# Patient Record
Sex: MALE | Race: WHITE | NOT HISPANIC OR LATINO | Employment: OTHER | URBAN - METROPOLITAN AREA
[De-identification: names, ages, dates, MRNs, and addresses within clinical notes are randomized per-mention and may not be internally consistent; named-entity substitution may affect disease eponyms.]

---

## 2017-02-14 ENCOUNTER — HOSPITAL ENCOUNTER (OUTPATIENT)
Dept: RADIOLOGY | Facility: HOSPITAL | Age: 82
Discharge: HOME | End: 2017-02-14
Attending: ORTHOPAEDIC SURGERY
Payer: MEDICARE

## 2017-02-14 ENCOUNTER — HOSPITAL ENCOUNTER (OUTPATIENT)
Dept: PREADMISSION TESTING | Facility: HOSPITAL | Age: 82
Discharge: HOME | End: 2017-02-14
Attending: ORTHOPAEDIC SURGERY
Payer: MEDICARE

## 2017-02-14 VITALS — WEIGHT: 225 LBS | BODY MASS INDEX: 33.33 KG/M2 | HEIGHT: 69 IN

## 2017-02-14 DIAGNOSIS — M17.12 OSTEOARTHRITIS OF LEFT KNEE, UNSPECIFIED OSTEOARTHRITIS TYPE: Primary | ICD-10-CM

## 2017-02-14 LAB
ALBUMIN SERPL BCP-MCNC: 3.9 G/DL
ALP SERPL-CCNC: 53 U/L
ALT SERPL W/O P-5'-P-CCNC: 9 U/L
ANION GAP SERPL CALC-SCNC: 9 MMOL/L
AST SERPL-CCNC: 15 U/L
BASOPHILS # BLD AUTO: 0 K/UL
BASOPHILS NFR BLD: 0.6 %
BILIRUB SERPL-MCNC: 0.6 MG/DL
BILIRUB UR QL STRIP: NEGATIVE
BUN SERPL-MCNC: 22 MG/DL
CALCIUM SERPL-MCNC: 9.3 MG/DL
CHLORIDE SERPL-SCNC: 103 MMOL/L
CLARITY UR: CLEAR
CO2 SERPL-SCNC: 27 MMOL/L
COLOR UR: YELLOW
CREAT SERPL-MCNC: 1.5 MG/DL
DIFFERENTIAL METHOD: ABNORMAL
EOSINOPHIL # BLD AUTO: 0.1 K/UL
EOSINOPHIL NFR BLD: 0.8 %
ERYTHROCYTE [DISTWIDTH] IN BLOOD BY AUTOMATED COUNT: 12.7 %
EST. GFR  (AFRICAN AMERICAN): 49 ML/MIN/1.73 M^2
EST. GFR  (NON AFRICAN AMERICAN): 43 ML/MIN/1.73 M^2
GLUCOSE SERPL-MCNC: 106 MG/DL
GLUCOSE UR QL STRIP: NEGATIVE
HCT VFR BLD AUTO: 41.2 %
HGB BLD-MCNC: 13.5 G/DL
HGB UR QL STRIP: NEGATIVE
KETONES UR QL STRIP: NEGATIVE
LEUKOCYTE ESTERASE UR QL STRIP: NEGATIVE
LYMPHOCYTES # BLD AUTO: 1.5 K/UL
LYMPHOCYTES NFR BLD: 24.2 %
MCH RBC QN AUTO: 31.3 PG
MCHC RBC AUTO-ENTMCNC: 32.7 %
MCV RBC AUTO: 96 FL
MONOCYTES # BLD AUTO: 0.6 K/UL
MONOCYTES NFR BLD: 8.8 %
NEUTROPHILS # BLD AUTO: 4.1 K/UL
NEUTROPHILS NFR BLD: 65.6 %
NITRITE UR QL STRIP: NEGATIVE
PH UR STRIP: 7 [PH] (ref 5–8)
PLATELET # BLD AUTO: 200 K/UL
PMV BLD AUTO: 7.1 FL
POTASSIUM SERPL-SCNC: 4.2 MMOL/L
PROT SERPL-MCNC: 7 G/DL
PROT UR QL STRIP: NEGATIVE
RBC # BLD AUTO: 4.31 M/UL
SODIUM SERPL-SCNC: 139 MMOL/L
SP GR UR STRIP: 1.01 (ref 1–1.03)
URN SPEC COLLECT METH UR: NORMAL
UROBILINOGEN UR STRIP-ACNC: NEGATIVE EU/DL
WBC # BLD AUTO: 6.3 K/UL

## 2017-02-14 PROCEDURE — 93005 ELECTROCARDIOGRAM TRACING: CPT

## 2017-02-14 PROCEDURE — 93010 ELECTROCARDIOGRAM REPORT: CPT | Mod: ,,, | Performed by: INTERNAL MEDICINE

## 2017-02-14 PROCEDURE — 81003 URINALYSIS AUTO W/O SCOPE: CPT

## 2017-02-14 PROCEDURE — 99900104 DSU ONLY-NO CHARGE-EA ADD'L HR (STAT)

## 2017-02-14 PROCEDURE — 87081 CULTURE SCREEN ONLY: CPT

## 2017-02-14 PROCEDURE — 71020 XR CHEST PA AND LATERAL PRE-OP: CPT | Mod: 26,,, | Performed by: RADIOLOGY

## 2017-02-14 PROCEDURE — 85025 COMPLETE CBC W/AUTO DIFF WBC: CPT

## 2017-02-14 PROCEDURE — 80053 COMPREHEN METABOLIC PANEL: CPT

## 2017-02-14 PROCEDURE — 99900103 DSU ONLY-NO CHARGE-INITIAL HR (STAT)

## 2017-02-14 PROCEDURE — 36415 COLL VENOUS BLD VENIPUNCTURE: CPT

## 2017-02-14 PROCEDURE — 71020 XR CHEST PA AND LATERAL PRE-OP: CPT | Mod: TC

## 2017-02-16 LAB — MRSA SPEC QL CULT: NORMAL

## 2017-02-21 ENCOUNTER — ANESTHESIA EVENT (OUTPATIENT)
Dept: SURGERY | Facility: HOSPITAL | Age: 82
DRG: 470 | End: 2017-02-21
Payer: MEDICARE

## 2017-02-22 ENCOUNTER — ANESTHESIA (OUTPATIENT)
Dept: SURGERY | Facility: HOSPITAL | Age: 82
DRG: 470 | End: 2017-02-22
Payer: MEDICARE

## 2017-02-22 ENCOUNTER — HOSPITAL ENCOUNTER (INPATIENT)
Facility: HOSPITAL | Age: 82
LOS: 2 days | Discharge: HOME HEALTH | DRG: 470 | End: 2017-02-24
Attending: ORTHOPAEDIC SURGERY | Admitting: INTERNAL MEDICINE
Payer: MEDICARE

## 2017-02-22 DIAGNOSIS — M17.12 OSTEOARTHRITIS OF LEFT KNEE, UNSPECIFIED OSTEOARTHRITIS TYPE: Primary | ICD-10-CM

## 2017-02-22 PROBLEM — Z96.652 STATUS POST TOTAL LEFT KNEE REPLACEMENT: Status: ACTIVE | Noted: 2017-02-22

## 2017-02-22 PROCEDURE — C2626 INFUSION PUMP, NON-PROG,TEMP: HCPCS | Performed by: ANESTHESIOLOGY

## 2017-02-22 PROCEDURE — 64450 NJX AA&/STRD OTHER PN/BRANCH: CPT | Mod: 59,LT,, | Performed by: ANESTHESIOLOGY

## 2017-02-22 PROCEDURE — 63600175 PHARM REV CODE 636 W HCPCS: Performed by: NURSE ANESTHETIST, CERTIFIED REGISTERED

## 2017-02-22 PROCEDURE — 36000710: Performed by: ORTHOPAEDIC SURGERY

## 2017-02-22 PROCEDURE — 63600175 PHARM REV CODE 636 W HCPCS: Performed by: ANESTHESIOLOGY

## 2017-02-22 PROCEDURE — 25000003 PHARM REV CODE 250: Performed by: ANESTHESIOLOGY

## 2017-02-22 PROCEDURE — 25000003 PHARM REV CODE 250: Performed by: ORTHOPAEDIC SURGERY

## 2017-02-22 PROCEDURE — 76942 ECHO GUIDE FOR BIOPSY: CPT | Performed by: ANESTHESIOLOGY

## 2017-02-22 PROCEDURE — 63600175 PHARM REV CODE 636 W HCPCS: Performed by: ORTHOPAEDIC SURGERY

## 2017-02-22 PROCEDURE — 71000039 HC RECOVERY, EACH ADD'L HOUR: Performed by: ORTHOPAEDIC SURGERY

## 2017-02-22 PROCEDURE — 37000009 HC ANESTHESIA EA ADD 15 MINS: Performed by: ORTHOPAEDIC SURGERY

## 2017-02-22 PROCEDURE — C1729 CATH, DRAINAGE: HCPCS | Performed by: ORTHOPAEDIC SURGERY

## 2017-02-22 PROCEDURE — 99900103 DSU ONLY-NO CHARGE-INITIAL HR (STAT): Performed by: ORTHOPAEDIC SURGERY

## 2017-02-22 PROCEDURE — 25000003 PHARM REV CODE 250: Performed by: INTERNAL MEDICINE

## 2017-02-22 PROCEDURE — 99222 1ST HOSP IP/OBS MODERATE 55: CPT | Mod: ,,, | Performed by: INTERNAL MEDICINE

## 2017-02-22 PROCEDURE — 27201423 OPTIME MED/SURG SUP & DEVICES STERILE SUPPLY: Performed by: ORTHOPAEDIC SURGERY

## 2017-02-22 PROCEDURE — 64448 NJX AA&/STRD FEM NRV NFS IMG: CPT | Mod: 59,LT,, | Performed by: ANESTHESIOLOGY

## 2017-02-22 PROCEDURE — 11000001 HC ACUTE MED/SURG PRIVATE ROOM

## 2017-02-22 PROCEDURE — C1776 JOINT DEVICE (IMPLANTABLE): HCPCS | Performed by: ORTHOPAEDIC SURGERY

## 2017-02-22 PROCEDURE — 99900104 DSU ONLY-NO CHARGE-EA ADD'L HR (STAT): Performed by: ORTHOPAEDIC SURGERY

## 2017-02-22 PROCEDURE — C1713 ANCHOR/SCREW BN/BN,TIS/BN: HCPCS | Performed by: ORTHOPAEDIC SURGERY

## 2017-02-22 PROCEDURE — D9220A PRA ANESTHESIA: Mod: ANES,,, | Performed by: ANESTHESIOLOGY

## 2017-02-22 PROCEDURE — 37000008 HC ANESTHESIA 1ST 15 MINUTES: Performed by: ORTHOPAEDIC SURGERY

## 2017-02-22 PROCEDURE — 63600175 PHARM REV CODE 636 W HCPCS: Performed by: INTERNAL MEDICINE

## 2017-02-22 PROCEDURE — 25000003 PHARM REV CODE 250: Performed by: NURSE ANESTHETIST, CERTIFIED REGISTERED

## 2017-02-22 PROCEDURE — 0SRD0J9 REPLACEMENT OF LEFT KNEE JOINT WITH SYNTHETIC SUBSTITUTE, CEMENTED, OPEN APPROACH: ICD-10-PCS | Performed by: ORTHOPAEDIC SURGERY

## 2017-02-22 PROCEDURE — 71000033 HC RECOVERY, INTIAL HOUR: Performed by: ORTHOPAEDIC SURGERY

## 2017-02-22 PROCEDURE — 76942 ECHO GUIDE FOR BIOPSY: CPT | Mod: 26,,, | Performed by: ANESTHESIOLOGY

## 2017-02-22 PROCEDURE — 36000711: Performed by: ORTHOPAEDIC SURGERY

## 2017-02-22 PROCEDURE — D9220A PRA ANESTHESIA: Mod: CRNA,,, | Performed by: NURSE ANESTHETIST, CERTIFIED REGISTERED

## 2017-02-22 RX ORDER — MORPHINE SULFATE 2 MG/ML
2 INJECTION, SOLUTION INTRAMUSCULAR; INTRAVENOUS EVERY 10 MIN PRN
Status: DISCONTINUED | OUTPATIENT
Start: 2017-02-22 | End: 2017-02-24 | Stop reason: HOSPADM

## 2017-02-22 RX ORDER — DEXTROSE MONOHYDRATE AND SODIUM CHLORIDE 5; .45 G/100ML; G/100ML
INJECTION, SOLUTION INTRAVENOUS CONTINUOUS
Status: DISCONTINUED | OUTPATIENT
Start: 2017-02-22 | End: 2017-02-23

## 2017-02-22 RX ORDER — ACETAMINOPHEN 500 MG
1000 TABLET ORAL EVERY 6 HOURS
Status: DISCONTINUED | OUTPATIENT
Start: 2017-02-22 | End: 2017-02-22 | Stop reason: SDUPTHER

## 2017-02-22 RX ORDER — MIDAZOLAM HYDROCHLORIDE 1 MG/ML
INJECTION, SOLUTION INTRAMUSCULAR; INTRAVENOUS
Status: DISCONTINUED | OUTPATIENT
Start: 2017-02-22 | End: 2017-02-22

## 2017-02-22 RX ORDER — ZOLPIDEM TARTRATE 5 MG/1
5 TABLET ORAL NIGHTLY PRN
COMMUNITY
End: 2017-04-26 | Stop reason: SDUPTHER

## 2017-02-22 RX ORDER — OXYCODONE HCL 10 MG/1
10 TABLET, FILM COATED, EXTENDED RELEASE ORAL EVERY 12 HOURS
Status: DISCONTINUED | OUTPATIENT
Start: 2017-02-22 | End: 2017-02-24 | Stop reason: HOSPADM

## 2017-02-22 RX ORDER — EPINEPHRINE CONVENIENCE KIT 1 MG/ML(1)
KIT INTRAMUSCULAR; SUBCUTANEOUS
Status: DISCONTINUED | OUTPATIENT
Start: 2017-02-22 | End: 2017-02-22 | Stop reason: HOSPADM

## 2017-02-22 RX ORDER — SODIUM CHLORIDE, SODIUM LACTATE, POTASSIUM CHLORIDE, CALCIUM CHLORIDE 600; 310; 30; 20 MG/100ML; MG/100ML; MG/100ML; MG/100ML
INJECTION, SOLUTION INTRAVENOUS CONTINUOUS
Status: DISCONTINUED | OUTPATIENT
Start: 2017-02-22 | End: 2017-02-22

## 2017-02-22 RX ORDER — ZOLPIDEM TARTRATE 5 MG/1
5 TABLET ORAL NIGHTLY PRN
Status: DISCONTINUED | OUTPATIENT
Start: 2017-02-22 | End: 2017-02-24 | Stop reason: HOSPADM

## 2017-02-22 RX ORDER — ONDANSETRON 2 MG/ML
INJECTION INTRAMUSCULAR; INTRAVENOUS
Status: DISCONTINUED | OUTPATIENT
Start: 2017-02-22 | End: 2017-02-22

## 2017-02-22 RX ORDER — FENTANYL CITRATE 50 UG/ML
INJECTION, SOLUTION INTRAMUSCULAR; INTRAVENOUS
Status: DISCONTINUED | OUTPATIENT
Start: 2017-02-22 | End: 2017-02-22

## 2017-02-22 RX ORDER — ONDANSETRON 2 MG/ML
4 INJECTION INTRAMUSCULAR; INTRAVENOUS DAILY PRN
Status: DISCONTINUED | OUTPATIENT
Start: 2017-02-22 | End: 2017-02-22 | Stop reason: HOSPADM

## 2017-02-22 RX ORDER — POLYETHYLENE GLYCOL 3350 17 G/17G
17 POWDER, FOR SOLUTION ORAL DAILY
Status: DISCONTINUED | OUTPATIENT
Start: 2017-02-22 | End: 2017-02-24 | Stop reason: HOSPADM

## 2017-02-22 RX ORDER — KETOROLAC TROMETHAMINE 30 MG/ML
INJECTION, SOLUTION INTRAMUSCULAR; INTRAVENOUS
Status: DISCONTINUED | OUTPATIENT
Start: 2017-02-22 | End: 2017-02-22 | Stop reason: HOSPADM

## 2017-02-22 RX ORDER — BUPIVACAINE HYDROCHLORIDE 2.5 MG/ML
INJECTION, SOLUTION EPIDURAL; INFILTRATION; INTRACAUDAL
Status: DISCONTINUED | OUTPATIENT
Start: 2017-02-22 | End: 2017-02-22

## 2017-02-22 RX ORDER — METHOCARBAMOL 500 MG/1
500 TABLET, FILM COATED ORAL 3 TIMES DAILY
Status: DISCONTINUED | OUTPATIENT
Start: 2017-02-22 | End: 2017-02-24 | Stop reason: HOSPADM

## 2017-02-22 RX ORDER — CELECOXIB 100 MG/1
200 CAPSULE ORAL ONCE
Status: DISCONTINUED | OUTPATIENT
Start: 2017-02-22 | End: 2017-02-22

## 2017-02-22 RX ORDER — ONDANSETRON 2 MG/ML
4 INJECTION INTRAMUSCULAR; INTRAVENOUS EVERY 12 HOURS PRN
Status: DISCONTINUED | OUTPATIENT
Start: 2017-02-22 | End: 2017-02-24 | Stop reason: HOSPADM

## 2017-02-22 RX ORDER — HYDROCODONE BITARTRATE AND ACETAMINOPHEN 5; 325 MG/1; MG/1
1 TABLET ORAL EVERY 4 HOURS PRN
Status: DISCONTINUED | OUTPATIENT
Start: 2017-02-22 | End: 2017-02-24 | Stop reason: HOSPADM

## 2017-02-22 RX ORDER — PREGABALIN 75 MG/1
75 CAPSULE ORAL 2 TIMES DAILY
Status: DISCONTINUED | OUTPATIENT
Start: 2017-02-22 | End: 2017-02-24 | Stop reason: HOSPADM

## 2017-02-22 RX ORDER — LIDOCAINE HYDROCHLORIDE 10 MG/ML
1 INJECTION, SOLUTION EPIDURAL; INFILTRATION; INTRACAUDAL; PERINEURAL ONCE
Status: COMPLETED | OUTPATIENT
Start: 2017-02-22 | End: 2017-02-22

## 2017-02-22 RX ORDER — ACETAMINOPHEN 10 MG/ML
1000 INJECTION, SOLUTION INTRAVENOUS ONCE
Status: DISCONTINUED | OUTPATIENT
Start: 2017-02-22 | End: 2017-02-22 | Stop reason: SDUPTHER

## 2017-02-22 RX ORDER — ROPIVACAINE HYDROCHLORIDE 5 MG/ML
INJECTION, SOLUTION EPIDURAL; INFILTRATION; PERINEURAL
Status: DISCONTINUED | OUTPATIENT
Start: 2017-02-22 | End: 2017-02-22 | Stop reason: HOSPADM

## 2017-02-22 RX ORDER — DIPHENHYDRAMINE HYDROCHLORIDE 50 MG/ML
12.5 INJECTION INTRAMUSCULAR; INTRAVENOUS
Status: DISCONTINUED | OUTPATIENT
Start: 2017-02-22 | End: 2017-02-24 | Stop reason: HOSPADM

## 2017-02-22 RX ORDER — ACETAMINOPHEN 325 MG/1
650 TABLET ORAL EVERY 6 HOURS
Status: DISCONTINUED | OUTPATIENT
Start: 2017-02-26 | End: 2017-02-24 | Stop reason: HOSPADM

## 2017-02-22 RX ORDER — BISACODYL 10 MG
10 SUPPOSITORY, RECTAL RECTAL EVERY 12 HOURS PRN
Status: DISCONTINUED | OUTPATIENT
Start: 2017-02-22 | End: 2017-02-24 | Stop reason: HOSPADM

## 2017-02-22 RX ORDER — CEFAZOLIN SODIUM 1 G/50ML
1 SOLUTION INTRAVENOUS ONCE
Status: COMPLETED | OUTPATIENT
Start: 2017-02-22 | End: 2017-02-22

## 2017-02-22 RX ORDER — CEFAZOLIN SODIUM 1 G/50ML
1 SOLUTION INTRAVENOUS
Status: COMPLETED | OUTPATIENT
Start: 2017-02-22 | End: 2017-02-23

## 2017-02-22 RX ORDER — CEFAZOLIN SODIUM 1 G/50ML
SOLUTION INTRAVENOUS
Status: DISPENSED
Start: 2017-02-22 | End: 2017-02-22

## 2017-02-22 RX ORDER — ONDANSETRON 4 MG/1
8 TABLET, ORALLY DISINTEGRATING ORAL EVERY 8 HOURS PRN
Status: DISCONTINUED | OUTPATIENT
Start: 2017-02-22 | End: 2017-02-24 | Stop reason: HOSPADM

## 2017-02-22 RX ORDER — ROPIVACAINE HYDROCHLORIDE 2 MG/ML
INJECTION, SOLUTION EPIDURAL; INFILTRATION; PERINEURAL
Status: DISCONTINUED | OUTPATIENT
Start: 2017-02-22 | End: 2017-02-22

## 2017-02-22 RX ORDER — OXYCODONE HYDROCHLORIDE 5 MG/1
10 TABLET ORAL
Status: DISCONTINUED | OUTPATIENT
Start: 2017-02-22 | End: 2017-02-24 | Stop reason: HOSPADM

## 2017-02-22 RX ORDER — FENTANYL CITRATE 50 UG/ML
25 INJECTION, SOLUTION INTRAMUSCULAR; INTRAVENOUS EVERY 5 MIN PRN
Status: DISCONTINUED | OUTPATIENT
Start: 2017-02-22 | End: 2017-02-22 | Stop reason: HOSPADM

## 2017-02-22 RX ORDER — ACETAMINOPHEN 10 MG/ML
1000 INJECTION, SOLUTION INTRAVENOUS EVERY 8 HOURS
Status: DISCONTINUED | OUTPATIENT
Start: 2017-02-22 | End: 2017-02-22 | Stop reason: HOSPADM

## 2017-02-22 RX ORDER — ASPIRIN 325 MG
325 TABLET ORAL 2 TIMES DAILY
Status: DISCONTINUED | OUTPATIENT
Start: 2017-02-22 | End: 2017-02-24 | Stop reason: HOSPADM

## 2017-02-22 RX ORDER — FAMOTIDINE 20 MG/1
20 TABLET, FILM COATED ORAL DAILY
Status: DISCONTINUED | OUTPATIENT
Start: 2017-02-22 | End: 2017-02-24 | Stop reason: HOSPADM

## 2017-02-22 RX ORDER — OXYCODONE HYDROCHLORIDE 5 MG/1
5 TABLET ORAL
Status: DISCONTINUED | OUTPATIENT
Start: 2017-02-22 | End: 2017-02-24 | Stop reason: HOSPADM

## 2017-02-22 RX ORDER — CELECOXIB 100 MG/1
400 CAPSULE ORAL ONCE
Status: COMPLETED | OUTPATIENT
Start: 2017-02-22 | End: 2017-02-22

## 2017-02-22 RX ORDER — METHOCARBAMOL 100 MG/ML
1000 INJECTION, SOLUTION INTRAMUSCULAR; INTRAVENOUS ONCE
Status: DISCONTINUED | OUTPATIENT
Start: 2017-02-22 | End: 2017-02-22 | Stop reason: SDUPTHER

## 2017-02-22 RX ORDER — PROPOFOL 10 MG/ML
VIAL (ML) INTRAVENOUS CONTINUOUS PRN
Status: DISCONTINUED | OUTPATIENT
Start: 2017-02-22 | End: 2017-02-22

## 2017-02-22 RX ORDER — CELECOXIB 100 MG/1
200 CAPSULE ORAL DAILY
Status: DISCONTINUED | OUTPATIENT
Start: 2017-02-23 | End: 2017-02-24 | Stop reason: HOSPADM

## 2017-02-22 RX ORDER — SODIUM CHLORIDE 9 MG/ML
INJECTION, SOLUTION INTRAVENOUS CONTINUOUS PRN
Status: DISCONTINUED | OUTPATIENT
Start: 2017-02-22 | End: 2017-02-22

## 2017-02-22 RX ADMIN — METHOCARBAMOL 1000 MG: 100 INJECTION INTRAMUSCULAR; INTRAVENOUS at 10:02

## 2017-02-22 RX ADMIN — ONDANSETRON 4 MG: 2 INJECTION, SOLUTION INTRAMUSCULAR; INTRAVENOUS at 07:02

## 2017-02-22 RX ADMIN — METHOCARBAMOL 500 MG: 500 TABLET ORAL at 01:02

## 2017-02-22 RX ADMIN — CELECOXIB 400 MG: 100 CAPSULE ORAL at 09:02

## 2017-02-22 RX ADMIN — ASPIRIN 325 MG ORAL TABLET 325 MG: 325 PILL ORAL at 09:02

## 2017-02-22 RX ADMIN — PROPOFOL 20 MCG/KG/MIN: 10 INJECTION, EMULSION INTRAVENOUS at 07:02

## 2017-02-22 RX ADMIN — POLYETHYLENE GLYCOL (3350) 17 G: 17 POWDER, FOR SOLUTION ORAL at 01:02

## 2017-02-22 RX ADMIN — PREGABALIN 75 MG: 75 CAPSULE ORAL at 09:02

## 2017-02-22 RX ADMIN — ROPIVACAINE HYDROCHLORIDE: 10 INJECTION, SOLUTION EPIDURAL at 09:02

## 2017-02-22 RX ADMIN — DEXTROSE AND SODIUM CHLORIDE: 5; .45 INJECTION, SOLUTION INTRAVENOUS at 10:02

## 2017-02-22 RX ADMIN — SODIUM CHLORIDE: 0.9 INJECTION, SOLUTION INTRAVENOUS at 06:02

## 2017-02-22 RX ADMIN — BUPIVACAINE HYDROCHLORIDE 20 ML: 2.5 INJECTION, SOLUTION EPIDURAL; INFILTRATION; INTRACAUDAL; PERINEURAL at 06:02

## 2017-02-22 RX ADMIN — CEFAZOLIN SODIUM 1 G: 1 SOLUTION INTRAVENOUS at 07:02

## 2017-02-22 RX ADMIN — SODIUM CHLORIDE, SODIUM LACTATE, POTASSIUM CHLORIDE, AND CALCIUM CHLORIDE: .6; .31; .03; .02 INJECTION, SOLUTION INTRAVENOUS at 06:02

## 2017-02-22 RX ADMIN — OXYCODONE HYDROCHLORIDE 10 MG: 10 TABLET, FILM COATED, EXTENDED RELEASE ORAL at 10:02

## 2017-02-22 RX ADMIN — LIDOCAINE HYDROCHLORIDE: 10 INJECTION, SOLUTION EPIDURAL; INFILTRATION; INTRACAUDAL; PERINEURAL at 06:02

## 2017-02-22 RX ADMIN — ZOLPIDEM TARTRATE 5 MG: 5 TABLET, FILM COATED ORAL at 09:02

## 2017-02-22 RX ADMIN — ROPIVACAINE HYDROCHLORIDE 20 ML: 2 INJECTION, SOLUTION EPIDURAL; INFILTRATION at 06:02

## 2017-02-22 RX ADMIN — METHOCARBAMOL 500 MG: 500 TABLET ORAL at 10:02

## 2017-02-22 RX ADMIN — FENTANYL CITRATE 50 MCG: 50 INJECTION INTRAMUSCULAR; INTRAVENOUS at 07:02

## 2017-02-22 RX ADMIN — SODIUM CHLORIDE, SODIUM LACTATE, POTASSIUM CHLORIDE, AND CALCIUM CHLORIDE: .6; .31; .03; .02 INJECTION, SOLUTION INTRAVENOUS at 08:02

## 2017-02-22 RX ADMIN — OXYCODONE HYDROCHLORIDE 10 MG: 5 TABLET ORAL at 01:02

## 2017-02-22 RX ADMIN — OXYCODONE HYDROCHLORIDE 10 MG: 10 TABLET, FILM COATED, EXTENDED RELEASE ORAL at 09:02

## 2017-02-22 RX ADMIN — OXYCODONE HYDROCHLORIDE 10 MG: 5 TABLET ORAL at 08:02

## 2017-02-22 RX ADMIN — FENTANYL CITRATE 50 MCG: 50 INJECTION INTRAMUSCULAR; INTRAVENOUS at 06:02

## 2017-02-22 RX ADMIN — CEFAZOLIN SODIUM 1 G: 1 SOLUTION INTRAVENOUS at 03:02

## 2017-02-22 RX ADMIN — OXYCODONE HYDROCHLORIDE 10 MG: 5 TABLET ORAL at 04:02

## 2017-02-22 RX ADMIN — SODIUM CHLORIDE 1000 ML: 0.9 INJECTION, SOLUTION INTRAVENOUS at 06:02

## 2017-02-22 RX ADMIN — FAMOTIDINE 20 MG: 20 TABLET, FILM COATED ORAL at 01:02

## 2017-02-22 RX ADMIN — ACETAMINOPHEN 1000 MG: 10 INJECTION, SOLUTION INTRAVENOUS at 07:02

## 2017-02-22 RX ADMIN — MIDAZOLAM 2 MG: 1 INJECTION INTRAMUSCULAR; INTRAVENOUS at 06:02

## 2017-02-22 NOTE — ANESTHESIA PROCEDURE NOTES
Spinal    Diagnosis: left TKA  Patient location during procedure: OR  Start time: 2/22/2017 7:10 AM  Timeout: 2/22/2017 7:10 AM  End time: 2/22/2017 7:15 AM  Staffing  Anesthesiologist: MARLEY MICHAEL II  Performed by: anesthesiologist   Preanesthetic Checklist  Completed: patient identified, site marked, surgical consent, pre-op evaluation, timeout performed, IV checked, risks and benefits discussed and monitors and equipment checked  Spinal Block  Patient position: sitting  Prep: ChloraPrep  Patient monitoring: heart rate  Approach: midline  Location: L3-4  Injection technique: single shot  CSF Fluid: clear free-flowing CSF  Needle  Needle type: Marc   Needle gauge: 25 G  Needle length: 3.5 in  Additional Documentation: incremental injection, negative aspiration for heme and no paresthesia on injection  Needle localization: anatomical landmarks  Assessment  Sensory level: T6   Dermatomal levels determined by pinch or prick  Ease of block: easy  Patient's tolerance of the procedure: comfortable throughout block and no complaints  Medications:  Bolus administered: 1.6 mL of 0.75 and with dextrose bupivacaine

## 2017-02-22 NOTE — ANESTHESIA POSTPROCEDURE EVALUATION
"Anesthesia Post Evaluation    Patient: Anthony Sheldon    Procedure(s) Performed: Procedure(s) (LRB):  ARTHROPLASTY-KNEE (Left)    Final Anesthesia Type: general  Patient location during evaluation: PACU  Patient participation: Yes- Able to Participate  Level of consciousness: awake and alert and oriented  Post-procedure vital signs: reviewed and stable  Pain management: adequate  Airway patency: patent  PONV status at discharge: No PONV  Anesthetic complications: no      Cardiovascular status: blood pressure returned to baseline  Respiratory status: unassisted, spontaneous ventilation and room air  Hydration status: euvolemic  Follow-up not needed.        Visit Vitals    BP (!) 144/61    Pulse 62    Temp 36.5 °C (97.7 °F) (Oral)    Resp 13    Ht 5' 9" (1.753 m)    Wt 102.1 kg (225 lb)    SpO2 96%    BMI 33.23 kg/m2       Pain/David Score: Pain Assessment Performed: Yes (2/22/2017  9:15 AM)  Presence of Pain: denies (2/22/2017  9:15 AM)  Pain Rating Prior to Med Admin: 0 (2/22/2017  9:52 AM)  David Score: 9 (2/22/2017  9:15 AM)      "

## 2017-02-22 NOTE — PLAN OF CARE
Patient is stable at this time.  VSS. Anesthesiologist at patient's bedside and is ok for patient to transfer to the floor.  Dressings to left knee remains clean, dry and intact.  Pain is a 0/10. Dorsalis pulses audible with doppler.  No complaints of nausea or vomiting.  Patient tolerating po intake well.

## 2017-02-22 NOTE — OP NOTE
DATE OF PROCEDURE:  02/22/2017    PREOPERATIVE DIAGNOSIS:  Left knee bone-on-bone osteoarthritis.    POSTOPERATIVE DIAGNOSIS:  Left knee bone-on-bone osteoarthritis.    PROCEDURE PERFORMED:  Left total knee arthroplasty.    ATTENDING PHYSICIAN:  Grupo Jose M.D.    FIRST ASSISTANT:  Garo Stephens.    ESTIMATED BLOOD LOSS:  Minimal.    IV FLUID:  Crystalloid.    ANESTHESIA:  Spinal anesthesia with block augmentation and sedation.    COMPONENTS UTILIZED:  A [employer] medical/micro port total knee arthroplasty   system, size 6 femur, size 6 tibia, size 10 mm spacer, size 35 mm tri-peg   patella button.    PROCEDURE IN DETAIL:  The patient was placed on the operating table in supine   position.  The knee was prepped and draped in the usual sterile manner for   surgery.  An anterior approach was undertaken to the knee and carried down   sharply through the skin.  The medial parapatellar tissues were visualized and   released.  There was a large effusion.  This was a valgus knee, so no medial   release was undertaken.  The knee was flexed to 90 degrees.  There was   full-thickness cartilaginous loss in the lateral compartment.  A drill was used   to gain access to the femur and intramedullary steve was inserted up the femoral   canal.  A cutting jig was pinned into position such that it would make a 5   degree valgus cut and take 9 mm of distal bone.  It was checked secondarily and   the cut was made.  We now placed a femoral sizer into position.  It sized to a   size 6.  Size 6 cutting jig was pinned into position and the cuts were made.  We   placed a femoral trial.  It fit ideally, it was extracted.  The tibia was   subluxed anteriorly.  A cutting jig was pinned into position such that it would   make a neutral cut and take 2 mm of lateral bone.  It was checked secondarily   and the cut was made.  We now placed a femoral trial and a tibial trial.  We had   full extension, full flexion, symmetric flexion and extension gaps.   The   patella was callipered and cut and a button was placed.  The construct trialed   perfectly with no liftoff.  We copiously irrigated.  We pulsed and irrigated and   dried the bony surfaces.  We mixed our bone cement and cemented first the   tibia, then the femur, then the patella.  All excess cement was removed at the   time of cementation and the construct was held in full extension until the   cement was completely hardened.  We copiously irrigated again.  The actual   spacer was tapped into position.  A drain was brought out laterally.  We closed   the extensor mechanism with a combination of #2 FiberWire and a running Quill   stitch.  We irrigated again, closed the subQ with 2-0 Vicryl and the skin with   PDS.  Sterile dressings were applied and the patient was taken to Recovery Room   in stable condition.      JANNY  dd: 02/22/2017 08:20:02 (CST)  td: 02/22/2017 09:20:05 (CST)  Doc ID   #[id number]  Job ID #[id number]    CC:

## 2017-02-22 NOTE — ANESTHESIA PROCEDURE NOTES
Peripheral    Patient location during procedure: pre-op   Block not for primary anesthetic.  Reason for block: at surgeon's request and post-op pain management   Post-op Pain Location: Left total knee arthroplasty  Start time: 2/22/2017 6:42 AM  Timeout: 2/22/2017 6:42 AM   End time: 2/22/2017 6:55 AM  Surgery related to: Left total knee arthroplasty  Staffing  Anesthesiologist: MARLEY MICHAEL II  Performed by: anesthesiologist   Preanesthetic Checklist  Completed: patient identified, site marked, surgical consent, pre-op evaluation, timeout performed, IV checked, risks and benefits discussed and monitors and equipment checked  Peripheral Block  Patient position: supine  Prep: ChloraPrep  Patient monitoring: heart rate, cardiac monitor, continuous pulse ox and frequent blood pressure checks  Block type: adductor canal  Laterality: left  Injection technique: continuous  Needle  Needle type: Tuohy   Needle length: 4 in  Needle localization: anatomical landmarks and ultrasound guidance  Catheter type: non-stimulating  Catheter size: 20 G  Test dose: lidocaine 1.5% with Epi 1-to-200,000 and negative   -ultrasound image captured on disc.  Assessment  Injection assessment: negative aspiration, negative parasthesia and local visualized surrounding nerve  Paresthesia pain: none  Heart rate change: no  Slow fractionated injection: yes  Medications:  Bolus administered: 20 mL of 0.2 ropivacaine  Epinephrine added: none

## 2017-02-22 NOTE — PROGRESS NOTES
Famotidine therapy for Anthony Sheldon [id number] has been evaluated according to the pharmacy practice protocol.      Based on the patient's Estimated Creatinine Clearance: 44.7 mL/min (based on Cr of 1.5)., famotidine therapy has been adjusted to 20 mg once daily.    Thank you,  Yovani Burger

## 2017-02-22 NOTE — ANESTHESIA PREPROCEDURE EVALUATION
PREANESTHESIA EVALUATION NOTE       IDENTIFYING DATA         A.  Patient is Anthony Sheldon, 82 y.o. male; Medical record number [id number].            1. Height: 69            2. Weight: 102              3. BMI:         B.  Planned procedure: Left Total knee arthroplasty       C.  Surgical date:2/22/17       D.  Attending surgeon: finger        HISTORY OF PRESENT ILLNESS   Pt is a 82 yhr old obese male with pmxh sig for chronic arthritis and insomnia, presents for the above.  Per pt, no history of cad, denies angina, denies cva/tia, denies copd/asthma, denies renal or hepatic insufficiency, denies seizure history, denies ANNALISA, DM, GERD, npo since [place]              PAST MEDICAL HISTORY  Past Medical History   Diagnosis Date    Arthritis     Dental bridge present      LOWER PARTIAL    DVT (deep venous thrombosis) 2014     right le after thr    Wears glasses                        PAST SURGICAL HISTORY  Past Surgical History   Procedure Laterality Date    Tonsillectomy  1950    Total hip arthroplasty Bilateral 2013 & 2014    Total shoulder arthroplasty Right 2005    Joint replacement       BILAT HIPS, RIGHT SHOULDER              HISTORY OF ANESTHESIA COMPLICATIONS  Denies Personal or Family History of Anesthesia Related Complications            REVIEW OF SYSTEMS  Neurological: Denies history of cerebrovascular disease, denies history of stroke, denies history of seizures  Psychological:  Denies history of mood disorders, denies mental disease/disorder, history psychosis, PTSD  HEENT: Denies recent cough, cold, fever, chills, denies rhinitis, denies URI symptoms  Endocrine: Denies thyroid disorder, denies Diabetes, denies adrenal disorders  Pulmonary: Denies asthma, denies COPD/emphysema/chronic bronchitis, denies restrictive lung ds/do, denies recent history of pneumonia  Cardiac: Denies cardiac defect, denies CAD, denies history of infarction, denies history of arrhythmias, denies pacemaker/AICD, denies  chest pain/discomfort  Renal: Denies renal impairment or hemodialysis  Hepatic: Denies hepatic disease, hepatic impairment, hepatitis   Hematologic: Denies bleeding disorder              ALLERGY  Review of patient's allergies indicates:  No Known Allergies       MEDICATIONS    Current Facility-Administered Medications:     acetaminophen (10 mg/mL) injection 1,000 mg, 1,000 mg, Intravenous, Q8H, Jamari Mcintyre MD    ceFAZolin (ANCEF) 1 gram in dextrose 5 % 50 mL IVPB (premix), 1 g, Intravenous, Once, Grupo Jose MD    ceFAZolin 1 g/50ml Dextrose IVPB (ANCEF) 1 gram/50 mL IVPB, , , ,     lactated ringers infusion, , Intravenous, Continuous, Scar Sykes MD, Last Rate: 10 mL/hr at 02/22/17 0610     Current Discharge Medication List      CONTINUE these medications which have NOT CHANGED    Details   zolpidem (AMBIEN) 5 MG Tab Take 5 mg by mouth nightly as needed.      cetirizine (ZYRTEC) 10 MG tablet Take 10 mg by mouth once daily.      multivitamin (ONE DAILY MULTIVITAMIN) per tablet Take 1 tablet by mouth once daily.              Herbal Medications:         PSYCHOSOCIAL HISTORY  Social History     Social History    Marital status:      Spouse name: N/A    Number of children: N/A    Years of education: N/A     Social History Main Topics    Smoking status: Former Smoker     Types: Cigarettes    Smokeless tobacco: Former User     Quit date: 1/1/1990    Alcohol use 0.0 oz/week     0 Standard drinks or equivalent per week    Drug use: No    Sexual activity: Not Asked     Other Topics Concern    None     Social History Narrative                    REVIEW OF LAB   No results for input(s): NA, K, CL, CO2, BUN, CREATININE, LABGLOM, GLUCOSE, CALCIUM in the last 168 hours.     No results for input(s): WBC, HGB, HCT, PLT in the last 168 hours.     No results for input(s): APTT, INR, PTT in the last 168 hours.        PHYSICAL EXAM    Wt Readings from Last 3 Encounters:   02/22/17 102.1 kg (225 lb)    17 102.1 kg (225 lb)   10/06/16 97.5 kg (215 lb)     Temp Readings from Last 3 Encounters:   17 36.8 °C (98.3 °F) (Oral)   10/06/16 36.8 °C (98.2 °F) (Oral)   16 36.7 °C (98.1 °F) (Oral)     BP Readings from Last 3 Encounters:   17 (!) 154/81   10/06/16 138/68   16 138/78     Pulse Readings from Last 3 Encounters:   17 70   10/06/16 74   16 64                     B.  Physical Examination:   General appearance - alert, well appearing, and in no distress  Mental status - alert, oriented to person, place, and time  Chest - clear to auscultation, no wheezes, rales or rhonchi, symmetric air entry  Heart - normal rate, regular rhythm, normal S1, S2, no murmurs, rubs, clicks or gallops  Neurological - alert, oriented, normal speech, no focal findings or movement disorder noted  Extremities - peripheral pulses normal, no pedal edema, no clubbing or cyanosis  Skin - normal coloration and turgor, no rashes, no suspicious skin lesions noted                NPO since: After midnight      AIRWAY            A.  Mallampati classification: 2          B.  Thyromental distance: 4-5          C.  Neck exam: from          D.  Dentition: upper bridge          E.  Mouth openin+     ANESTHESIA PLAN           A.  Anesthesia plan: spinal         B.  A.S.A.-P.S.: 2         C. Post operative pain management discussed with patient: iv/po, regional;         D.  The anesthetic plan was explained. We discussed the risks, benefits,    alternatives to therapy, and potential complications including but not limited to:   sore throat, risk of damage to teeth, increased risk of heart attack and stroke,   nausea, emesis, risk of reaction of medications, allergic reactions, risk of    malignant hyperthermia, musculoskeletal pain, positioning/nerve injury, risk of   infection/bleeding associated with regional anesthesia, risk of ventilatory support   post operatively, emergence delerium, death         E.  The  patient is currently on beta blocker medications: no      The patient is at mod risk for venous thromboembolism as determined by the [facility] guidelines for prevention of VTE in adult surgical patients.    Pending Results: no    Consultation(s): no    Khadar Ospina II, MD  Department of anesthesiology   [facility]  Ext [phone]                                                                                                              02/22/2017  Anthony Sheldon is a 82 y.o., male.    [facility] Anesthesia Evaluation         Review of Systems         Anesthesia Plan  Type of Anesthesia, risks & benefits discussed:  Anesthesia Type:  spinal  Patient's Preference:   Intra-op Monitoring Plan:   Intra-op Monitoring Plan Comments:   Post Op Pain Control Plan:   Post Op Pain Control Plan Comments:   Induction:   IV  Beta Blocker:  Patient is not currently on a Beta-Blocker (No further documentation required).       Informed Consent: Patient understands risks and agrees with Anesthesia plan.  Questions answered. Anesthesia consent signed with patient.  ASA Score: 2     Day of Surgery Review of History & Physical:    H&P update referred to the surgeon.         Ready For Surgery From Anesthesia Perspective.

## 2017-02-22 NOTE — BRIEF OP NOTE
[facility]  Brief Operative Note    SUMMARY     Surgery Date: 2/22/2017     Surgeon(s) and Role:     * Grupo Jose MD - Primary    Assisting Surgeon: Angelo    Pre-op Diagnosis:  Arthritis of knee, left [M19.90]    Post-op Diagnosis:  Post-Op Diagnosis Codes:     * Arthritis of knee, left [M19.90]    Procedure(s) (LRB):  ARTHROPLASTY-KNEE (Left)    Anesthesia: General    Description of Procedure: L tka    Description of the findings of the procedure: L tka    Estimated Blood Loss: 0Specimens: none  Specimen     None

## 2017-02-22 NOTE — ANESTHESIA PROCEDURE NOTES
Peripheral    Patient location during procedure: pre-op   Block not for primary anesthetic.  Reason for block: at surgeon's request and post-op pain management   Post-op Pain Location: left knee  Start time: 2/22/2017 6:42 AM  Timeout: 2/22/2017 6:42 AM   End time: 2/22/2017 6:55 AM  Surgery related to: Left TKA  Staffing  Anesthesiologist: MARLEY MICHAEL II  Performed by: anesthesiologist   Preanesthetic Checklist  Completed: patient identified, site marked, surgical consent, pre-op evaluation, timeout performed, IV checked, risks and benefits discussed and monitors and equipment checked  Peripheral Block  Patient position: supine  Prep: ChloraPrep  Patient monitoring: heart rate, cardiac monitor, continuous pulse ox and frequent blood pressure checks  Block type: I PACK  Laterality: left  Injection technique: single shot  Needle  Needle type: Stimuplex   Needle gauge: 21 G  Needle length: 4 in  Needle localization: anatomical landmarks and ultrasound guidance   -ultrasound image captured on disc.  Assessment  Injection assessment: negative aspiration, negative parasthesia and local visualized surrounding nerve  Paresthesia pain: none  Heart rate change: no  Slow fractionated injection: yes  Medications:  Bolus administered: 20 mL of 0.25 bupivacaine  Epinephrine added: 5 mcg/mL (1/200,000)

## 2017-02-22 NOTE — IP AVS SNAPSHOT
[address] [place] [address]  Phone: [phone]           Patient Discharge Instructions     Our goal is to set you up for success. This packet includes information on your condition, medications, and your home care. It will help you to care for yourself so you don't get sicker and need to go back to the hospital.     Please ask your nurse if you have any questions.        There are many details to remember when preparing to leave the hospital. Here is what you will need to do:    1. Take your medicine. If you are prescribed medications, review your Medication List in the following pages. You may have new medications to  at the pharmacy and others that you'll need to stop taking. Review the instructions for how and when to take your medications. Talk with your doctor or nurses if you are unsure of what to do.     2. Go to your follow-up appointments. Specific follow-up information is listed in the following pages. Your may be contacted by a transition nurse or clinical provider about future appointments. Be sure we have all of the phone numbers to reach you, if needed. Please contact your provider's office if you are unable to make an appointment.     3. Watch for warning signs. Your doctor or nurse will give you detailed warning signs to watch for and when to call for assistance. These instructions may also include educational information about your condition. If you experience any of warning signs to your health, call your doctor.               ** Verify the list of medication(s) below is accurate and up to date. Carry this with you in case of emergency. If your medications have changed, please notify your healthcare provider.             Medication List      START taking these medications        Additional Info                      aspirin 325 MG tablet   Refills:  0   Dose:  325 mg    Last time this was given:  325 mg on 2/24/2017  8:54 AM   Instructions:   Take 1 tablet (325 mg total) by mouth 2 (two) times daily.     Begin Date    AM    Noon    PM    Bedtime       oxycodone 10 mg Tab immediate release tablet   Commonly known as:  ROXICODONE   Refills:  0   Dose:  10 mg    Last time this was given:  10 mg on 2/24/2017  5:15 AM   Instructions:  Take 1 tablet (10 mg total) by mouth every 6 (six) hours as needed (pain 6-7/10).     Begin Date    AM    Noon    PM    Bedtime         CONTINUE taking these medications        Additional Info                      cetirizine 10 MG tablet   Commonly known as:  ZYRTEC   Refills:  0   Dose:  10 mg    Instructions:  Take 10 mg by mouth once daily.     Begin Date    AM    Noon    PM    Bedtime       ONE DAILY MULTIVITAMIN per tablet   Refills:  0   Dose:  1 tablet   Generic drug:  multivitamin    Instructions:  Take 1 tablet by mouth once daily.     Begin Date    AM    Noon    PM    Bedtime       zolpidem 5 MG Tab   Commonly known as:  AMBIEN   Refills:  0   Dose:  5 mg    Last time this was given:  5 mg on 2/23/2017  9:26 PM   Instructions:  Take 5 mg by mouth nightly as needed.     Begin Date    AM    Noon    PM    Bedtime            Where to Get Your Medications      You can get these medications from any pharmacy     You don't need a prescription for these medications     aspirin 325 MG tablet         Information about where to get these medications is not yet available     ! Ask your nurse or doctor about these medications     oxycodone 10 mg Tab immediate release tablet                  Please bring to all follow up appointments:    1. A copy of your discharge instructions.  2. All medicines you are currently taking in their original bottles.  3. Identification and insurance card.    Please arrive 15 minutes ahead of scheduled appointment time.    Please call 24 hours in advance if you must reschedule your appointment and/or time.        Your Scheduled Appointments     Mar 30, 2017  9:00 AM JOSYT   LAB-OCC with  - LAB   [place]  UNRULY Melgar III, M.D. ([facility])    [address]  [place] [address]   [phone]            Apr 06, 2017  9:50 AM CDT   Established Patient with MD Perfecto Castellon III, III, M.D. ([facility])    [address]  [place] [address]   [phone]              Follow-up Information     Follow up with Grupo Jose MD On 3/9/2017.    Specialty:  Orthopedic Surgery    Why:  @1:00pm     Contact information:    [address]    [place] [address]  [phone]          Follow up with Perfecto Melgar III, MD In 1 week.    Specialty:  Internal Medicine        Follow up with [facility].    Why:  [facility]    Contact information:    [address] [facility] [address]  [phone]      Referrals     Future Orders    Referral to [facility]         Discharge Instructions     Future Orders    Call MD for:     Comments:    For worsening symptoms, chest pain, shortness of breath, increased abdominal pain, high grade fever, stroke or stroke like symptoms, immediately go to the nearest Emergency Room or call 911 as soon as possible.    Diet general     Comments:    Cardiac/ 2 gram sodium low cholesterol diet    Questions:    Total calories:      Fat restriction, if any:      Protein restriction, if any:      Na restriction, if any:      Fluid restriction:      Additional restrictions:      Other restrictions (specify):     Scheduling Instructions:    Weight bearing as tolerated to left lower extremity    Comments:    Fall precautions        Primary Diagnosis     Your primary diagnosis was:  Status Post Total Left Knee Replacement      Admission Information     Date & Time Provider Department [facility]    2/22/2017  5:33 AM Elissa Castañeda MD [facility] [phone]      Care Providers     Provider Role Specialty Primary office phone    Elissa Castañeda MD Attending Provider Internal Medicine [phone]    Grupo Jose MD Surgeon   Orthopedic Surgery [phone]    Grupo Jose MD Consulting Physician  Orthopedic Surgery  [phone]      Important Medicare Message          Most Recent Value    Important Message from Medicare Regarding Discharge Appeal Rights  Explained to patient/caregiver, Signed/date by patient/caregiver yes 02/24/2017 0830      Your Vitals Were     BP                   137/77           Recent Lab Values     No lab values to display.      Allergies as of 2/24/2017     No Known Allergies      [facility]     [facility] Nurse Care Line - 24/7 Assistance  Unless otherwise directed by your provider, please contact [facility], our nurse care line that is available for 24/7 assistance.     Registered nurses in the [facility] provide clinical advisement, health education, appointment booking, and other advisory services.  Call for this free service at [phone].        Advance Directives     An advance directive is a document which, in the event you are no longer able to make decisions for yourself, tells your healthcare team what kind of treatment you do or do not want to receive, or who you would like to make those decisions for you.  If you do not currently have an advance directive, [facility] encourages you to create one.  For more information call:  [phone]), [phone] ([phone]),  or log on to [email or web address].        Smoking Cessation     If you would like to quit smoking:   You may be eligible for free services if you are a [place] resident and started smoking cigarettes before September 1, 1988.  Call the Smoking Cessation Trust (SCT) toll free at (224) 964-2523 or (346) 768-9978.   Call 9-140-QUIT-NOW if you do not meet the above criteria.            Language Assistance Services     ATTENTION: Language assistance services are available, free of charge. Please call [phone].      ATENCIÓN: Si yasmanyla lulu, tiene a hickey disposición servicios  cassidyos de asistencia lingüística. Dianna denton [phone].     SEBAS Ý: N?u b?n nói Ti?ng Vi?t, có các d?ch v? h? tr? ngôn ng? mi?n phí dành cho b?n. G?i s? [phone].        Chronic Kindey Disease Education              [facility] complies with applicable Federal civil rights laws and does not discriminate on the basis of race, color, national origin, age, disability, or sex.

## 2017-02-22 NOTE — TRANSFER OF CARE
"Anesthesia Transfer of Care Note    Patient: Anthony Sheldon    Procedure(s) Performed: Procedure(s) (LRB):  ARTHROPLASTY-KNEE (Left)    Patient location: PACU    Anesthesia Type: spinal, regional and MAC    Transport from OR: Transported from OR on room air with adequate spontaneous ventilation    Post pain: adequate analgesia    Post assessment: no apparent anesthetic complications and tolerated procedure well    Post vital signs: stable    Level of consciousness: awake, alert and oriented    Nausea/Vomiting: no nausea/vomiting    Complications: none          Last vitals:   Visit Vitals    BP (!) 154/81 (BP Location: Left arm, Patient Position: Lying, BP Method: Automatic)    Pulse 70    Temp 36.8 °C (98.3 °F) (Oral)    Resp 20    Ht 5' 9" (1.753 m)    Wt 102.1 kg (225 lb)    SpO2 97%    BMI 33.23 kg/m2     "

## 2017-02-23 LAB
ANION GAP SERPL CALC-SCNC: 6 MMOL/L
BASOPHILS # BLD AUTO: 0 K/UL
BASOPHILS # BLD AUTO: 0 K/UL
BASOPHILS NFR BLD: 0.2 %
BASOPHILS NFR BLD: 0.2 %
BUN SERPL-MCNC: 17 MG/DL
CALCIUM SERPL-MCNC: 8 MG/DL
CHLORIDE SERPL-SCNC: 103 MMOL/L
CO2 SERPL-SCNC: 25 MMOL/L
CREAT SERPL-MCNC: 1.5 MG/DL
DIFFERENTIAL METHOD: ABNORMAL
DIFFERENTIAL METHOD: ABNORMAL
EOSINOPHIL # BLD AUTO: 0.1 K/UL
EOSINOPHIL # BLD AUTO: 0.1 K/UL
EOSINOPHIL NFR BLD: 1.4 %
EOSINOPHIL NFR BLD: 1.4 %
ERYTHROCYTE [DISTWIDTH] IN BLOOD BY AUTOMATED COUNT: 13.1 %
ERYTHROCYTE [DISTWIDTH] IN BLOOD BY AUTOMATED COUNT: 13.1 %
EST. GFR  (AFRICAN AMERICAN): 49 ML/MIN/1.73 M^2
EST. GFR  (NON AFRICAN AMERICAN): 43 ML/MIN/1.73 M^2
GLUCOSE SERPL-MCNC: 181 MG/DL
HCT VFR BLD AUTO: 32 %
HCT VFR BLD AUTO: 32 %
HGB BLD-MCNC: 11 G/DL
HGB BLD-MCNC: 11 G/DL
LYMPHOCYTES # BLD AUTO: 1.1 K/UL
LYMPHOCYTES # BLD AUTO: 1.1 K/UL
LYMPHOCYTES NFR BLD: 18.6 %
LYMPHOCYTES NFR BLD: 18.6 %
MCH RBC QN AUTO: 32.3 PG
MCH RBC QN AUTO: 32.3 PG
MCHC RBC AUTO-ENTMCNC: 34.3 %
MCHC RBC AUTO-ENTMCNC: 34.3 %
MCV RBC AUTO: 94 FL
MCV RBC AUTO: 94 FL
MONOCYTES # BLD AUTO: 0.7 K/UL
MONOCYTES # BLD AUTO: 0.7 K/UL
MONOCYTES NFR BLD: 12.3 %
MONOCYTES NFR BLD: 12.3 %
NEUTROPHILS # BLD AUTO: 3.8 K/UL
NEUTROPHILS # BLD AUTO: 3.8 K/UL
NEUTROPHILS NFR BLD: 67.5 %
NEUTROPHILS NFR BLD: 67.5 %
PLATELET # BLD AUTO: 157 K/UL
PLATELET # BLD AUTO: 157 K/UL
PMV BLD AUTO: 7.4 FL
PMV BLD AUTO: 7.4 FL
POTASSIUM SERPL-SCNC: 4.5 MMOL/L
RBC # BLD AUTO: 3.4 M/UL
RBC # BLD AUTO: 3.4 M/UL
SODIUM SERPL-SCNC: 134 MMOL/L
WBC # BLD AUTO: 5.7 K/UL
WBC # BLD AUTO: 5.7 K/UL

## 2017-02-23 PROCEDURE — 36415 COLL VENOUS BLD VENIPUNCTURE: CPT

## 2017-02-23 PROCEDURE — 97530 THERAPEUTIC ACTIVITIES: CPT

## 2017-02-23 PROCEDURE — 99232 SBSQ HOSP IP/OBS MODERATE 35: CPT | Mod: ,,, | Performed by: INTERNAL MEDICINE

## 2017-02-23 PROCEDURE — 80048 BASIC METABOLIC PNL TOTAL CA: CPT

## 2017-02-23 PROCEDURE — 63600175 PHARM REV CODE 636 W HCPCS: Performed by: ORTHOPAEDIC SURGERY

## 2017-02-23 PROCEDURE — 11000001 HC ACUTE MED/SURG PRIVATE ROOM

## 2017-02-23 PROCEDURE — 63600175 PHARM REV CODE 636 W HCPCS: Performed by: ANESTHESIOLOGY

## 2017-02-23 PROCEDURE — 25000003 PHARM REV CODE 250: Performed by: ORTHOPAEDIC SURGERY

## 2017-02-23 PROCEDURE — 97116 GAIT TRAINING THERAPY: CPT

## 2017-02-23 PROCEDURE — 25000003 PHARM REV CODE 250: Performed by: ANESTHESIOLOGY

## 2017-02-23 PROCEDURE — 97162 PT EVAL MOD COMPLEX 30 MIN: CPT

## 2017-02-23 PROCEDURE — 85025 COMPLETE CBC W/AUTO DIFF WBC: CPT

## 2017-02-23 RX ADMIN — CEFAZOLIN SODIUM 1 G: 1 SOLUTION INTRAVENOUS at 09:02

## 2017-02-23 RX ADMIN — METHOCARBAMOL 500 MG: 500 TABLET ORAL at 05:02

## 2017-02-23 RX ADMIN — CEFAZOLIN SODIUM 1 G: 1 SOLUTION INTRAVENOUS at 12:02

## 2017-02-23 RX ADMIN — ASPIRIN 325 MG ORAL TABLET 325 MG: 325 PILL ORAL at 09:02

## 2017-02-23 RX ADMIN — OXYCODONE HYDROCHLORIDE 10 MG: 5 TABLET ORAL at 12:02

## 2017-02-23 RX ADMIN — METHOCARBAMOL 500 MG: 500 TABLET ORAL at 09:02

## 2017-02-23 RX ADMIN — ZOLPIDEM TARTRATE 5 MG: 5 TABLET, FILM COATED ORAL at 09:02

## 2017-02-23 RX ADMIN — ONDANSETRON 4 MG: 2 INJECTION INTRAMUSCULAR; INTRAVENOUS at 10:02

## 2017-02-23 RX ADMIN — CELECOXIB 200 MG: 100 CAPSULE ORAL at 09:02

## 2017-02-23 RX ADMIN — PREGABALIN 75 MG: 75 CAPSULE ORAL at 09:02

## 2017-02-23 RX ADMIN — OXYCODONE HYDROCHLORIDE 10 MG: 5 TABLET ORAL at 05:02

## 2017-02-23 RX ADMIN — OXYCODONE HYDROCHLORIDE 10 MG: 10 TABLET, FILM COATED, EXTENDED RELEASE ORAL at 09:02

## 2017-02-23 RX ADMIN — POLYETHYLENE GLYCOL (3350) 17 G: 17 POWDER, FOR SOLUTION ORAL at 09:02

## 2017-02-23 RX ADMIN — METHOCARBAMOL 500 MG: 500 TABLET ORAL at 03:02

## 2017-02-23 RX ADMIN — FAMOTIDINE 20 MG: 20 TABLET, FILM COATED ORAL at 09:02

## 2017-02-23 RX ADMIN — DEXTROSE AND SODIUM CHLORIDE: 5; .45 INJECTION, SOLUTION INTRAVENOUS at 12:02

## 2017-02-23 RX ADMIN — OXYCODONE HYDROCHLORIDE 10 MG: 5 TABLET ORAL at 02:02

## 2017-02-23 NOTE — PLAN OF CARE
Problem: Patient Care Overview  Goal: Plan of Care Review  Outcome: Ongoing (interventions implemented as appropriate)  Pt C/O intermittent pain to left knee releived with PO Oxycodone. SCD's and foot pump on. Frequent neurovascular checks to left leg, only able to doppler left pedal pulses. Foot remains warm without discoloration. Pt remains afebrile.

## 2017-02-23 NOTE — PROGRESS NOTES
Progress Note  Hospital Medicine  Patient Name:Anthony Sheldon  MRN:  [id number]  Patient Class: IP- Inpatient  Admit Date: 2/22/2017  Length of Stay: 1 days  Expected Discharge Date:   Attending Physician: Elissa Castañeda MD  Primary Care Provider:  Perfecto Melgar III, MD    SUBJECTIVE:     Principal Problem: Status post total left knee replacement  Initial history of present illness: Patient is a 82 y.o. male admitted to Hospitalist Service from [facility] s/p left total knee arthroplasty performed by Dr. Jose. Patient reportedly has past medical history significant for OA and history of RLE DVT and prior history of smoking. Patient denied chest pain, shortness of breath, abdominal pain, nausea, vomiting, headache, vision changes, focal neuro-deficits, cough or fever.    PMH/PSH/SH/FH/Meds: reviewed.    Symptoms/Review of Systems: Doing well. Pain controlled. Participating with PT. No shortness of breath, cough, chest pain or headache, fever or abdominal pain.     Diet:  Adequate intake.    Activity level: Normal.    Pain:  Patient reports no pain.       OBJECTIVE:   Vital Signs (Most Recent):      Temp: 98.8 °F (37.1 °C) (02/23/17 0700)  Pulse: 64 (02/23/17 0700)  Resp: 16 (02/23/17 0700)  BP: 131/68 (02/23/17 0700)  SpO2: 95 % (02/23/17 0700)       Vital Signs Range (Last 24H):  Temp:  [97.6 °F (36.4 °C)-98.8 °F (37.1 °C)]   Pulse:  [54-66]   Resp:  [10-20]   BP: (115-159)/(56-70)   SpO2:  [95 %-100 %]     Weight: 102.1 kg (225 lb)  Body mass index is 33.23 kg/(m^2).    Intake/Output Summary (Last 24 hours) at 02/23/17 0921  Last data filed at 02/23/17 0500   Gross per 24 hour   Intake          1496.25 ml   Output             1600 ml   Net          -103.75 ml     Physical Examination:  General appearance: well developed, appears stated age  Head: normocephalic, atraumatic  Eyes: conjunctivae/corneas clear. PERRL.  Nose: Nares normal. Septum midline.  Throat: lips, mucosa, and tongue normal; teeth and gums  normal, no throat erythema.  Neck: supple, symmetrical, trachea midline, no JVD and thyroid not enlarged, symmetric, no tenderness/mass/nodules  Lungs: clear to auscultation bilaterally and normal respiratory effort  Chest wall: no tenderness  Heart: regular rate and rhythm, S1, S2 normal, no murmur, click, rub or gallop  Abdomen: soft, non-tender non-distented; bowel sounds normal; no masses, no organomegaly  Extremities: no cyanosis, clubbing or edema. Right knee dressing C/D/I.  Pulses: 2+ and symmetric  Skin: Skin color, texture, turgor normal. No rashes or lesions.  Lymph nodes: Cervical, supraclavicular, and axillary nodes normal.  Neurologic: Normal strength and tone. No focal numbness or weakness. CNII-XII intact.     CBC:    Recent Labs  Lab 02/23/17  0512   WBC 5.70  5.70   RBC 3.40*  3.40*   HGB 11.0*  11.0*   HCT 32.0*  32.0*     157   MCV 94  94   MCH 32.3*  32.3*   MCHC 34.3  34.3   BMP    Recent Labs  Lab 02/23/17  0512   *   *   K 4.5      CO2 25   BUN 17   CREATININE 1.5*   CALCIUM 8.0*      Diagnostic Results:  Microbiology Results (last 7 days)     ** No results found for the last 168 hours. **      Left knee X-Ray: Left knee joint replacement.    Assessment/Plan:     Active Hospital Problems    Diagnosis  POA    *Status post total left knee replacement [Z96.652]  Not Applicable    Osteoarthritis of left knee [M17.12]  Yes    CKD (chronic kidney disease) stage 3, GFR 30-59 ml/min [N18.3]  Yes    DVT (deep venous thrombosis) [I82.409]  Yes     right le after thr        Resolved Hospital Problems    Diagnosis Date Resolved POA   No resolved problems to display.   Continue to follow Orthopedic recommendations.  Heplock IVF.  Needs aggressive incentive spirometry.  Follow hemoglobin and hematocrit closely.  Pain control with PO narcotics and antiemetics as needed.  Physical therapy as per Orthopedics protocol with fall precautions.  Gastric ulcer prophylaxis with  gastric acid suppressant.   Deep venous thrombosis prophylaxis - Aspirin 325 mg po bid as per Dr. Jose.  See Orders.    VTE Risk Mitigation         Ordered     Place sequential compression device  Until discontinued      02/22/17 1039     Medium Risk of VTE  Once      02/22/17 1038        Elissa Castañeda MD  Department of Hospital Medicine   [facility]

## 2017-02-23 NOTE — PLAN OF CARE
Problem: Patient Care Overview  Goal: Plan of Care Review  Outcome: Ongoing (interventions implemented as appropriate)  Patient aaox4. Pain controlled with po pain medicine and Qball. Cryotherapy to left knee. Torres to gravity. Tameka drain emptied during shift. Patient free from  Falls will continue to monitor.

## 2017-02-23 NOTE — PT/OT/SLP PROGRESS
Physical Therapy  Treatment    Anthony Sheldon   MRN: [id number]   Admitting Diagnosis: Status post total left knee replacement    PT Received On: 17  PT Start Time: 1354     PT Stop Time: 1417    PT Total Time (min): 23 min       Billable Minutes:  Gait [id number] and Therapeutic Activity 8    Treatment Type: Treatment  PT/PTA: PT             General Precautions: Standard, fall  Orthopedic Precautions: LLE weight bearing as tolerated   Braces: N/A         Subjective:  Communicated with nurse daniel prior to session.  Pt alert and agreeable to PT  Stated will need pain meds post PT    Pain Ratin/10  Location - Side: Left     Location: knee  Pain Addressed: Reposition, Nurse notified       Objective:   Patient found with: perineural catheter, Polar ice    Functional Mobility:  Bed Mobility:   Rolling/Turning to Left: Minimum assistance  Supine to Sit: Minimum Assistance    Transfers:  Sit <> Stand Assistance: Minimum Assistance  Sit <> Stand Assistive Device: Rolling Walker  Bed <> Chair Technique: Stand Pivot  Bed <> Chair Assistance: Minimum Assistance  Bed <> Chair Assistive Device: Rolling Walker    Gait:   Gait Distance: 80 ft  Assistance 1: Minimum assistance  Gait Assistive Device: Rolling walker  Gait Pattern: 3-point gait  Gait Deviation(s): decreased dionicio, increased time in double stance, decreased velocity of limb motion, decreased step length, decreased weight-shifting ability    Stairs:      Balance:   Static Sit: FAIR: Maintains without assist, but unable to take any challenges   Dynamic Sit: FAIR: Cannot move trunk without losing balance  Static Stand: FAIR: Maintains without assist but unable to take challenges  Dynamic stand: FAIR: Needs CONTACT GUARD during gait     Therapeutic Activities and Exercises:  Pt requiring assist to LLE with transfers, QASIM, femoral block intact  Ambulated to hallways with RW  OOB to chair with all needs within reach  Nurse Gutierrez at bedside to medicate pt      AM-PAC 6 CLICK MOBILITY  How much help from another person does this patient currently need?   1 = Unable, Total/Dependent Assistance  2 = A lot, Maximum/Moderate Assistance  3 = A little, Minimum/Contact Guard/Supervision  4 = None, Modified Harris/Independent         AM-PAC Raw Score CMS G-Code Modifier Level of Impairment Assistance   6 % Total / Unable   7 - 9 CM 80 - 100% Maximal Assist   10 - 14 CL 60 - 80% Moderate Assist   15 - 19 CK 40 - 60% Moderate Assist   20 - 22 CJ 20 - 40% Minimal Assist   23 CI 1-20% SBA / CGA   24 CH 0% Independent/ Mod I     Patient left up in chair with all lines intact and call button in reach.    Assessment:  Anthony Sheldon is a 82 y.o. male with a medical diagnosis of Status post total left knee replacement and presents with po pain/weakness. Gait distance increased in pm- will benefit from HHPT at discharge.    Rehab identified problem list/impairments: Rehab identified problem list/impairments: weakness, impaired endurance, impaired self care skills, impaired functional mobilty, gait instability, decreased lower extremity function, pain, decreased ROM    Rehab potential is fair.    Activity tolerance: Fair    Discharge recommendations: Discharge Facility/Level Of Care Needs: home health PT     Barriers to discharge:      Equipment recommendations:       GOALS:   Physical Therapy Goals        Problem: Physical Therapy Goal    Goal Priority Disciplines Outcome Goal Variances Interventions   Physical Therapy Goal    High PT/OT, PT      Description:  Goals to be met by: 2017     Patient will increase functional independence with mobility by performin. Supine to sit with Contact Guard Assistance  2. Sit to stand transfer with Contact Guard Assistance  3. Bed to chair transfer with Contact Guard Assistance using Rolling Walker  4. Gait  x 150 feet with Contact Guard Assistance using Rolling Walker.   5. Lower extremity exercise program x20 reps  per handout, with assistance as needed                PLAN:    Patient to be seen BID  to address the above listed problems via gait training, therapeutic activities, therapeutic exercises  Plan of Care expires: 03/02/17  Plan of Care reviewed with: patient         Olena Hui, PT  02/23/2017

## 2017-02-23 NOTE — ANESTHESIA POST-OP PAIN MANAGEMENT
Acute Pain Service Progress Note    Anthony Sheldon is a 82 y.o., male, [id number].    Surgery:  Knee arthroscopy    Post Op Day #: 1    Catheter type: perineural  femoral    Infusion type: Ropivacaine 0.2%  8 basal    Problem List:    Active Hospital Problems    Diagnosis  POA    *Status post total left knee replacement [Z96.652]  Not Applicable    Osteoarthritis of left knee [M17.12]  Yes    CKD (chronic kidney disease) stage 3, GFR 30-59 ml/min [N18.3]  Yes    DVT (deep venous thrombosis) [I82.409]  Yes     right le after thr        Resolved Hospital Problems    Diagnosis Date Resolved POA   No resolved problems to display.       Subjective:     General appearance of alert, oriented, no complaints   Pain with rest: 2    Numbers   Pain with movement: 2    Numbers   Side Effects    1. Pruritis No    2. Nausea No    3. Motor Blockade No, 1=Ability to bend knees and ankles    4. Sedation No, 1=awake and alert    Objective:     Catheter level    Catheter site clean, dry, intact   Catheter placement       Vitals   Vitals:    02/23/17 1120   BP: (!) 123/58   Pulse: 67   Resp: 20   Temp: 36.7 °C (98.1 °F)        Labs    Admission on 02/22/2017   Component Date Value Ref Range Status    WBC 02/23/2017 5.70  3.90 - 12.70 K/uL Final    RBC 02/23/2017 3.40* 4.60 - 6.20 M/uL Final    Hemoglobin 02/23/2017 11.0* 14.0 - 18.0 g/dL Final    Hematocrit 02/23/2017 32.0* 40.0 - 54.0 % Final    MCV 02/23/2017 94  82 - 98 fL Final    MCH 02/23/2017 32.3* 27.0 - 31.0 pg Final    MCHC 02/23/2017 34.3  32.0 - 36.0 % Final    RDW 02/23/2017 13.1  11.5 - 14.5 % Final    Platelets 02/23/2017 157  150 - 350 K/uL Final    MPV 02/23/2017 7.4* 9.2 - 12.9 fL Final    Gran # 02/23/2017 3.8  1.8 - 7.7 K/uL Final    Lymph # 02/23/2017 1.1  1.0 - 4.8 K/uL Final    Mono # 02/23/2017 0.7  0.3 - 1.0 K/uL Final    Eos # 02/23/2017 0.1  0.0 - 0.5 K/uL Final    Baso # 02/23/2017 0.00  0.00 - 0.20 K/uL Final    Gran% 02/23/2017 67.5   38.0 - 73.0 % Final    Lymph% 02/23/2017 18.6  18.0 - 48.0 % Final    Mono% 02/23/2017 12.3  4.0 - 15.0 % Final    Eosinophil% 02/23/2017 1.4  0.0 - 8.0 % Final    Basophil% 02/23/2017 0.2  0.0 - 1.9 % Final    Differential Method 02/23/2017 Automated   Final    WBC 02/23/2017 5.70  3.90 - 12.70 K/uL Final    RBC 02/23/2017 3.40* 4.60 - 6.20 M/uL Final    Hemoglobin 02/23/2017 11.0* 14.0 - 18.0 g/dL Final    Hematocrit 02/23/2017 32.0* 40.0 - 54.0 % Final    MCV 02/23/2017 94  82 - 98 fL Final    MCH 02/23/2017 32.3* 27.0 - 31.0 pg Final    MCHC 02/23/2017 34.3  32.0 - 36.0 % Final    RDW 02/23/2017 13.1  11.5 - 14.5 % Final    Platelets 02/23/2017 157  150 - 350 K/uL Final    MPV 02/23/2017 7.4* 9.2 - 12.9 fL Final    Gran # 02/23/2017 3.8  1.8 - 7.7 K/uL Final    Lymph # 02/23/2017 1.1  1.0 - 4.8 K/uL Final    Mono # 02/23/2017 0.7  0.3 - 1.0 K/uL Final    Eos # 02/23/2017 0.1  0.0 - 0.5 K/uL Final    Baso # 02/23/2017 0.00  0.00 - 0.20 K/uL Final    Gran% 02/23/2017 67.5  38.0 - 73.0 % Final    Lymph% 02/23/2017 18.6  18.0 - 48.0 % Final    Mono% 02/23/2017 12.3  4.0 - 15.0 % Final    Eosinophil% 02/23/2017 1.4  0.0 - 8.0 % Final    Basophil% 02/23/2017 0.2  0.0 - 1.9 % Final    Differential Method 02/23/2017 Automated   Final    Sodium 02/23/2017 134* 136 - 145 mmol/L Final    Potassium 02/23/2017 4.5  3.5 - 5.1 mmol/L Final    Chloride 02/23/2017 103  95 - 110 mmol/L Final    CO2 02/23/2017 25  23 - 29 mmol/L Final    Glucose 02/23/2017 181* 70 - 110 mg/dL Final    BUN, Bld 02/23/2017 17  8 - 23 mg/dL Final    Creatinine 02/23/2017 1.5* 0.5 - 1.4 mg/dL Final    Calcium 02/23/2017 8.0* 8.7 - 10.5 mg/dL Final    Anion Gap 02/23/2017 6* 8 - 16 mmol/L Final    eGFR if  02/23/2017 49* >60 mL/min/1.73 m^2 Final    eGFR if non  02/23/2017 43* >60 mL/min/1.73 m^2 Final        Meds   Current Facility-Administered Medications   Medication Dose Route  Frequency Provider Last Rate Last Dose    [START ON 2/26/2017] acetaminophen tablet 650 mg  650 mg Oral Q6H Khadar Ospina II, MD        aspirin tablet 325 mg  325 mg Oral BID Grupo Jose MD   325 mg at 02/23/17 0905    bisacodyl suppository 10 mg  10 mg Rectal Q12H PRN Grupo Jose MD        celecoxib capsule 200 mg  200 mg Oral Daily Khadar Ospina II, MD   200 mg at 02/23/17 0905    diphenhydrAMINE injection 12.5 mg  12.5 mg Intravenous PRN Khadar Ospina II, MD        famotidine tablet 20 mg  20 mg Oral Daily Grupo Jose MD   20 mg at 02/23/17 0905    hydrocodone-acetaminophen 5-325mg per tablet 1 tablet  1 tablet Oral Q4H PRN Grupo Jose MD        methocarbamol tablet 500 mg  500 mg Oral TID Khadar Ospina II, MD   500 mg at 02/23/17 0522    morphine injection 2 mg  2 mg Intravenous Q10 Min PRN Grupo Jose MD        ondansetron disintegrating tablet 8 mg  8 mg Oral Q8H PRN Grupo Jose MD        ondansetron injection 4 mg  4 mg Intravenous Q12H PRN Khadar Ospina II, MD   4 mg at 02/23/17 1014    oxycodone 12 hr tablet 10 mg  10 mg Oral Q12H Khadar Ospina II, MD   10 mg at 02/23/17 0906    oxycodone immediate release tablet 10 mg  10 mg Oral Q3H PRN Khadar Ospina II, MD   10 mg at 02/23/17 0522    oxycodone immediate release tablet 5 mg  5 mg Oral Q3H PRN Khadar Ospina II, MD        polyethylene glycol packet 17 g  17 g Oral Daily Grupo Jose MD   17 g at 02/23/17 0904    pregabalin capsule 75 mg  75 mg Oral BID Khadar Ospina II, MD   75 mg at 02/23/17 0905    promethazine (PHENERGAN) 6.25 mg in dextrose 5 % 50 mL IVPB  6.25 mg Intravenous Q6H PRN Khadar Ospina II, MD        ropivacaine (NAROPIN) 0.2% ON-Q C-BLOC 750 mL (med + pump)   Intravenous Continuous Khadar Ospina II, MD 8 mL/hr at 02/22/17 0943      zolpidem tablet 5 mg  5 mg Oral Nightly PRN Grupo Jose MD   5 mg at 02/22/17 5560        Anticoagulant dose    Assessment:     Pain control  adequate    Plan:     Patient doing well, continue present treatment.    Evaluator Jose Montano

## 2017-02-23 NOTE — PROGRESS NOTES
[facility] drain removed per MD's order. 100 cc of bloody drainage noted.  Patient tolerated well. Dressing in place.

## 2017-02-23 NOTE — PLAN OF CARE
Problem: Patient Care Overview  Goal: Plan of Care Review  Outcome: Ongoing (interventions implemented as appropriate)  Plan of care reviewed with patient at the beginning of this shift. Patient verbalized understanding. Patient is AAOX4. Pain is being managed with oral pain meds and a Qball.  Cryotherapy to the left knee. Patient has remained free from fall/injury. Side rails up X2, bed in lowest , locked position, call light within reach. Will continue to monitor.

## 2017-02-23 NOTE — PLAN OF CARE
I met with the pt at bedside. He was awake and alert and able to verify all info on the face sheet as correct. He lives at home with his wife, Shira. She will be home with him at the time of discharge. He has a walker and cane from a previous hip surgery but he does not use them and is independent in ADL's. He stated that he has had HH in the past on two occassions. One was very rude but he liked the other company. He stated that he believes [facility] home care out of [place] was very helpful but he had another company out of [place] that he did not care for. He uses [facility] pharmacy in [place] but he would like to get handwritten prescriptions at the time of discharge because [facility] in [place] can never find e-prescriptions. He stated that he does not like the service at [facility] and chose to have his surgery here. Everyone has been very nice however PT did not come to see him yesterday or this morning. I told him that I would speak to PT.     I spoke to Aditya with PT and she stated that the pt is on her list for an evaluation today and she will go see him right now. Bhargavi Obrien, LMSW\     02/23/17 1000   Discharge Assessment   Assessment Type Discharge Planning Assessment   Confirmed/corrected address and phone number on facesheet? No   Assessment information obtained from? Patient   Communicated expected length of stay with patient/caregiver no   Type of Healthcare Directive Received (Spouse Shira Sheldon [phone])   If Healthcare Directive is received, is it scanned into Epic? no (comment)   Prior to hospitilization cognitive status: Alert/Oriented   Prior to hospitalization functional status: Independent   Current cognitive status: Alert/Oriented   Current Functional Status: Independent   Arrived From home or self-care   Lives With spouse   Able to Return to Prior Arrangements yes   Is patient able to care for self after discharge? Yes   How many people do you have in your home that can  help with your care after discharge? 1   Who are your caregiver(s) and their phone number(s)? Wife- Shira Sheldon [phone]   Readmission Within The Last 30 Days no previous admission in last 30 days   Patient currently being followed by outpatient case management? No   Patient currently receives home health services? No   Does the patient currently use HME? No   Patient currently receives private duty nursing? No   Patient currently receives any other outside agency services? No   Equipment Currently Used at Home walker, standard;cane, straight   Do you have any problems affording any of your prescribed medications? No  ([facility],[place] )   Is the patient taking medications as prescribed? yes   Do you have any financial concerns preventing you from receiving the healthcare you need? No   Does the patient have transportation to healthcare appointments? Yes   Transportation Available car   On Dialysis? No   Does the patient receive services at the [facility]mad[facility] Clinic? No   Are there any open cases? No   Discharge Plan A Home Health;Home   Discharge Plan B Home with family   Patient/Family In Agreement With Plan yes

## 2017-02-23 NOTE — PT/OT/SLP EVAL
Physical Therapy  Evaluation    Anthony Sheldon   MRN: [id number]   Admitting Diagnosis: Status post total left knee replacement    PT Received On: 02/23/17  PT Start Time: 1003     PT Stop Time: 1055    PT Total Time (min): 52 min       Billable Minutes:  Evaluation 15, Gait [id number] and Therapeutic Activity 22    Diagnosis: Status post total left knee replacement  S/p L TKA 02-    Past Medical History   Diagnosis Date    Arthritis     Dental bridge present      LOWER PARTIAL    DVT (deep venous thrombosis) 2014     right le after thr    Wears glasses       Past Surgical History   Procedure Laterality Date    Tonsillectomy  1950    Total hip arthroplasty Bilateral 2013 & 2014    Total shoulder arthroplasty Right 2005    Joint replacement       BILAT HIPS, RIGHT SHOULDER       Referring physician: Damon  Date referred to PT: 02-    General Precautions: Standard, fall  Orthopedic Precautions: LLE weight bearing as tolerated   Braces: N/A            Patient History:  Lives With: spouse  Living Arrangements: house (elevated home with elevator)  Transportation Available: family or friend will provide  DME owned (not currently used): rolling walker    Previous Level of Function:  Ambulation Skills: independent  Transfer Skills: independent  ADL Skills: independent    Subjective:  Communicated with nurse Jodi prior to session.  Pt alert, interactive- c/o nausea- vomitted before and after PT with projectile vomitting  Chief Complaint: pt eager to get OOB  Patient goals: get well soon    Pain Rating: 3/10   Location - Side: Left     Location: knee  Pain Addressed: Reposition, Nurse notified       Objective:   Patient found with: peripheral IV, perineural catheter, Polar ice (QASIM drain)     Cognitive Exam:  Oriented to: Person, Place, Time and Situation    Follows Commands/attention: Follows multistep  commands  Communication: clear/fluent  Safety awareness/insight to disability: intact    Physical  Exam:  Postural examination/scapula alignment: Rounded shoulder and Head forward    Skin integrity: Dry  Edema: Mild LLE    Sensation:   Intact    Upper Extremity Range of Motion:  Right Upper Extremity: WFL  Left Upper Extremity: WFL    Upper Extremity Strength:  Right Upper Extremity: WFL  Left Upper Extremity: WFL    Lower Extremity Range of Motion:  Right Lower Extremity: WFL  Left Lower Extremity: 3-/5  Knee flexion 30 degrees    Lower Extremity Strength:  Right Lower Extremity: 4/5  Left Lower Extremity: 3-/5     Fine motor coordination:      Gross motor coordination:     Functional Mobility:  Bed Mobility:  Rolling/Turning to Left: Minimum assistance  Supine to Sit: Moderate Assistance    Transfers:  Sit <> Stand Assistance: Moderate Assistance  Sit <> Stand Assistive Device: Rolling Walker  Bed <> Chair Assistance: Moderate Assistance  Bed <> Chair Assistive Device: Rolling Walker    Gait:   Gait Distance: 30ft with chair following and 1 seated rest  Assistance 1: Minimum assistance, Moderate assistance  Gait Assistive Device: Rolling walker  Gait Pattern: 3-point gait  Gait Deviation(s): decreased dionicio, decreased step length, increased time in double stance, decreased velocity of limb motion, decreased weight-shifting ability    Stairs:      Balance:   Static Sit: FAIR-: Maintains without assist but inconsistent   Dynamic Sit: FAIR: Cannot move trunk without losing balance  Static Stand: POOR+: Needs MINIMAL assist to maintain  Dynamic stand: POOR: N/A    Therapeutic Activities and Exercises:  Pt alert, interactive, nausea with projectile vomitting [undigested food] prior to OOB  Pt cleaned, nurse Hagen came to medicate pt  Pt ambulated short distance to hallways with chair following and 1 seated rest  OOB to chair post PT with another onset of vomitting- pt with large amount of undigested food  Pt provided with wet wash cloth, nurse Jodi came to assess pt  LLE elevated    AM-PAC 6 CLICK MOBILITY  How much  help from another person does this patient currently need?   1 = Unable, Total/Dependent Assistance  2 = A lot, Maximum/Moderate Assistance  3 = A little, Minimum/Contact Guard/Supervision  4 = None, Modified [place]/Independent          AM-PAC Raw Score CMS G-Code Modifier Level of Impairment Assistance   6 % Total / Unable   7 - 9 CM 80 - 100% Maximal Assist   10 - 14 CL 60 - 80% Moderate Assist   15 - 19 CK 40 - 60% Moderate Assist   20 - 22 CJ 20 - 40% Minimal Assist   23 CI 1-20% SBA / CGA   24 CH 0% Independent/ Mod I     Patient left up in chair with all lines intact, call button in reach and nurse Jodi present.    Assessment:   Anthony Sheldon is a 82 y.o. male with a medical diagnosis of Status post total left knee replacement and presents with po weakness, nausea, impaired gait endurance. Previously indep and amb /lived at home with spouse. Pt to benefit from continued therapies.    Rehab identified problem list/impairments: Rehab identified problem list/impairments: weakness, impaired endurance, impaired self care skills, impaired functional mobilty, gait instability, impaired balance, decreased lower extremity function, pain, decreased safety awareness, decreased ROM, orthopedic precautions    Rehab potential is fair.    Activity tolerance: Fair    Discharge recommendations: Discharge Facility/Level Of Care Needs: home health PT     Barriers to discharge:      Equipment recommendations:       GOALS:   Physical Therapy Goals        Problem: Physical Therapy Goal    Goal Priority Disciplines Outcome Goal Variances Interventions   Physical Therapy Goal    High PT/OT, PT      Description:  Goals to be met by: 2017     Patient will increase functional independence with mobility by performin. Supine to sit with Contact Guard Assistance  2. Sit to stand transfer with Contact Guard Assistance  3. Bed to chair transfer with Contact Guard Assistance using Rolling Walker  4. Gait  x 150  feet with Contact Guard Assistance using Rolling Walker.   5. Lower extremity exercise program x20 reps per handout, with assistance as needed                PLAN:    Patient to be seen BID to address the above listed problems via gait training, therapeutic activities, therapeutic exercises  Plan of Care expires: 03/02/17  Plan of Care reviewed with: patient          Olena Kentkrystal, PT  02/23/2017

## 2017-02-24 VITALS
HEIGHT: 69 IN | HEART RATE: 78 BPM | BODY MASS INDEX: 33.33 KG/M2 | RESPIRATION RATE: 14 BRPM | DIASTOLIC BLOOD PRESSURE: 77 MMHG | WEIGHT: 225 LBS | SYSTOLIC BLOOD PRESSURE: 137 MMHG | TEMPERATURE: 96 F | OXYGEN SATURATION: 97 %

## 2017-02-24 LAB
ANION GAP SERPL CALC-SCNC: 8 MMOL/L
BASOPHILS # BLD AUTO: 0 K/UL
BASOPHILS NFR BLD: 0.5 %
BUN SERPL-MCNC: 18 MG/DL
CALCIUM SERPL-MCNC: 8.7 MG/DL
CHLORIDE SERPL-SCNC: 105 MMOL/L
CO2 SERPL-SCNC: 23 MMOL/L
CREAT SERPL-MCNC: 1.3 MG/DL
DIFFERENTIAL METHOD: ABNORMAL
EOSINOPHIL # BLD AUTO: 0.1 K/UL
EOSINOPHIL NFR BLD: 1.8 %
ERYTHROCYTE [DISTWIDTH] IN BLOOD BY AUTOMATED COUNT: 13.4 %
EST. GFR  (AFRICAN AMERICAN): 59 ML/MIN/1.73 M^2
EST. GFR  (NON AFRICAN AMERICAN): 51 ML/MIN/1.73 M^2
GLUCOSE SERPL-MCNC: 116 MG/DL
HCT VFR BLD AUTO: 32.3 %
HGB BLD-MCNC: 10.9 G/DL
LYMPHOCYTES # BLD AUTO: 1.1 K/UL
LYMPHOCYTES NFR BLD: 18.8 %
MCH RBC QN AUTO: 32.1 PG
MCHC RBC AUTO-ENTMCNC: 33.6 %
MCV RBC AUTO: 95 FL
MONOCYTES # BLD AUTO: 0.8 K/UL
MONOCYTES NFR BLD: 12.8 %
NEUTROPHILS # BLD AUTO: 4 K/UL
NEUTROPHILS NFR BLD: 66.1 %
PLATELET # BLD AUTO: 148 K/UL
PMV BLD AUTO: 7.5 FL
POTASSIUM SERPL-SCNC: 4.5 MMOL/L
RBC # BLD AUTO: 3.39 M/UL
SODIUM SERPL-SCNC: 136 MMOL/L
WBC # BLD AUTO: 6 K/UL

## 2017-02-24 PROCEDURE — 94761 N-INVAS EAR/PLS OXIMETRY MLT: CPT

## 2017-02-24 PROCEDURE — 85025 COMPLETE CBC W/AUTO DIFF WBC: CPT

## 2017-02-24 PROCEDURE — 80048 BASIC METABOLIC PNL TOTAL CA: CPT

## 2017-02-24 PROCEDURE — 97116 GAIT TRAINING THERAPY: CPT

## 2017-02-24 PROCEDURE — 25000003 PHARM REV CODE 250: Performed by: ORTHOPAEDIC SURGERY

## 2017-02-24 PROCEDURE — 97110 THERAPEUTIC EXERCISES: CPT

## 2017-02-24 PROCEDURE — 99239 HOSP IP/OBS DSCHRG MGMT >30: CPT | Mod: ,,, | Performed by: INTERNAL MEDICINE

## 2017-02-24 PROCEDURE — 25000003 PHARM REV CODE 250: Performed by: ANESTHESIOLOGY

## 2017-02-24 PROCEDURE — 36415 COLL VENOUS BLD VENIPUNCTURE: CPT

## 2017-02-24 RX ORDER — OXYCODONE HYDROCHLORIDE 10 MG/1
10 TABLET ORAL EVERY 6 HOURS PRN
Refills: 0
Start: 2017-02-24 | End: 2017-04-06

## 2017-02-24 RX ORDER — ASPIRIN 325 MG
325 TABLET ORAL 2 TIMES DAILY
Refills: 0 | COMMUNITY
Start: 2017-02-24 | End: 2017-03-07

## 2017-02-24 RX ADMIN — CELECOXIB 200 MG: 100 CAPSULE ORAL at 08:02

## 2017-02-24 RX ADMIN — OXYCODONE HYDROCHLORIDE 10 MG: 5 TABLET ORAL at 10:02

## 2017-02-24 RX ADMIN — ASPIRIN 325 MG ORAL TABLET 325 MG: 325 PILL ORAL at 08:02

## 2017-02-24 RX ADMIN — PREGABALIN 75 MG: 75 CAPSULE ORAL at 08:02

## 2017-02-24 RX ADMIN — OXYCODONE HYDROCHLORIDE 10 MG: 5 TABLET ORAL at 05:02

## 2017-02-24 RX ADMIN — FAMOTIDINE 20 MG: 20 TABLET, FILM COATED ORAL at 08:02

## 2017-02-24 RX ADMIN — OXYCODONE HYDROCHLORIDE 10 MG: 10 TABLET, FILM COATED, EXTENDED RELEASE ORAL at 08:02

## 2017-02-24 RX ADMIN — POLYETHYLENE GLYCOL (3350) 17 G: 17 POWDER, FOR SOLUTION ORAL at 08:02

## 2017-02-24 RX ADMIN — METHOCARBAMOL 500 MG: 500 TABLET ORAL at 05:02

## 2017-02-24 NOTE — PLAN OF CARE
02/24/17 0815   Patient Assessment/Suction   Level of Consciousness (AVPU) alert   Respiratory Effort Normal;Unlabored   All Lung Fields Breath Sounds clear   PRE-TX-O2-ETCO2   O2 Device (Oxygen Therapy) room air   SpO2 97 %   Pulse Oximetry Type Intermittent   $ Pulse Oximetry - Multiple Charge Pulse Oximetry - Multiple

## 2017-02-24 NOTE — DISCHARGE SUMMARY
Discharge Summary  Hospital Medicine    Admit Date: 2/22/2017    Date and Time: 2/24/201710:01 AM    Discharge Attending Physician: Elissa Castañeda MD    Primary Care Physician: Perfecto Melgar III, MD    Diagnoses:  Active Hospital Problems    Diagnosis  POA    *Status post total left knee replacement [Z96.652]  Not Applicable    Osteoarthritis of left knee [M17.12]  Yes    CKD (chronic kidney disease) stage 3, GFR 30-59 ml/min [N18.3]  Yes    DVT (deep venous thrombosis) [I82.409]  Yes     right le after thr        Resolved Hospital Problems    Diagnosis Date Resolved POA   No resolved problems to display.     Discharged Condition: Good    Hospital Course:   Patient is a 82 y.o. male admitted to Hospitalist Service from [facility] s/p left total knee arthroplasty performed by Dr. Jose. Patient reportedly has past medical history significant for OA and history of RLE DVT and prior history of smoking. Patient denied chest pain, shortness of breath, abdominal pain, nausea, vomiting, headache, vision changes, focal neuro-deficits, cough or fever. Patient was admitted to Hospitalist medicine service. Post-operative, patient did well. Pain adequately controlled. Patient participated with physical therapy. H/H remained stable. Patient to follow up with Dr. Jose. Patient was discharged home in stable condition with following discharge plan of care. Total time with the patient was 30 minutes and greater than 50% was spent in counseling and coordination of care. The assessment and plan have been discussed at length. Physicians' notes reviewed. Labs and procedure reviewed.     Consults: Dr. Jose    Significant Diagnostic Studies:   Left knee X-Ray: Left knee joint replacement.    Microbiology Results (last 7 days)     ** No results found for the last 168 hours. **        Special Treatments/Procedures: as above.  Disposition: Home or Self Care    Medications:  Reconciled Home Medications: Current Discharge Medication  List      START taking these medications    Details   aspirin 325 MG tablet Take 1 tablet (325 mg total) by mouth 2 (two) times daily.  Refills: 0      oxycodone (ROXICODONE) 10 mg Tab immediate release tablet Take 1 tablet (10 mg total) by mouth every 6 (six) hours as needed (pain 6-7/10).  Refills: 0         CONTINUE these medications which have NOT CHANGED    Details   zolpidem (AMBIEN) 5 MG Tab Take 5 mg by mouth nightly as needed.      cetirizine (ZYRTEC) 10 MG tablet Take 10 mg by mouth once daily.      multivitamin (ONE DAILY MULTIVITAMIN) per tablet Take 1 tablet by mouth once daily.             Discharge Procedure Orders  Referral to Home health   Referral Priority: Routine Referral Type: Home Health   Referral Reason: Specialty Services Required    Requested Specialty: Home Health Services    Number of Visits Requested: 1      Diet general   Order Comments: Cardiac/ 2 gram sodium low cholesterol diet     Other restrictions (specify):   Order Comments: Fall precautions   Scheduling Instructions: Weight bearing as tolerated to left lower extremity     Call MD for:   Order Comments: For worsening symptoms, chest pain, shortness of breath, increased abdominal pain, high grade fever, stroke or stroke like symptoms, immediately go to the nearest Emergency Room or call 911 as soon as possible.       Follow-up Information     Follow up with Grupo Jose MD On 3/9/2017.    Specialty:  Orthopedic Surgery    Why:  @1:00pm     Contact information:    [address]  [address] [address]  [phone]          Follow up with Perfecto Melgar III, MD In 1 week.    Specialty:  Internal Medicine

## 2017-02-24 NOTE — ANESTHESIA POST-OP PAIN MANAGEMENT
Acute Pain Service Progress Note    Anthony Sheldon is a 82 y.o., male, [id number].    Surgery:  Knee arthroplasty    Post Op Day #: 2    Catheter type: perineural  femoral    Infusion type: Ropivacaine 0.2%  8 basal    Problem List:    Active Hospital Problems    Diagnosis  POA    *Status post total left knee replacement [Z96.652]  Not Applicable    Osteoarthritis of left knee [M17.12]  Yes    CKD (chronic kidney disease) stage 3, GFR 30-59 ml/min [N18.3]  Yes    DVT (deep venous thrombosis) [I82.409]  Yes     right le after thr        Resolved Hospital Problems    Diagnosis Date Resolved POA   No resolved problems to display.       Subjective:     General appearance of alert, oriented, no complaints   Pain with rest: 2    Numbers   Pain with movement: 2    Numbers   Side Effects    1. Pruritis No    2. Nausea No    3. Motor Blockade No, 1=Ability to bend knees and ankles    4. Sedation No, 1=awake and alert    Objective:     Catheter level    Catheter site clean, dry, intact   Catheter placement       Vitals   Vitals:    02/24/17 0800   BP: 137/77   Pulse: 78   Resp: 14   Temp: 35.6 °C (96.1 °F)        Labs    Admission on 02/22/2017   Component Date Value Ref Range Status    WBC 02/23/2017 5.70  3.90 - 12.70 K/uL Final    RBC 02/23/2017 3.40* 4.60 - 6.20 M/uL Final    Hemoglobin 02/23/2017 11.0* 14.0 - 18.0 g/dL Final    Hematocrit 02/23/2017 32.0* 40.0 - 54.0 % Final    MCV 02/23/2017 94  82 - 98 fL Final    MCH 02/23/2017 32.3* 27.0 - 31.0 pg Final    MCHC 02/23/2017 34.3  32.0 - 36.0 % Final    RDW 02/23/2017 13.1  11.5 - 14.5 % Final    Platelets 02/23/2017 157  150 - 350 K/uL Final    MPV 02/23/2017 7.4* 9.2 - 12.9 fL Final    Gran # 02/23/2017 3.8  1.8 - 7.7 K/uL Final    Lymph # 02/23/2017 1.1  1.0 - 4.8 K/uL Final    Mono # 02/23/2017 0.7  0.3 - 1.0 K/uL Final    Eos # 02/23/2017 0.1  0.0 - 0.5 K/uL Final    Baso # 02/23/2017 0.00  0.00 - 0.20 K/uL Final    Gran% 02/23/2017 67.5  38.0  - 73.0 % Final    Lymph% 02/23/2017 18.6  18.0 - 48.0 % Final    Mono% 02/23/2017 12.3  4.0 - 15.0 % Final    Eosinophil% 02/23/2017 1.4  0.0 - 8.0 % Final    Basophil% 02/23/2017 0.2  0.0 - 1.9 % Final    Differential Method 02/23/2017 Automated   Final    WBC 02/23/2017 5.70  3.90 - 12.70 K/uL Final    RBC 02/23/2017 3.40* 4.60 - 6.20 M/uL Final    Hemoglobin 02/23/2017 11.0* 14.0 - 18.0 g/dL Final    Hematocrit 02/23/2017 32.0* 40.0 - 54.0 % Final    MCV 02/23/2017 94  82 - 98 fL Final    MCH 02/23/2017 32.3* 27.0 - 31.0 pg Final    MCHC 02/23/2017 34.3  32.0 - 36.0 % Final    RDW 02/23/2017 13.1  11.5 - 14.5 % Final    Platelets 02/23/2017 157  150 - 350 K/uL Final    MPV 02/23/2017 7.4* 9.2 - 12.9 fL Final    Gran # 02/23/2017 3.8  1.8 - 7.7 K/uL Final    Lymph # 02/23/2017 1.1  1.0 - 4.8 K/uL Final    Mono # 02/23/2017 0.7  0.3 - 1.0 K/uL Final    Eos # 02/23/2017 0.1  0.0 - 0.5 K/uL Final    Baso # 02/23/2017 0.00  0.00 - 0.20 K/uL Final    Gran% 02/23/2017 67.5  38.0 - 73.0 % Final    Lymph% 02/23/2017 18.6  18.0 - 48.0 % Final    Mono% 02/23/2017 12.3  4.0 - 15.0 % Final    Eosinophil% 02/23/2017 1.4  0.0 - 8.0 % Final    Basophil% 02/23/2017 0.2  0.0 - 1.9 % Final    Differential Method 02/23/2017 Automated   Final    Sodium 02/23/2017 134* 136 - 145 mmol/L Final    Potassium 02/23/2017 4.5  3.5 - 5.1 mmol/L Final    Chloride 02/23/2017 103  95 - 110 mmol/L Final    CO2 02/23/2017 25  23 - 29 mmol/L Final    Glucose 02/23/2017 181* 70 - 110 mg/dL Final    BUN, Bld 02/23/2017 17  8 - 23 mg/dL Final    Creatinine 02/23/2017 1.5* 0.5 - 1.4 mg/dL Final    Calcium 02/23/2017 8.0* 8.7 - 10.5 mg/dL Final    Anion Gap 02/23/2017 6* 8 - 16 mmol/L Final    eGFR if  02/23/2017 49* >60 mL/min/1.73 m^2 Final    eGFR if non  02/23/2017 43* >60 mL/min/1.73 m^2 Final    WBC 02/24/2017 6.00  3.90 - 12.70 K/uL Final    RBC 02/24/2017 3.39* 4.60 - 6.20  M/uL Final    Hemoglobin 02/24/2017 10.9* 14.0 - 18.0 g/dL Final    Hematocrit 02/24/2017 32.3* 40.0 - 54.0 % Final    MCV 02/24/2017 95  82 - 98 fL Final    MCH 02/24/2017 32.1* 27.0 - 31.0 pg Final    MCHC 02/24/2017 33.6  32.0 - 36.0 % Final    RDW 02/24/2017 13.4  11.5 - 14.5 % Final    Platelets 02/24/2017 148* 150 - 350 K/uL Final    MPV 02/24/2017 7.5* 9.2 - 12.9 fL Final    Gran # 02/24/2017 4.0  1.8 - 7.7 K/uL Final    Lymph # 02/24/2017 1.1  1.0 - 4.8 K/uL Final    Mono # 02/24/2017 0.8  0.3 - 1.0 K/uL Final    Eos # 02/24/2017 0.1  0.0 - 0.5 K/uL Final    Baso # 02/24/2017 0.00  0.00 - 0.20 K/uL Final    Gran% 02/24/2017 66.1  38.0 - 73.0 % Final    Lymph% 02/24/2017 18.8  18.0 - 48.0 % Final    Mono% 02/24/2017 12.8  4.0 - 15.0 % Final    Eosinophil% 02/24/2017 1.8  0.0 - 8.0 % Final    Basophil% 02/24/2017 0.5  0.0 - 1.9 % Final    Differential Method 02/24/2017 Automated   Final    Sodium 02/24/2017 136  136 - 145 mmol/L Final    Potassium 02/24/2017 4.5  3.5 - 5.1 mmol/L Final    Chloride 02/24/2017 105  95 - 110 mmol/L Final    CO2 02/24/2017 23  23 - 29 mmol/L Final    Glucose 02/24/2017 116* 70 - 110 mg/dL Final    BUN, Bld 02/24/2017 18  8 - 23 mg/dL Final    Creatinine 02/24/2017 1.3  0.5 - 1.4 mg/dL Final    Calcium 02/24/2017 8.7  8.7 - 10.5 mg/dL Final    Anion Gap 02/24/2017 8  8 - 16 mmol/L Final    eGFR if  02/24/2017 59* >60 mL/min/1.73 m^2 Final    eGFR if non  02/24/2017 51* >60 mL/min/1.73 m^2 Final        Meds   Current Facility-Administered Medications   Medication Dose Route Frequency Provider Last Rate Last Dose    [START ON 2/26/2017] acetaminophen tablet 650 mg  650 mg Oral Q6H Khadar Ospina II, MD        aspirin tablet 325 mg  325 mg Oral BID Grupo Jose MD   325 mg at 02/24/17 0854    bisacodyl suppository 10 mg  10 mg Rectal Q12H PRN Grupo Jose MD        celecoxib capsule 200 mg  200 mg Oral Daily Khadar MAYA  Bhavesh FLORENCE MD   200 mg at 02/24/17 0854    diphenhydrAMINE injection 12.5 mg  12.5 mg Intravenous PRN Khadar Ospina II, MD        famotidine tablet 20 mg  20 mg Oral Daily Grupo Jose MD   20 mg at 02/24/17 0854    hydrocodone-acetaminophen 5-325mg per tablet 1 tablet  1 tablet Oral Q4H PRN Grupo Jose MD        methocarbamol tablet 500 mg  500 mg Oral TID Khadar Ospina II, MD   500 mg at 02/24/17 0515    morphine injection 2 mg  2 mg Intravenous Q10 Min PRN Grupo Jose MD        ondansetron disintegrating tablet 8 mg  8 mg Oral Q8H PRN Grupo Jose MD        ondansetron injection 4 mg  4 mg Intravenous Q12H PRN Khadar Ospina II, MD   4 mg at 02/23/17 1014    oxycodone 12 hr tablet 10 mg  10 mg Oral Q12H Khadar Ospina II, MD   10 mg at 02/24/17 0854    oxycodone immediate release tablet 10 mg  10 mg Oral Q3H PRN Khadar Ospina II, MD   10 mg at 02/24/17 0515    oxycodone immediate release tablet 5 mg  5 mg Oral Q3H PRN Khadar Ospina II, MD        polyethylene glycol packet 17 g  17 g Oral Daily Grupo Jose MD   17 g at 02/24/17 0854    pregabalin capsule 75 mg  75 mg Oral BID Khadar Ospina II, MD   75 mg at 02/24/17 0854    promethazine (PHENERGAN) 6.25 mg in dextrose 5 % 50 mL IVPB  6.25 mg Intravenous Q6H PRN Khadar Ospina II, MD        ropivacaine (NAROPIN) 0.2% ON-Q C-BLOC 750 mL (med + pump)   Intravenous Continuous Khadar Ospina II, MD 8 mL/hr at 02/22/17 0943      zolpidem tablet 5 mg  5 mg Oral Nightly PRN Grupo Jose MD   5 mg at 02/23/17 2126        Anticoagulant dose     Assessment:     Pain control adequate    Plan:     Patient doing well, continue present treatment.    Evaluator Jose Montano

## 2017-02-24 NOTE — PT/OT/SLP PROGRESS
"Physical Therapy  Treatment    Anthony Sheldon   MRN: [id number]   Admitting Diagnosis: Status post total left knee replacement    PT Received On: 17  PT Start Time: 907     PT Stop Time: 934    PT Total Time (min): 27 min       Billable Minutes:  Gait [id number] and Therapeutic Exercise 10    Treatment Type: Treatment  PT/PTA: PT             General Precautions: Standard, fall  Orthopedic Precautions: LLE weight bearing as tolerated   Braces: N/A         Subjective:  Communicated with nurse Sallie prior to session.  Pt found supine in bed alert and willing to participate in therapy  Pt stated that he would be going home later this morning  Pt reported no further episodes n/v since yesterday  Pt stated "I have no stamina" during gait training and c/o fatigue once returned to room  PT encouraged Pt to remain active at home and educated on home exercise plan until HHPT sessions begin      Pain Ratin/10  Location - Side: Left     Location: knee  Pain Addressed: Distraction  Pain Rating Post-Intervention: 5/10    Objective:   Patient found with: perineural catheter    Functional Mobility:  Bed Mobility:   Supine to Sit: Minimum Assistance    Transfers:  Sit <> Stand Assistance: Minimum Assistance  Sit <> Stand Assistive Device: Rolling Walker  Bed <> Chair Technique: Stand Pivot  Bed <> Chair Assistance: Minimum Assistance  Bed <> Chair Assistive Device: Rolling Walker    Gait:   Gait Distance: 100 ft  Assistance 1: Minimum assistance  Gait Assistive Device: Rolling walker  Gait Deviation(s): decreased dionicio, increased time in double stance, decreased velocity of limb motion, decreased step length    Stairs:      Balance:   Static Sit: GOOD-: Takes MODERATE challenges from all directions but inconsistently  Dynamic Sit: GOOD-: Maintains balance through MODERATE excursions of active trunk movement,     Static Stand: FAIR: Maintains without assist but unable to take challenges  Dynamic stand: FAIR: Needs " CONTACT GUARD during gait     Therapeutic Activities and Exercises:  Pt ambulated to hallways 100 ft requiring short standing rest break  Pt performed seated LE exercises:  -ankle pumps x20 x2  -heel slides x10 x2- with assist  -knee extension x10 x2- with assist  -gluteal sets x20     AM-PAC 6 CLICK MOBILITY  How much help from another person does this patient currently need?   1 = Unable, Total/Dependent Assistance  2 = A lot, Maximum/Moderate Assistance  3 = A little, Minimum/Contact Guard/Supervision  4 = None, Modified [place]/Independent         AM-PAC Raw Score CMS G-Code Modifier Level of Impairment Assistance   6 % Total / Unable   7 - 9 CM 80 - 100% Maximal Assist   10 - 14 CL 60 - 80% Moderate Assist   15 - 19 CK 40 - 60% Moderate Assist   20 - 22 CJ 20 - 40% Minimal Assist   23 CI 1-20% SBA / CGA   24 CH 0% Independent/ Mod I     Patient left up in chair with call button in reach.    Assessment:  Anthony Sheldon is a 82 y.o. male with a medical diagnosis of Status post total left knee replacement. Pt exhibited improved tolerance to LE exercises and reported less pain. Pt able to ambulate to hallways and required some encouragement to walk further. Pt encouraged to continue to move post-op knee and perform exercises at least daily. Pt would benefit from continued therapy with HHPT and later progress to OP PT.    Rehab identified problem list/impairments: Rehab identified problem list/impairments: weakness, pain, decreased ROM, impaired functional mobilty, impaired endurance, decreased lower extremity function, gait instability    Rehab potential is good.    Activity tolerance: Fair    Discharge recommendations: Discharge Facility/Level Of Care Needs: home health PT     Barriers to discharge: Barriers to Discharge: None    Equipment recommendations: Equipment Needed After Discharge: walker, rolling     GOALS:   Physical Therapy Goals     Not on file      Multidisciplinary Problems  (Resolved)        Problem: Physical Therapy Goal    Goal Priority Disciplines Outcome Goal Variances Interventions   Physical Therapy Goal   (Resolved)    High PT/OT, PT Outcome(s) achieved     Description:  Goals to be met by: 2017     Patient will increase functional independence with mobility by performin. Supine to sit with Contact Guard Assistance  2. Sit to stand transfer with Contact Guard Assistance  3. Bed to chair transfer with Contact Guard Assistance using Rolling Walker  4. Gait  x 150 feet with Contact Guard Assistance using Rolling Walker.   5. Lower extremity exercise program x20 reps per handout, with assistance as needed                PLAN:    Patient to be seen 6 x/week  to address the above listed problems via gait training, therapeutic activities, therapeutic exercises  Plan of Care expires: 17  Plan of Care reviewed with: patient    Lexus Hamm, SPT  2017       I certify that I was present in the room directing the student in service delivery and guiding them using my skilled judgment. As the co-signing therapist I have reviewed the students documentation and am responsible for the treatment, assessment, and plan.       Olena Hui, PT  2017

## 2017-02-24 NOTE — PLAN OF CARE
Received call from Amberly PARADA notifying patient has been accepted, will be admitted to a Skill nurse on Sunday, 2/26/17 and PT on 2/27/17. Updated [place], rosalee to be seen on Sunday. KARAN Bills

## 2017-02-24 NOTE — NURSING
Pt discharge instructions given to pt and pt stated understanding. Pt piv removed and vss. Pt has qball and given instructions on qball removal. Pt stated understanding. F/u appts encouraged. Paper rx sent home with pt. Pt discharge instructions sent home with pt. Pt discharge to home with home health. Pt escorted off floor via w/c by transport attendant.

## 2017-02-25 NOTE — PLAN OF CARE
02/25/17 0924   Final Note   Assessment Type Final Discharge Note   Discharge Disposition Home-Health  ([facility])   Discharge planning education complete? Yes

## 2017-02-25 NOTE — ANESTHESIA POST-OP PAIN MANAGEMENT
Acute Pain Service Progress Note    Anthony Sheldon is a 82 y.o., male, [id number].    Surgery:  Knee arthroplasty     Post Op Day #: 3    Catheter type: perineural  Adductor canal    Infusion type: Ropivacaine 0.2%  8 basal    Problem List:    Active Hospital Problems    Diagnosis  POA    *Status post total left knee replacement [Z96.652]  Not Applicable    Osteoarthritis of left knee [M17.12]  Yes    CKD (chronic kidney disease) stage 3, GFR 30-59 ml/min [N18.3]  Yes    DVT (deep venous thrombosis) [I82.409]  Yes     right le after thr        Resolved Hospital Problems    Diagnosis Date Resolved POA   No resolved problems to display.       Subjective:     General appearance of alert, oriented, no complaints   Pain with rest: 3        Pain with movement: 4        Side Effects    1. Pruritis No    2. Nausea No    3. Motor Blockade No, 0=Ability to raise lower extremities off bed    4. Sedation No, 1=awake and alert    Objective:        Catheter site clean, dry, intact         Vitals   Vitals:    02/24/17 0800   BP: 137/77   Pulse: 78   Resp: 14   Temp: 35.6 °C (96.1 °F)        Labs    Admission on 02/22/2017, Discharged on 02/24/2017   Component Date Value Ref Range Status    WBC 02/23/2017 5.70  3.90 - 12.70 K/uL Final    RBC 02/23/2017 3.40* 4.60 - 6.20 M/uL Final    Hemoglobin 02/23/2017 11.0* 14.0 - 18.0 g/dL Final    Hematocrit 02/23/2017 32.0* 40.0 - 54.0 % Final    MCV 02/23/2017 94  82 - 98 fL Final    MCH 02/23/2017 32.3* 27.0 - 31.0 pg Final    MCHC 02/23/2017 34.3  32.0 - 36.0 % Final    RDW 02/23/2017 13.1  11.5 - 14.5 % Final    Platelets 02/23/2017 157  150 - 350 K/uL Final    MPV 02/23/2017 7.4* 9.2 - 12.9 fL Final    Gran # 02/23/2017 3.8  1.8 - 7.7 K/uL Final    Lymph # 02/23/2017 1.1  1.0 - 4.8 K/uL Final    Mono # 02/23/2017 0.7  0.3 - 1.0 K/uL Final    Eos # 02/23/2017 0.1  0.0 - 0.5 K/uL Final    Baso # 02/23/2017 0.00  0.00 - 0.20 K/uL Final    Gran% 02/23/2017 67.5  38.0 -  73.0 % Final    Lymph% 02/23/2017 18.6  18.0 - 48.0 % Final    Mono% 02/23/2017 12.3  4.0 - 15.0 % Final    Eosinophil% 02/23/2017 1.4  0.0 - 8.0 % Final    Basophil% 02/23/2017 0.2  0.0 - 1.9 % Final    Differential Method 02/23/2017 Automated   Final    WBC 02/23/2017 5.70  3.90 - 12.70 K/uL Final    RBC 02/23/2017 3.40* 4.60 - 6.20 M/uL Final    Hemoglobin 02/23/2017 11.0* 14.0 - 18.0 g/dL Final    Hematocrit 02/23/2017 32.0* 40.0 - 54.0 % Final    MCV 02/23/2017 94  82 - 98 fL Final    MCH 02/23/2017 32.3* 27.0 - 31.0 pg Final    MCHC 02/23/2017 34.3  32.0 - 36.0 % Final    RDW 02/23/2017 13.1  11.5 - 14.5 % Final    Platelets 02/23/2017 157  150 - 350 K/uL Final    MPV 02/23/2017 7.4* 9.2 - 12.9 fL Final    Gran # 02/23/2017 3.8  1.8 - 7.7 K/uL Final    Lymph # 02/23/2017 1.1  1.0 - 4.8 K/uL Final    Mono # 02/23/2017 0.7  0.3 - 1.0 K/uL Final    Eos # 02/23/2017 0.1  0.0 - 0.5 K/uL Final    Baso # 02/23/2017 0.00  0.00 - 0.20 K/uL Final    Gran% 02/23/2017 67.5  38.0 - 73.0 % Final    Lymph% 02/23/2017 18.6  18.0 - 48.0 % Final    Mono% 02/23/2017 12.3  4.0 - 15.0 % Final    Eosinophil% 02/23/2017 1.4  0.0 - 8.0 % Final    Basophil% 02/23/2017 0.2  0.0 - 1.9 % Final    Differential Method 02/23/2017 Automated   Final    Sodium 02/23/2017 134* 136 - 145 mmol/L Final    Potassium 02/23/2017 4.5  3.5 - 5.1 mmol/L Final    Chloride 02/23/2017 103  95 - 110 mmol/L Final    CO2 02/23/2017 25  23 - 29 mmol/L Final    Glucose 02/23/2017 181* 70 - 110 mg/dL Final    BUN, Bld 02/23/2017 17  8 - 23 mg/dL Final    Creatinine 02/23/2017 1.5* 0.5 - 1.4 mg/dL Final    Calcium 02/23/2017 8.0* 8.7 - 10.5 mg/dL Final    Anion Gap 02/23/2017 6* 8 - 16 mmol/L Final    eGFR if  02/23/2017 49* >60 mL/min/1.73 m^2 Final    eGFR if non  02/23/2017 43* >60 mL/min/1.73 m^2 Final    WBC 02/24/2017 6.00  3.90 - 12.70 K/uL Final    RBC 02/24/2017 3.39* 4.60 - 6.20 M/uL  Final    Hemoglobin 02/24/2017 10.9* 14.0 - 18.0 g/dL Final    Hematocrit 02/24/2017 32.3* 40.0 - 54.0 % Final    MCV 02/24/2017 95  82 - 98 fL Final    MCH 02/24/2017 32.1* 27.0 - 31.0 pg Final    MCHC 02/24/2017 33.6  32.0 - 36.0 % Final    RDW 02/24/2017 13.4  11.5 - 14.5 % Final    Platelets 02/24/2017 148* 150 - 350 K/uL Final    MPV 02/24/2017 7.5* 9.2 - 12.9 fL Final    Gran # 02/24/2017 4.0  1.8 - 7.7 K/uL Final    Lymph # 02/24/2017 1.1  1.0 - 4.8 K/uL Final    Mono # 02/24/2017 0.8  0.3 - 1.0 K/uL Final    Eos # 02/24/2017 0.1  0.0 - 0.5 K/uL Final    Baso # 02/24/2017 0.00  0.00 - 0.20 K/uL Final    Gran% 02/24/2017 66.1  38.0 - 73.0 % Final    Lymph% 02/24/2017 18.8  18.0 - 48.0 % Final    Mono% 02/24/2017 12.8  4.0 - 15.0 % Final    Eosinophil% 02/24/2017 1.8  0.0 - 8.0 % Final    Basophil% 02/24/2017 0.5  0.0 - 1.9 % Final    Differential Method 02/24/2017 Automated   Final    Sodium 02/24/2017 136  136 - 145 mmol/L Final    Potassium 02/24/2017 4.5  3.5 - 5.1 mmol/L Final    Chloride 02/24/2017 105  95 - 110 mmol/L Final    CO2 02/24/2017 23  23 - 29 mmol/L Final    Glucose 02/24/2017 116* 70 - 110 mg/dL Final    BUN, Bld 02/24/2017 18  8 - 23 mg/dL Final    Creatinine 02/24/2017 1.3  0.5 - 1.4 mg/dL Final    Calcium 02/24/2017 8.7  8.7 - 10.5 mg/dL Final    Anion Gap 02/24/2017 8  8 - 16 mmol/L Final    eGFR if  02/24/2017 59* >60 mL/min/1.73 m^2 Final    eGFR if non  02/24/2017 51* >60 mL/min/1.73 m^2 Final        Meds   No current facility-administered medications for this encounter.      Current Outpatient Prescriptions   Medication Sig Dispense Refill    zolpidem (AMBIEN) 5 MG Tab Take 5 mg by mouth nightly as needed.      aspirin 325 MG tablet Take 1 tablet (325 mg total) by mouth 2 (two) times daily.  0    cetirizine (ZYRTEC) 10 MG tablet Take 10 mg by mouth once daily.      multivitamin (ONE DAILY MULTIVITAMIN) per tablet Take 1  tablet by mouth once daily.      oxycodone (ROXICODONE) 10 mg Tab immediate release tablet Take 1 tablet (10 mg total) by mouth every 6 (six) hours as needed (pain 6-7/10).  0            Assessment:     Pain control adequate    Plan:     Pt discontinued his infusion this am and is awaiting his home health nurse to take out the catheter tomorrow.  Instructed to call if he had any issues.      Evaluator Jamrai S Cousin

## 2017-02-25 NOTE — ADDENDUM NOTE
Addendum  created 02/25/17 1356 by Jamari Mcintyre MD    Anesthesia Event edited, Procedure Event Log accessed, Sign clinical note

## 2017-02-27 ENCOUNTER — PATIENT OUTREACH (OUTPATIENT)
Dept: ADMINISTRATIVE | Facility: CLINIC | Age: 82
End: 2017-02-27
Payer: MEDICARE

## 2017-03-02 ENCOUNTER — HOSPITAL ENCOUNTER (OUTPATIENT)
Dept: RADIOLOGY | Facility: HOSPITAL | Age: 82
Discharge: HOME | End: 2017-03-02
Attending: ORTHOPAEDIC SURGERY
Payer: MEDICARE

## 2017-03-02 ENCOUNTER — HOSPITAL ENCOUNTER (EMERGENCY)
Facility: HOSPITAL | Age: 82
Discharge: HOME | End: 2017-03-02
Attending: EMERGENCY MEDICINE
Payer: MEDICARE

## 2017-03-02 VITALS
OXYGEN SATURATION: 95 % | HEIGHT: 69 IN | BODY MASS INDEX: 33.33 KG/M2 | HEART RATE: 88 BPM | RESPIRATION RATE: 20 BRPM | SYSTOLIC BLOOD PRESSURE: 132 MMHG | WEIGHT: 225 LBS | DIASTOLIC BLOOD PRESSURE: 63 MMHG | TEMPERATURE: 99 F

## 2017-03-02 DIAGNOSIS — I82.412 ACUTE DEEP VEIN THROMBOSIS (DVT) OF FEMORAL VEIN OF LEFT LOWER EXTREMITY: Primary | ICD-10-CM

## 2017-03-02 DIAGNOSIS — I82.412 THROMBOSIS OF LEFT FEMORAL VEIN: ICD-10-CM

## 2017-03-02 LAB
CREAT SERPL-MCNC: 1.5 MG/DL
EST. GFR  (AFRICAN AMERICAN): 49 ML/MIN/1.73 M^2
EST. GFR  (NON AFRICAN AMERICAN): 43 ML/MIN/1.73 M^2

## 2017-03-02 PROCEDURE — 99283 EMERGENCY DEPT VISIT LOW MDM: CPT

## 2017-03-02 PROCEDURE — 82565 ASSAY OF CREATININE: CPT

## 2017-03-02 PROCEDURE — 93971 EXTREMITY STUDY: CPT | Mod: 26,LT,, | Performed by: RADIOLOGY

## 2017-03-02 PROCEDURE — 36415 COLL VENOUS BLD VENIPUNCTURE: CPT

## 2017-03-02 NOTE — ED AVS SNAPSHOT
[facility] [address]  [place] [address]               Anthony Sheldon   3/2/2017  1:07 PM   ED    Description:  Male : 5/3/1934   Department:  [facility]           Your Care was Coordinated By:     Provider Role From To    Cristiano Easton MD Attending Provider [phone] --      Reason for Visit     Leg Swelling           Diagnoses this Visit        Comments    Acute deep vein thrombosis (DVT) of femoral vein of left lower extremity    -  Primary       ED Disposition     None           To Do List           Follow-up Information     Follow up with Perfecto Melgar III, MD. Call in 3 days.    Specialty:  Internal Medicine    Why:  As needed       These Medications        Disp Refills Start End    apixaban 5 mg Tab 70 tablet 0 3/2/2017     Take 2 tabs PO twice per day x1 week, then take 1 tab PO twice per day    Pharmacy: [facility] [address] [place] [address] AT [facility] [address] Ph #: [phone]         [facility]     [facility] Care [facility] -  Assistance  Registered nurses in the [facility] provide clinical advisement, health education, appointment booking, and other advisory services.  Call for this free service at [phone].             Medications           Message regarding Medications     Verify the changes and/or additions to your medication regime listed below are the same as discussed with your clinician today.  If any of these changes or additions are incorrect, please notify your healthcare provider.        START taking these NEW medications        Refills    apixaban 5 mg Tab 0    Sig: Take 2 tabs PO twice per day x1 week, then take 1 tab PO twice per day    Class: Print           Verify that the below list of medications is an accurate representation of the medications you are currently taking.  If none reported, the list may be blank. If incorrect, please contact  your healthcare provider. Carry this list with you in case of emergency.           Current Medications     apixaban 5 mg Tab Take 2 tabs PO twice per day x1 week, then take 1 tab PO twice per day    aspirin 325 MG tablet Take 1 tablet (325 mg total) by mouth 2 (two) times daily.    cetirizine (ZYRTEC) 10 MG tablet Take 10 mg by mouth once daily.    multivitamin (ONE DAILY MULTIVITAMIN) per tablet Take 1 tablet by mouth once daily.    oxycodone (ROXICODONE) 10 mg Tab immediate release tablet Take 1 tablet (10 mg total) by mouth every 6 (six) hours as needed (pain 6-7/10).    zolpidem (AMBIEN) 5 MG Tab Take 5 mg by mouth nightly as needed.           Clinical Reference Information           Your Vitals Were     BP                   132/63 (BP Location: Left arm, Patient Position: Sitting)           Allergies as of 3/2/2017     No Known Allergies      Immunizations Administered on Date of Encounter - 3/2/2017     None      ED Micro, Lab, POCT     Start Ordered       Status Ordering Provider    03/02/17 1330 03/02/17 1329  Creatinine, serum  Once      Final result       ED Imaging Orders     None      Discharge References/Attachments     DEEP VEIN THROMBOSIS, COMPLICATIONS OF (ENGLISH)    DEEP VEIN THROMBOSIS, TREATING (ENGLISH)      Your Scheduled Appointments     Mar 30, 2017  9:00 AM CDT   LAB-OCC with  - LAB   Perfecto Melgar III, M.D. (OCC)    [address]  [facility] [address] [phone]            Apr 06, 2017  9:50 AM CDT   Established Patient with MD Perfecto Castellon III, III, M.D. (OCC)    [address]  [facility] [place] [address]   [phone]              Smoking Cessation     If you would like to quit smoking:   You may be eligible for free services if you are a [place] resident and started smoking cigarettes before September 1, 1988.  Call the Smoking Cessation Trust (SCT) toll free at (894) 092-8938 or (890) 585-7059.   Call 0-800-QUIT-NOW if you do not meet  the above criteria.             [facility] complies with applicable Federal civil rights laws and does not discriminate on the basis of race, color, national origin, age, disability, or sex.        Language Assistance Services     ATTENTION: Language assistance services are available, free of charge. Please call [phone].      ATENCIÓN: Si habla español, tiene a hickey disposición servicios gratuitos de asistencia lingüística. Llame al [phone].     CHÚ Ý: N?u b?n nói Ti?ng Vi?t, có các d?ch v? h? tr? ngôn ng? mi?n phí dành cho b?n. G?i s? [phone].

## 2017-03-02 NOTE — ED PROVIDER NOTES
Encounter Date: 3/2/2017    SCRIBE #1 NOTE: I, Howard Cooley, am scribing for, and in the presence of,  Dr. Easton. I have scribed the entire note.       History     Chief Complaint   Patient presents with    Leg Swelling     sent by MD for ultrasound. States + for DVT     Review of patient's allergies indicates:  No Known Allergies  HPI Comments: 03/02/2017  1:19 PM     Chief Complaint: left leg swelling      The patient is a 82 y.o. male with recent total knee replacement performed one week ago, is presenting with acute onset of painless left leg swelling, which has become progressively worse for the past 2 days. He was told today that he had a blood clot in his leg by one of the nurses at [facility], and to come to the ED. Pt has been on Xarelto in the past for previous DVT. He has no other associated sx's. There are no other complaints at this time. He has a past medical history of Arthritis; Dental bridge present; DVT (2014); and Wears glasses. He has a past surgical history that includes Tonsillectomy (1950); Total hip arthroplasty (Bilateral, 2013 & 2014); Total shoulder arthroplasty (Right, 2005); and Joint replacement.      The history is provided by the patient.     Past Medical History:   Diagnosis Date    Arthritis     Dental bridge present     LOWER PARTIAL    DVT (deep venous thrombosis) 2014    right le after thr    Wears glasses      Past Surgical History:   Procedure Laterality Date    JOINT REPLACEMENT      BILAT HIPS, RIGHT SHOULDER    TONSILLECTOMY  1950    TOTAL HIP ARTHROPLASTY Bilateral 2013 & 2014    TOTAL SHOULDER ARTHROPLASTY Right 2005     Family History   Problem Relation Age of Onset    Stroke Mother     Bladder Cancer Father      Social History   Substance Use Topics    Smoking status: Former Smoker     Types: Cigarettes    Smokeless tobacco: Former User     Quit date: 1/1/1990    Alcohol use 0.0 oz/week     0 Standard drinks or equivalent per week     Review of  Systems   Constitutional: Negative for fever.   HENT: Negative for sore throat.    Eyes: Negative for visual disturbance.   Respiratory: Negative for shortness of breath.    Cardiovascular: Negative for chest pain.   Gastrointestinal: Negative for nausea.   Genitourinary: Negative for dysuria.   Musculoskeletal: Negative for back pain.   Skin: Positive for color change. Negative for rash.   Neurological: Negative for weakness.   Hematological: Does not bruise/bleed easily.   Psychiatric/Behavioral: Negative for confusion.       Physical Exam   Initial Vitals   BP Pulse Resp Temp SpO2   03/02/17 1205 03/02/17 1205 03/02/17 1205 03/02/17 1205 03/02/17 1205   132/63 88 20 98.5 °F (36.9 °C) 95 %     Physical Exam    Nursing note and vitals reviewed.  Constitutional: He appears well-developed and well-nourished. No distress.   HENT:   Head: Normocephalic and atraumatic.   Cardiovascular: Normal rate, regular rhythm, normal heart sounds and intact distal pulses. Exam reveals no gallop and no friction rub.    Pulmonary/Chest: Breath sounds normal. No respiratory distress. He has no wheezes. He has no rhonchi. He has no rales.   Musculoskeletal: He exhibits edema (2+, left leg).   Vertical dressing of her left knee, status post TKA.   Neurological: He is alert and oriented to person, place, and time.   Skin: There is erythema.   Swelling, and erythema in the left leg   Psychiatric: He has a normal mood and affect.         ED Course   Procedures  Labs Reviewed - No data to display                     Scribe Attestation:   Scribe #1: I performed the above scribed service and the documentation accurately describes the services I performed. I attest to the accuracy of the note.    Attending Attestation:           Physician Attestation for Scribe:  Physician Attestation Statement for Scribe #1: I, Dr. Easton, reviewed documentation, as scribed by Howard Cooley in my presence, and it is both accurate and complete.          Attending ED Notes:   This is an emergent evaluation of a 82 y.o.male patient with presentation of redness and swelling to the left leg.   Initial differentials include but are not limited to: DVT, cellulitis, msk injury.   Plan: basic labs    After my evaluation and workup, I believe this patient has DVT, left femoral vein.  Creatinine is 1.5, therefore can be placed on standard dosing.  Follow-up with orthopedist or PCP within one week.  Advised of complications of DVT and to watch for shortness of breath, chest pain, dizziness/lightheadedness.  May return for any questions or concerns.          ED Course     Clinical Impression:   The encounter diagnosis was Acute deep vein thrombosis (DVT) of femoral vein of left lower extremity.    Disposition:   Disposition: Discharged  Condition: Stable       Cristiano Easton MD  03/02/17 3192

## 2017-03-02 NOTE — ED NOTES
Pt sent to ED after having U/S with DVT in left leg. Had left knee replacement last week. Swelling noted to left leg. AAO x3. Skin warm and dry. Spouse at bedside.

## 2017-04-10 ENCOUNTER — APPOINTMENT (OUTPATIENT)
Dept: LAB | Facility: HOSPITAL | Age: 82
End: 2017-04-10
Attending: NURSE PRACTITIONER
Payer: MEDICARE

## 2017-04-10 ENCOUNTER — OFFICE VISIT (OUTPATIENT)
Dept: UROLOGY | Facility: CLINIC | Age: 82
End: 2017-04-10
Payer: MEDICARE

## 2017-04-10 VITALS
HEART RATE: 82 BPM | RESPIRATION RATE: 18 BRPM | WEIGHT: 223.13 LBS | TEMPERATURE: 98 F | HEIGHT: 69 IN | DIASTOLIC BLOOD PRESSURE: 80 MMHG | SYSTOLIC BLOOD PRESSURE: 151 MMHG | BODY MASS INDEX: 33.05 KG/M2

## 2017-04-10 DIAGNOSIS — R97.20 ELEVATED PSA, BETWEEN 10 AND LESS THAN 20 NG/ML: Primary | ICD-10-CM

## 2017-04-10 DIAGNOSIS — R31.29 MICROHEMATURIA: ICD-10-CM

## 2017-04-10 LAB
BACTERIA #/AREA URNS HPF: NORMAL /HPF
BILIRUB SERPL-MCNC: ABNORMAL MG/DL
BLOOD URINE, POC: ABNORMAL
COLOR, POC UA: YELLOW
GLUCOSE UR QL STRIP: ABNORMAL
KETONES UR QL STRIP: ABNORMAL
LEUKOCYTE ESTERASE URINE, POC: ABNORMAL
MICROSCOPIC COMMENT: NORMAL
NITRITE, POC UA: ABNORMAL
PH, POC UA: 5
PROTEIN, POC: ABNORMAL
RBC #/AREA URNS HPF: 2 /HPF (ref 0–4)
SPECIFIC GRAVITY, POC UA: 1.01
UROBILINOGEN, POC UA: ABNORMAL
WBC #/AREA URNS HPF: 1 /HPF (ref 0–5)

## 2017-04-10 PROCEDURE — 1126F AMNT PAIN NOTED NONE PRSNT: CPT | Mod: S$GLB,,, | Performed by: NURSE PRACTITIONER

## 2017-04-10 PROCEDURE — 81000 URINALYSIS NONAUTO W/SCOPE: CPT

## 2017-04-10 PROCEDURE — 81002 URINALYSIS NONAUTO W/O SCOPE: CPT | Mod: S$GLB,,, | Performed by: NURSE PRACTITIONER

## 2017-04-10 PROCEDURE — 1159F MED LIST DOCD IN RCRD: CPT | Mod: S$GLB,,, | Performed by: NURSE PRACTITIONER

## 2017-04-10 PROCEDURE — 1157F ADVNC CARE PLAN IN RCRD: CPT | Mod: S$GLB,,, | Performed by: NURSE PRACTITIONER

## 2017-04-10 PROCEDURE — 1160F RVW MEDS BY RX/DR IN RCRD: CPT | Mod: S$GLB,,, | Performed by: NURSE PRACTITIONER

## 2017-04-10 PROCEDURE — 99214 OFFICE O/P EST MOD 30 MIN: CPT | Mod: 25,S$GLB,, | Performed by: NURSE PRACTITIONER

## 2017-04-10 PROCEDURE — 99999 PR PBB SHADOW E&M-EST. PATIENT-LVL III: CPT | Mod: PBBFAC,,, | Performed by: NURSE PRACTITIONER

## 2017-04-10 PROCEDURE — 87086 URINE CULTURE/COLONY COUNT: CPT

## 2017-04-10 RX ORDER — OXYCODONE AND ACETAMINOPHEN 5; 325 MG/1; MG/1
TABLET ORAL
Refills: 0 | COMMUNITY
Start: 2017-03-09 | End: 2017-04-10 | Stop reason: ALTCHOICE

## 2017-04-10 RX ORDER — TRAMADOL HYDROCHLORIDE 50 MG/1
TABLET ORAL
Refills: 1 | COMMUNITY
Start: 2017-04-06 | End: 2017-05-25

## 2017-04-10 NOTE — LETTER
April 10, 2017      Perfecto Melgar III, MD  [address]  [place] [address]           [facility] [facility]  [address]  [place] [address]  Phone: [phone]          Patient: Anthony Sheldon   MR Number: [id number]   YOB: 1934   Date of Visit: 4/10/2017       Dear Dr. Perfecto Melgar III:    Thank you for referring Anthony Sheldon to me for evaluation. Attached you will find relevant portions of my assessment and plan of care.    If you have questions, please do not hesitate to call me. I look forward to following Anthony Sheldon along with you.    Sincerely,    Autumn Noble, [facility]    Enclosure  CC:  No Recipients    If you would like to receive this communication electronically, please contact [email or web address] or [phone] to request more information on [facility] [employer] access.    For providers and/or their staff who would like to refer a patient to [facility], please contact us through our one-stop-shop provider referral line, [facility] , at [phone].    If you feel you have received this communication in error or would no longer like to receive these types of communications, please e-mail [email or web address]

## 2017-04-10 NOTE — PROGRESS NOTES
"[facility] Note  Staff: JAYNE Alexandra    Referring provider and please cc:   PCP: Dr. Perfecto Melgar     Chief Complaint: Elevated PSA level    Subjective:        HPI: Anthony Sheldon is a 82 y.o. male new patient, referred by Dr. Perfecto Melgar to Urology clinic presents with elevated PSA level of 17.9 to discuss with me today.  Pt is accompanied by his wife during today's visit.  Pt states today that he has had elevated PSA levels "all his life".  He states that over 15 years ago he saw a Dr. Kevin Rice-Urologist at [facility] which performed a prostate ultrasound on the patient but DID NOT take an biopsies as stated by the patient today.  He remembers that the MD explained after the procedure that it showed "normal findings".  (No documentation found in "[facility]" to support this statement).    *Last PSA Screenin2017: 17.9**  2016: 16.6  2016: 14.1  10/01/2015: 14.3    *The patient recently had a left knee replacement procedure performed by Dr. Jose on 2017.  One week after the procedure pt returned to [facility] with c/o left leg swelling and found to have a DVT, left femoral vein.  He is currently taking Apixaban 5 mg one tablet twice daily which he has to be on for a minimum of SIX MONTHS.    Questions asked the pt during ov today:  Urgency: None, urge incontinence? None  NTF: 1x night, DTF: None  Dysuria: No  Gross Hematuria:No  Straining:No, Hesistancy:No, Intermittency:No, Weak stream:No  STDs in past: No  Vasectomy: YES, was done >35 years ago.    REVIEW OF SYSTEMS:  Review of Systems   Constitutional: Negative for chills, diaphoresis, fever and weight loss.   HENT: Negative for congestion, hearing loss, nosebleeds and sore throat.    Eyes: Negative for blurred vision and pain.   Respiratory: Negative for cough and wheezing.    Cardiovascular: Negative for chest pain, palpitations and leg swelling.   Gastrointestinal: Negative for " abdominal pain, heartburn, nausea and vomiting.   Genitourinary: Negative for dysuria, flank pain, frequency, hematuria and urgency.   Musculoskeletal: Negative for back pain, joint pain, myalgias and neck pain.   Skin: Negative for itching and rash.   Neurological: Negative for dizziness, tremors, sensory change, seizures, loss of consciousness, weakness and headaches.   Endo/Heme/Allergies: Does not bruise/bleed easily.   Psychiatric/Behavioral: Negative for depression and suicidal ideas. The patient is not nervous/anxious.      Physical Exam    PMHx:  Past Medical History:   Diagnosis Date    Arthritis     Dental bridge present     LOWER PARTIAL    DVT (deep venous thrombosis) 2014    right le after thr    Wears glasses      Kidney stones: No    PSHx:  Past Surgical History:   Procedure Laterality Date    JOINT REPLACEMENT      BILAT HIPS, RIGHT SHOULDER    TONSILLECTOMY  1950    TOTAL HIP ARTHROPLASTY Bilateral 2013 & 2014    TOTAL SHOULDER ARTHROPLASTY Right 2005     Fam Hx:   malignancies: No    kidney stones: No     Soc Hx:  +tobacco use: Smokes Cigars every 2-3 weeks  Lives in [place], [place]  :+  Occupation:Retired    Allergies:  Review of patient's allergies indicates no known allergies.    Medications: reviewed   Anticoagulation: Yes - Apixaban 5 mg 1 po BID    Objective:     Vitals:    04/10/17 1259   BP: (!) 151/80   Pulse: 82   Resp: 18   Temp: 98.1 °F (36.7 °C)     General:WDWN in NAD  Eyes: PERRLA, normal conjunctiva  Respiratory: no increased work on breathing, clear to auscultation  Cardiovascular: regular rate and rhythm. No obvious extremity edema.  GI: palpation of masses. No tenderness. No hepatosplenomegaly to palpation.  Musculoskeletal: normal range of motion of bilateral upper extremities. Normal muscle strength and tone.  Skin: no obvious rashes or lesions. No tightening of skin noted.  Neurologic: CN grossly normal. Normal sensation.   Psychiatric: awake, alert and oriented  x 3. Mood and affect normal. Cooperative.    LABS REVIEW:  UA today:  Color:Clear, Yellow  Spec. Grav.  1.015  PH  5.0  Trace of Protein  +1 Blood    UCx: No results found for this or any previous visit.  Cr:   Lab Results   Component Value Date    CREATININE 1.41 (H) 03/30/2017     Assessment:       1. Elevated PSA, between 10 and less than 20 ng/ml    2. Microhematuria          Plan:   Microscopic Hematuria:  1.  Per guidelines we will send urine for microscopic ua and culture testing.  If urinalysis is positive for greater than 3 red blood cells per high power field, we will discuss doing a further workup per pt's decision only.     Elevated PSA level:  I have spent >30 mins in encounter, over half in counseling, in discussion with patient regarding psa and prostate cancer diagnostics. We discussed the natural history of prostate cancer in men as well as when diagnostics are indicated. We discussed that PSA screening is often discontinued in a man his age, but the decision to continue in a healthy patient becomes a risk benefit discussion between the patient. Also discussed that despite age, recommendations for evaluation and treatment of prostate cancer with a 10-15 year life expectancy stand, and as a relatively healthy active 82 year old man he falls into this category. We discussed proceeding with prostate biopsy vs. returning with repeat psa including free psa percentage. Pt currently denies any lower urinary tract symptoms.    The patient has elected to hold off from doing any further evaluation related to his elevated PSA until he is recovered from his recent knee replacement surgery and is off all blood thinners.      The patient will follow-up with Urologist in July 2017 to re introduce the issue of repeat TRUS w/ Biopsies if indicated this time around.  If he does make the decision at that time to further evaluate, he would like to schedule procedures after September of this year.    [facility]:  Active    Autumn Noble, [facility]

## 2017-04-12 LAB — BACTERIA UR CULT: NO GROWTH

## 2017-04-26 PROBLEM — J32.9 SINUSITIS: Status: ACTIVE | Noted: 2017-04-26

## 2017-11-30 ENCOUNTER — TELEPHONE (OUTPATIENT)
Dept: ORTHOPEDICS | Facility: CLINIC | Age: 82
End: 2017-11-30

## 2017-11-30 NOTE — TELEPHONE ENCOUNTER
Spoke to pt and he was notified we would call him back once we get an answer from Dr. Morejon. Pt understood.

## 2017-11-30 NOTE — TELEPHONE ENCOUNTER
"----- Message from Hong Blackwell sent at 11/30/2017  2:57 PM CST -----  Contact: Wife(Shira)/[phone]  Pt's wife, Shira, called in requesting an appt with Dr. Morejon per Dr. Melgar for "left knee pain, unspecified chronicity." Shira can be reached at [phone].    "

## 2017-12-01 ENCOUNTER — TELEPHONE (OUTPATIENT)
Dept: ORTHOPEDICS | Facility: CLINIC | Age: 82
End: 2017-12-01

## 2017-12-01 NOTE — TELEPHONE ENCOUNTER
"----- Message from José Luis Morejon MD sent at 11/30/2017  4:20 PM CST -----  Contact: Wife(Shira)/[phone]  Yes.  No need to overbook.  ----- Message -----  From: Radha Delvalle MA  Sent: 11/30/2017   3:47 PM  To: José Luis Morejon MD    Will you see this pt? Pt states he had sx in February this year and  thinks the pt is infected. Please advise? Can he be worked in sooner if you agree to see this pt?   ----- Message -----  From: Hong Blackwell  Sent: 11/30/2017   2:57 PM  To: Darleen CHRISTENSEN Staff    Pt's wife, Shira, called in requesting an appt with Dr. Morejon per Dr. Melgar for "left knee pain, unspecified chronicity." Shira can be reached at [phone].        "

## 2017-12-01 NOTE — TELEPHONE ENCOUNTER
Left a message for pt to call back and schedule an appointment with Dr. Morejon for the next available date.

## 2018-01-02 DIAGNOSIS — M25.562 LEFT KNEE PAIN, UNSPECIFIED CHRONICITY: Primary | ICD-10-CM

## 2018-01-15 ENCOUNTER — TELEPHONE (OUTPATIENT)
Dept: ORTHOPEDICS | Facility: CLINIC | Age: 83
End: 2018-01-15

## 2018-01-15 ENCOUNTER — HOSPITAL ENCOUNTER (OUTPATIENT)
Dept: RADIOLOGY | Facility: HOSPITAL | Age: 83
Discharge: HOME | End: 2018-01-15
Attending: ORTHOPAEDIC SURGERY
Payer: MEDICARE

## 2018-01-15 ENCOUNTER — OFFICE VISIT (OUTPATIENT)
Dept: ORTHOPEDICS | Facility: CLINIC | Age: 83
End: 2018-01-15
Payer: MEDICARE

## 2018-01-15 VITALS — HEIGHT: 69 IN | BODY MASS INDEX: 33.8 KG/M2 | WEIGHT: 228.19 LBS

## 2018-01-15 DIAGNOSIS — T84.84XA PAIN DUE TO TOTAL LEFT KNEE REPLACEMENT, INITIAL ENCOUNTER: Primary | ICD-10-CM

## 2018-01-15 DIAGNOSIS — M25.562 LEFT KNEE PAIN, UNSPECIFIED CHRONICITY: ICD-10-CM

## 2018-01-15 DIAGNOSIS — M17.11 PRIMARY OSTEOARTHRITIS OF RIGHT KNEE: ICD-10-CM

## 2018-01-15 DIAGNOSIS — Z96.652 PAIN DUE TO TOTAL LEFT KNEE REPLACEMENT, INITIAL ENCOUNTER: Primary | ICD-10-CM

## 2018-01-15 PROCEDURE — 73560 X-RAY EXAM OF KNEE 1 OR 2: CPT | Mod: 26,59,RT, | Performed by: RADIOLOGY

## 2018-01-15 PROCEDURE — 99203 OFFICE O/P NEW LOW 30 MIN: CPT | Mod: S$GLB,,, | Performed by: ORTHOPAEDIC SURGERY

## 2018-01-15 PROCEDURE — 73560 X-RAY EXAM OF KNEE 1 OR 2: CPT | Mod: TC,RT

## 2018-01-15 PROCEDURE — 73562 X-RAY EXAM OF KNEE 3: CPT | Mod: 26,LT,, | Performed by: RADIOLOGY

## 2018-01-15 PROCEDURE — 99999 PR PBB SHADOW E&M-EST. PATIENT-LVL III: CPT | Mod: PBBFAC,,, | Performed by: ORTHOPAEDIC SURGERY

## 2018-01-15 NOTE — PROGRESS NOTES
Subjective:      Patient ID: Anthony Sheldon is a 83 y.o. male.    Chief Complaint: bilateral knee pain    HPI   Anthony Sheldon is a 83 year old male here with a 1 year history of left knee pain. He is s/p L TKA in February 22nd 2017 by Dr. Grupo Jose at [facility] in [place]. The patient is a  retiree. There was not a history of trauma.  The pain is mild The pain is located in the global aspect of the knee. There is occasional radiation.  The pain radiates to the ankle.  The pain is described as dull, burning pain mostly at night. The patient has had prior surgery. It is aggravated by walking, with moderate activity and Other: going up or down steps.  I tis alleviated by rest. There is occasional numbness or tingling of the lower extremity on the anteromedial surface of the lower leg.  There is not back pain.  He  has tried medications (Aleve) or injections. They have helped.  He does have difficulty getting in or out of a car, getting dressed, or going up or down stairs.  The patient does not use an assistive device.  He has occasional swelling and warmth of the knee, but no fevers or chills. The surgery was complicated by a DVT in the RLE about 1 month post-op. This was treated with Eliquis for 6 months. He completed 10 weeks of physical therapy without issue. The pain improved with therapy but never completely went away. No popping, catching, clicking, or giving out.      Past Medical History:   Diagnosis Date    Arthritis     Dental bridge present     LOWER PARTIAL    DVT (deep venous thrombosis) 2014    right le after thr    Wears glasses      Past Surgical History:   Procedure Laterality Date    JOINT REPLACEMENT      BILAT HIPS, RIGHT SHOULDER    TONSILLECTOMY  1950    TOTAL HIP ARTHROPLASTY Bilateral 2013 & 2014    TOTAL SHOULDER ARTHROPLASTY Right 2005     Family History   Problem Relation Age of Onset    Stroke Mother     Bladder Cancer Father      Social History     Social History     Marital status:      Spouse name: N/A    Number of children: N/A    Years of education: N/A     Occupational History    Not on file.     Social History Main Topics    Smoking status: Former Smoker     Types: Cigarettes    Smokeless tobacco: Former User     Quit date: 1/1/1990    Alcohol use 0.0 oz/week    Drug use: No    Sexual activity: Not on file     Other Topics Concern    Not on file     Social History Narrative    No narrative on file     Current Outpatient Prescriptions on File Prior to Visit   Medication Sig Dispense Refill    hydrocodone-acetaminophen 5-325mg (NORCO) 5-325 mg per tablet Take 1 tablet by mouth every 6 (six) hours as needed for Pain. 30 tablet 0    multivitamin (ONE DAILY MULTIVITAMIN) per tablet Take 1 tablet by mouth once daily.      naproxen (EC-NAPROSYN) 375 MG TbEC EC tablet Take 1 tablet (375 mg total) by mouth 2 (two) times daily as needed. 180 tablet 3    zolpidem (AMBIEN) 5 MG Tab Take 1 tablet (5 mg total) by mouth nightly as needed. 30 tablet 5     No current facility-administered medications on file prior to visit.      Review of patient's allergies indicates:  No Known Allergies    Review of Systems   Constitution: Negative for chills, fever and night sweats.   HENT: Negative for congestion and hearing loss.    Eyes: Negative for blurred vision, double vision and redness.   Cardiovascular: Negative for chest pain, claudication and leg swelling.   Respiratory: Negative for cough and shortness of breath.    Endocrine: Negative for polydipsia, polyphagia and polyuria.   Hematologic/Lymphatic: Negative for adenopathy and bleeding problem. Does not bruise/bleed easily.   Skin: Negative for poor wound healing and rash.   Musculoskeletal: Positive for arthritis and joint swelling.   Gastrointestinal: Negative for diarrhea, heartburn, nausea and vomiting.   Genitourinary: Negative for bladder incontinence and dysuria.   Neurological: Negative for focal weakness,  headaches, numbness, paresthesias and sensory change.   Psychiatric/Behavioral: Negative for altered mental status. The patient is not nervous/anxious.    Allergic/Immunologic: Negative for hives and persistent infections.         Objective:      Body mass index is 33.7 kg/m².    General    Constitutional: He is oriented to person, place, and time. He appears well-developed.   HENT:   Head: Normocephalic and atraumatic.   Eyes: Conjunctivae and EOM are normal.   Neck: Normal range of motion.   Cardiovascular: Intact distal pulses.    Pulmonary/Chest: Effort normal.   Neurological: He is alert and oriented to person, place, and time.     General Musculoskeletal Exam   Gait: antalgic       Right Knee Exam     Inspection   Scars: absent  Swelling: absent  Effusion: present (mild)    Tenderness   The patient is experiencing no tenderness.         Crepitus   The patient has crepitus of the patella.    Range of Motion   Extension: 0   Flexion: 130     Tests   Ligament Examination Lachman: normal (-1 to 2mm) PCL-Posterior Drawer: normal (0 to 2mm)     MCL - Valgus: normal (0 to 2mm)  LCL - Varus: normal  Patella   Patellar Tracking: normal    Other   Popliteal (Baker's) Cyst: absent  Sensation: normal    Left Knee Exam     Inspection   Scars: present  Swelling: absent  Effusion: absent  Deformity: deformity    Tenderness   The patient tender to palpation of the medial joint line and lateral joint line (Very mild tenderness).    Range of Motion   Extension: 0   Flexion: 120     Tests   Stability Lachman: normal (-1 to 2mm) PCL-Posterior Drawer: normal (0 to 2mm)  MCL - Valgus: normal (0 to 2mm)  LCL - Varus: normal (0 to 2mm)  Patella   Patellar Tracking: normal    Other   Popliteal (Baker's) Cyst: absent  Left knee sensation: anteromedial paresthesias lower leg.    Muscle Strength   Right Lower Extremity   Quadriceps:  5/5   Hamstrin/5   Left Lower Extremity   Quadriceps:  5/5   Hamstrin/5     Vascular Exam      Right Pulses  Dorsalis Pedis:      2+  Posterior Tibial:      2+        Left Pulses  Dorsalis Pedis:      2+  Posterior Tibial:      2+        Edema  Right Lower Leg: absent  Left Lower Leg: absent      Radiographs taken today and reviewed by me demonstrate moderate arthritic change of the right KNEE(s) and left TKA. There  is not bone destruction.  There is not a fracture. The lateral compartment is most involved.  There is a valgus deformity.  The changes are tricompartmental. L TKA is in good alignment with no evidence of loosening.      Assessment:       Encounter Diagnoses   Name Primary?   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  Will call with results.  He would like a right knee injection in the future.

## 2018-01-15 NOTE — TELEPHONE ENCOUNTER
Spoke to pt notified him of his results. Scheduled a f/u appt with Dr Morejon  On 3/14/18 @ 830. Pt was compliant and verbalized understanding.      ----- Message from José Luis Morejon MD sent at 1/15/2018  1:03 PM CST -----  Please call patient.  There does not appear to be infection of the left knee.  Please schedule follow-up next available.  At that time we will discuss options for the left knee and most likely inject the right knee.

## 2018-03-14 ENCOUNTER — OFFICE VISIT (OUTPATIENT)
Dept: ORTHOPEDICS | Facility: CLINIC | Age: 83
End: 2018-03-14
Payer: MEDICARE

## 2018-03-14 VITALS — BODY MASS INDEX: 34.78 KG/M2 | HEIGHT: 69 IN | WEIGHT: 234.81 LBS

## 2018-03-14 DIAGNOSIS — M17.11 PRIMARY OSTEOARTHRITIS OF RIGHT KNEE: Primary | ICD-10-CM

## 2018-03-14 PROCEDURE — 99213 OFFICE O/P EST LOW 20 MIN: CPT | Mod: S$GLB,,, | Performed by: ORTHOPAEDIC SURGERY

## 2018-03-14 PROCEDURE — 99999 PR PBB SHADOW E&M-EST. PATIENT-LVL II: CPT | Mod: PBBFAC,,, | Performed by: ORTHOPAEDIC SURGERY

## 2018-03-14 RX ORDER — HYALURONATE SODIUM 20 MG/2 ML
20 SYRINGE (ML) INTRAARTICULAR WEEKLY
Status: DISCONTINUED | OUTPATIENT
Start: 2018-03-14 | End: 2019-03-25 | Stop reason: CLARIF

## 2018-03-14 NOTE — PROGRESS NOTES
"Subjective:      Patient ID: Anthony Sheldon is a 83 y.o. male.    Chief Complaint: Pain of the Left Knee and Pain of the Right Knee    HPI  Anthony Sheldon has right knee pain. Left knee is OK at present.  Occasional tingling sensation. The right knee pain is unchanged. The pain is located in the global aspect of the knee.  There  is not radiation. There is associated stiffness.   There is not catching and locking. The pain is described as achy. The pain is aggravated by standing and walking.  It is alleviated by rest.  There is not associated back pain.  His history, medications and problem list were reviewed.    Review of Systems   Constitution: Negative for chills, fever and night sweats.   HENT: Negative for hearing loss.    Eyes: Negative for blurred vision and double vision.   Cardiovascular: Negative for chest pain, claudication and leg swelling.   Respiratory: Negative for shortness of breath.    Endocrine: Negative for polydipsia, polyphagia and polyuria.   Hematologic/Lymphatic: Negative for adenopathy and bleeding problem. Does not bruise/bleed easily.   Skin: Negative for poor wound healing.   Musculoskeletal: Positive for joint pain.   Gastrointestinal: Negative for diarrhea and heartburn.   Genitourinary: Negative for bladder incontinence.   Neurological: Negative for focal weakness, headaches, numbness, paresthesias and sensory change.   Psychiatric/Behavioral: The patient is not nervous/anxious.    Allergic/Immunologic: Negative for persistent infections.         Objective:      Body mass index is 34.67 kg/m².  Vitals:    03/14/18 0827   Weight: 106.5 kg (234 lb 12.6 oz)   Height: 5' 9" (1.753 m)           General    Constitutional: He is oriented to person, place, and time. He appears well-developed and well-nourished.   HENT:   Head: Normocephalic and atraumatic.   Eyes: EOM are normal.   Cardiovascular: Normal rate.    Pulmonary/Chest: Effort normal.   Neurological: He is alert and " oriented to person, place, and time.   Psychiatric: He has a normal mood and affect. His behavior is normal.     General Musculoskeletal Exam   Gait: normal       Right Knee Exam     Inspection   Erythema: absent  Scars: absent  Swelling: present  Effusion: effusion  Deformity: deformity  Bruising: absent    Tenderness   The patient is tender to palpation of the medial joint line and lateral joint line.    Crepitus   The patient has crepitus of the patella.    Range of Motion   Extension: 0   Flexion: 130     Tests   Ligament Examination Lachman: normal (-1 to 2mm)   MCL - Valgus: normal (0 to 2mm)  LCL - Varus: normal  Patella   Passive Patellar Tilt: neutral    Other   Sensation: normal    Left Knee Exam     Inspection   Erythema: absent  Scars: absent  Swelling: absent  Effusion: absent  Deformity: deformity  Bruising: absent    Tenderness   The patient is experiencing no tenderness.         Range of Motion   Extension: 0   Flexion: 130     Tests   Stability Lachman: normal (-1 to 2mm)   MCL - Valgus: normal (0 to 2mm)  LCL - Varus: normal (0 to 2mm)  Patella   Passive Patellar Tilt: neutral    Other   Sensation: normal    Muscle Strength   Right Lower Extremity   Hip Abduction: 5/5   Quadriceps:  5/5   Hamstrin/5   Left Lower Extremity   Hip Abduction: 5/5   Quadriceps:  5/5   Hamstrin/5     Reflexes     Left Side  Quadriceps:  2+    Right Side   Quadriceps:  2+    Vascular Exam     Right Pulses  Dorsalis Pedis:      2+          Left Pulses  Dorsalis Pedis:      2+          Edema  Right Lower Leg: absent  Left Lower Leg: absent              Assessment:       Encounter Diagnosis   Name Primary?    Primary osteoarthritis of right knee Yes          Plan:       Anthony was seen today for pain and pain.    Diagnoses and all orders for this visit:    Primary osteoarthritis of right knee  -     Prior Authorization Order    Other orders  -     EUFLEXXA 10 mg/mL(mw 2.4 -3.6 million) injection Syrg 20 mg; Inject  2 mLs (20 mg total) into the articular space once a week.        Treatment options have been discussed.  We have decided to proceed with right knee Euflexxa injections.  The risk of pseudoseptic reactions were discussed, as was the minimal risk of infection.  Anthony Sheldon will return for his first injection..

## 2018-03-22 ENCOUNTER — OFFICE VISIT (OUTPATIENT)
Dept: ORTHOPEDICS | Facility: CLINIC | Age: 83
End: 2018-03-22
Payer: MEDICARE

## 2018-03-22 VITALS
HEIGHT: 69 IN | BODY MASS INDEX: 34.78 KG/M2 | WEIGHT: 234.81 LBS | DIASTOLIC BLOOD PRESSURE: 78 MMHG | HEART RATE: 65 BPM | SYSTOLIC BLOOD PRESSURE: 161 MMHG

## 2018-03-22 DIAGNOSIS — M17.11 PRIMARY OSTEOARTHRITIS OF RIGHT KNEE: ICD-10-CM

## 2018-03-22 PROCEDURE — 99999 PR PBB SHADOW E&M-EST. PATIENT-LVL III: CPT | Mod: PBBFAC,,, | Performed by: NURSE PRACTITIONER

## 2018-03-22 PROCEDURE — 99499 UNLISTED E&M SERVICE: CPT | Mod: S$GLB,,, | Performed by: NURSE PRACTITIONER

## 2018-03-22 PROCEDURE — 20610 DRAIN/INJ JOINT/BURSA W/O US: CPT | Mod: RT,S$GLB,, | Performed by: NURSE PRACTITIONER

## 2018-03-22 RX ADMIN — Medication 20 MG: at 11:03

## 2018-03-22 NOTE — LETTER
March 22, 2018      José Luis Morejon MD  [address]  [facility] [address]           [facility]  [address]  [facility] [address]  Phone: [phone]          Patient: Anthony Sheldon   MR Number: [id number]   YOB: 1934   Date of Visit: 3/22/2018       Dear Dr. José Luis Morejon:    Thank you for referring Anthony Sheldon to me for evaluation. Attached you will find relevant portions of my assessment and plan of care.    If you have questions, please do not hesitate to call me. I look forward to following Anthony Sheldon along with you.    Sincerely,    Tanja Herrera, NP    Enclosure  CC:  No Recipients    If you would like to receive this communication electronically, please contact [email or web address] or [phone] to request more information on [employer] Link access.    For providers and/or their staff who would like to refer a patient to [facility], please contact us through our one-stop-shop provider referral line, [facility], at [phone].    If you feel you have received this communication in error or would no longer like to receive these types of communications, please e-mail [email or web address]

## 2018-03-22 NOTE — PROGRESS NOTES
Anthony Sheldon presents to clinic today for the first right knee Euflexxa injection.    Exam demonstrates the no effusion in the  right knee, and the skin is intact.    Diagnosis: osteoarthritis knee    After time out was performed and patient ID, side, and site were verified, the  right  knee was sterilly prepped in the standard fashion.  A 22-gauge needle was introduced into right knee joint from an brook-lateral site without complication and knee was then injected with 2 ml of Euflexxa.  Sterile dressing was applied.  The patient was instructed to resume activities as tolerated and to call with any problems.     We will see Anthony Sheldon back next week for the second injection.

## 2018-03-29 ENCOUNTER — OFFICE VISIT (OUTPATIENT)
Dept: ORTHOPEDICS | Facility: CLINIC | Age: 83
End: 2018-03-29
Payer: MEDICARE

## 2018-03-29 VITALS
HEART RATE: 66 BPM | WEIGHT: 234.81 LBS | SYSTOLIC BLOOD PRESSURE: 158 MMHG | BODY MASS INDEX: 34.78 KG/M2 | HEIGHT: 69 IN | DIASTOLIC BLOOD PRESSURE: 79 MMHG

## 2018-03-29 DIAGNOSIS — M17.11 PRIMARY OSTEOARTHRITIS OF RIGHT KNEE: ICD-10-CM

## 2018-03-29 PROCEDURE — 20610 DRAIN/INJ JOINT/BURSA W/O US: CPT | Mod: RT,S$GLB,, | Performed by: NURSE PRACTITIONER

## 2018-03-29 PROCEDURE — 99499 UNLISTED E&M SERVICE: CPT | Mod: S$GLB,,, | Performed by: NURSE PRACTITIONER

## 2018-03-29 PROCEDURE — 99999 PR PBB SHADOW E&M-EST. PATIENT-LVL III: CPT | Mod: PBBFAC,,, | Performed by: NURSE PRACTITIONER

## 2018-03-29 RX ORDER — TRAMADOL HYDROCHLORIDE 50 MG/1
TABLET ORAL
COMMUNITY
Start: 2018-03-26 | End: 2019-01-07 | Stop reason: ALTCHOICE

## 2018-03-29 RX ADMIN — Medication 20 MG: at 03:03

## 2018-03-29 NOTE — PROGRESS NOTES
Anthony Sheldon presents to clinic today for the second right knee Euflexxa injection.    Exam demonstrates the no effusion in the  right knee, and the skin is intact.    Diagnosis: osteoarthritis knee    After time out was performed and patient ID, side, and site were verified, the  right  knee was sterilly prepped in the standard fashion.  A 22-gauge needle was introduced into right knee joint from an brook-lateral site without complication and knee was then injected with 2 ml of Euflexxa.  Sterile dressing was applied.  The patient was instructed to resume activities as tolerated and to call with any problems.     We will see Anthony Sheldon back next week for the third injection.

## 2018-03-29 NOTE — LETTER
March 29, 2018      José Luis Morejon MD  [address]  [facility] [address]           [facility]  [address]  [facility] [address]  Phone: [phone]          Patient: Anthony Sheldon   MR Number: [id number]   YOB: 1934   Date of Visit: 3/29/2018       Dear Dr. José Luis Morejon:    Thank you for referring Anthony Sheldon to me for evaluation. Attached you will find relevant portions of my assessment and plan of care.    If you have questions, please do not hesitate to call me. I look forward to following Anthony Sheldon along with you.    Sincerely,    Tanja Herrera, NP    Enclosure  CC:  No Recipients    If you would like to receive this communication electronically, please contact [email or web address] or [phone] to request more information on [employer] Link access.    For providers and/or their staff who would like to refer a patient to [facility], please contact us through our one-stop-shop provider referral line, [facility], at [phone].    If you feel you have received this communication in error or would no longer like to receive these types of communications, please e-mail [email or web address]

## 2018-04-05 ENCOUNTER — TELEPHONE (OUTPATIENT)
Dept: ORTHOPEDICS | Facility: CLINIC | Age: 83
End: 2018-04-05

## 2018-04-05 ENCOUNTER — OFFICE VISIT (OUTPATIENT)
Dept: ORTHOPEDICS | Facility: CLINIC | Age: 83
End: 2018-04-05
Payer: MEDICARE

## 2018-04-05 DIAGNOSIS — M17.11 PRIMARY OSTEOARTHRITIS OF RIGHT KNEE: ICD-10-CM

## 2018-04-05 PROCEDURE — 99499 UNLISTED E&M SERVICE: CPT | Mod: S$GLB,,, | Performed by: NURSE PRACTITIONER

## 2018-04-05 PROCEDURE — 99999 PR PBB SHADOW E&M-EST. PATIENT-LVL II: CPT | Mod: PBBFAC,,, | Performed by: NURSE PRACTITIONER

## 2018-04-05 PROCEDURE — 20610 DRAIN/INJ JOINT/BURSA W/O US: CPT | Mod: RT,S$GLB,, | Performed by: NURSE PRACTITIONER

## 2018-04-05 RX ADMIN — Medication 20 MG: at 11:04

## 2018-04-05 NOTE — PROGRESS NOTES
"Anthony Sheldon presents to clinic today for the third right knee Euflexxa injection.    Exam demonstrates the no effusion in the  right knee, and the skin is intact.    Diagnosis: osteoarthritis knee    After time out was performed and patient ID, side, and site were verified, the  right  knee was sterilly prepped in the standard fashion.  A 22-gauge needle was introduced into right knee joint from an brook-lateral site without complication and knee was then injected with 2 ml of Euflexxa.  Sterile dressing was applied.  The patient was instructed to resume activities as tolerated and to call with any problems.     Pt reports good relief in the right knee. He is still having "shooting and burning" pain in the left knee and would like to have the nerve block that he discussed with Dr. Morejon. I will f/u on that and let him know when it's scheduled.    "

## 2018-04-05 NOTE — LETTER
April 5, 2018      José Luis Morejon MD  [address]  [facility] [address]           [facility]  [address]  [facility] [address]  Phone: [phone]          Patient: Anthony Sheldon   MR Number: [id number]   YOB: 1934   Date of Visit: 4/5/2018       Dear Dr. José Luis Morejon:    Thank you for referring Anthony Sheldon to me for evaluation. Attached you will find relevant portions of my assessment and plan of care.    If you have questions, please do not hesitate to call me. I look forward to following Anthony Sheldon along with you.    Sincerely,    Tanja Herrera, NP    Enclosure  CC:  No Recipients    If you would like to receive this communication electronically, please contact [email or web address] or [phone] to request more information on [employer] Link access.    For providers and/or their staff who would like to refer a patient to [facility], please contact us through our one-stop-shop provider referral line, [facility], at [phone].    If you feel you have received this communication in error or would no longer like to receive these types of communications, please e-mail [email or web address]

## 2018-04-05 NOTE — TELEPHONE ENCOUNTER
"----- Message from José Luis Morejon MD sent at 4/5/2018  1:04 PM CDT -----   It was an RFA.  ----- Message -----  From: Tanja Herrera NP  Sent: 4/5/2018  11:35 AM  To: José Luis Morejon MD    This patient got his last euflexxa in the right knee today and it's helping a lot, but he wants to get the "nerve injection" in the left knee that you talked about. I don't see anything in your note and I don't see an order, but I can order the injection if you wanted to try that.   Thanks  Tanja    "

## 2018-04-05 NOTE — TELEPHONE ENCOUNTER
Called patient and informed him of appt 4/23/18 with Dr. Nichole to discuss RFA for the left knee. Appt also mailed to the patient.

## 2018-04-23 ENCOUNTER — OFFICE VISIT (OUTPATIENT)
Dept: PAIN MEDICINE | Facility: CLINIC | Age: 83
End: 2018-04-23
Attending: ANESTHESIOLOGY
Payer: MEDICARE

## 2018-04-23 VITALS — BODY MASS INDEX: 34.25 KG/M2 | HEIGHT: 69 IN | WEIGHT: 231.25 LBS

## 2018-04-23 DIAGNOSIS — M25.562 CHRONIC PAIN OF LEFT KNEE: Primary | ICD-10-CM

## 2018-04-23 DIAGNOSIS — G89.29 CHRONIC PAIN OF LEFT KNEE: Primary | ICD-10-CM

## 2018-04-23 DIAGNOSIS — Z96.652 STATUS POST TOTAL LEFT KNEE REPLACEMENT: ICD-10-CM

## 2018-04-23 PROCEDURE — 99204 OFFICE O/P NEW MOD 45 MIN: CPT | Mod: S$GLB,,, | Performed by: ANESTHESIOLOGY

## 2018-04-23 NOTE — LETTER
April 23, 2018      José Luis Morejon MD  [address]  [facility] [address]           [facility]  [address]  [facility] [address]  Phone: [phone]  Fax: [phone]          Patient: Anthony Sheldon   MR Number: [id number]   YOB: 1934   Date of Visit: 4/23/2018       Dear Dr. José Luis Morejon:    Thank you for referring Anthony Sheldon to me for evaluation. Attached you will find relevant portions of my assessment and plan of care.    If you have questions, please do not hesitate to call me. I look forward to following Anthony Sheldon along with you.    Sincerely,    Crow Posadas III, MD    Enclosure  CC:  No Recipients    If you would like to receive this communication electronically, please contact [email or web address] or [phone] to request more information on [email or web address] Link access.    For providers and/or their staff who would like to refer a patient to [facility], please contact us through our one-stop-shop provider referral line, [facility], at [phone].    If you feel you have received this communication in error or would no longer like to receive these types of communications, please e-mail [email or web address]

## 2018-04-23 NOTE — PROGRESS NOTES
"Chronic Pain - New Consult    Referring Physician: José Luis Morejon MD      Chief Complaint   Patient presents with    Knee Pain     left        SUBJECTIVE:    Anthony Sheldon is an 84 y/o male who presents to the clinic for the evaluation of left knee pain. The pain started years ago. He is s/p L TKA in February 2017 by Dr. Grupo Jose at [facility] in [place]. The pain is located in the global aspect of the left knee and radiates to the medial aspect of the ankle. He denies low back pain. The pain is described as aching, dull and tight band and is rated as 4/10. The pain is rated with a score of  4/10 on the AVERAGE day and a score of 8/10 on the WORST day.  Symptoms interfere with daily activity. The pain is exacerbated activity, lifting and rising from a seated/crouched position.  The pain is mitigated by medication. The patient reports 6-7 hours of uninterrupted sleep per night.    Physical Therapy/Home Exercise: yes x 2    Pain Disability Index Review:  Last 3 PDI Scores 4/23/2018 4/23/2018   Pain Disability Index (PDI) 37 37       Pain Medications:    - Tramadol 50 mg twice daily as needed  - Naproxen 500 mg as needed     report:  Reviewed    Imaging:     Left Knee X-ray (1/15/2018):    Clinical History provided for exam:"lt knee".  As on 2/22/17, there are surgical changes of left knee arthroplasty.  Position and appearance of the prosthesis are unchanged.  There is a left suprapatellar effusion.  There is moderate diffuse narrowing of the right knee joint.  On the right there are spurs on the distal femur, proximal tibia, and patella. No acute fracture or bony destructive process is seen.  Alignment is normal.    Past Medical History:   Diagnosis Date    Arthritis     Dental bridge present     LOWER PARTIAL    DVT (deep venous thrombosis) 2014    right le after thr    Wears glasses      Past Surgical History:   Procedure Laterality Date    JOINT REPLACEMENT      BILAT HIPS, RIGHT SHOULDER    " TONSILLECTOMY  1950    TOTAL HIP ARTHROPLASTY Bilateral 2013 & 2014    TOTAL SHOULDER ARTHROPLASTY Right 2005     Social History     Social History    Marital status:      Spouse name: N/A    Number of children: N/A    Years of education: N/A     Occupational History    Not on file.     Social History Main Topics    Smoking status: Former Smoker     Types: Cigarettes    Smokeless tobacco: Former User     Quit date: 1/1/1990    Alcohol use 0.0 oz/week    Drug use: No    Sexual activity: Not on file     Other Topics Concern    Not on file     Social History Narrative    No narrative on file     Family History   Problem Relation Age of Onset    Stroke Mother     Bladder Cancer Father        Review of patient's allergies indicates:  No Known Allergies    Current Outpatient Prescriptions   Medication Sig    multivitamin (ONE DAILY MULTIVITAMIN) per tablet Take 1 tablet by mouth once daily.    traMADol (ULTRAM) 50 mg tablet     zolpidem (AMBIEN) 5 MG Tab Take 1 tablet (5 mg total) by mouth nightly as needed.    famotidine (PEPCID) 20 MG tablet Take 1 tablet (20 mg total) by mouth 2 (two) times daily.    hydrocodone-acetaminophen 5-325mg (NORCO) 5-325 mg per tablet Take 1 tablet by mouth every 6 (six) hours as needed for Pain.    naproxen (EC NAPROSYN) 500 MG EC tablet Take 1 tablet (500 mg total) by mouth 2 (two) times daily as needed.     Current Facility-Administered Medications   Medication    EUFLEXXA 10 mg/mL(mw 2.4 -3.6 million) injection Syrg 20 mg       REVIEW OF SYSTEMS:  GENERAL: No weight loss, malaise or fevers.  HEENT: Negative for frequent or significant headaches.  NECK: Negative for lumps or significant neck swelling.  RESPIRATORY: Negative for wheezing or shortness of breath.  CARDIOVASCULAR: Negative for chest pain or palpitations.  GI: No blood in stools or black stools or change in bowel habits.  : Negative for kidney stones, urinary tract infections, or  "incontinence.  MUSCULOSKELETAL: See HPI  SKIN: Negative for rash or itching.  PSYCH: Negative for sleep disturbance, mood disorder and recent psychosocial stressors.    HEMATOLOGY/LYMPHOLOGY Negative for prolonged bleeding, bruising easily or swollen nodes. Hx of DVT  NEURO:  No history of syncope, seizures or tremors.    OBJECTIVE:    Ht 5' 9" (1.753 m)   Wt 104.9 kg (231 lb 4.2 oz)   BMI 34.15 kg/m²     PHYSICAL EXAMINATION:  GENERAL: Well appearing, in no acute distress.  PSYCH:  Mood and affect is appropriate.  Awake, alert, and oriented x 3.  SKIN: Skin color, texture, turgor normal, no rashes or lesions  HEENT: Normocephalic, atraumatic.  EOM intact.  CV: Radial pulses are 2+.  RESP:  Respirations are unlabored.  GI: Abdomen soft and non-tender.  MSK:  No atrophy or tone abnormalities are noted.      Neck: No obvious deformity or signs of trauma.  Normal cervical spine range of motion.    Back: Normal range of motion without pain reproduction.    Extremities:  No edema or skin discolorations noted.     Left Knee: No tenderness to palpation over the joint lines. ROM is mildly limited on flexion. No evidence of instability on exam. No effusion, warmth, or erythema present. Sensation intact.    Gait:  Gait is antalgic.    NEUR:  Strength testing is 5/5 throughout all muscle groups in the upper and lower extremities. No loss of sensation is noted.     ASSESSMENT:     1. Chronic pain of left knee    2. Status post total left knee replacement          PLAN:     - I have stressed the importance of physical activity and a home exercise plan to help with pain and improve health.  - Schedule for a Diagnostic LEFT Knee Genicular nerve block.  - In the future, I will consider Radiofrequency ablation for longer pain relief if positive response from diagnostic block.    - Patient will contact office with results of diagnostic block.      The above plan and management options were discussed at length with patient. Patient " is in agreement with the above and verbalized understanding. It will be communicated with the referring physician via electronic record, fax, or mail.    Crow Posadas III  04/23/2018

## 2018-04-25 ENCOUNTER — PATIENT MESSAGE (OUTPATIENT)
Dept: PAIN MEDICINE | Facility: CLINIC | Age: 83
End: 2018-04-25

## 2018-04-25 ENCOUNTER — PATIENT MESSAGE (OUTPATIENT)
Dept: ORTHOPEDICS | Facility: CLINIC | Age: 83
End: 2018-04-25

## 2018-04-25 ENCOUNTER — TELEPHONE (OUTPATIENT)
Dept: PAIN MEDICINE | Facility: CLINIC | Age: 83
End: 2018-04-25

## 2018-04-25 NOTE — TELEPHONE ENCOUNTER
Per ,  Spoke with patient and was given recommendation that he should see his Orthopedic Doctor concerning his recent fall/knee injury and get clearance before we could proceed with his procedure. Patient voiced understanding.     Brynn Pantoja LPN Notified.

## 2018-04-26 ENCOUNTER — PATIENT MESSAGE (OUTPATIENT)
Dept: ORTHOPEDICS | Facility: CLINIC | Age: 83
End: 2018-04-26

## 2018-04-26 ENCOUNTER — TELEPHONE (OUTPATIENT)
Dept: ORTHOPEDICS | Facility: CLINIC | Age: 83
End: 2018-04-26

## 2018-04-26 NOTE — TELEPHONE ENCOUNTER
----- Message from Aparna Marshall sent at 4/26/2018  8:35 AM CDT -----  Contact: Pt   Pt was calling to speak with nurse about symptoms.    Pt would like a call back at [phone].    Thank you

## 2018-04-26 NOTE — TELEPHONE ENCOUNTER
Pt declined soonest appt 5/7/18. Stated he would try to get something sooner with another provider he was referred to and would call me back if he doesn't get a sooner appt

## 2018-04-26 NOTE — TELEPHONE ENCOUNTER
----- Message from Nani Rondon sent at 4/26/2018  3:39 PM CDT -----  Dr. Melgar's office requesting appointment for patient on Monday, April 30th for knee pain/please call office back at [phone] or [phone] (patient)to schedule or advise.

## 2018-04-26 NOTE — TELEPHONE ENCOUNTER
Spoke to pt and he was notified we could get him in on Monday with Tanja LARSEN. Pt wanted to see a doctor close to his home. Pt will go to a [facility] close to his home.

## 2018-04-27 ENCOUNTER — PATIENT MESSAGE (OUTPATIENT)
Dept: ORTHOPEDICS | Facility: CLINIC | Age: 83
End: 2018-04-27

## 2018-04-27 ENCOUNTER — HOSPITAL ENCOUNTER (OUTPATIENT)
Dept: RADIOLOGY | Facility: HOSPITAL | Age: 83
Discharge: HOME | End: 2018-04-27
Attending: NURSE PRACTITIONER
Payer: MEDICARE

## 2018-04-27 DIAGNOSIS — Z96.652 HISTORY OF TOTAL LEFT KNEE REPLACEMENT: ICD-10-CM

## 2018-04-27 DIAGNOSIS — M25.562 ACUTE PAIN OF LEFT KNEE: ICD-10-CM

## 2018-04-27 PROCEDURE — 73721 MRI JNT OF LWR EXTRE W/O DYE: CPT | Mod: TC,LT

## 2018-04-27 PROCEDURE — 73721 MRI JNT OF LWR EXTRE W/O DYE: CPT | Mod: 26,LT,, | Performed by: RADIOLOGY

## 2018-05-01 ENCOUNTER — OFFICE VISIT (OUTPATIENT)
Dept: ORTHOPEDICS | Facility: CLINIC | Age: 83
End: 2018-05-01
Payer: MEDICARE

## 2018-05-01 ENCOUNTER — TELEPHONE (OUTPATIENT)
Dept: PAIN MEDICINE | Facility: CLINIC | Age: 83
End: 2018-05-01

## 2018-05-01 VITALS
DIASTOLIC BLOOD PRESSURE: 78 MMHG | WEIGHT: 231.06 LBS | BODY MASS INDEX: 34.22 KG/M2 | SYSTOLIC BLOOD PRESSURE: 180 MMHG | HEART RATE: 61 BPM | HEIGHT: 69 IN

## 2018-05-01 DIAGNOSIS — M25.562 ACUTE PAIN OF LEFT KNEE: Primary | ICD-10-CM

## 2018-05-01 PROCEDURE — 99213 OFFICE O/P EST LOW 20 MIN: CPT | Mod: S$GLB,,, | Performed by: ORTHOPAEDIC SURGERY

## 2018-05-01 PROCEDURE — 99999 PR PBB SHADOW E&M-EST. PATIENT-LVL III: CPT | Mod: PBBFAC,,, | Performed by: ORTHOPAEDIC SURGERY

## 2018-05-01 NOTE — LETTER
May 2, 2018      Perfecto Melgar III, MD  [address]  [place] [address]           [address] [address]  Phone: [phone]          Patient: Anthony Sheldon   MR Number: [id number]   YOB: 1934   Date of Visit: 5/1/2018       Dear Dr. Perfecto Melgar III:    Thank you for referring Anthony Sheldon to me for evaluation. Attached you will find relevant portions of my assessment and plan of care.    If you have questions, please do not hesitate to call me. I look forward to following Anthony Sheldon along with you.    Sincerely,    Dario Sorenson MD    Enclosure  CC:  No Recipients    If you would like to receive this communication electronically, please contact [email or web address] or [phone] to request more information on [facility] [employer] access.    For providers and/or their staff who would like to refer a patient to [facility], please contact us through our one-stop-shop provider referral line, [facility], at [phone].    If you feel you have received this communication in error or would no longer like to receive these types of communications, please e-mail [email or web address]

## 2018-05-01 NOTE — TELEPHONE ENCOUNTER
Spoke with pt and rescheduled procedure for 5/11/18 at 1500 with an arrival time of 1330. Went over pre procedure instructions again and pt repeated them back to me at the end of our conversation: understanding verbalized.

## 2018-05-03 NOTE — PROGRESS NOTES
Past Medical History:   Diagnosis Date    Arthritis     Dental bridge present     LOWER PARTIAL    DVT (deep venous thrombosis) 2014    right le after thr    Wears glasses        Past Surgical History:   Procedure Laterality Date    JOINT REPLACEMENT      BILAT HIPS, RIGHT SHOULDER    TONSILLECTOMY  1950    TOTAL HIP ARTHROPLASTY Bilateral 2013 & 2014    TOTAL SHOULDER ARTHROPLASTY Right 2005       Current Outpatient Prescriptions   Medication Sig    cyclobenzaprine (FLEXERIL) 10 MG tablet Take 10 mg by mouth 2 (two) times daily.    famotidine (PEPCID) 20 MG tablet Take 1 tablet (20 mg total) by mouth 2 (two) times daily.    multivitamin (ONE DAILY MULTIVITAMIN) per tablet Take 1 tablet by mouth once daily.    naproxen (EC NAPROSYN) 500 MG EC tablet Take 1 tablet (500 mg total) by mouth 2 (two) times daily as needed.    traMADol (ULTRAM) 50 mg tablet     zolpidem (AMBIEN) 5 MG Tab Take 1 tablet (5 mg total) by mouth nightly as needed.     Current Facility-Administered Medications   Medication    EUFLEXXA 10 mg/mL(mw 2.4 -3.6 million) injection Syrg 20 mg       Review of patient's allergies indicates:  No Known Allergies    Family History   Problem Relation Age of Onset    Stroke Mother     Bladder Cancer Father        Social History     Social History    Marital status:      Spouse name: N/A    Number of children: N/A    Years of education: N/A     Occupational History    Not on file.     Social History Main Topics    Smoking status: Former Smoker     Types: Cigarettes    Smokeless tobacco: Former User     Quit date: 1/1/1990    Alcohol use 0.0 oz/week    Drug use: No    Sexual activity: Not on file     Other Topics Concern    Not on file     Social History Narrative    No narrative on file       Chief Complaint:   Chief Complaint   Patient presents with    Left Knee - Pain       Consulting Physician: Perfecto Melgar III, MD    History of present illness:    This is a 83 y.o. male  "who complains of left knee pain since a pop in the knee 4-24-18. HO TKA. Pain 6/10 and getting better.    Review of Systems:    Constitution: Denies chills, fever, and sweats.  HENT: Denies headaches or blurry vision.  Cardiovascular: Denies chest pain or irregular heart beat.  Respiratory: Denies cough or shortness of breath.  Gastrointestinal: Denies abdominal pain, nausea, or vomiting.  Musculoskeletal:  Denies muscle cramps.  Neurological: Denies dizziness or focal weakness.  Psychiatric/Behavioral: Normal mental status.  Hematologic/Lymphatic: Denies bleeding problem or easy bruising/bleeding.  Skin: Denies rash or suspicious lesions.    Examination:    Vital Signs:    Vitals:    05/01/18 0924   BP: (!) 180/78   Pulse: 61   Weight: 104.8 kg (231 lb 0.7 oz)   Height: 5' 9" (1.753 m)   PainSc:   2   PainLoc: Knee       Body mass index is 34.12 kg/m².    This a well-developed, well nourished patient in no acute distress.    Alert and oriented x 3 and cooperative to examination.       Physical Exam: Left Knee Exam    Gait   Normal    Skin  Rash:   None  Scars:   healed    Inspection  Erythema:  None  Bruising:  None  Effusion:  None  Masses:  None  Lymphadenopathy: None    Range of Motion: 0 to 120°    Medial Joint : None  Lateral Joint : None    Patellofemoral Tenderness: None  Patellofemoral Crepitus: None    Lachman:  Normal  Anterior Drawer: Normal  Posterior Drawer: Normal    Harry's:  Negative  Apley's:  Negative    Varus Stress:  Stable  Valgus Stress:  Stable    Strength:  5/5    Pulses:  Palpable distal    Sensation:  Intact          Imaging: X-rays ordered and reviewed today personally of left knee show well placed TKA. MRI reviewed with pt appears normal.       Assessment: Acute pain of left knee        Plan:  Will start PT and allow return to all activities.      DISCLAIMER: This note may have been dictated using voice recognition software and may contain grammatical errors. "     NOTE: Consult report sent to referring provider via [facility] EMR.

## 2018-05-04 ENCOUNTER — TELEPHONE (OUTPATIENT)
Dept: PAIN MEDICINE | Facility: CLINIC | Age: 83
End: 2018-05-04

## 2018-05-04 NOTE — TELEPHONE ENCOUNTER
Called & informed pt that we have a cx for 5/11 @ 1130 & offered him the spot. Pt accepted procedure date 5/11/18 @ 11:30 a.m with an arrival time of 10 a.m. Informed CALISTA Lovelace in the cath lab.

## 2018-05-11 ENCOUNTER — HOSPITAL ENCOUNTER (OUTPATIENT)
Facility: HOSPITAL | Age: 83
Discharge: HOME | End: 2018-05-11
Attending: ANESTHESIOLOGY | Admitting: ANESTHESIOLOGY
Payer: MEDICARE

## 2018-05-11 VITALS
RESPIRATION RATE: 18 BRPM | DIASTOLIC BLOOD PRESSURE: 72 MMHG | SYSTOLIC BLOOD PRESSURE: 165 MMHG | HEART RATE: 64 BPM | OXYGEN SATURATION: 99 % | BODY MASS INDEX: 33.33 KG/M2 | HEIGHT: 69 IN | TEMPERATURE: 98 F | WEIGHT: 225 LBS

## 2018-05-11 DIAGNOSIS — G89.29 CHRONIC PAIN OF LEFT KNEE: Primary | ICD-10-CM

## 2018-05-11 DIAGNOSIS — M25.562 CHRONIC PAIN OF LEFT KNEE: Primary | ICD-10-CM

## 2018-05-11 PROCEDURE — 25000003 PHARM REV CODE 250

## 2018-05-11 PROCEDURE — S0020 INJECTION, BUPIVICAINE HYDRO: HCPCS

## 2018-05-11 PROCEDURE — 63600175 PHARM REV CODE 636 W HCPCS

## 2018-05-11 PROCEDURE — 99152 MOD SED SAME PHYS/QHP 5/>YRS: CPT

## 2018-05-11 PROCEDURE — 99152 MOD SED SAME PHYS/QHP 5/>YRS: CPT | Mod: ,,, | Performed by: ANESTHESIOLOGY

## 2018-05-11 PROCEDURE — 64450 NJX AA&/STRD OTHER PN/BRANCH: CPT | Mod: LT,,, | Performed by: ANESTHESIOLOGY

## 2018-05-11 NOTE — H&P (VIEW-ONLY)
"Chronic Pain - New Consult    Referring Physician: José Luis Morejon MD      Chief Complaint   Patient presents with    Knee Pain     left        SUBJECTIVE:    Anthony Sheldon is an 82 y/o male who presents to the clinic for the evaluation of left knee pain. The pain started years ago. He is s/p L TKA in February 2017 by Dr. Grupo Jose at [facility] in [place]. The pain is located in the global aspect of the left knee and radiates to the medial aspect of the ankle. He denies low back pain. The pain is described as aching, dull and tight band and is rated as 4/10. The pain is rated with a score of  4/10 on the AVERAGE day and a score of 8/10 on the WORST day.  Symptoms interfere with daily activity. The pain is exacerbated activity, lifting and rising from a seated/crouched position.  The pain is mitigated by medication. The patient reports 6-7 hours of uninterrupted sleep per night.    Physical Therapy/Home Exercise: yes x 2    Pain Disability Index Review:  Last 3 PDI Scores 4/23/2018 4/23/2018   Pain Disability Index (PDI) 37 37       Pain Medications:    - Tramadol 50 mg twice daily as needed  - Naproxen 500 mg as needed     report:  Reviewed    Imaging:     Left Knee X-ray (1/15/2018):    Clinical History provided for exam:"lt knee".  As on 2/22/17, there are surgical changes of left knee arthroplasty.  Position and appearance of the prosthesis are unchanged.  There is a left suprapatellar effusion.  There is moderate diffuse narrowing of the right knee joint.  On the right there are spurs on the distal femur, proximal tibia, and patella. No acute fracture or bony destructive process is seen.  Alignment is normal.    Past Medical History:   Diagnosis Date    Arthritis     Dental bridge present     LOWER PARTIAL    DVT (deep venous thrombosis) 2014    right le after thr    Wears glasses      Past Surgical History:   Procedure Laterality Date    JOINT REPLACEMENT      BILAT HIPS, RIGHT SHOULDER    " TONSILLECTOMY  1950    TOTAL HIP ARTHROPLASTY Bilateral 2013 & 2014    TOTAL SHOULDER ARTHROPLASTY Right 2005     Social History     Social History    Marital status:      Spouse name: N/A    Number of children: N/A    Years of education: N/A     Occupational History    Not on file.     Social History Main Topics    Smoking status: Former Smoker     Types: Cigarettes    Smokeless tobacco: Former User     Quit date: 1/1/1990    Alcohol use 0.0 oz/week    Drug use: No    Sexual activity: Not on file     Other Topics Concern    Not on file     Social History Narrative    No narrative on file     Family History   Problem Relation Age of Onset    Stroke Mother     Bladder Cancer Father        Review of patient's allergies indicates:  No Known Allergies    Current Outpatient Prescriptions   Medication Sig    multivitamin (ONE DAILY MULTIVITAMIN) per tablet Take 1 tablet by mouth once daily.    traMADol (ULTRAM) 50 mg tablet     zolpidem (AMBIEN) 5 MG Tab Take 1 tablet (5 mg total) by mouth nightly as needed.    famotidine (PEPCID) 20 MG tablet Take 1 tablet (20 mg total) by mouth 2 (two) times daily.    hydrocodone-acetaminophen 5-325mg (NORCO) 5-325 mg per tablet Take 1 tablet by mouth every 6 (six) hours as needed for Pain.    naproxen (EC NAPROSYN) 500 MG EC tablet Take 1 tablet (500 mg total) by mouth 2 (two) times daily as needed.     Current Facility-Administered Medications   Medication    EUFLEXXA 10 mg/mL(mw 2.4 -3.6 million) injection Syrg 20 mg       REVIEW OF SYSTEMS:  GENERAL: No weight loss, malaise or fevers.  HEENT: Negative for frequent or significant headaches.  NECK: Negative for lumps or significant neck swelling.  RESPIRATORY: Negative for wheezing or shortness of breath.  CARDIOVASCULAR: Negative for chest pain or palpitations.  GI: No blood in stools or black stools or change in bowel habits.  : Negative for kidney stones, urinary tract infections, or  "incontinence.  MUSCULOSKELETAL: See HPI  SKIN: Negative for rash or itching.  PSYCH: Negative for sleep disturbance, mood disorder and recent psychosocial stressors.    HEMATOLOGY/LYMPHOLOGY Negative for prolonged bleeding, bruising easily or swollen nodes. Hx of DVT  NEURO:  No history of syncope, seizures or tremors.    OBJECTIVE:    Ht 5' 9" (1.753 m)   Wt 104.9 kg (231 lb 4.2 oz)   BMI 34.15 kg/m²     PHYSICAL EXAMINATION:  GENERAL: Well appearing, in no acute distress.  PSYCH:  Mood and affect is appropriate.  Awake, alert, and oriented x 3.  SKIN: Skin color, texture, turgor normal, no rashes or lesions  HEENT: Normocephalic, atraumatic.  EOM intact.  CV: Radial pulses are 2+.  RESP:  Respirations are unlabored.  GI: Abdomen soft and non-tender.  MSK:  No atrophy or tone abnormalities are noted.      Neck: No obvious deformity or signs of trauma.  Normal cervical spine range of motion.    Back: Normal range of motion without pain reproduction.    Extremities:  No edema or skin discolorations noted.     Left Knee: No tenderness to palpation over the joint lines. ROM is mildly limited on flexion. No evidence of instability on exam. No effusion, warmth, or erythema present. Sensation intact.    Gait:  Gait is antalgic.    NEUR:  Strength testing is 5/5 throughout all muscle groups in the upper and lower extremities. No loss of sensation is noted.     ASSESSMENT:     1. Chronic pain of left knee    2. Status post total left knee replacement          PLAN:     - I have stressed the importance of physical activity and a home exercise plan to help with pain and improve health.  - Schedule for a Diagnostic LEFT Knee Genicular nerve block.  - In the future, I will consider Radiofrequency ablation for longer pain relief if positive response from diagnostic block.    - Patient will contact office with results of diagnostic block.      The above plan and management options were discussed at length with patient. Patient " is in agreement with the above and verbalized understanding. It will be communicated with the referring physician via electronic record, fax, or mail.    Crow Posadas III  04/23/2018

## 2018-05-11 NOTE — PLAN OF CARE
Problem: Patient Care Overview  Goal: Plan of Care Review  Outcome: Ongoing (interventions implemented as appropriate)  Received report from CALISTA Ackerman. Patient s/p Left knee injection, AAOx3. VSS, no c/o pain or discomfort at this time, resp even and unlabored. Left knee bandaids x 2 CDI.  No active bleeding. No hematoma noted. Post procedure protocol reviewed with patient and patient's family. Understanding verbalized. Family members at bedside. Nurse call bell within reach. Will continue to monitor per post procedure protocol.

## 2018-05-11 NOTE — DISCHARGE INSTRUCTIONS

## 2018-05-11 NOTE — OP NOTE
Patient Name: Anthony Sheldon  MRN: [id number]    INFORMED CONSENT: The procedure, risks, benefits and options were discussed with patient. There are no contraindications to the procedure. The patient expressed understanding and agreed to proceed. The personnel performing the procedure was discussed. I verify that I personally obtained Anthony's consent prior to the start of the procedure and the signed consent can be found on the patient's chart.        Procedure Date: 5/11/2018  Anesthesia:   Systemic    Pre Procedure diagnosis:    1. Chronic pain of left knee        Post-Procedure diagnosis:  Same    Sedation: Yes - Fentanyl 50 mcg and Midazolam 1 mg IV    PROCEDURE: LEFT GENICULAR NERVE BLOCK  DESCRIPTION OF MD OCEDURE: The patient was brought to the fluoroscopy suite.  IV access was obtained prior to the procedure.  The patient was positioned supine  on the fluoroscopy table.  Continuous hemodynamic monitoring was initiated including blood pressure, EKG, and pulse oximetry.  The area around the LEFT knee joint was prepped with chlorhexidine three times and draped into a sterile field.  Skin anesthesia was achieved using 5 mL of Lidocaine 1% over the respective injection sites. A 22 gauge 3.5 inch spinal needle was slowly inserted and advanced to the junction of the lateral femoral shaft and the epicondyle while contacting periosteum to block the superior lateral genicular nerve using AP and lateral fluoroscopic imaging. The same process was repeated at the junction of the medial femoral shaft and the epicondyle to block the superior medial genicular nerve. The same process was repeated again using a third needle which was advanced to the junction of the medial tibial plateau and the epicondyle to block the inferior medial genicular nerve. Using AP fluoroscopy the position of all 3 needles was confirmed. Afterwards, lateral fluoroscopic images were obtained and the needles were adjusted to lay in the middle of  the diaphysis. Negative aspiration for blood. 1.5 mL of Bupivacaine 0.5% was injected at each needle location to block all targeted nerves. There was no pain on injection. The needles were removed and bleeding was nil.  A sterile dressing was applied. No specimens collected. PATIENT was taken to the Post-block Recovery Area for further observation.     Blood Loss: Nil  Specimen: None    Discharge Diagnosis: Same as Pre and Post Procedure  Condition on Discharge: Stable.  Diet on Discharge: Same as before.  Activity: as per instruction sheet.  Discharge to: Home with a responsible adult.  Follow up: as per Discharge instructions

## 2018-05-11 NOTE — DISCHARGE SUMMARY
Discharge Note  Short Stay      SUMMARY     Admit Date: 5/11/2018    Attending Physician: Crow Posadas III      Discharge Physician: Crow Posadas III      Discharge Date: 5/11/2018     Final Diagnosis:   1. Chronic pain of left knee        Disposition: Home or self care    Patient Instructions:   Current Discharge Medication List      CONTINUE these medications which have NOT CHANGED    Details   famotidine (PEPCID) 20 MG tablet Take 1 tablet (20 mg total) by mouth 2 (two) times daily.  Qty: 60 tablet, Refills: 11      multivitamin (ONE DAILY MULTIVITAMIN) per tablet Take 1 tablet by mouth once daily.      naproxen (EC NAPROSYN) 500 MG EC tablet Take 1 tablet (500 mg total) by mouth 2 (two) times daily as needed.  Qty: 60 tablet, Refills: 9    Associated Diagnoses: Osteoarthritis of left knee, unspecified osteoarthritis type; CKD (chronic kidney disease) stage 3, GFR 30-59 ml/min; Deep vein thrombosis (DVT) of lower extremity, unspecified chronicity, unspecified laterality, unspecified vein; Primary osteoarthritis involving multiple joints; Sleep disorder; Hyperglycemia; Status post total left knee replacement      traMADol (ULTRAM) 50 mg tablet       zolpidem (AMBIEN) 5 MG Tab Take 1 tablet (5 mg total) by mouth nightly as needed.  Qty: 30 tablet, Refills: 5    Associated Diagnoses: CKD (chronic kidney disease) stage 3, GFR 30-59 ml/min; Osteoarthritis of left knee, unspecified osteoarthritis type; Deep vein thrombosis (DVT) of lower extremity, unspecified chronicity, unspecified laterality, unspecified vein; Sleep disorder; Hyperglycemia; Sinusitis, unspecified chronicity, unspecified location; Primary osteoarthritis involving multiple joints; Status post total left knee replacement      cyclobenzaprine (FLEXERIL) 10 MG tablet Take 10 mg by mouth 2 (two) times daily.                 Discharge Diagnosis: Same as Pre and Post Procedure  Condition on Discharge: Stable.  Diet on Discharge: Same as  before.  Activity: as per instruction sheet.  Discharge to: Home with a responsible adult.  Follow up: in 1 week

## 2018-05-11 NOTE — NURSING
trf off unit via wheelchair for discharge home.  Wife at pt side. VSS.  Ambulated without difficulty.

## 2018-05-11 NOTE — NURSING
IV d/c'd with cath tip intact.  Denies numbness or tingling to lower extremities.  VSS.  Tolerated sandwich and fluids.

## 2018-05-14 ENCOUNTER — PATIENT MESSAGE (OUTPATIENT)
Dept: PAIN MEDICINE | Facility: CLINIC | Age: 83
End: 2018-05-14

## 2018-05-15 DIAGNOSIS — G89.29 CHRONIC PAIN OF LEFT KNEE: Primary | ICD-10-CM

## 2018-05-15 DIAGNOSIS — M25.562 CHRONIC PAIN OF LEFT KNEE: Primary | ICD-10-CM

## 2018-05-17 ENCOUNTER — TELEPHONE (OUTPATIENT)
Dept: PAIN MEDICINE | Facility: CLINIC | Age: 83
End: 2018-05-17

## 2018-05-17 NOTE — TELEPHONE ENCOUNTER
Called and left msg on pt's vm to confirm procedure appt for tomorrow and to r/t call if he needs to reschedule or cx.

## 2018-05-18 ENCOUNTER — HOSPITAL ENCOUNTER (OUTPATIENT)
Facility: HOSPITAL | Age: 83
Discharge: HOME | End: 2018-05-18
Attending: ANESTHESIOLOGY | Admitting: ANESTHESIOLOGY
Payer: MEDICARE

## 2018-05-18 VITALS
OXYGEN SATURATION: 98 % | SYSTOLIC BLOOD PRESSURE: 144 MMHG | TEMPERATURE: 97 F | HEIGHT: 69 IN | RESPIRATION RATE: 18 BRPM | WEIGHT: 225 LBS | HEART RATE: 64 BPM | DIASTOLIC BLOOD PRESSURE: 79 MMHG | BODY MASS INDEX: 33.33 KG/M2

## 2018-05-18 DIAGNOSIS — M25.562 CHRONIC PAIN OF LEFT KNEE: Primary | ICD-10-CM

## 2018-05-18 DIAGNOSIS — G89.29 CHRONIC PAIN OF LEFT KNEE: Primary | ICD-10-CM

## 2018-05-18 PROCEDURE — 64640 INJECTION TREATMENT OF NERVE: CPT | Mod: LT,,, | Performed by: ANESTHESIOLOGY

## 2018-05-18 PROCEDURE — 63600175 PHARM REV CODE 636 W HCPCS

## 2018-05-18 PROCEDURE — 25000003 PHARM REV CODE 250

## 2018-05-18 PROCEDURE — 99152 MOD SED SAME PHYS/QHP 5/>YRS: CPT | Mod: ,,, | Performed by: ANESTHESIOLOGY

## 2018-05-18 PROCEDURE — 99152 MOD SED SAME PHYS/QHP 5/>YRS: CPT

## 2018-05-18 NOTE — DISCHARGE SUMMARY
Discharge Note  Short Stay      SUMMARY     Admit Date: 5/18/2018    Attending Physician: Crow Posadas III      Discharge Physician: Crow Posadas III      Discharge Date: 5/18/2018 12:43 PM    Final Diagnosis:   1. Chronic pain of left knee        Disposition: Home or self care    Patient Instructions:   Discharge Medication List as of 5/18/2018 12:14 PM      CONTINUE these medications which have NOT CHANGED    Details   cyclobenzaprine (FLEXERIL) 10 MG tablet Take 10 mg by mouth 2 (two) times daily., Historical Med      famotidine (PEPCID) 20 MG tablet Take 1 tablet (20 mg total) by mouth 2 (two) times daily., Starting Thu 1/18/2018, Until Fri 1/18/2019, Normal      multivitamin (ONE DAILY MULTIVITAMIN) per tablet Take 1 tablet by mouth once daily., Until Discontinued, Historical Med      naproxen (EC NAPROSYN) 500 MG EC tablet Take 1 tablet (500 mg total) by mouth 2 (two) times daily as needed., Starting Thu 1/18/2018, Normal      traMADol (ULTRAM) 50 mg tablet Starting Mon 3/26/2018, Historical Med      zolpidem (AMBIEN) 5 MG Tab Take 1 tablet (5 mg total) by mouth nightly as needed., Starting Thu 1/18/2018, Print                 Discharge Diagnosis: Same as Pre and Post Procedure  Condition on Discharge: Stable.  Diet on Discharge: Same as before.  Activity: as per instruction sheet.  Discharge to: Home with a responsible adult.  Follow up: 6/25/2018

## 2018-05-18 NOTE — H&P (VIEW-ONLY)
"Chronic Pain - New Consult    Referring Physician: José Luis Morejon MD      Chief Complaint   Patient presents with    Knee Pain     left        SUBJECTIVE:    Anthony Sheldon is an 84 y/o male who presents to the clinic for the evaluation of left knee pain. The pain started years ago. He is s/p L TKA in February 2017 by Dr. Grupo Jose at [facility] in [place]. The pain is located in the global aspect of the left knee and radiates to the medial aspect of the ankle. He denies low back pain. The pain is described as aching, dull and tight band and is rated as 4/10. The pain is rated with a score of  4/10 on the AVERAGE day and a score of 8/10 on the WORST day.  Symptoms interfere with daily activity. The pain is exacerbated activity, lifting and rising from a seated/crouched position.  The pain is mitigated by medication. The patient reports 6-7 hours of uninterrupted sleep per night.    Physical Therapy/Home Exercise: yes x 2    Pain Disability Index Review:  Last 3 PDI Scores 4/23/2018 4/23/2018   Pain Disability Index (PDI) 37 37       Pain Medications:    - Tramadol 50 mg twice daily as needed  - Naproxen 500 mg as needed     report:  Reviewed    Imaging:     Left Knee X-ray (1/15/2018):    Clinical History provided for exam:"lt knee".  As on 2/22/17, there are surgical changes of left knee arthroplasty.  Position and appearance of the prosthesis are unchanged.  There is a left suprapatellar effusion.  There is moderate diffuse narrowing of the right knee joint.  On the right there are spurs on the distal femur, proximal tibia, and patella. No acute fracture or bony destructive process is seen.  Alignment is normal.    Past Medical History:   Diagnosis Date    Arthritis     Dental bridge present     LOWER PARTIAL    DVT (deep venous thrombosis) 2014    right le after thr    Wears glasses      Past Surgical History:   Procedure Laterality Date    JOINT REPLACEMENT      BILAT HIPS, RIGHT SHOULDER    " TONSILLECTOMY  1950    TOTAL HIP ARTHROPLASTY Bilateral 2013 & 2014    TOTAL SHOULDER ARTHROPLASTY Right 2005     Social History     Social History    Marital status:      Spouse name: N/A    Number of children: N/A    Years of education: N/A     Occupational History    Not on file.     Social History Main Topics    Smoking status: Former Smoker     Types: Cigarettes    Smokeless tobacco: Former User     Quit date: 1/1/1990    Alcohol use 0.0 oz/week    Drug use: No    Sexual activity: Not on file     Other Topics Concern    Not on file     Social History Narrative    No narrative on file     Family History   Problem Relation Age of Onset    Stroke Mother     Bladder Cancer Father        Review of patient's allergies indicates:  No Known Allergies    Current Outpatient Prescriptions   Medication Sig    multivitamin (ONE DAILY MULTIVITAMIN) per tablet Take 1 tablet by mouth once daily.    traMADol (ULTRAM) 50 mg tablet     zolpidem (AMBIEN) 5 MG Tab Take 1 tablet (5 mg total) by mouth nightly as needed.    famotidine (PEPCID) 20 MG tablet Take 1 tablet (20 mg total) by mouth 2 (two) times daily.    hydrocodone-acetaminophen 5-325mg (NORCO) 5-325 mg per tablet Take 1 tablet by mouth every 6 (six) hours as needed for Pain.    naproxen (EC NAPROSYN) 500 MG EC tablet Take 1 tablet (500 mg total) by mouth 2 (two) times daily as needed.     Current Facility-Administered Medications   Medication    EUFLEXXA 10 mg/mL(mw 2.4 -3.6 million) injection Syrg 20 mg       REVIEW OF SYSTEMS:  GENERAL: No weight loss, malaise or fevers.  HEENT: Negative for frequent or significant headaches.  NECK: Negative for lumps or significant neck swelling.  RESPIRATORY: Negative for wheezing or shortness of breath.  CARDIOVASCULAR: Negative for chest pain or palpitations.  GI: No blood in stools or black stools or change in bowel habits.  : Negative for kidney stones, urinary tract infections, or  "incontinence.  MUSCULOSKELETAL: See HPI  SKIN: Negative for rash or itching.  PSYCH: Negative for sleep disturbance, mood disorder and recent psychosocial stressors.    HEMATOLOGY/LYMPHOLOGY Negative for prolonged bleeding, bruising easily or swollen nodes. Hx of DVT  NEURO:  No history of syncope, seizures or tremors.    OBJECTIVE:    Ht 5' 9" (1.753 m)   Wt 104.9 kg (231 lb 4.2 oz)   BMI 34.15 kg/m²     PHYSICAL EXAMINATION:  GENERAL: Well appearing, in no acute distress.  PSYCH:  Mood and affect is appropriate.  Awake, alert, and oriented x 3.  SKIN: Skin color, texture, turgor normal, no rashes or lesions  HEENT: Normocephalic, atraumatic.  EOM intact.  CV: Radial pulses are 2+.  RESP:  Respirations are unlabored.  GI: Abdomen soft and non-tender.  MSK:  No atrophy or tone abnormalities are noted.      Neck: No obvious deformity or signs of trauma.  Normal cervical spine range of motion.    Back: Normal range of motion without pain reproduction.    Extremities:  No edema or skin discolorations noted.     Left Knee: No tenderness to palpation over the joint lines. ROM is mildly limited on flexion. No evidence of instability on exam. No effusion, warmth, or erythema present. Sensation intact.    Gait:  Gait is antalgic.    NEUR:  Strength testing is 5/5 throughout all muscle groups in the upper and lower extremities. No loss of sensation is noted.     ASSESSMENT:     1. Chronic pain of left knee    2. Status post total left knee replacement          PLAN:     - I have stressed the importance of physical activity and a home exercise plan to help with pain and improve health.  - Schedule for a Diagnostic LEFT Knee Genicular nerve block.  - In the future, I will consider Radiofrequency ablation for longer pain relief if positive response from diagnostic block.    - Patient will contact office with results of diagnostic block.      The above plan and management options were discussed at length with patient. Patient " is in agreement with the above and verbalized understanding. It will be communicated with the referring physician via electronic record, fax, or mail.    Crow Posadas III  04/23/2018

## 2018-05-18 NOTE — PLAN OF CARE
Received report from CALISTA Ackerman. Patient s/p left knee pain injection, AAOx3. VSS, no c/o pain or discomfort at this time, resp even and unlabored. bandaid x 3 dry and intact to left knee.  No active bleeding. No hematoma noted. Post procedure protocol reviewed with patient and patient's family. Understanding verbalized. Family members at bedside. Nurse call bell within reach. Will continue to monitor per post procedure protocol.

## 2018-05-18 NOTE — NURSING
IV d/c'd with cath tip intact.  Pt and wife verbalized understanding of d/c instructions.  Tolerated food and fluids. VSS.

## 2018-05-18 NOTE — OP NOTE
Patient Name: Anthony Sheldon  MRN: [id number]    INFORMED CONSENT: The procedure, risks, benefits and options were discussed with patient. There are no contraindications to the procedure. The patient expressed understanding and agreed to proceed. The personnel performing the procedure was discussed. I verify that I personally obtained Anthony's consent prior to the start of the procedure and the signed consent can be found on the patient's chart.    Procedure Date: 05/18/2018    Anesthesia: Systemic    Pre Procedure diagnosis:   1. Chronic pain of left knee      Post-Procedure diagnosis: same      Moderate Sedation: Yes - Fentanyl 50 mcg and Midazolam 2 mg      PROCEDURE: LEFT KNEE PERIPHERAL NERVE RADIOFREQUENCY ABLATION    DESCRIPTION OF PROCEDURE: The patient was brought to the fluoroscopy suite. IV access was obtained prior to the procedure. The patient was positioned supine on the fluoroscopy table. Continuous hemodynamic monitoring was initiated including blood pressure, EKG, and pulse oximetry. The area around the LEFT knee joint was prepped with chlorhexidine three times and draped into a sterile field. Skin anesthesia was achieved using Lidocaine 1% over the respective injection sites. A 17 gauge 50mm (4mm active tip) RF needle was slowly inserted and advanced to the junction of the lateral femoral shaft and the epicondyle while contacting periosteum to block the superior lateral genicular nerve using AP and lateral fluoroscopic imaging. The same process was repeated at the junction of the medial femoral shaft and the epicondyle to block the superior medial genicular nerve. The same process was repeated again using a 3rd needle which was advanced at the junction of the medial tibial plateau and the epicondyle to block the inferior medial genicular nerve. Using AP fluoroscopy the position of all 3 needles was confirmed. Afterwards, lateral fluoroscopic images were obtained and the needles were adjusted to  lay in the middle of the diaphysis. Negative aspiration for blood. Motor stimulation at 2Hz up to 1.5V did not cause any radicular symptoms at any level. Each level was anesthetized with 1 cc of lidocaine 2%. Radiofrequency lesioning was performed for 150 seconds at 60 degrees (eventual delivered temperature is 80 degrees) at each needle. 1 cc of Bupivacaine 0.25% was injected at all needle locations. There was no pain on injection. The needles were removed and bleeding was nil. A sterile dressing was applied. No specimens collected. PATIENT was taken to the Post-block Recovery Area for further observation.       Blood Loss: Nil  Specimen: None        Crow Posadas III, MD

## 2018-05-18 NOTE — PLAN OF CARE
Problem: Patient Care Overview  Goal: Plan of Care Review  Outcome: Ongoing (interventions implemented as appropriate)  Patient arrived to room. PIV placed, labs sent. Admit assessment completed. Plan of care discussed with patient. Wife at bedside. Nurse call bell within reach. Will monitor

## 2018-06-25 ENCOUNTER — OFFICE VISIT (OUTPATIENT)
Dept: PAIN MEDICINE | Facility: CLINIC | Age: 83
End: 2018-06-25
Attending: ANESTHESIOLOGY
Payer: MEDICARE

## 2018-06-25 VITALS
SYSTOLIC BLOOD PRESSURE: 170 MMHG | WEIGHT: 227 LBS | TEMPERATURE: 98 F | HEART RATE: 66 BPM | BODY MASS INDEX: 33.62 KG/M2 | HEIGHT: 69 IN | OXYGEN SATURATION: 98 % | RESPIRATION RATE: 16 BRPM | DIASTOLIC BLOOD PRESSURE: 86 MMHG

## 2018-06-25 DIAGNOSIS — M25.562 CHRONIC PAIN OF LEFT KNEE: Primary | ICD-10-CM

## 2018-06-25 DIAGNOSIS — Z96.652 STATUS POST TOTAL LEFT KNEE REPLACEMENT: ICD-10-CM

## 2018-06-25 DIAGNOSIS — G89.29 CHRONIC PAIN OF LEFT KNEE: Primary | ICD-10-CM

## 2018-06-25 PROCEDURE — 99213 OFFICE O/P EST LOW 20 MIN: CPT | Mod: S$GLB,,, | Performed by: ANESTHESIOLOGY

## 2018-06-25 NOTE — PROGRESS NOTES
"Chronic Pain - Established    INTERVAL HISTORY (06/25/2018):    Anthony Sheldon presents to the clinic today for a follow-up appointment for left knee pain. Since the last visit, the knee pain has been improving. The patient reports 75% pain relief after left knee genicular RFA which continues. Current pain intensity is 2/10. He has been walking close to 2 miles a day and performing aquatic exercises at home.     SUBJECTIVE:    Anthony Sheldon is an 84 y/o male who presents to the clinic for the evaluation of left knee pain. The pain started years ago. He is s/p L TKA in February 2017 by Dr. Grupo Jose at [facility] in [place]. The pain is located in the global aspect of the left knee and radiates to the medial aspect of the ankle. He denies low back pain. The pain is described as aching, dull and tight band and is rated as 4/10. The pain is rated with a score of  4/10 on the AVERAGE day and a score of 8/10 on the WORST day.  Symptoms interfere with daily activity. The pain is exacerbated activity, lifting and rising from a seated/crouched position.  The pain is mitigated by medication. The patient reports 6-7 hours of uninterrupted sleep per night.    Physical Therapy/Home Exercise: yes x 2    Pain Disability Index Review:  Last 3 PDI Scores 6/25/2018 4/23/2018 4/23/2018   Pain Disability Index (PDI) 0 37 37       Pain Medications:    - Tramadol 50 mg twice daily as needed  - Naproxen 500 mg as needed     report:  Reviewed    Imaging:     Left Knee X-ray (1/15/2018):    Clinical History provided for exam:"lt knee".  As on 2/22/17, there are surgical changes of left knee arthroplasty.  Position and appearance of the prosthesis are unchanged.  There is a left suprapatellar effusion.  There is moderate diffuse narrowing of the right knee joint.  On the right there are spurs on the distal femur, proximal tibia, and patella. No acute fracture or bony destructive process is seen.  Alignment is normal.    Past Medical " History:   Diagnosis Date    Arthritis     Dental bridge present     LOWER PARTIAL    DVT (deep venous thrombosis) 2014    right le after thr    Wears glasses      Past Surgical History:   Procedure Laterality Date    JOINT REPLACEMENT      BILAT HIPS, RIGHT SHOULDER    KNEE ARTHROPLASTY Left     TONSILLECTOMY  1950    TOTAL HIP ARTHROPLASTY Bilateral 2013 & 2014    TOTAL SHOULDER ARTHROPLASTY Right 2005     Social History     Social History    Marital status:      Spouse name: N/A    Number of children: N/A    Years of education: N/A     Occupational History    Not on file.     Social History Main Topics    Smoking status: Former Smoker     Types: Cigarettes    Smokeless tobacco: Former User     Quit date: 1/1/1990    Alcohol use 0.0 oz/week    Drug use: No    Sexual activity: Not on file     Other Topics Concern    Not on file     Social History Narrative    No narrative on file     Family History   Problem Relation Age of Onset    Stroke Mother     Bladder Cancer Father        Review of patient's allergies indicates:  No Known Allergies    Current Outpatient Prescriptions   Medication Sig    doxycycline (VIBRAMYCIN) 100 MG Cap Take 1 capsule (100 mg total) by mouth every 12 (twelve) hours.    famotidine (PEPCID) 20 MG tablet Take 1 tablet (20 mg total) by mouth 2 (two) times daily.    multivitamin (ONE DAILY MULTIVITAMIN) per tablet Take 1 tablet by mouth once daily.    naproxen (EC NAPROSYN) 500 MG EC tablet Take 1 tablet (500 mg total) by mouth 2 (two) times daily as needed.    traMADol (ULTRAM) 50 mg tablet     zolpidem (AMBIEN) 5 MG Tab Take 1 tablet (5 mg total) by mouth nightly as needed.     Current Facility-Administered Medications   Medication    EUFLEXXA 10 mg/mL(mw 2.4 -3.6 million) injection Syrg 20 mg       REVIEW OF SYSTEMS:  GENERAL: No weight loss, malaise or fevers.  HEENT: Negative for frequent or significant headaches.  NECK: Negative for lumps or significant  "neck swelling.  RESPIRATORY: Negative for wheezing or shortness of breath.  CARDIOVASCULAR: Negative for chest pain or palpitations.  GI: No blood in stools or black stools or change in bowel habits.  : Negative for kidney stones, urinary tract infections, or incontinence.  MUSCULOSKELETAL: See HPI  SKIN: Negative for rash or itching.  PSYCH: Negative for sleep disturbance, mood disorder and recent psychosocial stressors.    HEMATOLOGY/LYMPHOLOGY Negative for prolonged bleeding, bruising easily or swollen nodes. Hx of DVT  NEURO:  No history of syncope, seizures or tremors.    OBJECTIVE:    BP (!) 170/86   Pulse 66   Temp 98.3 °F (36.8 °C)   Resp 16   Ht 5' 9" (1.753 m)   Wt 103 kg (227 lb)   SpO2 98%   BMI 33.52 kg/m²     PHYSICAL EXAMINATION:  GENERAL: Well appearing, in no acute distress.  PSYCH:  Mood and affect is appropriate.  Awake, alert, and oriented x 3.  SKIN: Skin color, texture, turgor normal, no rashes or lesions  HEENT: Normocephalic, atraumatic.  EOM intact.  CV: Radial pulses are 2+.  RESP:  Respirations are unlabored.  GI: Abdomen soft and non-tender.  MSK:  No atrophy or tone abnormalities are noted.      Neck: No obvious deformity or signs of trauma.  Normal cervical spine range of motion.    Back: Normal range of motion without pain reproduction.    Extremities:  No edema or skin discolorations noted.     Left Knee: No tenderness to palpation over the joint lines. ROM is mildly limited on flexion. No evidence of instability on exam. No effusion, warmth, or erythema present. Sensation intact.    Gait:  Gait is antalgic.    NEUR:  Strength testing is 5/5 throughout all muscle groups in the upper and lower extremities. No loss of sensation is noted.     ASSESSMENT: 84-year-old male with history of left knee replacement with chronic left knee pain which is improving after left knee genicular RFA.    1. Chronic pain of left knee    2. Status post total left knee replacement          PLAN: "     - I have stressed the importance of physical activity and a home exercise plan to help with pain and improve health.  - Continue pool therapy.  - Will repeat LEFT Knee Genicular RFA in the future as needed.  - RTC as needed.      The above plan and management options were discussed at length with patient. Patient is in agreement with the above and verbalized understanding. It will be communicated with the referring physician via electronic record, fax, or mail.    Crow Posadas III  06/25/2018

## 2018-11-26 ENCOUNTER — TELEPHONE (OUTPATIENT)
Dept: PAIN MEDICINE | Facility: CLINIC | Age: 83
End: 2018-11-26

## 2018-11-27 ENCOUNTER — OFFICE VISIT (OUTPATIENT)
Dept: PAIN MEDICINE | Facility: CLINIC | Age: 83
End: 2018-11-27
Payer: MEDICARE

## 2018-11-27 VITALS
WEIGHT: 228.13 LBS | HEART RATE: 80 BPM | TEMPERATURE: 98 F | BODY MASS INDEX: 33.79 KG/M2 | HEIGHT: 69 IN | SYSTOLIC BLOOD PRESSURE: 138 MMHG | DIASTOLIC BLOOD PRESSURE: 84 MMHG

## 2018-11-27 DIAGNOSIS — Z96.652 STATUS POST TOTAL LEFT KNEE REPLACEMENT: ICD-10-CM

## 2018-11-27 DIAGNOSIS — M25.562 CHRONIC PAIN OF LEFT KNEE: Primary | ICD-10-CM

## 2018-11-27 DIAGNOSIS — M47.816 LUMBAR SPONDYLOSIS: ICD-10-CM

## 2018-11-27 DIAGNOSIS — G89.29 CHRONIC PAIN OF LEFT KNEE: Primary | ICD-10-CM

## 2018-11-27 PROCEDURE — 99214 OFFICE O/P EST MOD 30 MIN: CPT | Mod: S$GLB,,, | Performed by: ANESTHESIOLOGY

## 2018-11-27 PROCEDURE — 1101F PT FALLS ASSESS-DOCD LE1/YR: CPT | Mod: CPTII,S$GLB,, | Performed by: ANESTHESIOLOGY

## 2018-11-27 PROCEDURE — 99999 PR PBB SHADOW E&M-EST. PATIENT-LVL III: CPT | Mod: PBBFAC,,, | Performed by: ANESTHESIOLOGY

## 2018-11-27 RX ORDER — DICLOFENAC SODIUM 10 MG/G
2 GEL TOPICAL 2 TIMES DAILY PRN
Qty: 100 G | Refills: 5 | Status: SHIPPED | OUTPATIENT
Start: 2018-11-27 | End: 2019-03-25 | Stop reason: CLARIF

## 2018-11-27 NOTE — PATIENT INSTRUCTIONS
Recommend using Tylenol 1000 mg every 8 hours as needed for pain.  Do not take this with any other medications containing acetaminophen.  Do not exceed 3000 mg of acetaminophen in 24 hours.    Recommend restating exercises for stretching and strengthening of your left knee.    Recommend taking Naproxen 500 mg up to twice daily as needed.  Take medication with meals.  If you experience any upset stomach, nausea, or vomiting, stop taking this medication.    Recommend using Tylenol (acetaminophen) 1000 mg every 8 hours as needed for pain.  Do not take this with any other medications containing acetaminophen.  Do not exceed 3000 mg of acetaminophen in 24 hours.    Recommend using ice as needed, 20 minutes at a time to avoid injury.  You can also alternate this with heat.  For ice, you may freeze a pack of red beans or peas.    Exercises To Strengthen Your Lower Back  Strong lower-back and abdominal muscles work together to support your spine. These exercises will help strengthen the muscles of the lower back. It is important that you begin exercising slowly and increase levels gradually.  Always begin any exercise program with stretching. If you feel pain while doing any of these exercises, stop and talk to your doctor about a more specific exercise program that suits your condition better.  Low Back Stretch  · Lie on your back with your knees bent and both feet on the ground.  ·   Slowly raise your left knee to your chest as you flatten your lower back against the floor. Hold for 5 seconds.  · Relax and repeat the exercise with your right knee.  · Do 10 of these exercises for each leg.  · Repeat hugging both knees to your chest at the same time.  Building Lower Back Strength  1. Kneeling Lumbar Extension: Begin on your hands and knees. Simultaneously raise and straighten your right arm and left leg until they are parallel to the ground. Hold for 2 seconds and come back slowly to a starting position. Repeat with left  arm and right leg, alternating 10 times.  2. Prone Lumbar Extension: Lie face down, arms extended overhead, palms on the floor. Simultaneously raise your right arm and left leg as high as comfortably possible. Hold for 10 seconds and slowly return to start. Repeat with left arm and right leg, alternating 10 times. Gradually build up to 20 times. (Advanced: Repeat this exercise raising both arms and both legs a few inches off the floor at the same time. Hold for 5 seconds and release.)  3. Pelvic Tilt: Lie on the floor on your back with your knees bent at 90 degrees. Your feet should be flat on the floor. Inhale, exhale, then slowly contract your abdominal muscles bringing your navel toward your spine. Let your pelvis rock back until your lower back is flat on the floor. Hold for 10 seconds while breathing smoothly.  4. Abdominal Crunch: Perform a Pelvic Tilt (above) flattening your lower back against the floor. Holding the tension in your abdominal muscles, take another breath and raise your shoulder blades off the ground (this is not a full sit-up). Keep your head in line with your body (dont bend your neck forward). Hold for 2 seconds, then slowly lower.      Back Exercises: Abdominal Lift  The Abdominal Lift strengthens your lower abdominal muscles, helping you keep your pelvis and back stable.  · Lie on the floor with both knees bent. Put your feet flat on the floor and your arms by your sides. Tighten your abdominal muscles.  · Lift one bent knee and move it toward your upper body. Keep your abdominal muscles tight and your back flat on the floor. Hold for 10 seconds.  · Repeat 3 times. Then, switch legs.         Back Exercises: Bridge  The Bridge exercise strengthens your abdominal, buttocks, and hamstring muscles. This helps keep your back stable and aligned when you walk.  · Lie on the floor with your back and palms flat. Bend your knees. Keep your feet flat on the floor.  · Contract your abdominal and  buttocks muscles. Slowly lift your buttocks off the floor until there is a straight line from your knees to your shoulders.  · Hold for 5  seconds. Repeat 10 times.          Back Exercises: Hip Lift        To start, lie on your back with your knees bent and feet flat on the floor. Dont press your neck or lower back to the floor. Breathe deeply. You should feel comfortable and relaxed in this position.  · Slowly raise your hips upward.  · Tighten your abdomen and buttocks. Be careful not to arch your back.  · Hold for 5 seconds. Lower your hips to the floor.  · Repeat 10 times.  For your safety, check with your healthcare provider before starting an exercise program.       Back Exercises: Hip Rotator Stretch        To start, lie on your back with your knees bent and feet flat on the floor. Dont press your neck or lower back to the floor. Breathe deeply. You should feel comfortable and relaxed in this position.  · Rest your right ankle on your left knee.  · Place a towel behind your left thigh and use it to pull the knee toward your chest. Feel the stretch in your buttocks.  · Hold for 30-60 seconds. Release.  · Repeat 2 times.  · Switch legs.   For your safety, check with your healthcare provider before starting an exercise program.       Back Exercises: Knee Lift        To start, lie on your back with your knees bent and feet flat on the floor. Dont press your neck or lower back to the floor. Breathe deeply. You should feel comfortable and relaxed in this position.  · Lift one bent knee and move it toward your upper body. Keep your abdominal muscles tight and your back flat on the floor.  · Hold for 10 seconds. Return to start position.  · Repeat 3 times.  · Switch legs.        Back Exercises: Leg Pull        To start, lie on your back with your knees bent and feet flat on the floor. Dont press your neck or lower back to the floor. Breathe deeply. You should feel comfortable and relaxed in this position.  · Pull  one knee to your chest.  · Hold for 30-60 seconds. Return to starting position.  · Repeat 2 times.  · Switch legs.  · For a double leg pull, pull both legs to your chest at the same time. Repeat 2 times.  For your safety, check with your healthcare provider before starting an exercise program.       Back Exercises: Leg Reach        Do this exercise on your hands and knees. Keep your knees under your hips and your hands under your shoulders. Keep your spine in a neutral position (not arched or sagging). Be sure to maintain your necks natural curve.  · Extend one leg straight back. Dont arch your back or let your head or body sag.  · Hold for 5 seconds. Return to starting position.  · Repeat 5 times.  · Switch legs.       Back Exercises: Lower Back Rotation  To start, lie on your back with your knees bent and feet flat on the floor. Dont press your neck or lower back to the floor. Breathe deeply. You should feel comfortable and relaxed in this position.  · Drop both knees to one side. Turn your head to the other side. Keep your shoulders flat on the floor.  · Hold for 20 seconds.  · Slowly switch sides.  · Repeat 2 times.                                   Back Exercises: Lower Back Stretch                        To start, sit in a chair with your feet flat on the floor. Shift your weight slightly forward to avoid rounding your back. Relax, and keep your ears, shoulders, and hips aligned.  · Sit with your feet well apart.  · Bend forward and touch the floor with the backs of your hands. Relax and let your body drop.  · Hold for 20 seconds. Return to starting position.  · Repeat 2 times.       Back Exercises: Partial Curl-Ups        To start, lie on your back with your knees bent and feet flat on the floor. Dont press your neck or lower back to the floor. Breathe deeply. You should feel comfortable and relaxed in this position.  · Cross your arms loosely.  · Tighten your abdomen and curl custodial up, keeping your  head in line with your shoulders.  · Hold for 5 seconds. Uncurl to lie down.  · Repeat 5 times.       Back Exercises: Pelvic Tilt  To start, lie on your back with your knees bent and feet flat on the floor. Dont press your neck or lower back to the floor. Breathe deeply. You should feel comfortable and relaxed in this position.  · Tighten your abdomen and buttocks, and press your lower back toward the floor. This should be a small, subtle movement.  · Hold for 5 seconds. Release.  · Repeat 5 times.                           Back Exercises: Seated Rotation      To start, sit in a chair with your feet flat on the floor. Shift your weight slightly forward to avoid rounding your back. Relax, and keep your ears, shoulders, and hips aligned.  · Fold your arms, elbows just below shoulder height.  · Turn from the waist with hips forward. Turn your head last.  · Hold for a count of 5. Return to starting position.  · Repeat 5 times on one side. Then switch sides.          Back Exercises: Side Stretch  To start, sit in a chair with your feet flat on the floor. Shift your weight slightly forward to avoid rounding your back. Relax. Keep your ears, shoulders, and hips aligned.  · Stretch your right arm overhead.  · Slowly bend to the left. Dont twist your torso.  · Hold for 20 seconds. Return to starting position.  · Repeat 2 times. Then, switch to the other side.      © 3602-0508 [employer]. [address] [place] [address], [place] [address]. All rights reserved. This information is not intended as a substitute for professional medical care. Always follow your healthcare professional's instructions.

## 2018-11-27 NOTE — PROGRESS NOTES
Subjective:     Patient ID: Anthony Sheldon is a 84 y.o. male.    Chief Complaint: Pain    Consulted by: Self     Disclaimer: This note was generated using voice recognition software.  There may be a typographical errors that were missed during proofreading.      HPI:    Anthony Sheldon is a 84 y.o. male who presents today with left knee pain. He reports that the knee feels like a pressure sensation.  He is s/p left knee coolief in 5/2018 with resolution of his burning, stabbing pain, but this pressure pain never went away.   It is located on the lateral and posterior aspect of his knee.  This pain is described in detail below.    Of note, he received Euflexxa in the right knee with great benefit.    He also reports left-sided low back pain that is long-standing and worsening.  The pain is located in the left middle back and does not radiate.  The pain is worse with walking and with activities cause him to stand up straight.  It is better with rest.    Aggravating factors: Walking, the pain is worse in the morning    Mitigating factors: Coolief, motion    Previously seeing: Dr. Posadas, but patient lives here and would like to establish care here    Physical Therapy: Yes, after his surgery.  Continued pool exercises all summer.  He has been walking until recently when it got cold.  No stretches.    Non-pharmacologic Treatment:     · Ice/Heat: Ice after surgery  · TENS: Never  · Massage: Never   · Chiropractic care: Never  · Acupuncture: Never  · Other: No         Pain Medications:         · Currently taking: Naproxen 500 mg (some benefit, a couple of times a week), Tylenol 500 mg, 1-2 per dose (helpful)    · Has tried in the past:    · Opioids: tramadol  · NSAIDS: No others  · Tylenol: Takes 2-3 times per week  · Muscle relaxants: Never  · TCAs: Never  · SNRIs: Never  · Anticonvulsants: Never  · topical creams: Never  · Other: None    Blood thinners: None    Interventional Therapies:   · 5/18/2018: Left  knee coolief radiofrequency:  75% relief  · Euflexxa in right knee 3/2018: Good benefit    Relevant Surgeries:   · Left TKA    Affecting sleep? No    Affecting daily activities? No    Depressive symptoms? No          · SI/HI? No    Work status: No, retired    Prescription Monitoring Program database:  Reviewed and consistent with medication use as prescribed.    Last 3 PDI Scores 11/27/2018 6/25/2018 4/23/2018   Pain Disability Index (PDI) 4 0 37       Opioid Risk Score       Value Time User    Opioid Risk Score  0 11/27/2018 11:44 AM Olive Eugene LPN          GENERAL:  No weight loss, malaise or fevers.  HEENT:   No recent changes in vision or hearing  NECK:  Negative for lumps, no difficulty with swallowing.  RESPIRATORY:  Negative for cough, wheezing or shortness of breath, patient denies any recent URI.  CARDIOVASCULAR:  Negative for chest pain, leg swelling or palpitations.  GI:  Negative for abdominal discomfort, blood in stools or black stools or change in bowel habits.  MUSCULOSKELETAL:  See HPI.  SKIN:  Negative for lesions, rash, and itching.  PSYCH:  No mood disorder or recent psychosocial stressors.    HEMATOLOGY/LYMPHOLOGY:  Negative for prolonged bleeding, bruising easily or swollen nodes.    ENDO: No history of diabetes or thyroid dysfunction  NEURO:   No history of headaches, syncope, paralysis, seizures or tremors.  All other reviewed and negative other than HPI.          Past Medical History:   Diagnosis Date    Arthritis     Dental bridge present     LOWER PARTIAL    DVT (deep venous thrombosis) 2014    right le after thr    Wears glasses        Past Surgical History:   Procedure Laterality Date    ARTHROPLASTY-KNEE Left 2/22/2017    Performed by [facility] at [facility]    JOINT REPLACEMENT      BILAT HIPS, RIGHT SHOULDER    KNEE ARTHROPLASTY Left     TONSILLECTOMY  1950    TOTAL HIP ARTHROPLASTY Bilateral 2013 & 2014    TOTAL SHOULDER ARTHROPLASTY Right 2005       Review of  patient's allergies indicates:  No Known Allergies    Current Outpatient Medications   Medication Sig Dispense Refill    ciprofloxacin-dexamethasone 0.3-0.1% (CIPRODEX) 0.3-0.1 % DrpS 4 drops 2 (two) times daily.      clotrimazole-betamethasone 1-0.05% (LOTRISONE) cream Apply topically 2 (two) times daily.      multivitamin (ONE DAILY MULTIVITAMIN) per tablet Take 1 tablet by mouth once daily.      zolpidem (AMBIEN) 5 MG Tab Take 1 tablet (5 mg total) by mouth nightly as needed. 30 tablet 3    famotidine (PEPCID) 20 MG tablet Take 1 tablet (20 mg total) by mouth 2 (two) times daily. 60 tablet 11    fluconazole (DIFLUCAN) 150 MG Tab Take 150 mg by mouth once daily.      naproxen (EC NAPROSYN) 500 MG EC tablet Take 1 tablet (500 mg total) by mouth 2 (two) times daily as needed. 60 tablet 9    traMADol (ULTRAM) 50 mg tablet        Current Facility-Administered Medications   Medication Dose Route Frequency Provider Last Rate Last Dose    EUFLEXXA 10 mg/mL(mw 2.4 -3.6 million) injection Syrg 20 mg  20 mg Intra-articular Weekly José Luis Morejon MD   20 mg at 04/05/18 1143       Family History   Problem Relation Age of Onset    Stroke Mother     Bladder Cancer Father        Social History     Socioeconomic History    Marital status:      Spouse name: Not on file    Number of children: Not on file    Years of education: Not on file    Highest education level: Not on file   Social Needs    Financial resource strain: Not on file    Food insecurity - worry: Not on file    Food insecurity - inability: Not on file    Transportation needs - medical: Not on file    Transportation needs - non-medical: Not on file   Occupational History    Not on file   Tobacco Use    Smoking status: Former Smoker     Types: Cigarettes    Smokeless tobacco: Former User     Quit date: 1/1/1990   Substance and Sexual Activity    Alcohol use: Yes     Alcohol/week: 0.0 oz    Drug use: No    Sexual activity: Not on  "file   Other Topics Concern    Not on file   Social History Narrative    Not on file       Objective:     Vitals:    11/27/18 1141   BP: 138/84   Pulse: 80   Temp: 98.4 °F (36.9 °C)   Weight: 103.5 kg (228 lb 1.6 oz)   Height: 5' 9" (1.753 m)   PainSc:   2   PainLoc: Knee       GEN:  Well developed, well nourished.  No acute distress. No pain behavior.  HEENT:  No trauma.  Mucous membranes moist.  Nares patent bilaterally.  PSYCH: Normal affect. Thought content appropriate.  CHEST:  Breathing symmetric.  No audible wheezing.  ABD:   · Soft, non-distended.    SKIN:  Warm, pink, dry.  No rash on exposed areas.    EXT:  No cyanosis, clubbing, or edema.  No color change or changes in nail or hair growth.  NEURO/MUSCULOSKELETAL:  Fully alert, oriented, and appropriate. Speech normal dionicio. No cranial nerve deficits.   Gait: Normal.     Motor Strength: 5/5 motor strength throughout lower extremities.   Sensory:  No sensory deficit in the lower extremities.   Reflexes:  2+ and symmetric throughout.  Downgoing Babinski's bilaterally.  No clonus or spasticity.  L-Spine:  Decreased ROM with pain on flexion, extension. Positive facet loading on the left.  Negative SLR bilaterally.    SI Joint/Hip:  Negative TERESSA bilaterally.  Negative FADIR bilaterally.  Negative TTP over lumbar paraspinals, bilateral SI joints, hips, piriformis muscles, or GTB.        Right knee  Inspection: No erythema, swelling, or redness  ROM: Full    Left knee  Inspection: No erythema, swelling, or redness  ROM: Decreased  Palpation: No pes anserine tenderness and positive crepitus. No patellar tendon tenderness and no patellofemoral tendon tenderness.  No instability on exam.      Imaging:        The imaging studies listed below were independently reviewed by me, and I agree with the findings as documented below.     Narrative     XR KNEE ORTHO LEFT.  Clinical History provided for exam:"lt knee".  As on 2/22/17, there are surgical changes of left " knee arthroplasty.  Position and appearance of the prosthesis are unchanged.  There is a left suprapatellar effusion.  There is moderate diffuse narrowing of the right knee joint.  On the right there are spurs on the distal femur, proximal tibia, and patella. No acute fracture or bony destructive process is seen.  Alignment is normal.      Impression      Surgical changes of left knee arthroplasty.      Electronically signed by: MCKAYLA ZAVALA MD  Date: 01/15/18  Time: 09:18          Assessment:     Encounter Diagnoses   Name Primary?    Chronic pain of left knee Yes    Status post total left knee replacement     Lumbar spondylosis        Plan:     Anthony was seen today for low-back pain and knee pain.    Diagnoses and all orders for this visit:    Chronic pain of left knee  -     diclofenac sodium (VOLTAREN) 1 % Gel; Apply 2 g topically 2 (two) times daily as needed.    Status post total left knee replacement  -     diclofenac sodium (VOLTAREN) 1 % Gel; Apply 2 g topically 2 (two) times daily as needed.    Lumbar spondylosis  -     X-Ray Lumbar Complete With Flex And Ext; Future      His pain is consistent with the above.    We discussed the assessment and recommendations.  All available images were reviewed. We discussed the disease process, prognosis, treatment plan, and risks and benefits. The patient is aware of the risks and benefits of the medications being prescribed, common side effects, and proper usage. The following is the plan we agreed on:     1. Can repeat the left knee radiofrequency as needed.  I discussed that the procedure did not cover the lateral and posterior aspect of the knee, so I would expect for him still have symptoms in these areas.  2. X-ray lumbar spine to evaluate what appears to be facet mediated pain.  3. Voltaren gel for topical use  4. Okay to continue naproxen, Tylenol  5. Alternate ice and heat for symptomatic relief  6. Home exercises for his low back given today  7. I will  call him with the results of his x-rays.  If primarily spondylosis, I will set him up for a left facet joint injection at the affected levels.  If primarily degenerative disc disease, I will order an MRI.    Thank you for allowing me to participate in the care of this patient.   Please do not hesitate to call me at [phone] with any questions or concerns.    Chyna Valdovinos MD  11/27/2018     The above plan and management options were discussed at length with patient. Patient is in agreement with the above and verbalized understanding. It will be communicated with the referring physician via electronic record, fax, or mail.

## 2018-11-29 ENCOUNTER — PATIENT MESSAGE (OUTPATIENT)
Dept: PAIN MEDICINE | Facility: CLINIC | Age: 83
End: 2018-11-29

## 2018-11-29 ENCOUNTER — HOSPITAL ENCOUNTER (OUTPATIENT)
Dept: RADIOLOGY | Facility: HOSPITAL | Age: 83
Discharge: HOME | End: 2018-11-29
Attending: ANESTHESIOLOGY
Payer: MEDICARE

## 2018-11-29 DIAGNOSIS — M47.816 LUMBAR SPONDYLOSIS: ICD-10-CM

## 2018-11-29 DIAGNOSIS — R93.7 ABNORMAL X-RAY OF LUMBAR SPINE: ICD-10-CM

## 2018-11-29 DIAGNOSIS — M51.36 DDD (DEGENERATIVE DISC DISEASE), LUMBAR: Primary | ICD-10-CM

## 2018-11-29 PROCEDURE — 72110 X-RAY EXAM L-2 SPINE 4/>VWS: CPT | Mod: 26,,, | Performed by: RADIOLOGY

## 2018-11-29 PROCEDURE — 72110 X-RAY EXAM L-2 SPINE 4/>VWS: CPT | Mod: TC,FY

## 2018-11-29 NOTE — TELEPHONE ENCOUNTER
His x-rays are concerning for a degenerative disc at L2/3.  I am ordering an MRI to evaluate this more.  Please contact him to schedule this.  We will be in touch with the results.

## 2018-11-30 ENCOUNTER — HOSPITAL ENCOUNTER (OUTPATIENT)
Dept: RADIOLOGY | Facility: HOSPITAL | Age: 83
Discharge: HOME | End: 2018-11-30
Attending: ANESTHESIOLOGY
Payer: MEDICARE

## 2018-11-30 DIAGNOSIS — R93.7 ABNORMAL X-RAY OF LUMBAR SPINE: ICD-10-CM

## 2018-11-30 DIAGNOSIS — M51.36 DDD (DEGENERATIVE DISC DISEASE), LUMBAR: ICD-10-CM

## 2018-11-30 PROCEDURE — 72148 MRI LUMBAR SPINE W/O DYE: CPT | Mod: 26,,, | Performed by: RADIOLOGY

## 2018-11-30 PROCEDURE — 72148 MRI LUMBAR SPINE W/O DYE: CPT | Mod: TC

## 2018-11-30 NOTE — TELEPHONE ENCOUNTER
Spoke with patient, informed of x-ray result as noted by Dr. Valdovinos. Scheduled MRI for 11/30/18 @ 1230. Gave information regarding check-in process. Patient confirmed appt time and check-in process.

## 2019-01-07 ENCOUNTER — TELEPHONE (OUTPATIENT)
Dept: PAIN MEDICINE | Facility: CLINIC | Age: 84
End: 2019-01-07

## 2019-01-08 ENCOUNTER — OFFICE VISIT (OUTPATIENT)
Dept: PAIN MEDICINE | Facility: CLINIC | Age: 84
End: 2019-01-08
Payer: MEDICARE

## 2019-01-08 VITALS
HEIGHT: 69 IN | TEMPERATURE: 98 F | HEART RATE: 69 BPM | DIASTOLIC BLOOD PRESSURE: 81 MMHG | BODY MASS INDEX: 34.96 KG/M2 | SYSTOLIC BLOOD PRESSURE: 149 MMHG | WEIGHT: 236 LBS

## 2019-01-08 DIAGNOSIS — M25.562 CHRONIC PAIN OF LEFT KNEE: ICD-10-CM

## 2019-01-08 DIAGNOSIS — M51.36 DDD (DEGENERATIVE DISC DISEASE), LUMBAR: ICD-10-CM

## 2019-01-08 DIAGNOSIS — G89.29 CHRONIC PAIN OF LEFT KNEE: ICD-10-CM

## 2019-01-08 DIAGNOSIS — M47.816 LUMBAR SPONDYLOSIS: ICD-10-CM

## 2019-01-08 DIAGNOSIS — G89.4 CHRONIC PAIN DISORDER: Primary | ICD-10-CM

## 2019-01-08 DIAGNOSIS — Z96.652 STATUS POST TOTAL LEFT KNEE REPLACEMENT: ICD-10-CM

## 2019-01-08 PROCEDURE — 99213 PR OFFICE/OUTPT VISIT, EST, LEVL III, 20-29 MIN: ICD-10-PCS | Mod: S$GLB,,, | Performed by: ANESTHESIOLOGY

## 2019-01-08 PROCEDURE — 1101F PT FALLS ASSESS-DOCD LE1/YR: CPT | Mod: CPTII,S$GLB,, | Performed by: ANESTHESIOLOGY

## 2019-01-08 PROCEDURE — 99999 PR PBB SHADOW E&M-EST. PATIENT-LVL III: CPT | Mod: PBBFAC,,, | Performed by: ANESTHESIOLOGY

## 2019-01-08 PROCEDURE — 99213 OFFICE O/P EST LOW 20 MIN: CPT | Mod: S$GLB,,, | Performed by: ANESTHESIOLOGY

## 2019-01-08 PROCEDURE — 1101F PR PT FALLS ASSESS DOC 0-1 FALLS W/OUT INJ PAST YR: ICD-10-PCS | Mod: CPTII,S$GLB,, | Performed by: ANESTHESIOLOGY

## 2019-01-08 PROCEDURE — 99999 PR PBB SHADOW E&M-EST. PATIENT-LVL III: ICD-10-PCS | Mod: PBBFAC,,, | Performed by: ANESTHESIOLOGY

## 2019-01-08 NOTE — H&P (VIEW-ONLY)
Subjective:     Patient ID: Anthony Sheldon is a 84 y.o. male.    Chief Complaint: Pain    Consulted by: Self     Disclaimer: This note was generated using voice recognition software.  There may be a typographical errors that were missed during proofreading.      HPI:    Anthony Sheldon is a 84 y.o. male who presents today with left knee pain. He reports that the knee feels like a pressure sensation.  He is s/p left knee coolief in 5/2018 with resolution of his burning, stabbing pain, but this pressure pain never went away.   It is located on the lateral and posterior aspect of his knee.  This pain is described in detail below.    Of note, he received Euflexxa in the right knee with great benefit.    He also reports left-sided low back pain that is long-standing and worsening.  The pain is located in the left middle back and does not radiate.  The pain is worse with walking and with activities cause him to stand up straight.  It is better with rest.    Aggravating factors: Walking, the pain is worse in the morning    Mitigating factors: Coolief, motion    Previously seeing: Dr. Posadas, but patient lives here and would like to establish care here    Interval History (1/8/2019):  He returns today for follow up.  He reports that his low back pain is improving with the home exercises.  Tylenol has been helpful for the pain.    Physical Therapy: Yes, after his surgery.  Continued pool exercises all summer.  He has been walking until recently when it got cold.  No stretches.    Non-pharmacologic Treatment:     · Ice/Heat: Ice after surgery  · TENS: Never  · Massage: Never   · Chiropractic care: Never  · Acupuncture: Never  · Other: No         Pain Medications:         · Currently taking: Naproxen 500 mg (some benefit, a couple of times a week), Tylenol 500 mg, 1-2 per dose (helpful)    · Has tried in the past:    · Opioids: tramadol  · NSAIDS: No others  · Tylenol: Takes 2-3 times per week  · Muscle relaxants:  Never  · TCAs: Never  · SNRIs: Never  · Anticonvulsants: Never  · topical creams: Never  · Other: None    Blood thinners: None    Interventional Therapies:   · 5/18/2018: Left knee coolief radiofrequency:  75% relief  · Euflexxa in right knee 3/2018: Good benefit    Relevant Surgeries:   · Left TKA    Affecting sleep? No    Affecting daily activities? No    Depressive symptoms? No          · SI/HI? No    Work status: No, retired    Prescription Monitoring Program database:  Reviewed and consistent with medication use as prescribed.    Last 3 PDI Scores 1/8/2019 11/27/2018 6/25/2018   Pain Disability Index (PDI) 8 4 0       Opioid Risk Score       Value Time User    Opioid Risk Score  0 11/27/2018 11:44 AM Olive Eugene LPN          GENERAL:  No weight loss, malaise or fevers.  HEENT:   No recent changes in vision or hearing  NECK:  Negative for lumps, no difficulty with swallowing.  RESPIRATORY:  Negative for cough, wheezing or shortness of breath, patient denies any recent URI.  CARDIOVASCULAR:  Negative for chest pain, leg swelling or palpitations.  GI:  Negative for abdominal discomfort, blood in stools or black stools or change in bowel habits.  MUSCULOSKELETAL:  See HPI.  SKIN:  Negative for lesions, rash, and itching.  PSYCH:  No mood disorder or recent psychosocial stressors.    HEMATOLOGY/LYMPHOLOGY:  Negative for prolonged bleeding, bruising easily or swollen nodes.    ENDO: No history of diabetes or thyroid dysfunction  NEURO:   No history of headaches, syncope, paralysis, seizures or tremors.  All other reviewed and negative other than HPI.          Past Medical History:   Diagnosis Date    Arthritis     Dental bridge present     LOWER PARTIAL    DVT (deep venous thrombosis) 2014    right le after thr    Wears glasses        Past Surgical History:   Procedure Laterality Date    ARTHROPLASTY-KNEE Left 2/22/2017    [facility]    JOINT REPLACEMENT      BILAT HIPS, RIGHT  SHOULDER    KNEE ARTHROPLASTY Left     TONSILLECTOMY  1950    TOTAL HIP ARTHROPLASTY Bilateral 2013 & 2014    TOTAL SHOULDER ARTHROPLASTY Right 2005       Review of patient's allergies indicates:  No Known Allergies    Current Outpatient Medications   Medication Sig Dispense Refill    clotrimazole-betamethasone 1-0.05% (LOTRISONE) cream Apply topically 2 (two) times daily.      diclofenac sodium (VOLTAREN) 1 % Gel Apply 2 g topically 2 (two) times daily as needed. 100 g 5    famotidine (PEPCID) 20 MG tablet Take 1 tablet (20 mg total) by mouth 2 (two) times daily. 60 tablet 11    fluconazole (DIFLUCAN) 150 MG Tab Take 150 mg by mouth once daily.      multivitamin (ONE DAILY MULTIVITAMIN) per tablet Take 1 tablet by mouth once daily.      naproxen (EC NAPROSYN) 500 MG EC tablet Take 1 tablet (500 mg total) by mouth 2 (two) times daily as needed. 60 tablet 9    zolpidem (AMBIEN) 5 MG Tab Take 1 tablet (5 mg total) by mouth nightly as needed. 30 tablet 3     Current Facility-Administered Medications   Medication Dose Route Frequency Provider Last Rate Last Dose    EUFLEXXA 10 mg/mL(mw 2.4 -3.6 million) injection Syrg 20 mg  20 mg Intra-articular Weekly José Luis Morejon MD   20 mg at 04/05/18 1143       Family History   Problem Relation Age of Onset    Stroke Mother     Bladder Cancer Father        Social History     Socioeconomic History    Marital status:      Spouse name: Not on file    Number of children: Not on file    Years of education: Not on file    Highest education level: Not on file   Social Needs    Financial resource strain: Not on file    Food insecurity - worry: Not on file    Food insecurity - inability: Not on file    Transportation needs - medical: Not on file    Transportation needs - non-medical: Not on file   Occupational History    Not on file   Tobacco Use    Smoking status: Former Smoker     Types: Cigarettes    Smokeless tobacco: Former User     Quit date:  "1/1/1990   Substance and Sexual Activity    Alcohol use: Yes     Alcohol/week: 0.0 oz    Drug use: No    Sexual activity: Not on file   Other Topics Concern    Not on file   Social History Narrative    Not on file       Objective:     Vitals:    01/08/19 0801   BP: (!) 149/81   Pulse: 69   Temp: 97.6 °F (36.4 °C)   Weight: 107 kg (236 lb)   Height: 5' 9" (1.753 m)   PainSc:   3   PainLoc: Back       GEN:  Well developed, well nourished.  No acute distress. No pain behavior.  HEENT:  No trauma.  Mucous membranes moist.  Nares patent bilaterally.  PSYCH: Normal affect. Thought content appropriate.  CHEST:  Breathing symmetric.  No audible wheezing.  ABD:   · Soft, non-distended.    SKIN:  Warm, pink, dry.  No rash on exposed areas.    EXT:  No cyanosis, clubbing, or edema.  No color change or changes in nail or hair growth.  NEURO/MUSCULOSKELETAL:  Fully alert, oriented, and appropriate. Speech normal dionicio. No cranial nerve deficits.   Gait: Normal.       Previous physical exam:  Motor Strength: 5/5 motor strength throughout lower extremities.   Sensory:  No sensory deficit in the lower extremities.   Reflexes:  2+ and symmetric throughout.  Downgoing Babinski's bilaterally.  No clonus or spasticity.  L-Spine:  Decreased ROM with pain on flexion, extension. Positive facet loading on the left.  Negative SLR bilaterally.    SI Joint/Hip:  Negative TERESSA bilaterally.  Negative FADIR bilaterally.  Negative TTP over lumbar paraspinals, bilateral SI joints, hips, piriformis muscles, or GTB.        Right knee  Inspection: No erythema, swelling, or redness  ROM: Full    Left knee  Inspection: No erythema, swelling, or redness  ROM: Decreased  Palpation: No pes anserine tenderness and positive crepitus. No patellar tendon tenderness and no patellofemoral tendon tenderness.  No instability on exam.      Imaging:        The imaging studies listed below were independently reviewed by me, and I agree with the findings as " "documented below.     Narrative     XR KNEE ORTHO LEFT.  Clinical History provided for exam:"lt knee".  As on 2/22/17, there are surgical changes of left knee arthroplasty.  Position and appearance of the prosthesis are unchanged.  There is a left suprapatellar effusion.  There is moderate diffuse narrowing of the right knee joint.  On the right there are spurs on the distal femur, proximal tibia, and patella. No acute fracture or bony destructive process is seen.  Alignment is normal.      Impression      Surgical changes of left knee arthroplasty.      Electronically signed by: MCKAYLA ZAVALA MD  Date: 01/15/18  Time: 09:18        Narrative     EXAMINATION:  MRI LUMBAR SPINE WITHOUT CONTRAST    CLINICAL HISTORY:  Abnormal xray, lumbar spine, DJD; Abnormal findings on diagnostic imaging of other parts of musculoskeletal system    TECHNIQUE:  Multiplanar, multisequence MR images were acquired from the thoracolumbar junction to the sacrum without the administration of contrast.    COMPARISON:  November 29, 2018 plain film    FINDINGS:  There is a dextroscoliotic curvature in the upper lumbar region.  There is diffuse disc desiccation and disc space base narrowing.  Superior endplate concavity is noted at L2 and Schmorl's node formation is noted in the superior endplate of L3 and inferior endplate of L2.  There is no evidence of marrow edema to suggest acute compression.  The conus terminates at the L1 level and has an unremarkable appearance.  There are minimal type 1 (edematous) degenerative endplate changes at L1/L2 and there are fatty degenerative endplate changes at L2/L3 and L3/L4.  The individual disc levels appears follows:    T12/L1: There is a left central disc extrusion causing distortion of the left anterolateral canal.  Annular disc bulge and osteophyte formation cause mild narrowing of both foramina.    L1/L2: There is annular disc bulging and mild degenerative facet disease with mild effacement of the " anterior thecal sac.  There is left greater than right foraminal narrowing.    L2/L3: There is annular disc bulging with mild effacement of the anterior thecal sac and mild degenerative facet changes are noted bilaterally.  There is mild foraminal narrowing bilaterally.    L3/L4: Moderate degenerative facet changes are noted and there is no evidence of disc protrusion.  There is mild disc bulging into the left foramen.  There is mild narrowing the left foramen.    L4/L5: Annular disc bulge combines with facet and ligamentous hypertrophy to produce moderate canal stenosis.  There is inferior foraminal narrowing bilaterally.  Facet effusions are noted bilaterally.    L5/S1: Degenerative facet changes are noted and there is annular disc bulging with effacement of the anterior thecal sac and the proximal S1 nerve roots bilaterally.      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  There is mild chronic anterior wedging of L1.      Electronically signed by: Israel Morris MD  Date: 11/30/2018  Time: 13:48         Assessment:     Encounter Diagnoses   Name Primary?    Chronic pain disorder Yes    Lumbar spondylosis     DDD (degenerative disc disease), lumbar     Chronic pain of left knee     Status post total left knee replacement        Plan:     Anthony was seen today for follow-up.    Diagnoses and all orders for this visit:    Chronic pain disorder    Lumbar spondylosis    DDD (degenerative disc disease), lumbar    Chronic pain of left knee    Status post total left knee replacement      His pain is consistent with the above.    We discussed the assessment and recommendations.  All available images were reviewed. We discussed the disease process, prognosis, treatment plan, and risks and benefits. The patient is aware of the risks and benefits of the medications being prescribed, common side effects, and proper  usage. The following is the plan we agreed on:     1. Can repeat the left knee radiofrequency as needed.  I discussed that the procedure did not cover the lateral and posterior aspect of the knee, so I would expect for him still have symptoms in these areas.  2. X-ray lumbar spine to evaluate what appears to be facet mediated pain.  3. Voltaren gel for topical use  4. Okay to continue naproxen, Tylenol  5. Alternate ice and heat for symptomatic relief  6. Home exercises for his low back given today  7. RTC PRN.    Thank you for allowing me to participate in the care of this patient.   Please do not hesitate to call me at [phone] with any questions or concerns.    Chyna Valdovinos MD  01/08/2019     The above plan and management options were discussed at length with patient. Patient is in agreement with the above and verbalized understanding. It will be communicated with the referring physician via electronic record, fax, or mail.

## 2019-01-08 NOTE — PATIENT INSTRUCTIONS
Recommend using Tylenol 1000 mg every 8 hours as needed for pain.  Do not take this with any other medications containing acetaminophen.  Do not exceed 3000 mg of acetaminophen in 24 hours.    If this is not helping, please contact me through the [facility] system or to call me at [phone].  I will send over a prescription for Tylenol #3 with Codeine to see is this is helpful.    Given your slightly decreased kidney function, the anti-inflammatory class like naproxen and Aleve are not recommended in high doses or for daily use.  Occasional use is probably fine, but you may want to check with Dr. Melgar

## 2019-01-08 NOTE — PROGRESS NOTES
Subjective:     Patient ID: Anthony Sheldon is a 84 y.o. male.    Chief Complaint: Pain    Consulted by: Self     Disclaimer: This note was generated using voice recognition software.  There may be a typographical errors that were missed during proofreading.      HPI:    Anthony Sheldon is a 84 y.o. male who presents today with left knee pain. He reports that the knee feels like a pressure sensation.  He is s/p left knee coolief in 5/2018 with resolution of his burning, stabbing pain, but this pressure pain never went away.   It is located on the lateral and posterior aspect of his knee.  This pain is described in detail below.    Of note, he received Euflexxa in the right knee with great benefit.    He also reports left-sided low back pain that is long-standing and worsening.  The pain is located in the left middle back and does not radiate.  The pain is worse with walking and with activities cause him to stand up straight.  It is better with rest.    Aggravating factors: Walking, the pain is worse in the morning    Mitigating factors: Coolief, motion    Previously seeing: Dr. Posadas, but patient lives here and would like to establish care here    Interval History (1/8/2019):  He returns today for follow up.  He reports that his low back pain is improving with the home exercises.  Tylenol has been helpful for the pain.    Physical Therapy: Yes, after his surgery.  Continued pool exercises all summer.  He has been walking until recently when it got cold.  No stretches.    Non-pharmacologic Treatment:     · Ice/Heat: Ice after surgery  · TENS: Never  · Massage: Never   · Chiropractic care: Never  · Acupuncture: Never  · Other: No         Pain Medications:         · Currently taking: Naproxen 500 mg (some benefit, a couple of times a week), Tylenol 500 mg, 1-2 per dose (helpful)    · Has tried in the past:    · Opioids: tramadol  · NSAIDS: No others  · Tylenol: Takes 2-3 times per week  · Muscle relaxants:  Never  · TCAs: Never  · SNRIs: Never  · Anticonvulsants: Never  · topical creams: Never  · Other: None    Blood thinners: None    Interventional Therapies:   · 5/18/2018: Left knee coolief radiofrequency:  75% relief  · Euflexxa in right knee 3/2018: Good benefit    Relevant Surgeries:   · Left TKA    Affecting sleep? No    Affecting daily activities? No    Depressive symptoms? No          · SI/HI? No    Work status: No, retired    Prescription Monitoring Program database:  Reviewed and consistent with medication use as prescribed.    Last 3 PDI Scores 1/8/2019 11/27/2018 6/25/2018   Pain Disability Index (PDI) 8 4 0       Opioid Risk Score       Value Time User    Opioid Risk Score  0 11/27/2018 11:44 AM Olive Eugene LPN          GENERAL:  No weight loss, malaise or fevers.  HEENT:   No recent changes in vision or hearing  NECK:  Negative for lumps, no difficulty with swallowing.  RESPIRATORY:  Negative for cough, wheezing or shortness of breath, patient denies any recent URI.  CARDIOVASCULAR:  Negative for chest pain, leg swelling or palpitations.  GI:  Negative for abdominal discomfort, blood in stools or black stools or change in bowel habits.  MUSCULOSKELETAL:  See HPI.  SKIN:  Negative for lesions, rash, and itching.  PSYCH:  No mood disorder or recent psychosocial stressors.    HEMATOLOGY/LYMPHOLOGY:  Negative for prolonged bleeding, bruising easily or swollen nodes.    ENDO: No history of diabetes or thyroid dysfunction  NEURO:   No history of headaches, syncope, paralysis, seizures or tremors.  All other reviewed and negative other than HPI.          Past Medical History:   Diagnosis Date    Arthritis     Dental bridge present     LOWER PARTIAL    DVT (deep venous thrombosis) 2014    right le after thr    Wears glasses        Past Surgical History:   Procedure Laterality Date    ARTHROPLASTY-KNEE Left 2/22/2017    [facility]    JOINT REPLACEMENT      BILAT HIPS, RIGHT  SHOULDER    KNEE ARTHROPLASTY Left     TONSILLECTOMY  1950    TOTAL HIP ARTHROPLASTY Bilateral 2013 & 2014    TOTAL SHOULDER ARTHROPLASTY Right 2005       Review of patient's allergies indicates:  No Known Allergies    Current Outpatient Medications   Medication Sig Dispense Refill    clotrimazole-betamethasone 1-0.05% (LOTRISONE) cream Apply topically 2 (two) times daily.      diclofenac sodium (VOLTAREN) 1 % Gel Apply 2 g topically 2 (two) times daily as needed. 100 g 5    famotidine (PEPCID) 20 MG tablet Take 1 tablet (20 mg total) by mouth 2 (two) times daily. 60 tablet 11    fluconazole (DIFLUCAN) 150 MG Tab Take 150 mg by mouth once daily.      multivitamin (ONE DAILY MULTIVITAMIN) per tablet Take 1 tablet by mouth once daily.      naproxen (EC NAPROSYN) 500 MG EC tablet Take 1 tablet (500 mg total) by mouth 2 (two) times daily as needed. 60 tablet 9    zolpidem (AMBIEN) 5 MG Tab Take 1 tablet (5 mg total) by mouth nightly as needed. 30 tablet 3     Current Facility-Administered Medications   Medication Dose Route Frequency Provider Last Rate Last Dose    EUFLEXXA 10 mg/mL(mw 2.4 -3.6 million) injection Syrg 20 mg  20 mg Intra-articular Weekly José Luis Morejon MD   20 mg at 04/05/18 1143       Family History   Problem Relation Age of Onset    Stroke Mother     Bladder Cancer Father        Social History     Socioeconomic History    Marital status:      Spouse name: Not on file    Number of children: Not on file    Years of education: Not on file    Highest education level: Not on file   Social Needs    Financial resource strain: Not on file    Food insecurity - worry: Not on file    Food insecurity - inability: Not on file    Transportation needs - medical: Not on file    Transportation needs - non-medical: Not on file   Occupational History    Not on file   Tobacco Use    Smoking status: Former Smoker     Types: Cigarettes    Smokeless tobacco: Former User     Quit date:  "1/1/1990   Substance and Sexual Activity    Alcohol use: Yes     Alcohol/week: 0.0 oz    Drug use: No    Sexual activity: Not on file   Other Topics Concern    Not on file   Social History Narrative    Not on file       Objective:     Vitals:    01/08/19 0801   BP: (!) 149/81   Pulse: 69   Temp: 97.6 °F (36.4 °C)   Weight: 107 kg (236 lb)   Height: 5' 9" (1.753 m)   PainSc:   3   PainLoc: Back       GEN:  Well developed, well nourished.  No acute distress. No pain behavior.  HEENT:  No trauma.  Mucous membranes moist.  Nares patent bilaterally.  PSYCH: Normal affect. Thought content appropriate.  CHEST:  Breathing symmetric.  No audible wheezing.  ABD:   · Soft, non-distended.    SKIN:  Warm, pink, dry.  No rash on exposed areas.    EXT:  No cyanosis, clubbing, or edema.  No color change or changes in nail or hair growth.  NEURO/MUSCULOSKELETAL:  Fully alert, oriented, and appropriate. Speech normal dionicio. No cranial nerve deficits.   Gait: Normal.       Previous physical exam:  Motor Strength: 5/5 motor strength throughout lower extremities.   Sensory:  No sensory deficit in the lower extremities.   Reflexes:  2+ and symmetric throughout.  Downgoing Babinski's bilaterally.  No clonus or spasticity.  L-Spine:  Decreased ROM with pain on flexion, extension. Positive facet loading on the left.  Negative SLR bilaterally.    SI Joint/Hip:  Negative TERESSA bilaterally.  Negative FADIR bilaterally.  Negative TTP over lumbar paraspinals, bilateral SI joints, hips, piriformis muscles, or GTB.        Right knee  Inspection: No erythema, swelling, or redness  ROM: Full    Left knee  Inspection: No erythema, swelling, or redness  ROM: Decreased  Palpation: No pes anserine tenderness and positive crepitus. No patellar tendon tenderness and no patellofemoral tendon tenderness.  No instability on exam.      Imaging:        The imaging studies listed below were independently reviewed by me, and I agree with the findings as " "documented below.     Narrative     XR KNEE ORTHO LEFT.  Clinical History provided for exam:"lt knee".  As on 2/22/17, there are surgical changes of left knee arthroplasty.  Position and appearance of the prosthesis are unchanged.  There is a left suprapatellar effusion.  There is moderate diffuse narrowing of the right knee joint.  On the right there are spurs on the distal femur, proximal tibia, and patella. No acute fracture or bony destructive process is seen.  Alignment is normal.      Impression      Surgical changes of left knee arthroplasty.      Electronically signed by: MCKAYLA ZAVALA MD  Date: 01/15/18  Time: 09:18        Narrative     EXAMINATION:  MRI LUMBAR SPINE WITHOUT CONTRAST    CLINICAL HISTORY:  Abnormal xray, lumbar spine, DJD; Abnormal findings on diagnostic imaging of other parts of musculoskeletal system    TECHNIQUE:  Multiplanar, multisequence MR images were acquired from the thoracolumbar junction to the sacrum without the administration of contrast.    COMPARISON:  November 29, 2018 plain film    FINDINGS:  There is a dextroscoliotic curvature in the upper lumbar region.  There is diffuse disc desiccation and disc space base narrowing.  Superior endplate concavity is noted at L2 and Schmorl's node formation is noted in the superior endplate of L3 and inferior endplate of L2.  There is no evidence of marrow edema to suggest acute compression.  The conus terminates at the L1 level and has an unremarkable appearance.  There are minimal type 1 (edematous) degenerative endplate changes at L1/L2 and there are fatty degenerative endplate changes at L2/L3 and L3/L4.  The individual disc levels appears follows:    T12/L1: There is a left central disc extrusion causing distortion of the left anterolateral canal.  Annular disc bulge and osteophyte formation cause mild narrowing of both foramina.    L1/L2: There is annular disc bulging and mild degenerative facet disease with mild effacement of the " anterior thecal sac.  There is left greater than right foraminal narrowing.    L2/L3: There is annular disc bulging with mild effacement of the anterior thecal sac and mild degenerative facet changes are noted bilaterally.  There is mild foraminal narrowing bilaterally.    L3/L4: Moderate degenerative facet changes are noted and there is no evidence of disc protrusion.  There is mild disc bulging into the left foramen.  There is mild narrowing the left foramen.    L4/L5: Annular disc bulge combines with facet and ligamentous hypertrophy to produce moderate canal stenosis.  There is inferior foraminal narrowing bilaterally.  Facet effusions are noted bilaterally.    L5/S1: Degenerative facet changes are noted and there is annular disc bulging with effacement of the anterior thecal sac and the proximal S1 nerve roots bilaterally.      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  There is mild chronic anterior wedging of L1.      Electronically signed by: Israel Morris MD  Date: 11/30/2018  Time: 13:48         Assessment:     Encounter Diagnoses   Name Primary?    Chronic pain disorder Yes    Lumbar spondylosis     DDD (degenerative disc disease), lumbar     Chronic pain of left knee     Status post total left knee replacement        Plan:     Anthony was seen today for follow-up.    Diagnoses and all orders for this visit:    Chronic pain disorder    Lumbar spondylosis    DDD (degenerative disc disease), lumbar    Chronic pain of left knee    Status post total left knee replacement      His pain is consistent with the above.    We discussed the assessment and recommendations.  All available images were reviewed. We discussed the disease process, prognosis, treatment plan, and risks and benefits. The patient is aware of the risks and benefits of the medications being prescribed, common side effects, and proper  usage. The following is the plan we agreed on:     1. Can repeat the left knee radiofrequency as needed.  I discussed that the procedure did not cover the lateral and posterior aspect of the knee, so I would expect for him still have symptoms in these areas.  2. X-ray lumbar spine to evaluate what appears to be facet mediated pain.  3. Voltaren gel for topical use  4. Okay to continue naproxen, Tylenol  5. Alternate ice and heat for symptomatic relief  6. Home exercises for his low back given today  7. RTC PRN.    Thank you for allowing me to participate in the care of this patient.   Please do not hesitate to call me at [phone] with any questions or concerns.    Chyna Valdovinos MD  01/08/2019     The above plan and management options were discussed at length with patient. Patient is in agreement with the above and verbalized understanding. It will be communicated with the referring physician via electronic record, fax, or mail.

## 2019-01-18 ENCOUNTER — PATIENT MESSAGE (OUTPATIENT)
Dept: PAIN MEDICINE | Facility: CLINIC | Age: 84
End: 2019-01-18

## 2019-01-18 NOTE — TELEPHONE ENCOUNTER
Spoke to him.  Please set him up for a left L4-5 and L5-S1 facet joint injection on Monday afternoon.  He is aware, but he will need a call with the time to arrive.

## 2019-01-21 ENCOUNTER — HOSPITAL ENCOUNTER (OUTPATIENT)
Facility: HOSPITAL | Age: 84
Discharge: HOME | End: 2019-01-21
Attending: ANESTHESIOLOGY | Admitting: ANESTHESIOLOGY
Payer: MEDICARE

## 2019-01-21 ENCOUNTER — PATIENT MESSAGE (OUTPATIENT)
Dept: SURGERY | Facility: HOSPITAL | Age: 84
End: 2019-01-21

## 2019-01-21 VITALS
TEMPERATURE: 97 F | OXYGEN SATURATION: 98 % | RESPIRATION RATE: 18 BRPM | SYSTOLIC BLOOD PRESSURE: 154 MMHG | DIASTOLIC BLOOD PRESSURE: 92 MMHG | BODY MASS INDEX: 33.33 KG/M2 | HEART RATE: 65 BPM | WEIGHT: 225 LBS | HEIGHT: 69 IN

## 2019-01-21 DIAGNOSIS — M47.816 LUMBAR SPONDYLOSIS: Primary | ICD-10-CM

## 2019-01-21 PROCEDURE — 64493 INJ PARAVERT F JNT L/S 1 LEV: CPT | Mod: LT | Performed by: ANESTHESIOLOGY

## 2019-01-21 PROCEDURE — 64494 INJ PARAVERT F JNT L/S 2 LEV: CPT | Mod: LT,,, | Performed by: ANESTHESIOLOGY

## 2019-01-21 PROCEDURE — 76000 FLUOROSCOPY <1 HR PHYS/QHP: CPT | Mod: TC

## 2019-01-21 PROCEDURE — 64493 PR INJ DX/THER AGNT PARAVERT FACET JOINT,IMG GUIDE,LUMBAR/SAC,1ST LVL: ICD-10-PCS | Mod: LT,,, | Performed by: ANESTHESIOLOGY

## 2019-01-21 PROCEDURE — 64494 INJ PARAVERT F JNT L/S 2 LEV: CPT | Mod: LT | Performed by: ANESTHESIOLOGY

## 2019-01-21 PROCEDURE — 64493 INJ PARAVERT F JNT L/S 1 LEV: CPT | Mod: LT,,, | Performed by: ANESTHESIOLOGY

## 2019-01-21 PROCEDURE — 64494 PR INJ DX/THER AGNT PARAVERT FACET JOINT,IMG GUIDE,LUMBAR/SAC, 2ND LEVEL: ICD-10-PCS | Mod: LT,,, | Performed by: ANESTHESIOLOGY

## 2019-01-21 NOTE — OP NOTE
"Date of procedure: 01/21/2019    Procedure: Left L4-5 and L5-S1 Facet joint injection     Pre-op diagnosis: Lumbar spondylosis [M47.816]    Post-op diagnosis: Lumbar spondylosis [M47.816]    Physician: Dr. Chyna Valdovinos     Assistant: None    Anesthestia: local    EBL: None    Specimens: None    All medications, allergies, and relevant histories were reviewed. No recent antibiotics or infections. A time-out was taken to verify the correct patient, procedure, laterality, and appropriate medications/allergies.     Fluoroscopically-Guided, Contrast-Controlled Lumbar Facet Joint Injection:     Following denial of allergy and review of potential side effects and complications including but not necessarily limited to infection, allergic reaction, local tissue breakdown, nerve injury, paresis, paralysis, spinal cord injury, and seizure, the patient indicated they understood and agreed to proceed.     Patient was placed in prone position. Lumbar area was prepped and draped in sterile manner. The level was determined under fluoroscopic guidance. Using a 25 gauge 1.5" needle, bicarbonated Xylocaine 1% was given subcutaneously at the level L4-5 on the left. The 3.5in 22-gauge needle was introduced into the left L4-5 facet joint. Following negative aspiration for blood and CSF and confirming the absence of paresthesias, injection of approximately 1 cc of Omnipaque 300 demonstrated intra-articular spread without vascular or intrathecal uptake. At this point, 1cc of a mixture of 1.5 cc of 0.5% marcaine with 40 mg of Depo-Medrol was injected without complication. The same procedure was performed in an identical fashion on the left L5-S1 joints sequentially.     Patient tolerated the procedure well without any side effects. No noted complications.     The patient was followed post procedure and discharged under their own power in excellent condition.     Future Management:   If helpful, can repeat as needed.   Follow up in 8 weeks " or as needed.

## 2019-01-21 NOTE — TELEPHONE ENCOUNTER
Procedure location: [facility]    Procedure: Left L4-5 and L5-S1 facet joint injection     Diagnosis: Lumbar spondylosis    Date of procedure: 1/21/19    Status: Non-urgent

## 2019-01-21 NOTE — INTERVAL H&P NOTE
The patient has been examined and the H&P has been reviewed:    I concur with the findings and no changes have occurred since H&P was written.     No change in the location or quality of the pain since the most recent clinic visit.  No new symptoms.  He wishes to proceed with the procedure today.    PE, unchanged from previous:  CV:  RRR  Resp: unlabored, no wheezing.    NPO since MN.      Anesthesia/Surgery risks, benefits and alternative options discussed and understood by patient/family.          Active Hospital Problems    Diagnosis  POA    Lumbar spondylosis [M47.816]  Yes      Resolved Hospital Problems   No resolved problems to display.

## 2019-01-24 ENCOUNTER — PATIENT MESSAGE (OUTPATIENT)
Dept: PAIN MEDICINE | Facility: CLINIC | Age: 84
End: 2019-01-24

## 2019-01-24 DIAGNOSIS — M47.816 LUMBAR SPONDYLOSIS: Primary | ICD-10-CM

## 2019-01-24 RX ORDER — DICLOFENAC SODIUM 75 MG/1
75 TABLET, DELAYED RELEASE ORAL 2 TIMES DAILY PRN
Qty: 60 TABLET | Refills: 2 | Status: SHIPPED | OUTPATIENT
Start: 2019-01-24 | End: 2019-03-25 | Stop reason: CLARIF

## 2019-01-24 NOTE — TELEPHONE ENCOUNTER
I am sorry that he is having pain.  I have sent Diclofenac, a stronger anti-inflammatory, to his pharmacy.  He should not take naproxen or Advil (ibuprofen) while taking this, but he can still take Tylenol.    Thanks,   LW

## 2019-02-13 ENCOUNTER — OFFICE VISIT (OUTPATIENT)
Dept: UROLOGY | Facility: CLINIC | Age: 84
End: 2019-02-13
Payer: MEDICARE

## 2019-02-13 VITALS
SYSTOLIC BLOOD PRESSURE: 146 MMHG | HEIGHT: 69 IN | BODY MASS INDEX: 34.66 KG/M2 | RESPIRATION RATE: 18 BRPM | HEART RATE: 88 BPM | WEIGHT: 234 LBS | DIASTOLIC BLOOD PRESSURE: 80 MMHG | TEMPERATURE: 98 F

## 2019-02-13 DIAGNOSIS — R97.20 PSA ELEVATION: Primary | ICD-10-CM

## 2019-02-13 PROCEDURE — 81002 URINALYSIS NONAUTO W/O SCOPE: CPT | Mod: S$GLB,,, | Performed by: UROLOGY

## 2019-02-13 PROCEDURE — 99214 PR OFFICE/OUTPT VISIT, EST, LEVL IV, 30-39 MIN: ICD-10-PCS | Mod: 25,S$GLB,, | Performed by: UROLOGY

## 2019-02-13 PROCEDURE — 99999 PR PBB SHADOW E&M-EST. PATIENT-LVL III: ICD-10-PCS | Mod: PBBFAC,,, | Performed by: UROLOGY

## 2019-02-13 PROCEDURE — 81002 POCT URINE DIPSTICK WITHOUT MICROSCOPE: ICD-10-PCS | Mod: S$GLB,,, | Performed by: UROLOGY

## 2019-02-13 PROCEDURE — 99214 OFFICE O/P EST MOD 30 MIN: CPT | Mod: 25,S$GLB,, | Performed by: UROLOGY

## 2019-02-13 PROCEDURE — 99999 PR PBB SHADOW E&M-EST. PATIENT-LVL III: CPT | Mod: PBBFAC,,, | Performed by: UROLOGY

## 2019-02-13 PROCEDURE — 1101F PR PT FALLS ASSESS DOC 0-1 FALLS W/OUT INJ PAST YR: ICD-10-PCS | Mod: CPTII,S$GLB,, | Performed by: UROLOGY

## 2019-02-13 PROCEDURE — 1101F PT FALLS ASSESS-DOCD LE1/YR: CPT | Mod: CPTII,S$GLB,, | Performed by: UROLOGY

## 2019-02-13 NOTE — PROGRESS NOTES
Subjective:       Patient ID: Anthony Sheldon is a 84 y.o. male.    Chief Complaint:   This is a consultation with Dr. Perfecto Melgar III.    CHIEF COMPLAINT:  Elevation of the PSA.    Mr. Sheldon is an 84-year-old male who a year ago, his PSA was 17.9 and a  recent PSA on 12/28/2018 came to 23.3.  The patient has been referred by  Dr. Melgar for further evaluation and treatment.  The patient referred to  have nocturia x1.  No dysuria or hematuria.  The flow is adequate and he  feels that he empties the bladder satisfactory.    FAMILY HISTORY:  Negative for prostate cancer.    The past medical and surgical history and the current medications and the  allergies are well documented in the medical record and reviewed by me  during this visit.  His previous genitourinary history is completely  negative.    The urinalysis today is also negative.    I explained to Mr. Sheldon that these were the options that we have in  evaluating his elevation of the PSA and after lengthy discussion with him  of the pros and cons of each approach.  He elected to undergo a transrectal  prostate ultrasound and biopsy due to the high suspicious of carcinoma in  his prostate.  He would like to know and may be treated very conservatively  if the diagnosis of cancer can be made.  He was present with his wife and  we discussed, all three of us and they agreed that this is the way they  want to proceed.        EOR/HN dd: 02/13/2019 17:04:13 (CST)   td: 02/13/2019 20:34:31 (CST)  Doc ID #[id number]   Job ID #[id number]    CC: Perfecto Melgar III, M.D.           HPI  Review of Systems   Constitutional: Negative for activity change and appetite change.   HENT: Negative.    Eyes: Negative for discharge.   Respiratory: Negative for cough and shortness of breath.    Cardiovascular: Negative for chest pain and palpitations.   Gastrointestinal: Negative for abdominal distention, abdominal pain, constipation and vomiting.   Genitourinary: Negative for discharge,  dysuria, flank pain, frequency, hematuria, testicular pain and urgency.   Musculoskeletal: Positive for arthralgias and back pain.   Skin: Negative for rash.   Neurological: Negative for dizziness.   Psychiatric/Behavioral: The patient is not nervous/anxious.        Objective:      Physical Exam   Constitutional: He appears well-developed and well-nourished.   HENT:   Head: Normocephalic.   Eyes: Pupils are equal, round, and reactive to light.   Neck: Normal range of motion.   Cardiovascular: Normal rate.    Pulmonary/Chest: Effort normal.   Abdominal: Soft. He exhibits no distension and no mass. There is no tenderness.   Genitourinary: Rectum normal and penis normal. Rectal exam shows no external hemorrhoid, no mass and no tenderness. Prostate is not enlarged and not tender. Right testis shows no mass and no tenderness. Left testis shows no mass and no tenderness. No discharge found.       Musculoskeletal: Normal range of motion.   Neurological: He is alert.   Skin: Skin is warm.     Psychiatric: He has a normal mood and affect.       Assessment:       1. PSA elevation        Plan:       PSA elevation  -     POCT URINE DIPSTICK WITHOUT MICROSCOPE    For TRUS+BX+Cysto on 3/13/19

## 2019-02-13 NOTE — LETTER
February 13, 2019      Perfecto Melgar III, MD  [address]  [facility] [place] [address]           [facility] - Urology  [address] [place] [address]  Phone: [phone]  Fax: [phone]          Patient: Anthony Sheldon   MR Number: [id number]   YOB: 1934   Date of Visit: 2/13/2019       Dear Dr. Perfecto Melgar III:    Thank you for referring Anthony Sheldon to me for evaluation. Attached you will find relevant portions of my assessment and plan of care.    If you have questions, please do not hesitate to call me. I look forward to following Anthony Sheldon along with you.    Sincerely,    Niall Blas MD    Enclosure  CC:  No Recipients    If you would like to receive this communication electronically, please contact [email or web address] or [phone] to request more information on "[employer]" Link access.    For providers and/or their staff who would like to refer a patient to [facility], please contact us through our one-stop-shop provider referral line, [facility] , at [phone].    If you feel you have received this communication in error or would no longer like to receive these types of communications, please e-mail [email or web address]

## 2019-02-14 LAB
BILIRUB SERPL-MCNC: NEGATIVE MG/DL
BLOOD URINE, POC: ABNORMAL
COLOR, POC UA: ABNORMAL
GLUCOSE UR QL STRIP: NEGATIVE
KETONES UR QL STRIP: NEGATIVE
LEUKOCYTE ESTERASE URINE, POC: NEGATIVE
NITRITE, POC UA: NEGATIVE
PH, POC UA: 5
PROTEIN, POC: NEGATIVE
SPECIFIC GRAVITY, POC UA: 1010
UROBILINOGEN, POC UA: NEGATIVE

## 2019-03-11 ENCOUNTER — TELEPHONE (OUTPATIENT)
Dept: UROLOGY | Facility: CLINIC | Age: 84
End: 2019-03-11

## 2019-03-11 NOTE — TELEPHONE ENCOUNTER
Pt states he has a cough and runny nose and he was diagnosed with a upper respiratory infection. Informed pt it would be best to reschedule his procedure due to possible anaesthesia complications with respiratory infections. Pt agreed. rescheduled procedure for 3/27 and pre-op for 2/25 @ 11am. Pt verbalized understanding.

## 2019-03-11 NOTE — TELEPHONE ENCOUNTER
----- Message from Yanet Spencer sent at 3/11/2019  9:24 AM CDT -----  Contact: Anthony  Type: Needs Medical Advice    Who Called:  patient  Best Call Back Number: [phone]  Additional Information: patient has procedure scheduled this week but is taking a Zpak due to upper Respiratory Infection--wants to know if he needs to r/s the surgery--please advise--thank you

## 2019-03-25 ENCOUNTER — HOSPITAL ENCOUNTER (OUTPATIENT)
Dept: PREADMISSION TESTING | Facility: HOSPITAL | Age: 84
Discharge: HOME | End: 2019-03-25
Attending: UROLOGY
Payer: MEDICARE

## 2019-03-25 ENCOUNTER — ANESTHESIA EVENT (OUTPATIENT)
Dept: SURGERY | Facility: HOSPITAL | Age: 84
End: 2019-03-25
Payer: MEDICARE

## 2019-03-25 PROCEDURE — 99900103 DSU ONLY-NO CHARGE-INITIAL HR (STAT)

## 2019-03-25 NOTE — ANESTHESIA PREPROCEDURE EVALUATION
03/25/2019  Anthony Sheldon is a 84 y.o., male.    Anesthesia Evaluation    I have reviewed the Patient Summary Reports.    I have reviewed the Nursing Notes.   I have reviewed the Medications.     Review of Systems  Anesthesia Hx:  No problems with previous Anesthesia    Social:  Former Smoker    Hematology/Oncology:     Oncology Normal     EENT/Dental:EENT/Dental Normal   Cardiovascular:  Cardiovascular Normal     Pulmonary:  Pulmonary Normal    Musculoskeletal:   Arthritis     Endocrine:  Endocrine Normal    Dermatological:  Skin Normal    Psych:  Psychiatric Normal           Physical Exam  General:  Well nourished, Obesity    Airway/Jaw/Neck:  Airway Findings: Mouth Opening: Normal Tongue: Normal  General Airway Assessment: Adult  Mallampati: III  Improves to II with phonation.  TM Distance: 4 - 6 cm       Chest/Lungs:  Chest/Lungs Findings: Clear to auscultation     Heart/Vascular:  Heart Findings: Rate: Normal  Rhythm: Regular Rhythm             Anesthesia Plan  Type of Anesthesia, risks & benefits discussed:  Anesthesia Type:  general  Patient's Preference:   Intra-op Monitoring Plan: standard ASA monitors  Intra-op Monitoring Plan Comments:   Post Op Pain Control Plan: IV/PO Opioids PRN  Post Op Pain Control Plan Comments:   Induction:   IV  Beta Blocker:         Informed Consent: Patient understands risks and agrees with Anesthesia plan.  Questions answered. Anesthesia consent signed with patient.  ASA Score: 3     Day of Surgery Review of History & Physical: I have interviewed and examined the patient. I have reviewed the patient's H&P dated:            Ready For Surgery From Anesthesia Perspective.

## 2019-03-26 ENCOUNTER — HOSPITAL ENCOUNTER (OUTPATIENT)
Dept: CARDIOLOGY | Facility: HOSPITAL | Age: 84
Discharge: HOME | End: 2019-03-26
Attending: UROLOGY
Payer: MEDICARE

## 2019-03-26 DIAGNOSIS — Z01.818 PREOPERATIVE CLEARANCE: Primary | ICD-10-CM

## 2019-03-26 DIAGNOSIS — Z01.818 PREOPERATIVE CLEARANCE: ICD-10-CM

## 2019-03-26 PROCEDURE — 93005 ELECTROCARDIOGRAM TRACING: CPT

## 2019-03-27 ENCOUNTER — HOSPITAL ENCOUNTER (OUTPATIENT)
Facility: HOSPITAL | Age: 84
Discharge: HOME | End: 2019-03-27
Attending: UROLOGY | Admitting: UROLOGY
Payer: MEDICARE

## 2019-03-27 ENCOUNTER — ANESTHESIA (OUTPATIENT)
Dept: SURGERY | Facility: HOSPITAL | Age: 84
End: 2019-03-27
Payer: MEDICARE

## 2019-03-27 VITALS
TEMPERATURE: 98 F | SYSTOLIC BLOOD PRESSURE: 125 MMHG | RESPIRATION RATE: 19 BRPM | HEART RATE: 66 BPM | OXYGEN SATURATION: 97 % | DIASTOLIC BLOOD PRESSURE: 71 MMHG | HEIGHT: 69 IN | BODY MASS INDEX: 34.8 KG/M2 | WEIGHT: 235 LBS

## 2019-03-27 DIAGNOSIS — R97.20 PSA ELEVATION: ICD-10-CM

## 2019-03-27 PROCEDURE — 88305 TISSUE SPECIMEN TO PATHOLOGY - SURGERY: ICD-10-PCS | Mod: 26,,, | Performed by: PATHOLOGY

## 2019-03-27 PROCEDURE — D9220A PRA ANESTHESIA: ICD-10-PCS | Mod: ANES,,, | Performed by: ANESTHESIOLOGY

## 2019-03-27 PROCEDURE — 55700 PR BIOPSY OF PROSTATE,NEEDLE/PUNCH: CPT | Mod: ,,, | Performed by: UROLOGY

## 2019-03-27 PROCEDURE — 25000003 PHARM REV CODE 250: Performed by: ANESTHESIOLOGY

## 2019-03-27 PROCEDURE — 71000039 HC RECOVERY, EACH ADD'L HOUR: Performed by: UROLOGY

## 2019-03-27 PROCEDURE — 36000707: Performed by: UROLOGY

## 2019-03-27 PROCEDURE — D9220A PRA ANESTHESIA: ICD-10-PCS | Mod: CRNA,,, | Performed by: NURSE ANESTHETIST, CERTIFIED REGISTERED

## 2019-03-27 PROCEDURE — D9220A PRA ANESTHESIA: Mod: ANES,,, | Performed by: ANESTHESIOLOGY

## 2019-03-27 PROCEDURE — S0028 INJECTION, FAMOTIDINE, 20 MG: HCPCS | Performed by: ANESTHESIOLOGY

## 2019-03-27 PROCEDURE — 37000009 HC ANESTHESIA EA ADD 15 MINS: Performed by: UROLOGY

## 2019-03-27 PROCEDURE — 52000 PR CYSTOURETHROSCOPY: ICD-10-PCS | Mod: 59,,, | Performed by: UROLOGY

## 2019-03-27 PROCEDURE — 63600175 PHARM REV CODE 636 W HCPCS: Performed by: UROLOGY

## 2019-03-27 PROCEDURE — 71000015 HC POSTOP RECOV 1ST HR: Performed by: UROLOGY

## 2019-03-27 PROCEDURE — 52000 CYSTOURETHROSCOPY: CPT | Mod: 59,,, | Performed by: UROLOGY

## 2019-03-27 PROCEDURE — 76872 US TRANSRECTAL: CPT | Mod: 26,,, | Performed by: UROLOGY

## 2019-03-27 PROCEDURE — 36000706: Performed by: UROLOGY

## 2019-03-27 PROCEDURE — 71000033 HC RECOVERY, INTIAL HOUR: Performed by: UROLOGY

## 2019-03-27 PROCEDURE — 76942 PR U/S GUIDANCE FOR NEEDLE GUIDANCE: ICD-10-PCS | Mod: 26,59,, | Performed by: UROLOGY

## 2019-03-27 PROCEDURE — D9220A PRA ANESTHESIA: Mod: CRNA,,, | Performed by: NURSE ANESTHETIST, CERTIFIED REGISTERED

## 2019-03-27 PROCEDURE — 63600175 PHARM REV CODE 636 W HCPCS: Performed by: NURSE ANESTHETIST, CERTIFIED REGISTERED

## 2019-03-27 PROCEDURE — 76942 ECHO GUIDE FOR BIOPSY: CPT | Mod: 26,59,, | Performed by: UROLOGY

## 2019-03-27 PROCEDURE — 88305 TISSUE EXAM BY PATHOLOGIST: CPT | Mod: 26,,, | Performed by: PATHOLOGY

## 2019-03-27 PROCEDURE — 76872 PR US TRANSRECTAL: ICD-10-PCS | Mod: 26,,, | Performed by: UROLOGY

## 2019-03-27 PROCEDURE — 25000003 PHARM REV CODE 250: Performed by: NURSE ANESTHETIST, CERTIFIED REGISTERED

## 2019-03-27 PROCEDURE — 25000242 PHARM REV CODE 250 ALT 637 W/ HCPCS

## 2019-03-27 PROCEDURE — 37000008 HC ANESTHESIA 1ST 15 MINUTES: Performed by: UROLOGY

## 2019-03-27 PROCEDURE — 25000003 PHARM REV CODE 250: Performed by: UROLOGY

## 2019-03-27 PROCEDURE — 55700 PR BIOPSY OF PROSTATE,NEEDLE/PUNCH: ICD-10-PCS | Mod: ,,, | Performed by: UROLOGY

## 2019-03-27 PROCEDURE — 88305 TISSUE EXAM BY PATHOLOGIST: CPT | Mod: 59 | Performed by: PATHOLOGY

## 2019-03-27 RX ORDER — SUCCINYLCHOLINE CHLORIDE 20 MG/ML
INJECTION INTRAMUSCULAR; INTRAVENOUS
Status: DISCONTINUED | OUTPATIENT
Start: 2019-03-27 | End: 2019-03-27

## 2019-03-27 RX ORDER — MIDAZOLAM HYDROCHLORIDE 1 MG/ML
INJECTION, SOLUTION INTRAMUSCULAR; INTRAVENOUS
Status: DISCONTINUED | OUTPATIENT
Start: 2019-03-27 | End: 2019-03-27

## 2019-03-27 RX ORDER — SODIUM CHLORIDE, SODIUM LACTATE, POTASSIUM CHLORIDE, CALCIUM CHLORIDE 600; 310; 30; 20 MG/100ML; MG/100ML; MG/100ML; MG/100ML
125 INJECTION, SOLUTION INTRAVENOUS CONTINUOUS
Status: DISCONTINUED | OUTPATIENT
Start: 2019-03-27 | End: 2019-03-27 | Stop reason: HOSPADM

## 2019-03-27 RX ORDER — ALBUTEROL SULFATE 0.83 MG/ML
SOLUTION RESPIRATORY (INHALATION)
Status: COMPLETED
Start: 2019-03-27 | End: 2019-03-27

## 2019-03-27 RX ORDER — DIPHENHYDRAMINE HYDROCHLORIDE 50 MG/ML
12.5 INJECTION INTRAMUSCULAR; INTRAVENOUS
Status: DISCONTINUED | OUTPATIENT
Start: 2019-03-27 | End: 2019-03-27 | Stop reason: HOSPADM

## 2019-03-27 RX ORDER — SODIUM CHLORIDE, SODIUM LACTATE, POTASSIUM CHLORIDE, CALCIUM CHLORIDE 600; 310; 30; 20 MG/100ML; MG/100ML; MG/100ML; MG/100ML
INJECTION, SOLUTION INTRAVENOUS CONTINUOUS
Status: DISCONTINUED | OUTPATIENT
Start: 2019-03-27 | End: 2019-03-27 | Stop reason: HOSPADM

## 2019-03-27 RX ORDER — ROCURONIUM BROMIDE 10 MG/ML
INJECTION, SOLUTION INTRAVENOUS
Status: DISCONTINUED | OUTPATIENT
Start: 2019-03-27 | End: 2019-03-27

## 2019-03-27 RX ORDER — CEPHALEXIN 500 MG/1
500 CAPSULE ORAL EVERY 12 HOURS
Qty: 10 CAPSULE | Refills: 0 | Status: SHIPPED | OUTPATIENT
Start: 2019-03-27 | End: 2019-04-01

## 2019-03-27 RX ORDER — MEPERIDINE HYDROCHLORIDE 50 MG/ML
INJECTION INTRAMUSCULAR; INTRAVENOUS; SUBCUTANEOUS
Status: DISCONTINUED | OUTPATIENT
Start: 2019-03-27 | End: 2019-03-27

## 2019-03-27 RX ORDER — LIDOCAINE HYDROCHLORIDE 20 MG/ML
JELLY TOPICAL
Status: DISCONTINUED | OUTPATIENT
Start: 2019-03-27 | End: 2019-03-27 | Stop reason: HOSPADM

## 2019-03-27 RX ORDER — ONDANSETRON 2 MG/ML
4 INJECTION INTRAMUSCULAR; INTRAVENOUS DAILY PRN
Status: DISCONTINUED | OUTPATIENT
Start: 2019-03-27 | End: 2019-03-27 | Stop reason: HOSPADM

## 2019-03-27 RX ORDER — PROPOFOL 10 MG/ML
VIAL (ML) INTRAVENOUS
Status: DISCONTINUED | OUTPATIENT
Start: 2019-03-27 | End: 2019-03-27

## 2019-03-27 RX ORDER — CEFAZOLIN SODIUM 2 G/50ML
2 SOLUTION INTRAVENOUS
Status: COMPLETED | OUTPATIENT
Start: 2019-03-27 | End: 2019-03-27

## 2019-03-27 RX ORDER — SODIUM CHLORIDE 9 MG/ML
INJECTION, SOLUTION INTRAVENOUS CONTINUOUS PRN
Status: DISCONTINUED | OUTPATIENT
Start: 2019-03-27 | End: 2019-03-27

## 2019-03-27 RX ORDER — FAMOTIDINE 10 MG/ML
20 INJECTION INTRAVENOUS ONCE
Status: COMPLETED | OUTPATIENT
Start: 2019-03-27 | End: 2019-03-27

## 2019-03-27 RX ORDER — TRAMADOL HYDROCHLORIDE AND ACETAMINOPHEN 37.5; 325 MG/1; MG/1
1 TABLET, FILM COATED ORAL EVERY 6 HOURS PRN
Qty: 10 TABLET | Refills: 0 | Status: SHIPPED | OUTPATIENT
Start: 2019-03-27 | End: 2019-05-10 | Stop reason: ALTCHOICE

## 2019-03-27 RX ORDER — ONDANSETRON 2 MG/ML
INJECTION INTRAMUSCULAR; INTRAVENOUS
Status: DISCONTINUED | OUTPATIENT
Start: 2019-03-27 | End: 2019-03-27

## 2019-03-27 RX ORDER — LIDOCAINE HYDROCHLORIDE 10 MG/ML
1 INJECTION, SOLUTION EPIDURAL; INFILTRATION; INTRACAUDAL; PERINEURAL ONCE
Status: DISCONTINUED | OUTPATIENT
Start: 2019-03-27 | End: 2019-03-27 | Stop reason: HOSPADM

## 2019-03-27 RX ORDER — MORPHINE SULFATE 4 MG/ML
2 INJECTION, SOLUTION INTRAMUSCULAR; INTRAVENOUS EVERY 5 MIN PRN
Status: DISCONTINUED | OUTPATIENT
Start: 2019-03-27 | End: 2019-03-27 | Stop reason: HOSPADM

## 2019-03-27 RX ORDER — ALBUTEROL SULFATE 0.83 MG/ML
2.5 SOLUTION RESPIRATORY (INHALATION) ONCE
Status: COMPLETED | OUTPATIENT
Start: 2019-03-27 | End: 2019-03-27

## 2019-03-27 RX ADMIN — ONDANSETRON 4 MG: 2 INJECTION INTRAMUSCULAR; INTRAVENOUS at 09:03

## 2019-03-27 RX ADMIN — SUGAMMADEX 200 MG: 100 INJECTION, SOLUTION INTRAVENOUS at 10:03

## 2019-03-27 RX ADMIN — CEFAZOLIN SODIUM 2 G: 2 SOLUTION INTRAVENOUS at 09:03

## 2019-03-27 RX ADMIN — FAMOTIDINE 20 MG: 10 INJECTION INTRAVENOUS at 08:03

## 2019-03-27 RX ADMIN — ROCURONIUM BROMIDE 30 MG: 10 INJECTION, SOLUTION INTRAVENOUS at 09:03

## 2019-03-27 RX ADMIN — PROPOFOL 200 MG: 10 INJECTION, EMULSION INTRAVENOUS at 09:03

## 2019-03-27 RX ADMIN — MEPERIDINE HYDROCHLORIDE 25 MG: 50 INJECTION INTRAMUSCULAR; INTRAVENOUS; SUBCUTANEOUS at 09:03

## 2019-03-27 RX ADMIN — ALBUTEROL SULFATE 2.5 MG: 0.83 SOLUTION RESPIRATORY (INHALATION) at 10:03

## 2019-03-27 RX ADMIN — ALBUTEROL SULFATE 2.5 MG: 2.5 SOLUTION RESPIRATORY (INHALATION) at 10:03

## 2019-03-27 RX ADMIN — MIDAZOLAM HYDROCHLORIDE 2 MG: 1 INJECTION, SOLUTION INTRAMUSCULAR; INTRAVENOUS at 09:03

## 2019-03-27 RX ADMIN — SUCCINYLCHOLINE CHLORIDE 150 MG: 20 INJECTION, SOLUTION INTRAMUSCULAR; INTRAVENOUS at 09:03

## 2019-03-27 RX ADMIN — SODIUM CHLORIDE: 0.9 INJECTION, SOLUTION INTRAVENOUS at 09:03

## 2019-03-27 NOTE — PLAN OF CARE
Patient came into recovery awake. After a few minutes he stated he felt like he could not breath. Sats 96 on 2L. Dr Davila notified and at bedside. Breathing treatment ordered and given. Patient states it has gotten a little better but he still feels like there is something in his chest he can not cough up. Monitoring.

## 2019-03-27 NOTE — DISCHARGE INSTRUCTIONS
Cystoscopy    Cystoscopy is a procedure that lets your doctor look directly inside your urethra and bladder. It can be used to:  · Help diagnose a problem with your urethra, bladder, or kidneys.  · Take a sample (biopsy) of bladder or urethral tissue.  · Treat certain problems (such as removing kidney stones).  · Place a stent to bypass an obstruction.  · Take special X-rays of the kidneys.  Based on the findings, your doctor may recommend other tests or treatments.  What is a cystoscope?  A cystoscope is a telescope-like instrument that contains lenses and fiberoptics (small glass wires that make bright light). The cystoscope may be straight and rigid, or flexible to bend around curves in the urethra. The doctor may look directly into the cystoscope, or project the image onto a monitor.  Getting ready  · Ask your doctor if you should stop taking any medicines before the procedure.  · Ask whether you should avoid eating or drinking anything after midnight before the procedure.  · Follow any other instructions your doctor gives you.  Tell your doctor before the exam if you:  · Take any medicines, such as aspirin or blood thinners  · Have allergies to any medicines  · Are pregnant   The procedure  Cystoscopy is done in the doctors office, surgery center, or hospital. The doctor and a nurse are present during the procedure. It takes only a few minutes, longer if a biopsy, X-ray, or treatment needs to be done.  During the procedure:  · You lie on an exam table on your back, knees bent and legs apart. You are covered with a drape.  · Your urethra and the area around it are washed. Anesthetic jelly may be applied to numb the urethra. Other pain medicine is usually not needed. In some cases, you may be offered a mild sedative to help you relax. If a more extensive procedure is to be done, such as a biopsy or kidney stone removal, general anesthesia may be needed.  · The cystoscope is inserted. A sterile fluid is put  into the bladder to expand it. You may feel pressure from this fluid.  · When the procedure is done, the cystoscope is removed.  After the procedure  If you had a sedative, general anesthesia, or spinal anesthesia, you must have someone drive you home. Once youre home:  · Drink plenty of fluids.  · You may have burning or light bleeding when you urinate--this is normal.  · Medicines may be prescribed to ease any discomfort or prevent infection. Take these as directed.  · Call your doctor if you have heavy bleeding or blood clots, burning that lasts more than a day, a fever over 100°F  (38° C), or trouble urinating.  Date Last Reviewed: 1/1/2017 © 2000-2017 [employer]. [address] [place] [address], [place] [address]. All rights reserved. This information is not intended as a substitute for professional medical care. Always follow your healthcare professional's instructions.        Discharge Instructions: After Your Surgery  Youve just had surgery. During surgery, you were given medicine called anesthesia to keep you relaxed and free of pain. After surgery, you may have some pain or nausea. This is common. Here are some tips for feeling better and getting well after surgery.     Stay on schedule with your medicine.   Going home  Your healthcare provider will show you how to take care of yourself when you go home. He or she will also answer your questions. Have an adult family member or friend drive you home. For the first 24 hours after your surgery:  · Do not drive or use heavy equipment.  · Do not make important decisions or sign legal papers.  · Do not drink alcohol.  · Have someone stay with you, if needed. He or she can watch for problems and help keep you safe.  Be sure to go to all follow-up visits with your healthcare provider. And rest after your surgery for as long as your healthcare provider tells you to.  Coping with pain  If you have pain after surgery, pain medicine will help you feel better.  Take it as told, before pain becomes severe. Also, ask your healthcare provider or pharmacist about other ways to control pain. This might be with heat, ice, or relaxation. And follow any other instructions your surgeon or nurse gives you.  Tips for taking pain medicine  To get the best relief possible, remember these points:  · Pain medicines can upset your stomach. Taking them with a little food may help.  · Most pain relievers taken by mouth need at least 20 to 30 minutes to start to work.  · Taking medicine on a schedule can help you remember to take it. Try to time your medicine so that you can take it before starting an activity. This might be before you get dressed, go for a walk, or sit down for dinner.  · Constipation is a common side effect of pain medicines. Call your healthcare provider before taking any medicines such as laxatives or stool softeners to help ease constipation. Also ask if you should skip any foods. Drinking lots of fluids and eating foods such as fruits and vegetables that are high in fiber can also help. Remember, do not take laxatives unless your surgeon has prescribed them.  · Drinking alcohol and taking pain medicine can cause dizziness and slow your breathing. It can even be deadly. Do not drink alcohol while taking pain medicine.  · Pain medicine can make you react more slowly to things. Do not drive or run machinery while taking pain medicine.  Your healthcare provider may tell you to take acetaminophen to help ease your pain. Ask him or her how much you are supposed to take each day. Acetaminophen or other pain relievers may interact with your prescription medicines or other over-the-counter (OTC) medicines. Some prescription medicines have acetaminophen and other ingredients. Using both prescription and OTC acetaminophen for pain can cause you to overdose. Read the labels on your OTC medicines with care. This will help you to clearly know the list of ingredients, how much to  take, and any warnings. It may also help you not take too much acetaminophen. If you have questions or do not understand the information, ask your pharmacist or healthcare provider to explain it to you before you take the OTC medicine.  Managing nausea  Some people have an upset stomach after surgery. This is often because of anesthesia, pain, or pain medicine, or the stress of surgery. These tips will help you handle nausea and eat healthy foods as you get better. If you were on a special food plan before surgery, ask your healthcare provider if you should follow it while you get better. These tips may help:  · Do not push yourself to eat. Your body will tell you when to eat and how much.  · Start off with clear liquids and soup. They are easier to digest.  · Next try semi-solid foods, such as mashed potatoes, applesauce, and gelatin, as you feel ready.  · Slowly move to solid foods. Dont eat fatty, rich, or spicy foods at first.  · Do not force yourself to have 3 large meals a day. Instead eat smaller amounts more often.  · Take pain medicines with a small amount of solid food, such as crackers or toast, to avoid nausea.     Call your surgeon if  · You still have pain an hour after taking medicine. The medicine may not be strong enough.  · You feel too sleepy, dizzy, or groggy. The medicine may be too strong.  · You have side effects like nausea, vomiting, or skin changes, such as rash, itching, or hives.       If you have obstructive sleep apnea  You were given anesthesia medicine during surgery to keep you comfortable and free of pain. After surgery, you may have more apnea spells because of this medicine and other medicines you were given. The spells may last longer than usual.   At home:  · Keep using the continuous positive airway pressure (CPAP) device when you sleep. Unless your healthcare provider tells you not to, use it when you sleep, day or night. CPAP is a common device used to treat obstructive  sleep apnea.  · Talk with your provider before taking any pain medicine, muscle relaxants, or sedatives. Your provider will tell you about the possible dangers of taking these medicines.  Date Last Reviewed: 12/1/2016  © 4160-1798 [employer]. [address] [place] [address]. All rights reserved. This information is not intended as a substitute for professional medical care. Always follow your healthcare professional's instructions.

## 2019-03-27 NOTE — OP NOTE
TRANSRECTAL PROSTATIC ULTRASOUND, TRANSRECTAL PROSTATE BIOPSY  REPORT    DATE:  3/27/2019  NAME:Anthony Sheldon  : 5/3/1934  Diagnosis: Elevated PSA  Current PSA: 23.3  EBL: Minimal                          TRANSRECTAL ULTRASOUND  Measurements:   Height:  44 mm  Width:  54 mm  Length:  49 mm  Volume: 62.5 cm3    Seminal vesicles/Ejaculatory Ducts: Normal  Outline/Symmetry of Prostate: WNL  Central Gland/Transition Zone: Not well demarcated  Peripheral Zone: Hypoechoic  Bladder: Normal  MedianLobe: Enlarged    Documentation images were taken.  It was confirmed that the patient took the antibiotic this morning. A total of 12 biopsies were taken with ultrasound guidance, using a spring loaded needle device. The biopsies were at the base, mid-portion, apex and lateral areas of each lobe. Minimal rectal bleeding was observed. The patient tolerated the procedure well. He is to keep taking the prescribed antibiotics until finished. Post biopsy instructions were given and he is to follow up in one week to discuss the pathologist report.        CYSTOSCOPY REPORT    Surgery Date:  3/27/2019  Surgeon(s) and Role:     * Niall Blas MD - Primary      Anthony Sheldon  : 5/3/1934    Pre Procedure Diagnosis:Elevated PSA.  OPERATION: CYSTOSCOPY     ANESTHESIA: 10 cc 2% lidocaine jelly applied per urethra. General      PROCEDURE:  The patient was taken to the cystoscopy suite and placed in supine position.  The genitalia was prepped and draped  in the usual sterile fashion.  Two percent lidocaine jelly was inserted in the urethra and held in place with a penile clamp.  After sufficent time had passed to allow good local anesthesia, the cystoscope was inserted in the urethra and passed into the bladder visualizing the urethra along its entire course.  The dome, anterior, posterior and lateral walls were examined systematically.  The ureteral orifices were in their usual position and configuration.  The cystoscope  was then brought to the level of the bladder neck, the water was turned on and the prostate was visualized.  The cystoscope was removed and the patient was instructed to urinate prior to leaving the office.  SPECIMEN:none    FINDINGS:  URETHRA: open with no strictures  PROSTATE: 5 cm with partial MEDRANO   TRABEC: grade 3  BLADDER: no lesions or stones seen.  URETERAL ORIFICES : NSSP clear efflux   OTHER FINDINGS: none    Procedure medication: Lidocaine gel in the urethra  Post Procedure Diagnosis: Elevated PSA     ASSESSMENT/PLAN:  Status post  cystoscopy.  1. Push fluids for 24 hours.  2. May see blood in the urine, this should gradually improve over the next 2-3 days.  3. The patient was instructed to return to the office or go to the emergency should fever, chills, cloudy urine, or inability to urinate develop.  4.  PLAN: discuss pathology report in 1 week  5. Follow up in 1 week

## 2019-03-27 NOTE — PLAN OF CARE
Patient as been coughing up mucus. States his chest is feeling a lot better. Vitals and O2 Sats are stable.

## 2019-03-27 NOTE — PLAN OF CARE
Patient awake and alert. VSS. Patient says chest feels completely better. Drinking fluids. Tolerating well. Discharge teaching complete. All questions answered and necessary handouts provided. Doctor spoke with patient and family.

## 2019-03-27 NOTE — ANESTHESIA POSTPROCEDURE EVALUATION
Anesthesia Post Evaluation    Patient: Anthony Sheldon    Procedure(s) Performed: Procedure(s) (LRB):  BIOPSY, WITH TRANSRECTAL US GUIDANCE (Bilateral)  CYSTOSCOPY (N/A)    Final Anesthesia Type: general  Patient location during evaluation: PACU  Patient participation: Yes- Able to Participate  Level of consciousness: awake and alert  Post-procedure vital signs: reviewed and stable  Pain management: adequate  Airway patency: patent  PONV status at discharge: No PONV  Anesthetic complications: no      Cardiovascular status: blood pressure returned to baseline  Respiratory status: unassisted  Hydration status: euvolemic  Follow-up not needed.          Vitals Value Taken Time   /71 3/27/2019 11:33 AM   Temp 36.7 °C (98 °F) 3/27/2019 10:35 AM   Pulse 67 3/27/2019 11:38 AM   Resp 14 3/27/2019 11:38 AM   SpO2 97 % 3/27/2019 11:38 AM   Vitals shown include unvalidated device data.      Event Time     Out of Recovery 11:35:37          Pain/David Score: David Score: 10 (3/27/2019 11:30 AM)

## 2019-03-27 NOTE — BRIEF OP NOTE
Brief Operative Note     Surgery Date: 3/27/2019    Surgeon:   Niall Blas MD     Pre-op Diagnosis:  PSA elevation [R97.20]  PSA elevation [R97.20]    Post-op Diagnosis:  Same    Procedure:  Procedure(s) (LRB):  BIOPSY, WITH TRANSRECTAL US GUIDANCE (Bilateral)  CYSTOSCOPY (N/A)    Anesthesia: General    Findings/Key Components:  Prostate volume 62.5 ml    Estimated Blood Loss: Minimal                                                                                                                           Discharge Summary    Admit Date: 3/27/2019     Attending Physician: Niall Blas MD     Discharge Physician: Niall Blas MD      Discharge Date: 3/27/2019    Treatments/Procedures: Procedure(s) (LRB):  BIOPSY, WITH TRANSRECTAL US GUIDANCE (Bilateral)  CYSTOSCOPY (N/A)  Final Diagnosis:Elevated PSA  Hospital Course: TRUS+BX+Cystoscopy done as planned.  MAYO Blas 1 week    Disposition: Home or Self Care     Patient Instructions:     Current Discharge Medication List:         Anthony Sheldon   Home Medication Instructions CADE:[id number]    Printed on:03/27/19 1024   Medication Information                      cephALEXin (KEFLEX) 500 MG capsule  Take 1 capsule (500 mg total) by mouth every 12 (twelve) hours. for 10 doses             multivitamin (ONE DAILY MULTIVITAMIN) per tablet  Take 1 tablet by mouth once daily.             tramadol-acetaminophen 37.5-325 mg (ULTRACET) 37.5-325 mg Tab  Take 1 tablet by mouth every 6 (six) hours as needed.             zolpidem (AMBIEN) 5 MG Tab  Take 1 tablet (5 mg total) by mouth nightly as needed.                     Current Discharge Medication List      START taking these medications    Details   cephALEXin (KEFLEX) 500 MG capsule Take 1 capsule (500 mg total) by mouth every 12 (twelve) hours. for 10 doses  Qty: 10 capsule, Refills: 0      tramadol-acetaminophen 37.5-325 mg (ULTRACET) 37.5-325 mg Tab Take 1 tablet by mouth every 6 (six)  hours as needed.  Qty: 10 tablet, Refills: 0         CONTINUE these medications which have NOT CHANGED    Details   multivitamin (ONE DAILY MULTIVITAMIN) per tablet Take 1 tablet by mouth once daily.      zolpidem (AMBIEN) 5 MG Tab Take 1 tablet (5 mg total) by mouth nightly as needed.  Qty: 30 tablet, Refills: 3    Associated Diagnoses: Sleep disturbance             Discharge Procedure Orders:    Discharge Procedure Orders   Diet Adult Regular     Activity as tolerated

## 2019-03-27 NOTE — TRANSFER OF CARE
"Anesthesia Transfer of Care Note    Patient: Anthony Sheldon    Procedure(s) Performed: Procedure(s) (LRB):  BIOPSY, WITH TRANSRECTAL US GUIDANCE (Bilateral)  CYSTOSCOPY (N/A)    Patient location: PACU    Anesthesia Type: general    Transport from OR: Transported from OR on room air with adequate spontaneous ventilation    Post pain: adequate analgesia    Post assessment: no apparent anesthetic complications and tolerated procedure well    Post vital signs: stable    Level of consciousness: sedated and responds to stimulation    Nausea/Vomiting: no nausea/vomiting    Complications: none    Transfer of care protocol was followed      Last vitals:   Visit Vitals  BP (!) 151/70 (BP Location: Right arm, Patient Position: Lying)   Pulse 65   Temp 36.9 °C (98.5 °F) (Oral)   Resp 19   Ht 5' 9" (1.753 m)   Wt 106.6 kg (235 lb)   BMI 34.70 kg/m²     "

## 2019-03-27 NOTE — H&P
CHIEF COMPLAINT:  Elevation of the PSA.     Mr. Sheldon is an 84-year-old male who a year ago, his PSA was 17.9 and a  recent PSA on 12/28/2018 came to 23.3.  The patient has been referred by  Dr. Melgar for further evaluation and treatment.  The patient referred to  have nocturia x1.  No dysuria or hematuria.  The flow is adequate and he  feels that he empties the bladder satisfactory.     FAMILY HISTORY:  Negative for prostate cancer.     The past medical and surgical history and the current medications and the  allergies are well documented in the medical record and reviewed by me  during this visit.  His previous genitourinary history is completely  negative.     The urinalysis today is also negative.     I explained to Mr. Sheldon that these were the options that we have in  evaluating his elevation of the PSA and after lengthy discussion with him  of the pros and cons of each approach.  He elected to undergo a transrectal  prostate ultrasound and biopsy due to the high suspicious of carcinoma in  his prostate.  He would like to know and may be treated very conservatively  if the diagnosis of cancer can be made.  He was present with his wife and  we discussed, all three of us and they agreed that this is the way they  want to proceed.    Review of Systems   Constitutional: Negative for activity change and appetite change.   HENT: Negative.    Eyes: Negative for discharge.   Respiratory: Negative for cough and shortness of breath.    Cardiovascular: Negative for chest pain and palpitations.   Gastrointestinal: Negative for abdominal distention, abdominal pain, constipation and vomiting.   Genitourinary: Negative for discharge, dysuria, flank pain, frequency, hematuria, testicular pain and urgency.   Musculoskeletal: Positive for arthralgias and back pain.   Skin: Negative for rash.   Neurological: Negative for dizziness.   Psychiatric/Behavioral: The patient is not nervous/anxious.        Objective:   Physical  Exam   Constitutional: He appears well-developed and well-nourished.   HENT:   Head: Normocephalic.   Eyes: Pupils are equal, round, and reactive to light.   Neck: Normal range of motion.   Cardiovascular: Normal rate.    Pulmonary/Chest: Effort normal.   Abdominal: Soft. He exhibits no distension and no mass. There is no tenderness.   Genitourinary: Rectum normal and penis normal. Rectal exam shows no external hemorrhoid, no mass and no tenderness. Prostate is not enlarged and not tender. Right testis shows no mass and no tenderness. Left testis shows no mass and no tenderness. No discharge found.       Musculoskeletal: Normal range of motion.   Neurological: He is alert.   Skin: Skin is warm.     Psychiatric: He has a normal mood and affect.       Assessment:       1. PSA elevation        Plan:       PSA elevation  TRUS+Bx+cystoscopy

## 2019-04-04 ENCOUNTER — PATIENT MESSAGE (OUTPATIENT)
Dept: UROLOGY | Facility: CLINIC | Age: 84
End: 2019-04-04

## 2019-04-08 ENCOUNTER — PATIENT MESSAGE (OUTPATIENT)
Dept: PAIN MEDICINE | Facility: CLINIC | Age: 84
End: 2019-04-08

## 2019-05-10 ENCOUNTER — OFFICE VISIT (OUTPATIENT)
Dept: UROLOGY | Facility: CLINIC | Age: 84
End: 2019-05-10
Payer: MEDICARE

## 2019-05-10 VITALS
RESPIRATION RATE: 16 BRPM | TEMPERATURE: 98 F | BODY MASS INDEX: 34.8 KG/M2 | WEIGHT: 235 LBS | SYSTOLIC BLOOD PRESSURE: 152 MMHG | DIASTOLIC BLOOD PRESSURE: 91 MMHG | HEIGHT: 69 IN | HEART RATE: 77 BPM

## 2019-05-10 DIAGNOSIS — C61 PROSTATE CANCER: Primary | ICD-10-CM

## 2019-05-10 DIAGNOSIS — D49.59 NEOPLASM OF PROSTATE: ICD-10-CM

## 2019-05-10 LAB
BILIRUB SERPL-MCNC: NEGATIVE MG/DL
BLOOD URINE, POC: NEGATIVE
COLOR, POC UA: NORMAL
GLUCOSE UR QL STRIP: NEGATIVE
KETONES UR QL STRIP: NEGATIVE
LEUKOCYTE ESTERASE URINE, POC: NEGATIVE
NITRITE, POC UA: NEGATIVE
PH, POC UA: 5
PROTEIN, POC: NEGATIVE
SPECIFIC GRAVITY, POC UA: 1015
UROBILINOGEN, POC UA: NEGATIVE

## 2019-05-10 PROCEDURE — 99214 PR OFFICE/OUTPT VISIT, EST, LEVL IV, 30-39 MIN: ICD-10-PCS | Mod: 25,S$GLB,, | Performed by: UROLOGY

## 2019-05-10 PROCEDURE — 81002 POCT URINE DIPSTICK WITHOUT MICROSCOPE: ICD-10-PCS | Mod: S$GLB,,, | Performed by: UROLOGY

## 2019-05-10 PROCEDURE — 1101F PT FALLS ASSESS-DOCD LE1/YR: CPT | Mod: CPTII,S$GLB,, | Performed by: UROLOGY

## 2019-05-10 PROCEDURE — 99214 OFFICE O/P EST MOD 30 MIN: CPT | Mod: 25,S$GLB,, | Performed by: UROLOGY

## 2019-05-10 PROCEDURE — 99999 PR PBB SHADOW E&M-EST. PATIENT-LVL III: CPT | Mod: PBBFAC,,, | Performed by: UROLOGY

## 2019-05-10 PROCEDURE — 99999 PR PBB SHADOW E&M-EST. PATIENT-LVL III: ICD-10-PCS | Mod: PBBFAC,,, | Performed by: UROLOGY

## 2019-05-10 PROCEDURE — 1101F PR PT FALLS ASSESS DOC 0-1 FALLS W/OUT INJ PAST YR: ICD-10-PCS | Mod: CPTII,S$GLB,, | Performed by: UROLOGY

## 2019-05-10 PROCEDURE — 81002 URINALYSIS NONAUTO W/O SCOPE: CPT | Mod: S$GLB,,, | Performed by: UROLOGY

## 2019-05-10 NOTE — LETTER
May 10, 2019      Perfecto Melgar III, MD  [address] [place] [address]           [facility]  [address] [place] [address]  Phone: [phone]  Fax: [phone]          Patient: Anthony Sheldon   MR Number: [id number]   YOB: 1934   Date of Visit: 5/10/2019       Dear Dr. Perfecto Melgar III:    Thank you for referring Anthony Sheldon to me for evaluation. Attached you will find relevant portions of my assessment and plan of care.    If you have questions, please do not hesitate to call me. I look forward to following Anthony Sheldon along with you.    Sincerely,    Niall Blas MD    Enclosure  CC:  No Recipients    If you would like to receive this communication electronically, please contact [email or web address] or [phone] to request more information on [email or web address] access.    For providers and/or their staff who would like to refer a patient to [facility], please contact us through our one-stop-shop provider referral line, [facility], at [phone].    If you feel you have received this communication in error or would no longer like to receive these types of communications, please e-mail [email or web address]

## 2019-05-10 NOTE — PROGRESS NOTES
CHIEF COMPLAINT:  Prostate cancer.    Mr. Sheldon was found to have a PSA of 23.3 and underwent evaluation for  possible prostate cancer and he has been found with extensive prostate CA  with positive cancer in all the twelve biopsies taken with [place] score 9,  5+4.  The patient was informed of these findings.  Despite his advanced age  of 85, I think he will need treatment for this condition due to the  aggressiveness of the neoplasm and the extensive nature of this condition.   After a lengthy discussion, we agreed that we are going to proceed with a  metastatic workup and he will be initially given an LHRH agonist as a  treatment in the form of an injection every six months.  The rationale, the  risks, and complications of this treatment have been fully discussed with  the patient and he agreed to proceed.    The patient was informed of the grading and staging of the tumor and he  seems to understand.  He was present with his wife and all the questions  were answered at their satisfaction.    I am also going to consider the possibility of giving him oral chemotherapy  and this will be in a consultation with Dr. Dwyer later on.  The patient is  going to schedule the CT scan and bone scan and have a followup appointment  and at that time, we plan to give him the LHRH agonist.  We are going to  proceed with a consultation with Dr. Dwyer at that point.    I spent approximately 40 minutes with Mr. Sheldon and all the time was spent  on counseling.        EOR/HN dd: 05/10/2019 10:09:12 (CDT)   td: 05/10/2019 21:23:00 (CDT)  Doc ID #[id number]   Job ID #[id number]    CC: Perfecto Melgar III, MD

## 2019-05-14 ENCOUNTER — HOSPITAL ENCOUNTER (OUTPATIENT)
Dept: RADIOLOGY | Facility: HOSPITAL | Age: 84
Discharge: HOME | End: 2019-05-14
Attending: UROLOGY
Payer: MEDICARE

## 2019-05-14 DIAGNOSIS — D49.59 NEOPLASM OF PROSTATE: ICD-10-CM

## 2019-05-14 PROCEDURE — 78306 NM BONE SCAN WHOLE BODY: ICD-10-PCS | Mod: 26,,, | Performed by: RADIOLOGY

## 2019-05-14 PROCEDURE — 74176 CT ABD & PELVIS W/O CONTRAST: CPT | Mod: TC

## 2019-05-14 PROCEDURE — 74176 CT ABD & PELVIS W/O CONTRAST: CPT | Mod: 26,,, | Performed by: RADIOLOGY

## 2019-05-14 PROCEDURE — 74176 CT ABDOMEN PELVIS WITHOUT CONTRAST: ICD-10-PCS | Mod: 26,,, | Performed by: RADIOLOGY

## 2019-05-14 PROCEDURE — 25500020 PHARM REV CODE 255: Performed by: UROLOGY

## 2019-05-14 PROCEDURE — A9503 TC99M MEDRONATE: HCPCS

## 2019-05-14 PROCEDURE — 78306 BONE IMAGING WHOLE BODY: CPT | Mod: 26,,, | Performed by: RADIOLOGY

## 2019-05-14 RX ADMIN — IOHEXOL 30 ML: 350 INJECTION, SOLUTION INTRAVENOUS at 09:05

## 2019-05-17 ENCOUNTER — OFFICE VISIT (OUTPATIENT)
Dept: UROLOGY | Facility: CLINIC | Age: 84
End: 2019-05-17
Payer: MEDICARE

## 2019-05-17 VITALS
RESPIRATION RATE: 16 BRPM | HEART RATE: 66 BPM | WEIGHT: 237 LBS | TEMPERATURE: 98 F | DIASTOLIC BLOOD PRESSURE: 85 MMHG | SYSTOLIC BLOOD PRESSURE: 160 MMHG | HEIGHT: 69 IN | BODY MASS INDEX: 35.1 KG/M2

## 2019-05-17 DIAGNOSIS — C61 PROSTATE CANCER: ICD-10-CM

## 2019-05-17 PROCEDURE — 99214 OFFICE O/P EST MOD 30 MIN: CPT | Mod: S$GLB,,, | Performed by: UROLOGY

## 2019-05-17 PROCEDURE — 99214 PR OFFICE/OUTPT VISIT, EST, LEVL IV, 30-39 MIN: ICD-10-PCS | Mod: S$GLB,,, | Performed by: UROLOGY

## 2019-05-17 PROCEDURE — 1101F PT FALLS ASSESS-DOCD LE1/YR: CPT | Mod: CPTII,S$GLB,, | Performed by: UROLOGY

## 2019-05-17 PROCEDURE — 99999 PR PBB SHADOW E&M-EST. PATIENT-LVL III: CPT | Mod: PBBFAC,,, | Performed by: UROLOGY

## 2019-05-17 PROCEDURE — 1101F PR PT FALLS ASSESS DOC 0-1 FALLS W/OUT INJ PAST YR: ICD-10-PCS | Mod: CPTII,S$GLB,, | Performed by: UROLOGY

## 2019-05-17 PROCEDURE — 99999 PR PBB SHADOW E&M-EST. PATIENT-LVL III: ICD-10-PCS | Mod: PBBFAC,,, | Performed by: UROLOGY

## 2019-05-17 NOTE — PROGRESS NOTES
OFFICE NOTE    DATE OF VISIT:  05/17/2019.    CHIEF COMPLAINT:  Prostate cancer.    HISTORY OF PRESENT ILLNESS:  Mr. Sheldon is an 85-year-old male who was  found to have an elevated PSA of 23.3.  The biopsies have shown poorly  differentiated prostate CA with a Whitt score 9.  The patient underwent a  bone scan and CT scan.  The bone scan failed to show evidence of metastatic  disease to the bones.  The abdominal CT scan shows no evidence of  metastases, but the left colon is very thickened and suspicious of possible  lesion for what has been recommended to undergo a colonoscopy and an  appointment with Dr. Jesus has been given in order to proceed with that  testing.    Today, I had a lengthy discussion with him and his wife regarding his  condition and I explained to them the staging and grading of prostate CA  and the different options for treatment.  At his age, there is no doubt  that the best treatment will be LHRH agonist therapy and possible  chemotherapy with Xtandi.  The patient understood the rationale, risks and  possible complications of this therapy.  I suggested that we proceed  scheduling him with LHRH agonist injection and also an appointment with Dr. Dwyer for her to see if he feels that he would benefit from additional  chemotherapy.  All the questions were answered to their satisfaction and  they left the office in satisfactory condition.  I spent approximately 30  minutes with Mr. Sheldon and all the time was spent on counseling.    PLAN:  Colonoscopy by Dr. Jesus, consultation with Oncology Dr. Dwyer and  start on LHRH agonist therapy.        EOR/IN dd: 05/17/2019 09:59:53 (CDT)   td: 05/17/2019 19:18:17 (CDT)  Doc ID #[id number]   Job ID #[id number]    CC:

## 2019-05-22 ENCOUNTER — OFFICE VISIT (OUTPATIENT)
Dept: GASTROENTEROLOGY | Facility: CLINIC | Age: 84
End: 2019-05-22
Payer: MEDICARE

## 2019-05-22 VITALS
DIASTOLIC BLOOD PRESSURE: 84 MMHG | OXYGEN SATURATION: 98 % | WEIGHT: 240 LBS | HEART RATE: 65 BPM | HEIGHT: 69 IN | BODY MASS INDEX: 35.55 KG/M2 | SYSTOLIC BLOOD PRESSURE: 169 MMHG

## 2019-05-22 DIAGNOSIS — Z12.11 ENCOUNTER FOR SCREENING COLONOSCOPY: Primary | ICD-10-CM

## 2019-05-22 PROCEDURE — 99999 PR PBB SHADOW E&M-EST. PATIENT-LVL III: CPT | Mod: PBBFAC,,, | Performed by: INTERNAL MEDICINE

## 2019-05-22 PROCEDURE — 99999 PR PBB SHADOW E&M-EST. PATIENT-LVL III: ICD-10-PCS | Mod: PBBFAC,,, | Performed by: INTERNAL MEDICINE

## 2019-05-22 PROCEDURE — 99203 OFFICE O/P NEW LOW 30 MIN: CPT | Mod: S$GLB,,, | Performed by: INTERNAL MEDICINE

## 2019-05-22 PROCEDURE — 99203 PR OFFICE/OUTPT VISIT, NEW, LEVL III, 30-44 MIN: ICD-10-PCS | Mod: S$GLB,,, | Performed by: INTERNAL MEDICINE

## 2019-05-22 PROCEDURE — 1101F PT FALLS ASSESS-DOCD LE1/YR: CPT | Mod: CPTII,S$GLB,, | Performed by: INTERNAL MEDICINE

## 2019-05-22 PROCEDURE — 1101F PR PT FALLS ASSESS DOC 0-1 FALLS W/OUT INJ PAST YR: ICD-10-PCS | Mod: CPTII,S$GLB,, | Performed by: INTERNAL MEDICINE

## 2019-05-22 NOTE — PROGRESS NOTES
He does not want a colonoscopy.  He states that he is too healthy to have a colonoscopy.  He denies gastrointestinal symptoms.

## 2019-05-22 NOTE — PROGRESS NOTES
Subjective:       Patient ID: Anthony Sheldon is a 85 y.o. male.    Chief Complaint: Other (Discuss Richardson)    He is here for screening colonoscopy.  Years ago at the [facility] he had a colonoscopy and was told was negative.  His family history is negative for colon disease or cancer.  He is having daily bowel movements.  He denies gastrointestinal some symptoms.  He denies hematemesis hematochezia jaundice fever chills. He does not walk to have a colonoscopy.  He is physically active and works in his garden and use of the treadmill.      Allergies:  Review of patient's allergies indicates:  No Known Allergies    Medications:    Current Outpatient Medications:     multivitamin (ONE DAILY MULTIVITAMIN) per tablet, Take 1 tablet by mouth once daily., Disp: , Rfl:     zolpidem (AMBIEN) 5 MG Tab, Take 1 tablet (5 mg total) by mouth nightly as needed., Disp: 30 tablet, Rfl: 3    Past Medical History:   Diagnosis Date    Arthritis     Dental bridge present     LOWER PARTIAL    DVT (deep venous thrombosis) 2014    right le after thr    Wears glasses        Past Surgical History:   Procedure Laterality Date    ARTHROPLASTY-KNEE Left 2/22/2017    [facility] OR    BIOPSY, WITH TRANSRECTAL US GUIDANCE Bilateral 3/27/2019    [facility] at [facility] OR    CYSTOSCOPY N/A 3/27/2019    [facility] at [facility] OR    Injection, FACET JOINT INJECTION LEFT L4-5 AND L5-S1 Left 1/21/2019    [facility] at [facility] OR    JOINT REPLACEMENT      BILAT HIPS, RIGHT SHOULDER    KNEE ARTHROPLASTY Left     TONSILLECTOMY  1950    TOTAL HIP ARTHROPLASTY Bilateral 2013 & 2014    TOTAL SHOULDER ARTHROPLASTY Right 2005         Review of Systems   Constitutional: Negative for appetite change, fever and unexpected weight change.   HENT: Negative for trouble swallowing.         No jaundice.   Respiratory: Negative for cough, shortness of breath and  wheezing.         No Rales, Rhonchi, or Dyspnea.   Cardiovascular: Negative for chest pain.   Gastrointestinal: Negative for abdominal distention, abdominal pain, anal bleeding, blood in stool, constipation and diarrhea.   Genitourinary:        Current being treated for prostate cancer.  He denies  symptoms except for occasional nocturia   Musculoskeletal: Negative for back pain and neck pain.   Skin: Negative for pallor and rash.   Neurological: Negative for dizziness, seizures, syncope, speech difficulty, weakness and numbness.   Hematological: Negative for adenopathy.   Psychiatric/Behavioral: Negative for confusion.       Objective:      Physical Exam   Constitutional: He is oriented to person, place, and time. He appears well-developed and well-nourished.   HENT:   Head: Normocephalic.   Eyes: Pupils are equal, round, and reactive to light. EOM are normal.   Neck: Normal range of motion. Neck supple. No tracheal deviation present. No thyromegaly present.   Cardiovascular: Normal rate, regular rhythm and normal heart sounds.   Pulmonary/Chest: Effort normal and breath sounds normal.   Musculoskeletal: Normal range of motion.   Lymphadenopathy:     He has no cervical adenopathy.   Neurological: He is alert and oriented to person, place, and time. No cranial nerve deficit.   Skin: Skin is warm and dry.   Psychiatric: He has a normal mood and affect. His behavior is normal.         Plan:       Encounter for screening colonoscopy

## 2019-05-22 NOTE — PATIENT INSTRUCTIONS
He will continue his current therapy for his prostate cancer.  He continues his activities.  Colon symptoms he wants to use a FIT test rather than a colonoscopy at this point.  If the test is positive then I will call you and we could discuss further evaluation.  If is negative nothing further needs to be done.

## 2019-05-22 NOTE — LETTER
May 22, 2019      Niall Blas MD  [address] [place] [address]           [facility]  [place] [facility]  [address] [place] [address]  [place] [address]  Phone: [phone]  Fax: [phone]          Patient: Anthony Sheldon   MR Number: [id number]   YOB: 1934   Date of Visit: 5/22/2019       Dear Dr. Niall Blas:    Thank you for referring Anthony Sheldon to me for evaluation. Attached you will find relevant portions of my assessment and plan of care.    If you have questions, please do not hesitate to call me. I look forward to following Anthony Sheldon along with you.    Sincerely,    Royer Jesus MD    Enclosure  CC:  No Recipients    If you would like to receive this communication electronically, please contact [email or web address] or [phone] to request more information on [employer] Link access.    For providers and/or their staff who would like to refer a patient to [facility], please contact us through our one-stop-shop provider referral line, [facility] [place], at [phone].    If you feel you have received this communication in error or would no longer like to receive these types of communications, please e-mail [email or web address]

## 2019-05-22 NOTE — PROGRESS NOTES
Subjective:       Patient ID: Anthony Sheldon is a 85 y.o. male.    Chief Complaint: Other (Discuss Cranks)    HPI    Allergies:  Review of patient's allergies indicates:  No Known Allergies    Medications:    Current Outpatient Medications:     multivitamin (ONE DAILY MULTIVITAMIN) per tablet, Take 1 tablet by mouth once daily., Disp: , Rfl:     zolpidem (AMBIEN) 5 MG Tab, Take 1 tablet (5 mg total) by mouth nightly as needed., Disp: 30 tablet, Rfl: 3    Past Medical History:   Diagnosis Date    Arthritis     Dental bridge present     LOWER PARTIAL    DVT (deep venous thrombosis) 2014    right le after thr    Wears glasses        Past Surgical History:   Procedure Laterality Date    ARTHROPLASTY-KNEE Left 2/22/2017    [facility] OR    BIOPSY, WITH TRANSRECTAL US GUIDANCE Bilateral 3/27/2019    [facility] OR    CYSTOSCOPY N/A 3/27/2019    Per[facility] at [facility] OR    Injection, FACET JOINT INJECTION LEFT L4-5 AND L5-S1 Left 1/21/2019    Performed [facility] at [facility] OR    JOINT REPLACEMENT      BILAT HIPS, RIGHT SHOULDER    KNEE ARTHROPLASTY Left     TONSILLECTOMY  1950    TOTAL HIP ARTHROPLASTY Bilateral 2013 & 2014    TOTAL SHOULDER ARTHROPLASTY Right 2005         Review of Systems    Objective:      Physical Exam   Constitutional: He is oriented to person, place, and time. He appears well-developed and well-nourished.   Well-nourished well-hydrated overweight nonicteric white male   HENT:   Head: Normocephalic.   Eyes: Pupils are equal, round, and reactive to light. EOM are normal.   Neck: Normal range of motion. Neck supple. No tracheal deviation present. No thyromegaly present.   Cardiovascular: Normal rate, regular rhythm and normal heart sounds.   Pulmonary/Chest: Effort normal and breath sounds normal.   Abdominal: Soft. Bowel sounds are normal.   The abdomen is soft obese without organomegaly  tenderness or masses.  Bowel sounds are normal.   Musculoskeletal: Normal range of motion.   Lymphadenopathy:     He has no cervical adenopathy.   Neurological: He is alert and oriented to person, place, and time. No cranial nerve deficit.   Skin: Skin is warm and dry.   Psychiatric: He has a normal mood and affect. His behavior is normal.   Vitals reviewed.        Plan:       Encounter for screening colonoscopy

## 2019-05-24 ENCOUNTER — INFUSION (OUTPATIENT)
Dept: INFUSION THERAPY | Facility: HOSPITAL | Age: 84
End: 2019-05-24
Attending: UROLOGY
Payer: MEDICARE

## 2019-05-24 VITALS
TEMPERATURE: 98 F | DIASTOLIC BLOOD PRESSURE: 72 MMHG | HEART RATE: 72 BPM | SYSTOLIC BLOOD PRESSURE: 158 MMHG | RESPIRATION RATE: 18 BRPM | OXYGEN SATURATION: 99 %

## 2019-05-24 DIAGNOSIS — C61 PROSTATE CANCER: Primary | ICD-10-CM

## 2019-05-24 PROCEDURE — 63600175 PHARM REV CODE 636 W HCPCS: Mod: JG | Performed by: UROLOGY

## 2019-05-24 PROCEDURE — 96402 CHEMO HORMON ANTINEOPL SQ/IM: CPT

## 2019-05-24 RX ADMIN — LEUPROLIDE ACETATE 45 MG: KIT at 11:05

## 2019-05-28 ENCOUNTER — LAB VISIT (OUTPATIENT)
Dept: LAB | Facility: HOSPITAL | Age: 84
End: 2019-05-28
Attending: INTERNAL MEDICINE
Payer: MEDICARE

## 2019-05-28 ENCOUNTER — INITIAL CONSULT (OUTPATIENT)
Dept: HEMATOLOGY/ONCOLOGY | Facility: CLINIC | Age: 84
End: 2019-05-28
Payer: MEDICARE

## 2019-05-28 VITALS
HEART RATE: 76 BPM | OXYGEN SATURATION: 95 % | SYSTOLIC BLOOD PRESSURE: 162 MMHG | RESPIRATION RATE: 16 BRPM | WEIGHT: 242.31 LBS | BODY MASS INDEX: 35.89 KG/M2 | TEMPERATURE: 98 F | DIASTOLIC BLOOD PRESSURE: 77 MMHG | HEIGHT: 69 IN

## 2019-05-28 DIAGNOSIS — R97.20 ELEVATED PSA: ICD-10-CM

## 2019-05-28 DIAGNOSIS — C61 PROSTATE CANCER: Primary | ICD-10-CM

## 2019-05-28 DIAGNOSIS — C61 PROSTATE CANCER: ICD-10-CM

## 2019-05-28 LAB
COMPLEXED PSA SERPL-MCNC: 26.6 NG/ML (ref 0–4)
TESTOST SERPL-MCNC: 346 NG/DL (ref 304–1227)

## 2019-05-28 PROCEDURE — 1101F PR PT FALLS ASSESS DOC 0-1 FALLS W/OUT INJ PAST YR: ICD-10-PCS | Mod: CPTII,S$GLB,, | Performed by: INTERNAL MEDICINE

## 2019-05-28 PROCEDURE — 36415 COLL VENOUS BLD VENIPUNCTURE: CPT

## 2019-05-28 PROCEDURE — 1101F PT FALLS ASSESS-DOCD LE1/YR: CPT | Mod: CPTII,S$GLB,, | Performed by: INTERNAL MEDICINE

## 2019-05-28 PROCEDURE — 99205 OFFICE O/P NEW HI 60 MIN: CPT | Mod: S$GLB,,, | Performed by: INTERNAL MEDICINE

## 2019-05-28 PROCEDURE — 99999 PR PBB SHADOW E&M-EST. PATIENT-LVL IV: CPT | Mod: PBBFAC,,, | Performed by: INTERNAL MEDICINE

## 2019-05-28 PROCEDURE — 84403 ASSAY OF TOTAL TESTOSTERONE: CPT

## 2019-05-28 PROCEDURE — 3288F PR FALLS RISK ASSESSMENT DOCUMENTED: ICD-10-PCS | Mod: CPTII,S$GLB,, | Performed by: INTERNAL MEDICINE

## 2019-05-28 PROCEDURE — 84153 ASSAY OF PSA TOTAL: CPT

## 2019-05-28 PROCEDURE — 3288F FALL RISK ASSESSMENT DOCD: CPT | Mod: CPTII,S$GLB,, | Performed by: INTERNAL MEDICINE

## 2019-05-28 PROCEDURE — 1100F PTFALLS ASSESS-DOCD GE2>/YR: CPT | Mod: CPTII,S$GLB,, | Performed by: INTERNAL MEDICINE

## 2019-05-28 PROCEDURE — 99999 PR PBB SHADOW E&M-EST. PATIENT-LVL IV: ICD-10-PCS | Mod: PBBFAC,,, | Performed by: INTERNAL MEDICINE

## 2019-05-28 PROCEDURE — 82626 DEHYDROEPIANDROSTERONE: CPT

## 2019-05-28 PROCEDURE — 99205 PR OFFICE/OUTPT VISIT, NEW, LEVL V, 60-74 MIN: ICD-10-PCS | Mod: S$GLB,,, | Performed by: INTERNAL MEDICINE

## 2019-05-28 PROCEDURE — 1100F PR PT FALLS ASSESS DOC 2+ FALLS/FALL W/INJURY/YR: ICD-10-PCS | Mod: CPTII,S$GLB,, | Performed by: INTERNAL MEDICINE

## 2019-05-28 NOTE — LETTER
June 2, 2019      Niall Blas MD  [address] [place] [address]           [facility]  [address], [address]  [place] [address]  Phone: [phone]          Patient: Anthony Sheldon   MR Number: [id number]   YOB: 1934   Date of Visit: 5/28/2019       Dear Dr. Niall Blas:    Thank you for referring Anthony Sheldon to me for evaluation. Attached you will find relevant portions of my assessment and plan of care.    If you have questions, please do not hesitate to call me. I look forward to following Anthony Sheldon along with you.    Sincerely,    Chyna Dwyer MD    Enclosure  CC:  No Recipients    If you would like to receive this communication electronically, please contact [email or web address] or [phone] to request more information on [employer] Link access.    For providers and/or their staff who would like to refer a patient to [facility], please contact us through our one-stop-shop provider referral line, [facility] , at [phone].    If you feel you have received this communication in error or would no longer like to receive these types of communications, please e-mail [email or web address]

## 2019-05-28 NOTE — PROGRESS NOTES
CC : I feel good     Shanelle Sheldon is a 85 y.o.  Pt referred with prostate carcinoma Elise 9. Scans reveal no evidence of bone mets     Entire chart reviewed    March 27 2019  FINAL PATHOLOGIC DIAGNOSIS  1. Prostate gland, right base, core needle biopsy:  - Prostatic adenocarcinoma, Bison score 5+4=9, ISUP grade group 5, involving 85% (9mm) of 1 of 1 cores  2. Prostate gland, right middle, core needle biopsy:  - Prostatic adenocarcinoma, Bison score 5+4=9, ISUP grade group 5, involving 95% (11mm) of 1 of 1 cores  3. Prostate gland, right apex, core needle biopsy:  - Prostatic adenocarcinoma, Bison score 5+4=9, ISUP grade group 5, involving 99% (12mm) of 1 of 1 cores  4. Prostate gland, right lateral base, core needle biopsy:  - Prostatic adenocarcinoma, Elise score 5+4=9, ISUP grade group 5, involving 90% (7mm) of 1 of 1 cores  5. Prostate gland, right lateral mid, core needle biopsy:  - Prostatic adenocarcinoma, Bison score 5+4=9, ISUP grade group 5, involving 99% (12mm) of 1 of 1 cores  6. Prostate gland, right lateral apex, core needle biopsy:  - Prostatic adenocarcinoma, Bison score 5+4=9, ISUP grade group 5, involving 99% (12mm) of 1 of 1 cores  [facility]  Report continued on next page Page 1 of 3  Patient Name SHANELLE SHELDON  Past Medical History:   Diagnosis Date    Arthritis     Dental bridge present     LOWER PARTIAL    DVT (deep venous thrombosis) 2014    right le after thr    Wears glasses      Past Surgical History:   Procedure Laterality Date    ARTHROPLASTY-KNEE Left 2/22/2017    [facility] OR    BIOPSY, WITH TRANSRECTAL US GUIDANCE Bilateral 3/27/2019    [facility] OR    CYSTOSCOPY N/A 3/27/2019    [facility] OR    Injection, FACET JOINT INJECTION LEFT L4-5 AND L5-S1 Left 1/21/2019    [facility] at [facility] OR    JOINT REPLACEMENT      BILAT HIPS, RIGHT SHOULDER     KNEE ARTHROPLASTY Left     TONSILLECTOMY  1950    TOTAL HIP ARTHROPLASTY Bilateral 2013 & 2014    TOTAL SHOULDER ARTHROPLASTY Right 2005       Current Outpatient Medications:     multivitamin (ONE DAILY MULTIVITAMIN) per tablet, Take 1 tablet by mouth once daily., Disp: , Rfl:     zolpidem (AMBIEN) 5 MG Tab, Take 1 tablet (5 mg total) by mouth nightly as needed., Disp: 30 tablet, Rfl: 3  Review of patient's allergies indicates:  No Known Allergies  Social History     Tobacco Use    Smoking status: Former Smoker     Types: Cigarettes    Smokeless tobacco: Former User     Quit date: 1/1/1990   Substance Use Topics    Alcohol use: Yes     Alcohol/week: 0.0 oz    Drug use: No     Family History   Problem Relation Age of Onset    Stroke Mother     Bladder Cancer Father        CONSTITUTIONAL: No fevers, chills, night sweats, wt. loss, appetite changes  SKIN: no rashes or itching  ENT: No headaches, head trauma, vision changes, or eye pain  LYMPH NODES: None noticed   CV: No chest pain, palpitations.   RESP: No dyspnea on exertion, cough, wheezing, or hemoptysis  GI: No nausea, emesis, diarrhea, constipation, melena, hematochezia, pain.   : No dysuria, hematuria, urgency, or frequency   HEME: No easy bruising, bleeding problems  PSYCHIATRIC: No depression, anxiety, psychosis, hallucinations.  NEURO: No seizures, memory loss, dizziness or difficulty sleeping  MSK: No arthralgias or joint swelling         There were no vitals taken for this visit.  Gen: NAD, A and O x3,   Psych: pleasant affect, normal thought process  Eyes: Pupils round and non dilated, EOM intact  Nose: Nares patent  OP clear, mucosa patent  Neck: suppple, no JVD, trachea midline, no palpable mass, no adenopathy  Lungs: CTAB, no wheezes, no use of accessory muscles  CV: S1S2 with RRR, No mrg  Abd: soft, NTND, + BS, No HSM, no ascites  Extr: No CCE, MAX, strength normal, good capillary refill  Neuro: steady gait, CNs grossly intact  Skin:  intact, no lesions noted  Rheum: No joint swelling    Lab Results   Component Value Date    WBC 5.83 03/26/2019    HGB 12.7 (L) 03/26/2019    HCT 38.9 (L) 03/26/2019    MCV 98 03/26/2019     03/26/2019     CMP  Sodium   Date Value Ref Range Status   03/26/2019 139 136 - 145 mmol/L Final     Potassium   Date Value Ref Range Status   03/26/2019 4.2 3.5 - 5.1 mmol/L Final     Chloride   Date Value Ref Range Status   03/26/2019 106 95 - 110 mmol/L Final     CO2   Date Value Ref Range Status   03/26/2019 24 23 - 29 mmol/L Final     Glucose   Date Value Ref Range Status   03/26/2019 130 (H) 70 - 110 mg/dL Final     BUN, Bld   Date Value Ref Range Status   03/26/2019 21 8 - 23 mg/dL Final     Creatinine   Date Value Ref Range Status   03/26/2019 1.4 0.5 - 1.4 mg/dL Final     Calcium   Date Value Ref Range Status   03/26/2019 8.7 8.7 - 10.5 mg/dL Final     Total Protein   Date Value Ref Range Status   03/26/2019 7.1 6.0 - 8.4 g/dL Final     Albumin   Date Value Ref Range Status   03/26/2019 3.7 3.5 - 5.2 g/dL Final     Total Bilirubin   Date Value Ref Range Status   03/26/2019 0.4 0.1 - 1.0 mg/dL Final     Comment:     For infants and newborns, interpretation of results should be based  on gestational age, weight and in agreement with clinical  observations.  Premature Infant recommended reference ranges:  Up to 24 hours.............<8.0 mg/dL  Up to 48 hours............<12.0 mg/dL  3-5 days..................<15.0 mg/dL  6-29 days.................<15.0 mg/dL       Alkaline Phosphatase   Date Value Ref Range Status   03/26/2019 57 55 - 135 U/L Final     AST   Date Value Ref Range Status   03/26/2019 19 10 - 40 U/L Final     ALT   Date Value Ref Range Status   03/26/2019 17 10 - 44 U/L Final     Anion Gap   Date Value Ref Range Status   03/26/2019 9 8 - 16 mmol/L Final     eGFR if    Date Value Ref Range Status   03/26/2019 53.0 (A) >60 mL/min/1.73 m^2 Final     eGFR if non    Date Value  Ref Range Status   03/26/2019 45.8 (A) >60 mL/min/1.73 m^2 Final     Comment:     Calculation used to obtain the estimated glomerular filtration  rate (eGFR) is the CKD-EPI equation.                     Last Resulted: 05/14/19 10:14 Order Details View Encounter Lab and Collection Details Routing Result History            External Result Report     External Result Report   Narrative     EXAMINATION:  CT ABDOMEN PELVIS WITHOUT CONTRAST    CLINICAL HISTORY:  Neoplasm: prostate, staging; Neoplasm of unspecified behavior of other genitourinary organ    TECHNIQUE:  Low dose axial images, sagittal and coronal reformations were obtained from the lung bases to the pubic symphysis.  Oral contrast was not administered.    COMPARISON:  None    FINDINGS:  The liver, spleen, pancreas, and adrenal glands are unremarkable.  There is a 12 mm calcified stone within the gallbladder.  No biliary dilatation.  The adrenal glands are normal.    There is a small hiatal hernia.  A debris containing 3.6 cm diverticulum of the transverse limb of the duodenum is present.  A few small bowel diverticula are also present proximally.  The small bowel is nondilated.  A normal appendix is present.  There is severe diverticulosis coli.  There is a 10 cm segment proximal to mid sigmoid colon which demonstrates diffuse mural thickening, without accompanying pericolonic fat stranding.    There is moderate calcification of the aorta without aneurysm.  Calcification of the coronary arteries is also present.    There is no nephroureterolithiasis or hydronephrosis.  There is a 10 mm moderately hyperdense finding of the upper pole of the right kidney which is indeterminate.    The urinary bladder and low pelvis are largely obscured by beam hardening artifact from bilateral total hip arthroplasties.  The region of the prostate gland is not well evaluated.  No enlarged lymph nodes are identified.    There are no suspicious osseous lesions.   Impression        No evidence for abdominopelvic metastatic disease.    Severe diverticulosis, with moderate segments sigmoid mural thickening favoring chronic diverticulitis.  Consideration for colonoscopy is suggested to exclude a mass, if the patient has not recently undergone colonoscopy.    Small indeterminate right renal lesion.  Consider further evaluation with renal ultrasound.    Cholelithiasis.  Bilateral total hip arthroplasties.  Atherosclerosis.      Electronically signed by: Ortiz Antunez MD  Date: 05/14/2019  Time: 10:14    Encounter     View Encounter                 Prostate cancer  -     NM PET CT Routine Skull to Mid Thigh  -     PSA; Future; Expected date: 05/28/2019  -     Testosterone; Future; Expected date: 05/28/2019  -     DHEA; Future; Expected date: 05/28/2019    Elevated PSA  -     NM PET CT Routine Skull to Mid Thigh  -     PSA; Future; Expected date: 05/28/2019  -     Testosterone; Future; Expected date: 05/28/2019  -     DHEA; Future; Expected date: 05/28/2019        PET CT due   psa diagnostic   Hold xtandi  Sigmoid thickening  Right renal lesion  Cont trelstar       Thank you for allowing me to evaluate and participate in the care of this pleasant patient. Please fell free to call me with any questions or concerns.    MelissalyChyna MD    This note was dictated with Dragon and briefly proofread. Please excuse any sentences that may be unclear or words misspelled

## 2019-05-31 LAB — DHEA SERPL-MCNC: 0.5 NG/ML (ref 0.63–4.7)

## 2019-06-05 ENCOUNTER — TELEPHONE (OUTPATIENT)
Dept: HEMATOLOGY/ONCOLOGY | Facility: CLINIC | Age: 84
End: 2019-06-05

## 2019-06-05 NOTE — TELEPHONE ENCOUNTER
Called but no answer.left message about return to clinic after the PET has been done.asked pt to return call so that we can come up with an appt

## 2019-06-17 ENCOUNTER — OFFICE VISIT (OUTPATIENT)
Dept: PAIN MEDICINE | Facility: CLINIC | Age: 84
End: 2019-06-17
Payer: MEDICARE

## 2019-06-17 ENCOUNTER — HOSPITAL ENCOUNTER (OUTPATIENT)
Facility: HOSPITAL | Age: 84
Discharge: HOME | End: 2019-06-17
Attending: ANESTHESIOLOGY | Admitting: ANESTHESIOLOGY
Payer: MEDICARE

## 2019-06-17 VITALS
BODY MASS INDEX: 35.1 KG/M2 | HEART RATE: 67 BPM | WEIGHT: 237 LBS | SYSTOLIC BLOOD PRESSURE: 155 MMHG | DIASTOLIC BLOOD PRESSURE: 75 MMHG | HEIGHT: 69 IN | TEMPERATURE: 98 F | OXYGEN SATURATION: 97 % | RESPIRATION RATE: 20 BRPM

## 2019-06-17 VITALS
RESPIRATION RATE: 18 BRPM | WEIGHT: 237 LBS | TEMPERATURE: 98 F | SYSTOLIC BLOOD PRESSURE: 175 MMHG | DIASTOLIC BLOOD PRESSURE: 85 MMHG | HEART RATE: 59 BPM | BODY MASS INDEX: 35.1 KG/M2 | HEIGHT: 69 IN | OXYGEN SATURATION: 99 %

## 2019-06-17 DIAGNOSIS — M25.562 CHRONIC PAIN OF LEFT KNEE: ICD-10-CM

## 2019-06-17 DIAGNOSIS — M47.816 LUMBAR SPONDYLOSIS: ICD-10-CM

## 2019-06-17 DIAGNOSIS — G89.29 CHRONIC PAIN OF LEFT KNEE: ICD-10-CM

## 2019-06-17 DIAGNOSIS — Z96.652 STATUS POST TOTAL LEFT KNEE REPLACEMENT: ICD-10-CM

## 2019-06-17 DIAGNOSIS — M51.36 DDD (DEGENERATIVE DISC DISEASE), LUMBAR: ICD-10-CM

## 2019-06-17 DIAGNOSIS — M54.16 LUMBAR RADICULITIS: Primary | ICD-10-CM

## 2019-06-17 DIAGNOSIS — M47.816 LUMBAR SPONDYLOSIS: Primary | ICD-10-CM

## 2019-06-17 DIAGNOSIS — G89.4 CHRONIC PAIN DISORDER: ICD-10-CM

## 2019-06-17 PROBLEM — M51.369 DDD (DEGENERATIVE DISC DISEASE), LUMBAR: Status: ACTIVE | Noted: 2019-06-17

## 2019-06-17 PROCEDURE — 1101F PR PT FALLS ASSESS DOC 0-1 FALLS W/OUT INJ PAST YR: ICD-10-PCS | Mod: CPTII,S$GLB,, | Performed by: ANESTHESIOLOGY

## 2019-06-17 PROCEDURE — 99214 PR OFFICE/OUTPT VISIT, EST, LEVL IV, 30-39 MIN: ICD-10-PCS | Mod: S$GLB,,, | Performed by: ANESTHESIOLOGY

## 2019-06-17 PROCEDURE — 99214 OFFICE O/P EST MOD 30 MIN: CPT | Mod: S$GLB,,, | Performed by: ANESTHESIOLOGY

## 2019-06-17 PROCEDURE — 62323 PR INJ LUMBAR/SACRAL, W/IMAGING GUIDANCE: ICD-10-PCS | Mod: ,,, | Performed by: ANESTHESIOLOGY

## 2019-06-17 PROCEDURE — 62323 NJX INTERLAMINAR LMBR/SAC: CPT | Performed by: ANESTHESIOLOGY

## 2019-06-17 PROCEDURE — 99999 PR PBB SHADOW E&M-EST. PATIENT-LVL V: CPT | Mod: PBBFAC,,, | Performed by: ANESTHESIOLOGY

## 2019-06-17 PROCEDURE — 1101F PT FALLS ASSESS-DOCD LE1/YR: CPT | Mod: CPTII,S$GLB,, | Performed by: ANESTHESIOLOGY

## 2019-06-17 PROCEDURE — 62323 NJX INTERLAMINAR LMBR/SAC: CPT | Mod: ,,, | Performed by: ANESTHESIOLOGY

## 2019-06-17 PROCEDURE — 76000 FLUOROSCOPY <1 HR PHYS/QHP: CPT | Mod: TC

## 2019-06-17 PROCEDURE — 99999 PR PBB SHADOW E&M-EST. PATIENT-LVL V: ICD-10-PCS | Mod: PBBFAC,,, | Performed by: ANESTHESIOLOGY

## 2019-06-17 NOTE — OP NOTE
Date of Procedure: 06/17/2019    Procedure: L5/S1 Interlaminar Epidural Steroid Injection    Referring Provider: None    Pre-op diagnosis: Lumbar radiculitis [M54.16]  DDD (degenerative disc disease), lumbar [M51.36]    Post-op diagnosis: Lumbar radiculitis [M54.16]  DDD (degenerative disc disease), lumbar [M51.36]    Physician: Dr. Chyna Valdovinos     Assistant: None    Anesthestia: local    EBL: None    Specimens: None    All medications, allergies, and relevant histories were reviewed. No recent antibiotics or infections.  A time-out was taken to verify the correct patient, procedure, laterality, and appropriate medications/allergies.    Fluoroscopically-Guided, Contrast-Controlled Lumbar Interlaminar Epidural Steroid Injection:     Following denial of allergy and review of potential side effects and complications including but not necessarily limited to infection, allergic reaction, local tissue breakdown, nerve injury, paresis, paralysis, spinal cord injury, and seizure, the patient indicated they understood and agreed to proceed.     Patient was placed in prone position. Lumbar area was prepped and draped in sterile manner. Local Xylocaine 1% was given subcutaneously at the level L5/S1. An 18-gauge Tuohy needle was introduced atraumatically in the interspinous space and advanced up to the epidural space using fluoroscopic guidance in the AP position. Loss of resistance to air was used to identify the epidural space using fluoroscopic guidance in the lateral position. Following negative aspiration for blood and CSF and confirming the absence of paresthesias, injection of approximately 1 cc of Omnipaque 300 demonstrated excellent epidural spread without vascular or intrathecal uptake. At this point, 2cc of 0.25% marcaine with 80 mg of Depo-Medrol was injected without complication. The needle was flushed with 1% lidocaine and withdrawn.     Patient tolerated the procedure well without any side effects. No noted  complications.     The patient was followed post procedure and discharged under their own power in excellent condition in the company of a responsible adult.     Future Management:   If helpful, can repeat as needed.    Follow up with my clinic in 3 weeks or sooner if needed

## 2019-06-17 NOTE — H&P (VIEW-ONLY)
Subjective:     Patient ID: Anthony Sheldon is a 85 y.o. male.    Chief Complaint: Pain    Consulted by: Self     Disclaimer: This note was generated using voice recognition software.  There may be a typographical errors that were missed during proofreading.      HPI:    Anthony Sheldon is a 85 y.o. male who presents today with left knee pain. He reports that the knee feels like a pressure sensation.  He is s/p left knee coolief in 5/2018 with resolution of his burning, stabbing pain, but this pressure pain never went away.   It is located on the lateral and posterior aspect of his knee.  This pain is described in detail below.    Of note, he received Euflexxa in the right knee with great benefit.    He also reports left-sided low back pain that is long-standing and worsening.  The pain is located in the left middle back and does not radiate.  The pain is worse with walking and with activities cause him to stand up straight.  It is better with rest.    Aggravating factors: Walking, the pain is worse in the morning    Mitigating factors: Coolief, motion    Previously seeing: Dr. Posadas, but patient lives here and would like to establish care here    Interval History (1/8/2019):  He returns today for follow up.  He reports that his low back pain is improving with the home exercises.  Tylenol has been helpful for the pain.    Interval History (6/17/2019):  He returns today for follow up.  He reports that he is having increased pain in his left foot.  This pain began around April of 2019 with no inciting event or injury.  He reports a sharp, stabbing pain on the top of his left foot that occurs when he is lying on his left side.  This resolves when he turns over to his back.  This is associated with some numbness in the same area.  No muscle weakness.  Diclofenac helps.    Physical Therapy: Yes, after his surgery.  Continued pool exercises all summer.  He has been walking until recently when it got cold.  No  stretches.    Non-pharmacologic Treatment:     · Ice/Heat: Ice after surgery  · TENS: Never  · Massage: Never   · Chiropractic care: Never  · Acupuncture: Never  · Other: No         Pain Medications:         · Currently taking: Tylenol 500 mg, 1-2 per dose (helpful), diclofenac 75 mg twice daily as needed    · Has tried in the past:    · Opioids: tramadol  · NSAIDS: Naproxen 500 mg (some benefit, a couple of times a week),   · Tylenol: Takes 2-3 times per week  · Muscle relaxants: Never  · TCAs: Never  · SNRIs: Never  · Anticonvulsants: Never  · topical creams: Never  · Other: None    Blood thinners: None    Interventional Therapies:   · 5/18/2018: Left knee coolief radiofrequency:  75% relief  · Euflexxa in right knee 3/2018: Good benefit    Relevant Surgeries:   · Left TKA    Affecting sleep? No    Affecting daily activities? No    Depressive symptoms? No          · SI/HI? No    Work status: No, retired    Prescription Monitoring Program database:  Reviewed and consistent with medication use as prescribed.    Last 3 PDI Scores 6/17/2019 1/8/2019 11/27/2018   Pain Disability Index (PDI) 13 8 4       Opioid Risk Score       Value Time User    Opioid Risk Score  0 11/27/2018 11:44 AM Olive Eugene LPN          GENERAL:  No weight loss, malaise or fevers.  HEENT:   No recent changes in vision or hearing  NECK:  Negative for lumps, no difficulty with swallowing.  RESPIRATORY:  Negative for cough, wheezing or shortness of breath, patient denies any recent URI.  CARDIOVASCULAR:  Negative for chest pain, leg swelling or palpitations.  GI:  Negative for abdominal discomfort, blood in stools or black stools or change in bowel habits.  MUSCULOSKELETAL:  See HPI.  SKIN:  Negative for lesions, rash, and itching.  PSYCH:  No mood disorder or recent psychosocial stressors.    HEMATOLOGY/LYMPHOLOGY:  Negative for prolonged bleeding, bruising easily or swollen nodes.    ENDO: No history of diabetes or thyroid dysfunction  NEURO:    No history of headaches, syncope, paralysis, seizures or tremors.  All other reviewed and negative other than HPI.          Past Medical History:   Diagnosis Date    Arthritis     Cataract 2009    had surgery to have removed    Dental bridge present     LOWER PARTIAL    DVT (deep venous thrombosis) 2014    right le after thr    Prostate cancer 05/2019    Wears glasses        Past Surgical History:   Procedure Laterality Date    ARTHROPLASTY-KNEE Left 2/22/2017    [facility]    BIOPSY, WITH TRANSRECTAL US GUIDANCE Bilateral 3/27/2019    [facility]    CYSTOSCOPY N/A 3/27/2019    [facility]    Injection, FACET JOINT INJECTION LEFT L4-5 AND L5-S1 Left 1/21/2019    [facility]    JOINT REPLACEMENT      BILAT HIPS, RIGHT SHOULDER    KNEE ARTHROPLASTY Left     TONSILLECTOMY  1950    TOTAL HIP ARTHROPLASTY Bilateral 2013 & 2014    TOTAL SHOULDER ARTHROPLASTY Right 2005       Review of patient's allergies indicates:  No Known Allergies    Current Outpatient Medications   Medication Sig Dispense Refill    multivitamin (ONE DAILY MULTIVITAMIN) per tablet Take 1 tablet by mouth once daily.      zolpidem (AMBIEN) 5 MG Tab Take 1 tablet (5 mg total) by mouth nightly as needed. 30 tablet 3     No current facility-administered medications for this visit.        Family History   Problem Relation Age of Onset    Stroke Mother     Bladder Cancer Father        Social History     Socioeconomic History    Marital status:      Spouse name: Not on file    Number of children: Not on file    Years of education: Not on file    Highest education level: Not on file   Occupational History    Not on file   Social Needs    Financial resource strain: Not on file    Food insecurity:     Worry: Not on file     Inability: Not on file    Transportation needs:     Medical: Not on file      "Non-medical: Not on file   Tobacco Use    Smoking status: Former Smoker     Types: Cigarettes    Smokeless tobacco: Former User     Quit date: 1/1/1990   Substance and Sexual Activity    Alcohol use: Yes     Alcohol/week: 0.6 oz     Types: 1 Glasses of wine per week    Drug use: No    Sexual activity: Not Currently   Lifestyle    Physical activity:     Days per week: Not on file     Minutes per session: Not on file    Stress: Not on file   Relationships    Social connections:     Talks on phone: Not on file     Gets together: Not on file     Attends Baptism service: Not on file     Active member of club or organization: Not on file     Attends meetings of clubs or organizations: Not on file     Relationship status: Not on file   Other Topics Concern    Not on file   Social History Narrative    Not on file       Objective:     Vitals:    06/17/19 0808   BP: (!) 175/85   Pulse: (!) 59   Resp: 18   Temp: 97.9 °F (36.6 °C)   SpO2: 99%   Weight: 107.5 kg (237 lb)   Height: 5' 9" (1.753 m)   PainSc:   3       GEN:  Well developed, well nourished.  No acute distress. No pain behavior.  HEENT:  No trauma.  Mucous membranes moist.  Nares patent bilaterally.  PSYCH: Normal affect. Thought content appropriate.  CHEST:  Breathing symmetric.  No audible wheezing.  ABD:   · Soft, non-distended.    SKIN:  Warm, pink, dry.  No rash on exposed areas.    EXT:  No cyanosis, clubbing, or edema.  No color change or changes in nail or hair growth.  NEURO/MUSCULOSKELETAL:  Fully alert, oriented, and appropriate. Speech normal dionicio. No cranial nerve deficits.   Gait: Normal.     L-Spine:  Decreased ROM with pain on flexion, extension. Positive facet loading on the left.  Negative SLR bilaterally.    SI Joint/Hip:  Negative TERESSA bilaterally.  Negative FADIR bilaterally.  Sensory:  Increased sensation in a left L4, L5, and S1 distribution  Negative TTP over lumbar paraspinals, bilateral SI joints, hips, piriformis muscles, or " GTB.      Previous physical exam:  Motor Strength: 5/5 motor strength throughout lower extremities.     Reflexes:  2+ and symmetric throughout.  Downgoing Babinski's bilaterally.  No clonus or spasticity.      Right knee  Inspection: No erythema, swelling, or redness  ROM: Full    Left knee  Inspection: No erythema, swelling, or redness  ROM: Decreased  Palpation: No pes anserine tenderness and positive crepitus. No patellar tendon tenderness and no patellofemoral tendon tenderness.  No instability on exam.      Imaging:        The imaging studies listed below were independently reviewed by me, and I agree with the findings as documented below.     Narrative     EXAMINATION:  XR LUMBAR SPINE 5 VIEW WITH FLEX AND EXT    CLINICAL HISTORY:  lumbar spondylosis;  Spondylosis without myelopathy or radiculopathy, lumbar region    TECHNIQUE:  Five views of the lumbar spine plus flexion extension views were performed.    COMPARISON:  None.    FINDINGS:  Mild thoracolumbar dextroscoliosis.  Lumbar vertebral bodies are normal alignment.  Mild superior endplate compression fracture of L2.  Prominent Schmorl's node formation of the L3 vertebral body.    Moderate multilevel degenerative disc disease.  Bilateral oblique views demonstrate moderate hypertrophic changes within the facet joints most predominant within the lower lumbar spine.    There is 2 mm retrolisthesis of L2 on L3 on the lateral extension view.  This reduces on the neutral and flexion lateral views.  Remaining lumbar vertebral bodies are in alignment.    The SI joints are intact.  There is bone demineralization.    Previous bilateral total hip arthroplasties.      Impression       1. Mild thoracolumbar dextroscoliosis.  2. Mild superior endplate compression fracture of L2.  If there is clinical concern for fracture acuity, further evaluation may be obtained with either nuclear medicine bone scan or an MRI of the lumbar spine.  3. Moderate multilevel degenerative  "disc disease.  4. Grade 1 retrolisthesis of L2 on L3 on extension which reduces on neutral and flexion views consistent with movement.  5. Bone demineralization.      Electronically signed by: Ortiz Riojas  Date: 11/29/2018  Time: 08:49       Narrative     XR KNEE ORTHO LEFT.  Clinical History provided for exam:"lt knee".  As on 2/22/17, there are surgical changes of left knee arthroplasty.  Position and appearance of the prosthesis are unchanged.  There is a left suprapatellar effusion.  There is moderate diffuse narrowing of the right knee joint.  On the right there are spurs on the distal femur, proximal tibia, and patella. No acute fracture or bony destructive process is seen.  Alignment is normal.      Impression      Surgical changes of left knee arthroplasty.      Electronically signed by: MCKAYLA ZAVALA MD  Date: 01/15/18  Time: 09:18        Narrative     EXAMINATION:  MRI LUMBAR SPINE WITHOUT CONTRAST    CLINICAL HISTORY:  Abnormal xray, lumbar spine, DJD; Abnormal findings on diagnostic imaging of other parts of musculoskeletal system    TECHNIQUE:  Multiplanar, multisequence MR images were acquired from the thoracolumbar junction to the sacrum without the administration of contrast.    COMPARISON:  November 29, 2018 plain film    FINDINGS:  There is a dextroscoliotic curvature in the upper lumbar region.  There is diffuse disc desiccation and disc space base narrowing.  Superior endplate concavity is noted at L2 and Schmorl's node formation is noted in the superior endplate of L3 and inferior endplate of L2.  There is no evidence of marrow edema to suggest acute compression.  The conus terminates at the L1 level and has an unremarkable appearance.  There are minimal type 1 (edematous) degenerative endplate changes at L1/L2 and there are fatty degenerative endplate changes at L2/L3 and L3/L4.  The individual disc levels appears follows:    T12/L1: There is a left central disc extrusion causing " distortion of the left anterolateral canal.  Annular disc bulge and osteophyte formation cause mild narrowing of both foramina.    L1/L2: There is annular disc bulging and mild degenerative facet disease with mild effacement of the anterior thecal sac.  There is left greater than right foraminal narrowing.    L2/L3: There is annular disc bulging with mild effacement of the anterior thecal sac and mild degenerative facet changes are noted bilaterally.  There is mild foraminal narrowing bilaterally.    L3/L4: Moderate degenerative facet changes are noted and there is no evidence of disc protrusion.  There is mild disc bulging into the left foramen.  There is mild narrowing the left foramen.    L4/L5: Annular disc bulge combines with facet and ligamentous hypertrophy to produce moderate canal stenosis.  There is inferior foraminal narrowing bilaterally.  Facet effusions are noted bilaterally.    L5/S1: Degenerative facet changes are noted and there is annular disc bulging with effacement of the anterior thecal sac and the proximal S1 nerve roots bilaterally.      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  There is mild chronic anterior wedging of L1.      Electronically signed by: Israel Morris MD  Date: 11/30/2018  Time: 13:48         Assessment:     Encounter Diagnoses   Name Primary?    Lumbar radiculitis Yes    DDD (degenerative disc disease), lumbar     Chronic pain disorder     Lumbar spondylosis     Chronic pain of left knee     Status post total left knee replacement        Plan:     Anthony was seen today for chronic pain syndrome.    Diagnoses and all orders for this visit:    Lumbar radiculitis  -     Case Request Operating Room: Injection, Steroid, Epidural - L5/S1 EPIDURAL STEROID INJECTION    DDD (degenerative disc disease), lumbar  -     Case Request Operating Room: Injection, Steroid, Epidural -  L5/S1 EPIDURAL STEROID INJECTION    Chronic pain disorder    Lumbar spondylosis    Chronic pain of left knee    Status post total left knee replacement      His pain is consistent with the above.    We discussed the assessment and recommendations.  All available images were reviewed. We discussed the disease process, prognosis, treatment plan, and risks and benefits. The patient is aware of the risks and benefits of the medications being prescribed, common side effects, and proper usage. The following is the plan we agreed on:     1. MRI reviewed with him  2. Schedule for the L5/S1 interlaminar epidural steroid injection. Procedure discussed, including benefits, risks, alternatives.  All questions answered.  Consent obtained today  3. Can repeat the left knee radiofrequency as needed.  I discussed that the procedure did not cover the lateral and posterior aspect of the knee, so I would expect for him still have symptoms in these areas.  4. Continue diclofenac, Tylenol  5. Voltaren gel for topical use  6. Alternate ice and heat for symptomatic relief  7. Home exercises for his low back given previously  8. RTC in 3-4 weeks or sooner if needed.    Chyna Valdovinos MD  06/17/2019     The above plan and management options were discussed at length with patient. Patient is in agreement with the above and verbalized understanding. It will be communicated with the referring physician via electronic record, fax, or mail.

## 2019-06-17 NOTE — PROGRESS NOTES
Subjective:     Patient ID: Anthony Sheldon is a 85 y.o. male.    Chief Complaint: Pain    Consulted by: Self     Disclaimer: This note was generated using voice recognition software.  There may be a typographical errors that were missed during proofreading.      HPI:    Anthony Sheldon is a 85 y.o. male who presents today with left knee pain. He reports that the knee feels like a pressure sensation.  He is s/p left knee coolief in 5/2018 with resolution of his burning, stabbing pain, but this pressure pain never went away.   It is located on the lateral and posterior aspect of his knee.  This pain is described in detail below.    Of note, he received Euflexxa in the right knee with great benefit.    He also reports left-sided low back pain that is long-standing and worsening.  The pain is located in the left middle back and does not radiate.  The pain is worse with walking and with activities cause him to stand up straight.  It is better with rest.    Aggravating factors: Walking, the pain is worse in the morning    Mitigating factors: Coolief, motion    Previously seeing: Dr. Posadas, but patient lives here and would like to establish care here    Interval History (1/8/2019):  He returns today for follow up.  He reports that his low back pain is improving with the home exercises.  Tylenol has been helpful for the pain.    Interval History (6/17/2019):  He returns today for follow up.  He reports that he is having increased pain in his left foot.  This pain began around April of 2019 with no inciting event or injury.  He reports a sharp, stabbing pain on the top of his left foot that occurs when he is lying on his left side.  This resolves when he turns over to his back.  This is associated with some numbness in the same area.  No muscle weakness.  Diclofenac helps.    Physical Therapy: Yes, after his surgery.  Continued pool exercises all summer.  He has been walking until recently when it got cold.  No  stretches.    Non-pharmacologic Treatment:     · Ice/Heat: Ice after surgery  · TENS: Never  · Massage: Never   · Chiropractic care: Never  · Acupuncture: Never  · Other: No         Pain Medications:         · Currently taking: Tylenol 500 mg, 1-2 per dose (helpful), diclofenac 75 mg twice daily as needed    · Has tried in the past:    · Opioids: tramadol  · NSAIDS: Naproxen 500 mg (some benefit, a couple of times a week),   · Tylenol: Takes 2-3 times per week  · Muscle relaxants: Never  · TCAs: Never  · SNRIs: Never  · Anticonvulsants: Never  · topical creams: Never  · Other: None    Blood thinners: None    Interventional Therapies:   · 5/18/2018: Left knee coolief radiofrequency:  75% relief  · Euflexxa in right knee 3/2018: Good benefit    Relevant Surgeries:   · Left TKA    Affecting sleep? No    Affecting daily activities? No    Depressive symptoms? No          · SI/HI? No    Work status: No, retired    Prescription Monitoring Program database:  Reviewed and consistent with medication use as prescribed.    Last 3 PDI Scores 6/17/2019 1/8/2019 11/27/2018   Pain Disability Index (PDI) 13 8 4       Opioid Risk Score       Value Time User    Opioid Risk Score  0 11/27/2018 11:44 AM Olive Eugene LPN          GENERAL:  No weight loss, malaise or fevers.  HEENT:   No recent changes in vision or hearing  NECK:  Negative for lumps, no difficulty with swallowing.  RESPIRATORY:  Negative for cough, wheezing or shortness of breath, patient denies any recent URI.  CARDIOVASCULAR:  Negative for chest pain, leg swelling or palpitations.  GI:  Negative for abdominal discomfort, blood in stools or black stools or change in bowel habits.  MUSCULOSKELETAL:  See HPI.  SKIN:  Negative for lesions, rash, and itching.  PSYCH:  No mood disorder or recent psychosocial stressors.    HEMATOLOGY/LYMPHOLOGY:  Negative for prolonged bleeding, bruising easily or swollen nodes.    ENDO: No history of diabetes or thyroid dysfunction  NEURO:    No history of headaches, syncope, paralysis, seizures or tremors.  All other reviewed and negative other than HPI.          Past Medical History:   Diagnosis Date    Arthritis     Cataract 2009    had surgery to have removed    Dental bridge present     LOWER PARTIAL    DVT (deep venous thrombosis) 2014    right le after thr    Prostate cancer 05/2019    Wears glasses        Past Surgical History:   Procedure Laterality Date    ARTHROPLASTY-KNEE Left 2/22/2017    [facility]    BIOPSY, WITH TRANSRECTAL US GUIDANCE Bilateral 3/27/2019    [facility]    CYSTOSCOPY N/A 3/27/2019    [facility]    Injection, FACET JOINT INJECTION LEFT L4-5 AND L5-S1 Left 1/21/2019    [facility]    JOINT REPLACEMENT      BILAT HIPS, RIGHT SHOULDER    KNEE ARTHROPLASTY Left     TONSILLECTOMY  1950    TOTAL HIP ARTHROPLASTY Bilateral 2013 & 2014    TOTAL SHOULDER ARTHROPLASTY Right 2005       Review of patient's allergies indicates:  No Known Allergies    Current Outpatient Medications   Medication Sig Dispense Refill    multivitamin (ONE DAILY MULTIVITAMIN) per tablet Take 1 tablet by mouth once daily.      zolpidem (AMBIEN) 5 MG Tab Take 1 tablet (5 mg total) by mouth nightly as needed. 30 tablet 3     No current facility-administered medications for this visit.        Family History   Problem Relation Age of Onset    Stroke Mother     Bladder Cancer Father        Social History     Socioeconomic History    Marital status:      Spouse name: Not on file    Number of children: Not on file    Years of education: Not on file    Highest education level: Not on file   Occupational History    Not on file   Social Needs    Financial resource strain: Not on file    Food insecurity:     Worry: Not on file     Inability: Not on file    Transportation needs:     Medical: Not on file      "Non-medical: Not on file   Tobacco Use    Smoking status: Former Smoker     Types: Cigarettes    Smokeless tobacco: Former User     Quit date: 1/1/1990   Substance and Sexual Activity    Alcohol use: Yes     Alcohol/week: 0.6 oz     Types: 1 Glasses of wine per week    Drug use: No    Sexual activity: Not Currently   Lifestyle    Physical activity:     Days per week: Not on file     Minutes per session: Not on file    Stress: Not on file   Relationships    Social connections:     Talks on phone: Not on file     Gets together: Not on file     Attends Latter day service: Not on file     Active member of club or organization: Not on file     Attends meetings of clubs or organizations: Not on file     Relationship status: Not on file   Other Topics Concern    Not on file   Social History Narrative    Not on file       Objective:     Vitals:    06/17/19 0808   BP: (!) 175/85   Pulse: (!) 59   Resp: 18   Temp: 97.9 °F (36.6 °C)   SpO2: 99%   Weight: 107.5 kg (237 lb)   Height: 5' 9" (1.753 m)   PainSc:   3       GEN:  Well developed, well nourished.  No acute distress. No pain behavior.  HEENT:  No trauma.  Mucous membranes moist.  Nares patent bilaterally.  PSYCH: Normal affect. Thought content appropriate.  CHEST:  Breathing symmetric.  No audible wheezing.  ABD:   · Soft, non-distended.    SKIN:  Warm, pink, dry.  No rash on exposed areas.    EXT:  No cyanosis, clubbing, or edema.  No color change or changes in nail or hair growth.  NEURO/MUSCULOSKELETAL:  Fully alert, oriented, and appropriate. Speech normal dionicio. No cranial nerve deficits.   Gait: Normal.     L-Spine:  Decreased ROM with pain on flexion, extension. Positive facet loading on the left.  Negative SLR bilaterally.    SI Joint/Hip:  Negative TERESSA bilaterally.  Negative FADIR bilaterally.  Sensory:  Increased sensation in a left L4, L5, and S1 distribution  Negative TTP over lumbar paraspinals, bilateral SI joints, hips, piriformis muscles, or " GTB.      Previous physical exam:  Motor Strength: 5/5 motor strength throughout lower extremities.     Reflexes:  2+ and symmetric throughout.  Downgoing Babinski's bilaterally.  No clonus or spasticity.      Right knee  Inspection: No erythema, swelling, or redness  ROM: Full    Left knee  Inspection: No erythema, swelling, or redness  ROM: Decreased  Palpation: No pes anserine tenderness and positive crepitus. No patellar tendon tenderness and no patellofemoral tendon tenderness.  No instability on exam.      Imaging:        The imaging studies listed below were independently reviewed by me, and I agree with the findings as documented below.     Narrative     EXAMINATION:  XR LUMBAR SPINE 5 VIEW WITH FLEX AND EXT    CLINICAL HISTORY:  lumbar spondylosis;  Spondylosis without myelopathy or radiculopathy, lumbar region    TECHNIQUE:  Five views of the lumbar spine plus flexion extension views were performed.    COMPARISON:  None.    FINDINGS:  Mild thoracolumbar dextroscoliosis.  Lumbar vertebral bodies are normal alignment.  Mild superior endplate compression fracture of L2.  Prominent Schmorl's node formation of the L3 vertebral body.    Moderate multilevel degenerative disc disease.  Bilateral oblique views demonstrate moderate hypertrophic changes within the facet joints most predominant within the lower lumbar spine.    There is 2 mm retrolisthesis of L2 on L3 on the lateral extension view.  This reduces on the neutral and flexion lateral views.  Remaining lumbar vertebral bodies are in alignment.    The SI joints are intact.  There is bone demineralization.    Previous bilateral total hip arthroplasties.      Impression       1. Mild thoracolumbar dextroscoliosis.  2. Mild superior endplate compression fracture of L2.  If there is clinical concern for fracture acuity, further evaluation may be obtained with either nuclear medicine bone scan or an MRI of the lumbar spine.  3. Moderate multilevel degenerative  "disc disease.  4. Grade 1 retrolisthesis of L2 on L3 on extension which reduces on neutral and flexion views consistent with movement.  5. Bone demineralization.      Electronically signed by: Ortiz Riojas  Date: 11/29/2018  Time: 08:49       Narrative     XR KNEE ORTHO LEFT.  Clinical History provided for exam:"lt knee".  As on 2/22/17, there are surgical changes of left knee arthroplasty.  Position and appearance of the prosthesis are unchanged.  There is a left suprapatellar effusion.  There is moderate diffuse narrowing of the right knee joint.  On the right there are spurs on the distal femur, proximal tibia, and patella. No acute fracture or bony destructive process is seen.  Alignment is normal.      Impression      Surgical changes of left knee arthroplasty.      Electronically signed by: MCKAYLA ZAVALA MD  Date: 01/15/18  Time: 09:18        Narrative     EXAMINATION:  MRI LUMBAR SPINE WITHOUT CONTRAST    CLINICAL HISTORY:  Abnormal xray, lumbar spine, DJD; Abnormal findings on diagnostic imaging of other parts of musculoskeletal system    TECHNIQUE:  Multiplanar, multisequence MR images were acquired from the thoracolumbar junction to the sacrum without the administration of contrast.    COMPARISON:  November 29, 2018 plain film    FINDINGS:  There is a dextroscoliotic curvature in the upper lumbar region.  There is diffuse disc desiccation and disc space base narrowing.  Superior endplate concavity is noted at L2 and Schmorl's node formation is noted in the superior endplate of L3 and inferior endplate of L2.  There is no evidence of marrow edema to suggest acute compression.  The conus terminates at the L1 level and has an unremarkable appearance.  There are minimal type 1 (edematous) degenerative endplate changes at L1/L2 and there are fatty degenerative endplate changes at L2/L3 and L3/L4.  The individual disc levels appears follows:    T12/L1: There is a left central disc extrusion causing " distortion of the left anterolateral canal.  Annular disc bulge and osteophyte formation cause mild narrowing of both foramina.    L1/L2: There is annular disc bulging and mild degenerative facet disease with mild effacement of the anterior thecal sac.  There is left greater than right foraminal narrowing.    L2/L3: There is annular disc bulging with mild effacement of the anterior thecal sac and mild degenerative facet changes are noted bilaterally.  There is mild foraminal narrowing bilaterally.    L3/L4: Moderate degenerative facet changes are noted and there is no evidence of disc protrusion.  There is mild disc bulging into the left foramen.  There is mild narrowing the left foramen.    L4/L5: Annular disc bulge combines with facet and ligamentous hypertrophy to produce moderate canal stenosis.  There is inferior foraminal narrowing bilaterally.  Facet effusions are noted bilaterally.    L5/S1: Degenerative facet changes are noted and there is annular disc bulging with effacement of the anterior thecal sac and the proximal S1 nerve roots bilaterally.      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  There is mild chronic anterior wedging of L1.      Electronically signed by: Israel Morris MD  Date: 11/30/2018  Time: 13:48         Assessment:     Encounter Diagnoses   Name Primary?    Lumbar radiculitis Yes    DDD (degenerative disc disease), lumbar     Chronic pain disorder     Lumbar spondylosis     Chronic pain of left knee     Status post total left knee replacement        Plan:     Anthony was seen today for chronic pain syndrome.    Diagnoses and all orders for this visit:    Lumbar radiculitis  -     Case Request Operating Room: Injection, Steroid, Epidural - L5/S1 EPIDURAL STEROID INJECTION    DDD (degenerative disc disease), lumbar  -     Case Request Operating Room: Injection, Steroid, Epidural -  L5/S1 EPIDURAL STEROID INJECTION    Chronic pain disorder    Lumbar spondylosis    Chronic pain of left knee    Status post total left knee replacement      His pain is consistent with the above.    We discussed the assessment and recommendations.  All available images were reviewed. We discussed the disease process, prognosis, treatment plan, and risks and benefits. The patient is aware of the risks and benefits of the medications being prescribed, common side effects, and proper usage. The following is the plan we agreed on:     1. MRI reviewed with him  2. Schedule for the L5/S1 interlaminar epidural steroid injection. Procedure discussed, including benefits, risks, alternatives.  All questions answered.  Consent obtained today  3. Can repeat the left knee radiofrequency as needed.  I discussed that the procedure did not cover the lateral and posterior aspect of the knee, so I would expect for him still have symptoms in these areas.  4. Continue diclofenac, Tylenol  5. Voltaren gel for topical use  6. Alternate ice and heat for symptomatic relief  7. Home exercises for his low back given previously  8. RTC in 3-4 weeks or sooner if needed.    Chyna Valdovinos MD  06/17/2019     The above plan and management options were discussed at length with patient. Patient is in agreement with the above and verbalized understanding. It will be communicated with the referring physician via electronic record, fax, or mail.

## 2019-06-17 NOTE — INTERVAL H&P NOTE
The patient has been examined and the H&P has been reviewed:    I concur with the findings and no changes have occurred since H&P was written.     No change in the location or quality of the pain since the most recent clinic visit.  No new symptoms.  He wishes to proceed with the procedure today.    PE, unchanged from previous:  CV:  RRR  Resp: unlabored, no wheezing.    NPO since MN.      Anesthesia/Surgery risks, benefits and alternative options discussed and understood by patient/family.          Active Hospital Problems    Diagnosis  POA    DDD (degenerative disc disease), lumbar [M51.36]  Yes      Resolved Hospital Problems   No resolved problems to display.

## 2019-06-17 NOTE — LETTER
June 17, 2019      Lisa Y. Cooksey, CHAVA  [address]  [place]  [place] [address]           [facility]  [address] [place] [address]  Phone: [phone]  Fax: [phone]          Patient: Anthony Sheldon   MR Number: [id number]   YOB: 1934   Date of Visit: 6/17/2019       Dear Lisa Y. Cooksey:    Thank you for referring Anthony Sheldon to me for evaluation. Attached you will find relevant portions of my assessment and plan of care.    If you have questions, please do not hesitate to call me. I look forward to following Anthony Sheldon along with you.    Sincerely,    Chyna Valdovinos MD    Enclosure  CC:  No Recipients    If you would like to receive this communication electronically, please contact [email or web address] or [phone] to request more information on [employer] Link access.    For providers and/or their staff who would like to refer a patient to [facility], please contact us through our one-stop-shop provider referral line, [facility], at [phone].    If you feel you have received this communication in error or would no longer like to receive these types of communications, please e-mail [email or web address]

## 2019-06-18 ENCOUNTER — OFFICE VISIT (OUTPATIENT)
Dept: HEMATOLOGY/ONCOLOGY | Facility: CLINIC | Age: 84
End: 2019-06-18
Payer: MEDICARE

## 2019-06-18 VITALS
OXYGEN SATURATION: 98 % | WEIGHT: 237.19 LBS | BODY MASS INDEX: 35.13 KG/M2 | TEMPERATURE: 98 F | HEIGHT: 69 IN | RESPIRATION RATE: 20 BRPM | SYSTOLIC BLOOD PRESSURE: 163 MMHG | HEART RATE: 67 BPM | DIASTOLIC BLOOD PRESSURE: 73 MMHG

## 2019-06-18 DIAGNOSIS — N32.89 BLADDER WALL THICKENING: Primary | ICD-10-CM

## 2019-06-18 DIAGNOSIS — Z78.9 OTHER SPECIFIED HEALTH STATUS: ICD-10-CM

## 2019-06-18 DIAGNOSIS — C61 PROSTATE CANCER: ICD-10-CM

## 2019-06-18 DIAGNOSIS — N30.90 BLADDER INFECTION: ICD-10-CM

## 2019-06-18 DIAGNOSIS — R97.20 ELEVATED PSA: ICD-10-CM

## 2019-06-18 PROCEDURE — 3288F PR FALLS RISK ASSESSMENT DOCUMENTED: ICD-10-PCS | Mod: CPTII,S$GLB,, | Performed by: INTERNAL MEDICINE

## 2019-06-18 PROCEDURE — 99215 OFFICE O/P EST HI 40 MIN: CPT | Mod: S$GLB,,, | Performed by: INTERNAL MEDICINE

## 2019-06-18 PROCEDURE — 1101F PT FALLS ASSESS-DOCD LE1/YR: CPT | Mod: CPTII,S$GLB,, | Performed by: INTERNAL MEDICINE

## 2019-06-18 PROCEDURE — 99215 PR OFFICE/OUTPT VISIT, EST, LEVL V, 40-54 MIN: ICD-10-PCS | Mod: S$GLB,,, | Performed by: INTERNAL MEDICINE

## 2019-06-18 PROCEDURE — 1101F PR PT FALLS ASSESS DOC 0-1 FALLS W/OUT INJ PAST YR: ICD-10-PCS | Mod: CPTII,S$GLB,, | Performed by: INTERNAL MEDICINE

## 2019-06-18 PROCEDURE — 3288F FALL RISK ASSESSMENT DOCD: CPT | Mod: CPTII,S$GLB,, | Performed by: INTERNAL MEDICINE

## 2019-06-18 PROCEDURE — 99999 PR PBB SHADOW E&M-EST. PATIENT-LVL III: ICD-10-PCS | Mod: PBBFAC,,, | Performed by: INTERNAL MEDICINE

## 2019-06-18 PROCEDURE — 99999 PR PBB SHADOW E&M-EST. PATIENT-LVL III: CPT | Mod: PBBFAC,,, | Performed by: INTERNAL MEDICINE

## 2019-06-18 RX ORDER — LEVOFLOXACIN 500 MG/1
500 TABLET, FILM COATED ORAL DAILY
Qty: 10 TABLET | Refills: 0 | Status: SHIPPED | OUTPATIENT
Start: 2019-06-18 | End: 2019-07-09 | Stop reason: ALTCHOICE

## 2019-06-18 RX ORDER — BICALUTAMIDE 50 MG/1
50 TABLET, FILM COATED ORAL DAILY
Qty: 30 TABLET | Refills: 6 | Status: SHIPPED | OUTPATIENT
Start: 2019-06-18 | End: 2019-09-18 | Stop reason: SDUPTHER

## 2019-06-18 NOTE — PROGRESS NOTES
CC : I feel good     Anthony Sheldon is a 85 y.o.  Pt referred with prostate carcinoma Elise 9. Scans reveal no evidence of bone mets   His psa is 26 here to review pet ct scan : no evidence of disseminated disease   Entire chart reviewed    March 27 2019  FINAL PATHOLOGIC DIAGNOSIS  1. Prostate gland, right base, core needle biopsy:  - Prostatic adenocarcinoma, [place] score 5+4=9, ISUP grade group 5, involving 85% (9mm) of 1 of 1 cores  2. Prostate gland, right middle, core needle biopsy:  - Prostatic adenocarcinoma, [place] score 5+4=9, ISUP grade group 5, involving 95% (11mm) of 1 of 1 cores  3. Prostate gland, right apex, core needle biopsy:  - Prostatic adenocarcinoma, [place] score 5+4=9, ISUP grade group 5, involving 99% (12mm) of 1 of 1 cores  4. Prostate gland, right lateral base, core needle biopsy:  - Prostatic adenocarcinoma, [place] score 5+4=9, ISUP grade group 5, involving 90% (7mm) of 1 of 1 cores  5. Prostate gland, right lateral mid, core needle biopsy:  - Prostatic adenocarcinoma, [place] score 5+4=9, ISUP grade group 5, involving 99% (12mm) of 1 of 1 cores  6. Prostate gland, right lateral apex, core needle biopsy:  - Prostatic adenocarcinoma, [place] score 5+4=9, ISUP grade group 5, involving 99% (12mm) of 1 of 1 cores  No pain , no weight loss   Past Medical History:   Diagnosis Date    Arthritis     Cataract 2009    had surgery to have removed    Dental bridge present     LOWER PARTIAL    DVT (deep venous thrombosis) 2014    right le after thr    Prostate cancer 05/2019    Wears glasses      Tolerating ambien only prn insomnia    Current Outpatient Medications:     multivitamin (ONE DAILY MULTIVITAMIN) per tablet, Take 1 tablet by mouth once daily., Disp: , Rfl:     zolpidem (AMBIEN) 5 MG Tab, Take 1 tablet (5 mg total) by mouth nightly as needed., Disp: 30 tablet, Rfl: 3  Review of patient's allergies indicates:  No Known Allergies    Family History   Problem Relation Age of  "Onset    Stroke Mother     Bladder Cancer Father        CONSTITUTIONAL: No fevers, chills, night sweats, wt. loss, appetite changes  SKIN: no rashes or itching  ENT: No headaches, head trauma, vision changes, or eye pain  LYMPH NODES: None noticed   CV: No chest pain, palpitations.   RESP: No dyspnea on exertion, cough, wheezing, or hemoptysis  GI: No nausea, emesis, diarrhea, constipation, melena, hematochezia, pain.   : No dysuria, hematuria, urgency, or frequency   HEME: No easy bruising, bleeding problems  PSYCHIATRIC: No depression, anxiety, psychosis, hallucinations.  NEURO: No seizures, memory loss, dizziness or difficulty sleeping  MSK: No arthralgias or joint swelling         BP (!) 163/73   Pulse 67   Temp 97.7 °F (36.5 °C)   Resp 20   Ht 5' 9" (1.753 m)   Wt 107.6 kg (237 lb 3.4 oz)   SpO2 98%   BMI 35.03 kg/m²   Gen: NAD, A and O x3,   Psych: pleasant affect, normal thought process  Eyes: Pupils round and non dilated, EOM intact  Nose: Nares patent  OP clear, mucosa patent  Neck: suppple, no JVD, trachea midline, no palpable mass, no adenopathy  Lungs: CTAB, no wheezes, no use of accessory muscles  CV: S1S2 with RRR, No mrg  Abd: soft, NTND, + BS, No HSM, no ascites  Extr: No CCE, MAX, strength normal, good capillary refill  Neuro: steady gait, CNs grossly intact  Skin: intact, no lesions noted  Rheum: No joint swelling    Lab Results   Component Value Date    WBC 5.83 03/26/2019    HGB 12.7 (L) 03/26/2019    HCT 38.9 (L) 03/26/2019    MCV 98 03/26/2019     03/26/2019     CMP  Sodium   Date Value Ref Range Status   03/26/2019 139 136 - 145 mmol/L Final     Potassium   Date Value Ref Range Status   03/26/2019 4.2 3.5 - 5.1 mmol/L Final     Chloride   Date Value Ref Range Status   03/26/2019 106 95 - 110 mmol/L Final     CO2   Date Value Ref Range Status   03/26/2019 24 23 - 29 mmol/L Final     Glucose   Date Value Ref Range Status   03/26/2019 130 (H) 70 - 110 mg/dL Final     BUN, Bld "   Date Value Ref Range Status   03/26/2019 21 8 - 23 mg/dL Final     Creatinine   Date Value Ref Range Status   03/26/2019 1.4 0.5 - 1.4 mg/dL Final     Calcium   Date Value Ref Range Status   03/26/2019 8.7 8.7 - 10.5 mg/dL Final     Total Protein   Date Value Ref Range Status   03/26/2019 7.1 6.0 - 8.4 g/dL Final     Albumin   Date Value Ref Range Status   03/26/2019 3.7 3.5 - 5.2 g/dL Final     Total Bilirubin   Date Value Ref Range Status   03/26/2019 0.4 0.1 - 1.0 mg/dL Final     Comment:     For infants and newborns, interpretation of results should be based  on gestational age, weight and in agreement with clinical  observations.  Premature Infant recommended reference ranges:  Up to 24 hours.............<8.0 mg/dL  Up to 48 hours............<12.0 mg/dL  3-5 days..................<15.0 mg/dL  6-29 days.................<15.0 mg/dL       Alkaline Phosphatase   Date Value Ref Range Status   03/26/2019 57 55 - 135 U/L Final     AST   Date Value Ref Range Status   03/26/2019 19 10 - 40 U/L Final     ALT   Date Value Ref Range Status   03/26/2019 17 10 - 44 U/L Final     Anion Gap   Date Value Ref Range Status   03/26/2019 9 8 - 16 mmol/L Final     eGFR if    Date Value Ref Range Status   03/26/2019 53.0 (A) >60 mL/min/1.73 m^2 Final     eGFR if non    Date Value Ref Range Status   03/26/2019 45.8 (A) >60 mL/min/1.73 m^2 Final     Comment:     Calculation used to obtain the estimated glomerular filtration  rate (eGFR) is the CKD-EPI equation.                     Last Resulted: 05/14/19 10:14 Order Details View Encounter Lab and Collection Details Routing Result History            External Result Report     External Result Report   Narrative     EXAMINATION:  CT ABDOMEN PELVIS WITHOUT CONTRAST    CLINICAL HISTORY:  Neoplasm: prostate, staging; Neoplasm of unspecified behavior of other genitourinary organ    TECHNIQUE:  Low dose axial images, sagittal and coronal reformations were  obtained from the lung bases to the pubic symphysis.  Oral contrast was not administered.    COMPARISON:  None    FINDINGS:  The liver, spleen, pancreas, and adrenal glands are unremarkable.  There is a 12 mm calcified stone within the gallbladder.  No biliary dilatation.  The adrenal glands are normal.    There is a small hiatal hernia.  A debris containing 3.6 cm diverticulum of the transverse limb of the duodenum is present.  A few small bowel diverticula are also present proximally.  The small bowel is nondilated.  A normal appendix is present.  There is severe diverticulosis coli.  There is a 10 cm segment proximal to mid sigmoid colon which demonstrates diffuse mural thickening, without accompanying pericolonic fat stranding.    There is moderate calcification of the aorta without aneurysm.  Calcification of the coronary arteries is also present.    There is no nephroureterolithiasis or hydronephrosis.  There is a 10 mm moderately hyperdense finding of the upper pole of the right kidney which is indeterminate.    The urinary bladder and low pelvis are largely obscured by beam hardening artifact from bilateral total hip arthroplasties.  The region of the prostate gland is not well evaluated.  No enlarged lymph nodes are identified.    There are no suspicious osseous lesions.   Impression       No evidence for abdominopelvic metastatic disease.    Severe diverticulosis, with moderate segments sigmoid mural thickening favoring chronic diverticulitis.  Consideration for colonoscopy is suggested to exclude a mass, if the patient has not recently undergone colonoscopy.    Small indeterminate right renal lesion.  Consider further evaluation with renal ultrasound.    Cholelithiasis.  Bilateral total hip arthroplasties.  Atherosclerosis.      Electronically signed by: Ortiz Antunez MD  Date: 05/14/2019  Time: 10:14    Encounter     View Encounter               Alfredo Mccoy MD 6/7/2019       Narrative      CLINICAL INFORMATION:  Male patient, 85 years old, with a history of prostate cancer diagnosed 5/2019  here for initial staging. Elevated PSA levels.  Patient presents for staging. Primary malignant neoplasm of prostate    COMPARISON:  None available.    TECHNIQUE:  The blood glucose prior to injection was 135 mg/dl. Images were acquired from  the vertex of the skull through the mid thighs. approximately 45-60 minutes  post injection of 12.0 mCi F-18 FDG into the right antecubital fossa. Dilute  oral contrast was given. Axial, coronal and sagittal PET reconstructions with  and without attenuation correction were interpreted.  Corresponding CT images  were acquired and reviewed alongside the PET images. The low dose unenhanced CT  images were used for attenuation correction and anatomic correlation only.    FINDINGS:  The prostate is obscured by beam hardening attenuation artifact from the hip  arthroplasties. No FDG avid adenopathy or evidence of metastatic disease seen.    Tracer distribution is otherwise physiologic.    Additional findings:  - Right shoulder arthroplasty and bilateral total hip arthroplasties with beam  hardening attenuation artifact (obscuring PET/CT findings in these regions).  -Degenerative changes are seen in the spine with no aggressive FDG avid lytic  or blastic lesion identified. There is dextroscoliosis in the lumbar spine.  - Scattered coronary arterial calcifications and atheromatous calcifications in  the aorta with no aneurysm identified.  - Small stones seen in the gallbladder without evidence of acute cholecystitis.  - Marked diffuse diverticulosis without evidence of acute diverticulitis.  - The bladder is partially decompressed. That being said, there is mild  circumferential bladder wall thickening. In the appropriate clinical setting  this may relate to a mild degree of infection or inflammation; consider  correlation with urinalysis.    IMPRESSION:  No evidence of FDG avid  malignancy.                   Bladder wall thickening  -     CBC auto differential; Standing  -     CMP; Future; Expected date: 06/18/2019  -     High sensitivity CRP (Cardiac CRP); Future; Expected date: 06/18/2019  -     Sedimentation rate; Future; Expected date: 06/18/2019    Other specified health status   -     High sensitivity CRP (Cardiac CRP); Future; Expected date: 06/18/2019    Elevated PSA    Prostate cancer        psa diagnostic elevated   Bladder thickening on imaging studies  Sigmoid thickening  Cont trelstar   He could have a colonoscopy but PET CT indicates no diverticulitis   Concerning for possible underlying infectious process   Although elevated PSA no indication of mets   May need cytoscopy by DR Blas or trial of antibiotics for cystitis  Thank you for allowing me to evaluate and participate in the care of this pleasant patient. Please fell free to call me with any questions or concerns.    [place],  Chyna Dwyer MD    This note was dictated with Dragon and briefly proofread. Please excuse any sentences that may be unclear or words misspelled

## 2019-06-19 ENCOUNTER — TELEPHONE (OUTPATIENT)
Dept: HEMATOLOGY/ONCOLOGY | Facility: CLINIC | Age: 84
End: 2019-06-19

## 2019-06-19 ENCOUNTER — PATIENT MESSAGE (OUTPATIENT)
Dept: HEMATOLOGY/ONCOLOGY | Facility: CLINIC | Age: 84
End: 2019-06-19

## 2019-06-19 ENCOUNTER — LAB VISIT (OUTPATIENT)
Dept: LAB | Facility: HOSPITAL | Age: 84
End: 2019-06-19
Attending: INTERNAL MEDICINE
Payer: MEDICARE

## 2019-06-19 DIAGNOSIS — Z78.9 OTHER SPECIFIED HEALTH STATUS: ICD-10-CM

## 2019-06-19 DIAGNOSIS — Z12.5 ENCOUNTER FOR SCREENING FOR MALIGNANT NEOPLASM OF PROSTATE: ICD-10-CM

## 2019-06-19 DIAGNOSIS — N32.89 BLADDER WALL THICKENING: ICD-10-CM

## 2019-06-19 DIAGNOSIS — C61 PROSTATE CANCER: Primary | ICD-10-CM

## 2019-06-19 LAB
ALBUMIN SERPL BCP-MCNC: 4.1 G/DL (ref 3.5–5.2)
ALP SERPL-CCNC: 47 U/L (ref 55–135)
ALT SERPL W/O P-5'-P-CCNC: 15 U/L (ref 10–44)
ANION GAP SERPL CALC-SCNC: 9 MMOL/L (ref 8–16)
AST SERPL-CCNC: 19 U/L (ref 10–40)
BASOPHILS # BLD AUTO: 0.01 K/UL (ref 0–0.2)
BASOPHILS NFR BLD: 0.2 % (ref 0–1.9)
BILIRUB SERPL-MCNC: 0.6 MG/DL (ref 0.1–1)
BUN SERPL-MCNC: 34 MG/DL (ref 8–23)
CALCIUM SERPL-MCNC: 8.8 MG/DL (ref 8.7–10.5)
CHLORIDE SERPL-SCNC: 104 MMOL/L (ref 95–110)
CO2 SERPL-SCNC: 23 MMOL/L (ref 23–29)
CREAT SERPL-MCNC: 1.4 MG/DL (ref 0.5–1.4)
CRP SERPL-MCNC: 1.38 MG/L (ref 0–3.19)
DIFFERENTIAL METHOD: ABNORMAL
EOSINOPHIL # BLD AUTO: 0 K/UL (ref 0–0.5)
EOSINOPHIL NFR BLD: 0.4 % (ref 0–8)
ERYTHROCYTE [DISTWIDTH] IN BLOOD BY AUTOMATED COUNT: 12.6 % (ref 11.5–14.5)
ERYTHROCYTE [SEDIMENTATION RATE] IN BLOOD BY WESTERGREN METHOD: 8 MM/HR (ref 0–10)
EST. GFR  (AFRICAN AMERICAN): 52.6 ML/MIN/1.73 M^2
EST. GFR  (NON AFRICAN AMERICAN): 45.5 ML/MIN/1.73 M^2
GLUCOSE SERPL-MCNC: 182 MG/DL (ref 70–110)
HCT VFR BLD AUTO: 39.7 % (ref 40–54)
HGB BLD-MCNC: 13 G/DL (ref 14–18)
IMM GRANULOCYTES # BLD AUTO: 0.01 K/UL (ref 0–0.04)
IMM GRANULOCYTES NFR BLD AUTO: 0.2 % (ref 0–0.5)
LYMPHOCYTES # BLD AUTO: 1.2 K/UL (ref 1–4.8)
LYMPHOCYTES NFR BLD: 22.4 % (ref 18–48)
MCH RBC QN AUTO: 31.9 PG (ref 27–31)
MCHC RBC AUTO-ENTMCNC: 32.7 G/DL (ref 32–36)
MCV RBC AUTO: 97 FL (ref 82–98)
MONOCYTES # BLD AUTO: 0.3 K/UL (ref 0.3–1)
MONOCYTES NFR BLD: 5.3 % (ref 4–15)
NEUTROPHILS # BLD AUTO: 3.9 K/UL (ref 1.8–7.7)
NEUTROPHILS NFR BLD: 71.5 % (ref 38–73)
NRBC BLD-RTO: 0 /100 WBC
PLATELET # BLD AUTO: 188 K/UL (ref 150–350)
PMV BLD AUTO: 9.5 FL (ref 9.2–12.9)
POTASSIUM SERPL-SCNC: 4.6 MMOL/L (ref 3.5–5.1)
PROT SERPL-MCNC: 7.3 G/DL (ref 6–8.4)
RBC # BLD AUTO: 4.08 M/UL (ref 4.6–6.2)
SODIUM SERPL-SCNC: 136 MMOL/L (ref 136–145)
WBC # BLD AUTO: 5.49 K/UL (ref 3.9–12.7)

## 2019-06-19 PROCEDURE — 80053 COMPREHEN METABOLIC PANEL: CPT

## 2019-06-19 PROCEDURE — 86141 C-REACTIVE PROTEIN HS: CPT

## 2019-06-19 PROCEDURE — 36415 COLL VENOUS BLD VENIPUNCTURE: CPT

## 2019-06-19 PROCEDURE — 85025 COMPLETE CBC W/AUTO DIFF WBC: CPT

## 2019-06-19 PROCEDURE — 85651 RBC SED RATE NONAUTOMATED: CPT

## 2019-06-19 NOTE — TELEPHONE ENCOUNTER
Spoke to pt to inform him to go to pharmacy and  antibiotic called in by dr farooq and see her back in clinic in 7 days per dr farooq. Pt confirmed apt date and time scheduled and verbalized understanding.

## 2019-06-25 ENCOUNTER — TELEPHONE (OUTPATIENT)
Dept: HEMATOLOGY/ONCOLOGY | Facility: CLINIC | Age: 84
End: 2019-06-25

## 2019-06-25 ENCOUNTER — PATIENT MESSAGE (OUTPATIENT)
Dept: HEMATOLOGY/ONCOLOGY | Facility: CLINIC | Age: 84
End: 2019-06-25

## 2019-06-25 NOTE — TELEPHONE ENCOUNTER
----- Message from Kwaku Steele MA sent at 6/25/2019 10:33 AM CDT -----  Contact: Pt  Pt would like to be called back regarding the test and doctor appt.    Pt can be reached at [phone].      Thanks

## 2019-06-26 ENCOUNTER — LAB VISIT (OUTPATIENT)
Dept: LAB | Facility: HOSPITAL | Age: 84
End: 2019-06-26
Attending: INTERNAL MEDICINE
Payer: MEDICARE

## 2019-06-26 DIAGNOSIS — N32.89 BLADDER WALL THICKENING: ICD-10-CM

## 2019-06-26 DIAGNOSIS — C61 PROSTATE CANCER: ICD-10-CM

## 2019-06-26 DIAGNOSIS — Z12.5 ENCOUNTER FOR SCREENING FOR MALIGNANT NEOPLASM OF PROSTATE: ICD-10-CM

## 2019-06-26 LAB
BASOPHILS # BLD AUTO: 0.04 K/UL (ref 0–0.2)
BASOPHILS NFR BLD: 0.7 % (ref 0–1.9)
COMPLEXED PSA SERPL-MCNC: 6 NG/ML (ref 0–4)
DIFFERENTIAL METHOD: ABNORMAL
EOSINOPHIL # BLD AUTO: 0.1 K/UL (ref 0–0.5)
EOSINOPHIL NFR BLD: 1.5 % (ref 0–8)
ERYTHROCYTE [DISTWIDTH] IN BLOOD BY AUTOMATED COUNT: 12.6 % (ref 11.5–14.5)
HCT VFR BLD AUTO: 39.5 % (ref 40–54)
HGB BLD-MCNC: 12.7 G/DL (ref 14–18)
IMM GRANULOCYTES # BLD AUTO: 0.01 K/UL (ref 0–0.04)
IMM GRANULOCYTES NFR BLD AUTO: 0.2 % (ref 0–0.5)
LYMPHOCYTES # BLD AUTO: 1.7 K/UL (ref 1–4.8)
LYMPHOCYTES NFR BLD: 31.2 % (ref 18–48)
MCH RBC QN AUTO: 31.5 PG (ref 27–31)
MCHC RBC AUTO-ENTMCNC: 32.2 G/DL (ref 32–36)
MCV RBC AUTO: 98 FL (ref 82–98)
MONOCYTES # BLD AUTO: 0.4 K/UL (ref 0.3–1)
MONOCYTES NFR BLD: 7.2 % (ref 4–15)
NEUTROPHILS # BLD AUTO: 3.2 K/UL (ref 1.8–7.7)
NEUTROPHILS NFR BLD: 59.2 % (ref 38–73)
NRBC BLD-RTO: 0 /100 WBC
PLATELET # BLD AUTO: 166 K/UL (ref 150–350)
PMV BLD AUTO: 9.1 FL (ref 9.2–12.9)
RBC # BLD AUTO: 4.03 M/UL (ref 4.6–6.2)
WBC # BLD AUTO: 5.45 K/UL (ref 3.9–12.7)

## 2019-06-26 PROCEDURE — 85025 COMPLETE CBC W/AUTO DIFF WBC: CPT

## 2019-06-26 PROCEDURE — 36415 COLL VENOUS BLD VENIPUNCTURE: CPT

## 2019-06-26 PROCEDURE — 84153 ASSAY OF PSA TOTAL: CPT

## 2019-06-27 ENCOUNTER — OFFICE VISIT (OUTPATIENT)
Dept: HEMATOLOGY/ONCOLOGY | Facility: CLINIC | Age: 84
End: 2019-06-27
Payer: MEDICARE

## 2019-06-27 ENCOUNTER — TELEPHONE (OUTPATIENT)
Dept: HEMATOLOGY/ONCOLOGY | Facility: CLINIC | Age: 84
End: 2019-06-27

## 2019-06-27 VITALS
BODY MASS INDEX: 34.32 KG/M2 | RESPIRATION RATE: 20 BRPM | DIASTOLIC BLOOD PRESSURE: 81 MMHG | HEIGHT: 69 IN | SYSTOLIC BLOOD PRESSURE: 188 MMHG | HEART RATE: 64 BPM | TEMPERATURE: 99 F | WEIGHT: 231.69 LBS

## 2019-06-27 DIAGNOSIS — N18.30 CKD (CHRONIC KIDNEY DISEASE) STAGE 3, GFR 30-59 ML/MIN: ICD-10-CM

## 2019-06-27 DIAGNOSIS — N18.9 ANEMIA IN CHRONIC KIDNEY DISEASE, UNSPECIFIED CKD STAGE: ICD-10-CM

## 2019-06-27 DIAGNOSIS — D63.1 ANEMIA IN CHRONIC KIDNEY DISEASE, UNSPECIFIED CKD STAGE: ICD-10-CM

## 2019-06-27 DIAGNOSIS — C61 PROSTATE CANCER: Primary | ICD-10-CM

## 2019-06-27 PROCEDURE — 99999 PR PBB SHADOW E&M-EST. PATIENT-LVL III: ICD-10-PCS | Mod: PBBFAC,,, | Performed by: INTERNAL MEDICINE

## 2019-06-27 PROCEDURE — 1101F PT FALLS ASSESS-DOCD LE1/YR: CPT | Mod: CPTII,S$GLB,, | Performed by: INTERNAL MEDICINE

## 2019-06-27 PROCEDURE — 3288F FALL RISK ASSESSMENT DOCD: CPT | Mod: CPTII,S$GLB,, | Performed by: INTERNAL MEDICINE

## 2019-06-27 PROCEDURE — 99214 PR OFFICE/OUTPT VISIT, EST, LEVL IV, 30-39 MIN: ICD-10-PCS | Mod: S$GLB,,, | Performed by: INTERNAL MEDICINE

## 2019-06-27 PROCEDURE — 3288F PR FALLS RISK ASSESSMENT DOCUMENTED: ICD-10-PCS | Mod: CPTII,S$GLB,, | Performed by: INTERNAL MEDICINE

## 2019-06-27 PROCEDURE — 1101F PR PT FALLS ASSESS DOC 0-1 FALLS W/OUT INJ PAST YR: ICD-10-PCS | Mod: CPTII,S$GLB,, | Performed by: INTERNAL MEDICINE

## 2019-06-27 PROCEDURE — 99214 OFFICE O/P EST MOD 30 MIN: CPT | Mod: S$GLB,,, | Performed by: INTERNAL MEDICINE

## 2019-06-27 PROCEDURE — 99999 PR PBB SHADOW E&M-EST. PATIENT-LVL III: CPT | Mod: PBBFAC,,, | Performed by: INTERNAL MEDICINE

## 2019-06-27 NOTE — PROGRESS NOTES
CC : I finished the levaquin    Anthony Sheldon is a 85 y.o.  Pt referred with prostate carcinoma Elise 9. Scans reveal no evidence of bone mets   His psa is 26 here to review pet ct scan : no evidence of disseminated disease   Entire chart reviewed    March 27 2019  FINAL PATHOLOGIC DIAGNOSIS  1. Prostate gland, right base, core needle biopsy:  - Prostatic adenocarcinoma, [place] score 5+4=9, ISUP grade group 5, involving 85% (9mm) of 1 of 1 cores  2. Prostate gland, right middle, core needle biopsy:  - Prostatic adenocarcinoma, [place] score 5+4=9, ISUP grade group 5, involving 95% (11mm) of 1 of 1 cores  3. Prostate gland, right apex, core needle biopsy:  - Prostatic adenocarcinoma, [place] score 5+4=9, ISUP grade group 5, involving 99% (12mm) of 1 of 1 cores  4. Prostate gland, right lateral base, core needle biopsy:  - Prostatic adenocarcinoma, [place] score 5+4=9, ISUP grade group 5, involving 90% (7mm) of 1 of 1 cores  5. Prostate gland, right lateral mid, core needle biopsy:  - Prostatic adenocarcinoma, [place] score 5+4=9, ISUP grade group 5, involving 99% (12mm) of 1 of 1 cores  6. Prostate gland, right lateral apex, core needle biopsy:  - Prostatic adenocarcinoma, [place] score 5+4=9, ISUP grade group 5, involving 99% (12mm) of 1 of 1 cores  No pain , no weight loss     Pt finished a course of levaquin for presumed cystitis Here to recheck PSA yet test was drawn wrong : he was to have a diagnostic PSA   He has no fever, no hematuria, no urinary hesitancy   He was to start casodex for prostate cancer     Past Medical History:   Diagnosis Date    Arthritis     Cataract 2009    had surgery to have removed    Dental bridge present     LOWER PARTIAL    DVT (deep venous thrombosis) 2014    right le after thr    Prostate cancer 05/2019    Wears glasses      Tolerating ambien only prn insomnia    Current Outpatient Medications:     bicalutamide (CASODEX) 50 MG Tab, Take 1 tablet (50 mg total) by  "mouth once daily., Disp: 30 tablet, Rfl: 6    levoFLOXacin (LEVAQUIN) 500 MG tablet, Take 1 tablet (500 mg total) by mouth once daily. For bladder infection, Disp: 10 tablet, Rfl: 0    multivitamin (ONE DAILY MULTIVITAMIN) per tablet, Take 1 tablet by mouth once daily., Disp: , Rfl:     zolpidem (AMBIEN) 5 MG Tab, Take 1 tablet (5 mg total) by mouth nightly as needed., Disp: 30 tablet, Rfl: 3  Review of patient's allergies indicates:  No Known Allergies    Family History   Problem Relation Age of Onset    Stroke Mother     Bladder Cancer Father        CONSTITUTIONAL: No fevers, chills, night sweats, wt. loss, appetite changes  SKIN: no rashes or itching  ENT: No headaches, head trauma, vision changes, or eye pain  LYMPH NODES: None noticed   CV: No chest pain, palpitations.   RESP: No dyspnea on exertion, cough, wheezing, or hemoptysis  GI: No nausea, emesis, diarrhea, constipation, melena, hematochezia, pain.   : No dysuria, hematuria, urgency, or frequency   HEME: No easy bruising, bleeding problems  PSYCHIATRIC: No depression, anxiety, psychosis, hallucinations.  NEURO: No seizures, memory loss, dizziness or difficulty sleeping  MSK: No arthralgias or joint swelling         BP (!) 188/81   Pulse 64   Temp 98.7 °F (37.1 °C) (Oral)   Resp 20   Ht 5' 9" (1.753 m)   Wt 105.1 kg (231 lb 11.3 oz)   BMI 34.22 kg/m²   Gen: NAD, A and O x3,   Psych: pleasant affect, normal thought process  Eyes: Pupils round and non dilated, EOM intact  Nose: Nares patent  OP clear, mucosa patent  Neck: suppple, no JVD, trachea midline, no palpable mass, no adenopathy  Lungs: CTAB, no wheezes, no use of accessory muscles  CV: S1S2 with RRR, No mrg  Abd: soft, NTND, + BS, No HSM, no ascites  Extr: No CCE, MAX, strength normal, good capillary refill  Neuro: steady gait, CNs grossly intact  Skin: intact, no lesions noted  Rheum: No joint swelling    Lab Results   Component Value Date    WBC 5.45 06/26/2019    HGB 12.7 (L) " 06/26/2019    HCT 39.5 (L) 06/26/2019    MCV 98 06/26/2019     06/26/2019     CMP  Sodium   Date Value Ref Range Status   06/19/2019 136 136 - 145 mmol/L Final     Potassium   Date Value Ref Range Status   06/19/2019 4.6 3.5 - 5.1 mmol/L Final     Chloride   Date Value Ref Range Status   06/19/2019 104 95 - 110 mmol/L Final     CO2   Date Value Ref Range Status   06/19/2019 23 23 - 29 mmol/L Final     Glucose   Date Value Ref Range Status   06/19/2019 182 (H) 70 - 110 mg/dL Final     BUN, Bld   Date Value Ref Range Status   06/19/2019 34 (H) 8 - 23 mg/dL Final     Creatinine   Date Value Ref Range Status   06/19/2019 1.4 0.5 - 1.4 mg/dL Final     Calcium   Date Value Ref Range Status   06/19/2019 8.8 8.7 - 10.5 mg/dL Final     Total Protein   Date Value Ref Range Status   06/19/2019 7.3 6.0 - 8.4 g/dL Final     Albumin   Date Value Ref Range Status   06/19/2019 4.1 3.5 - 5.2 g/dL Final     Total Bilirubin   Date Value Ref Range Status   06/19/2019 0.6 0.1 - 1.0 mg/dL Final     Comment:     For infants and newborns, interpretation of results should be based  on gestational age, weight and in agreement with clinical  observations.  Premature Infant recommended reference ranges:  Up to 24 hours.............<8.0 mg/dL  Up to 48 hours............<12.0 mg/dL  3-5 days..................<15.0 mg/dL  6-29 days.................<15.0 mg/dL       Alkaline Phosphatase   Date Value Ref Range Status   06/19/2019 47 (L) 55 - 135 U/L Final     AST   Date Value Ref Range Status   06/19/2019 19 10 - 40 U/L Final     ALT   Date Value Ref Range Status   06/19/2019 15 10 - 44 U/L Final     Anion Gap   Date Value Ref Range Status   06/19/2019 9 8 - 16 mmol/L Final     eGFR if    Date Value Ref Range Status   06/19/2019 52.6 (A) >60 mL/min/1.73 m^2 Final     eGFR if non    Date Value Ref Range Status   06/19/2019 45.5 (A) >60 mL/min/1.73 m^2 Final     Comment:     Calculation used to obtain the  estimated glomerular filtration  rate (eGFR) is the CKD-EPI equation.                     Last Resulted: 05/14/19 10:14 Order Details View Encounter Lab and Collection Details Routing Result History            External Result Report     External Result Report   Narrative     EXAMINATION:  CT ABDOMEN PELVIS WITHOUT CONTRAST    CLINICAL HISTORY:  Neoplasm: prostate, staging; Neoplasm of unspecified behavior of other genitourinary organ    TECHNIQUE:  Low dose axial images, sagittal and coronal reformations were obtained from the lung bases to the pubic symphysis.  Oral contrast was not administered.    COMPARISON:  None    FINDINGS:  The liver, spleen, pancreas, and adrenal glands are unremarkable.  There is a 12 mm calcified stone within the gallbladder.  No biliary dilatation.  The adrenal glands are normal.    There is a small hiatal hernia.  A debris containing 3.6 cm diverticulum of the transverse limb of the duodenum is present.  A few small bowel diverticula are also present proximally.  The small bowel is nondilated.  A normal appendix is present.  There is severe diverticulosis coli.  There is a 10 cm segment proximal to mid sigmoid colon which demonstrates diffuse mural thickening, without accompanying pericolonic fat stranding.    There is moderate calcification of the aorta without aneurysm.  Calcification of the coronary arteries is also present.    There is no nephroureterolithiasis or hydronephrosis.  There is a 10 mm moderately hyperdense finding of the upper pole of the right kidney which is indeterminate.    The urinary bladder and low pelvis are largely obscured by beam hardening artifact from bilateral total hip arthroplasties.  The region of the prostate gland is not well evaluated.  No enlarged lymph nodes are identified.    There are no suspicious osseous lesions.   Impression       No evidence for abdominopelvic metastatic disease.    Severe diverticulosis, with moderate segments sigmoid mural  thickening favoring chronic diverticulitis.  Consideration for colonoscopy is suggested to exclude a mass, if the patient has not recently undergone colonoscopy.    Small indeterminate right renal lesion.  Consider further evaluation with renal ultrasound.    Cholelithiasis.  Bilateral total hip arthroplasties.  Atherosclerosis.      Electronically signed by: Ortiz Antunez MD  Date: 05/14/2019  Time: 10:14    Encounter     View Encounter               Alfredo Mccoy MD 6/7/2019       Narrative     CLINICAL INFORMATION:  Male patient, 85 years old, with a history of prostate cancer diagnosed 5/2019  here for initial staging. Elevated PSA levels.  Patient presents for staging. Primary malignant neoplasm of prostate    COMPARISON:  None available.    TECHNIQUE:  The blood glucose prior to injection was 135 mg/dl. Images were acquired from  the vertex of the skull through the mid thighs. approximately 45-60 minutes  post injection of 12.0 mCi F-18 FDG into the right antecubital fossa. Dilute  oral contrast was given. Axial, coronal and sagittal PET reconstructions with  and without attenuation correction were interpreted.  Corresponding CT images  were acquired and reviewed alongside the PET images. The low dose unenhanced CT  images were used for attenuation correction and anatomic correlation only.    FINDINGS:  The prostate is obscured by beam hardening attenuation artifact from the hip  arthroplasties. No FDG avid adenopathy or evidence of metastatic disease seen.    Tracer distribution is otherwise physiologic.    Additional findings:  - Right shoulder arthroplasty and bilateral total hip arthroplasties with beam  hardening attenuation artifact (obscuring PET/CT findings in these regions).  -Degenerative changes are seen in the spine with no aggressive FDG avid lytic  or blastic lesion identified. There is dextroscoliosis in the lumbar spine.  - Scattered coronary arterial calcifications and atheromatous  calcifications in  the aorta with no aneurysm identified.  - Small stones seen in the gallbladder without evidence of acute cholecystitis.  - Marked diffuse diverticulosis without evidence of acute diverticulitis.  - The bladder is partially decompressed. That being said, there is mild  circumferential bladder wall thickening. In the appropriate clinical setting  this may relate to a mild degree of infection or inflammation; consider  correlation with urinalysis.    IMPRESSION:  No evidence of FDG avid malignancy.                   Prostate cancer    CKD (chronic kidney disease) stage 3, GFR 30-59 ml/min    Anemia in chronic kidney disease, unspecified CKD stage      psa diagnostic elevated recheck today after he completed a course of antibiotics   Bladder thickening on imaging studies: to DR Blas for scope   Sigmoid thickening with no diverticulitis : consider c scope   Cont trelstar and casodex   RTC 2 months with another diagnostic PSA   Given his age The data are misconstrued and IT is not necessarily recommended to cont to workup  With colonoscopy etc  I will leave this up to the pt  Call with psa results and further recs     Thank you for allowing me to evaluate and participate in the care of this pleasant patient. Please fell free to call me with any questions or concerns.    Chyna Ngo MD    This note was dictated with Dragon and briefly proofread. Please excuse any sentences that may be unclear or words misspelled

## 2019-06-28 ENCOUNTER — LAB VISIT (OUTPATIENT)
Dept: LAB | Facility: HOSPITAL | Age: 84
End: 2019-06-28
Attending: INTERNAL MEDICINE
Payer: MEDICARE

## 2019-06-28 DIAGNOSIS — C61 PROSTATE CANCER: ICD-10-CM

## 2019-06-28 LAB — COMPLEXED PSA SERPL-MCNC: 4.8 NG/ML (ref 0–4)

## 2019-06-28 PROCEDURE — 36415 COLL VENOUS BLD VENIPUNCTURE: CPT

## 2019-06-28 PROCEDURE — 84153 ASSAY OF PSA TOTAL: CPT

## 2019-07-01 ENCOUNTER — TELEPHONE (OUTPATIENT)
Dept: HEMATOLOGY/ONCOLOGY | Facility: CLINIC | Age: 84
End: 2019-07-01

## 2019-07-01 NOTE — TELEPHONE ENCOUNTER
Spoke to pt to inform him that per dr. farooq his lab results came back good and to keep his scheduled 2 month apt. Pt confirmed and verbalized understanding.

## 2019-07-09 ENCOUNTER — OFFICE VISIT (OUTPATIENT)
Dept: PAIN MEDICINE | Facility: CLINIC | Age: 84
End: 2019-07-09
Payer: MEDICARE

## 2019-07-09 VITALS
RESPIRATION RATE: 16 BRPM | TEMPERATURE: 98 F | HEART RATE: 69 BPM | BODY MASS INDEX: 34.51 KG/M2 | SYSTOLIC BLOOD PRESSURE: 154 MMHG | HEIGHT: 69 IN | DIASTOLIC BLOOD PRESSURE: 84 MMHG | WEIGHT: 233 LBS

## 2019-07-09 DIAGNOSIS — G89.4 CHRONIC PAIN DISORDER: ICD-10-CM

## 2019-07-09 DIAGNOSIS — Z96.652 STATUS POST TOTAL LEFT KNEE REPLACEMENT: ICD-10-CM

## 2019-07-09 DIAGNOSIS — M51.36 DDD (DEGENERATIVE DISC DISEASE), LUMBAR: ICD-10-CM

## 2019-07-09 DIAGNOSIS — G89.29 CHRONIC PAIN OF LEFT KNEE: Primary | ICD-10-CM

## 2019-07-09 DIAGNOSIS — M25.562 CHRONIC PAIN OF LEFT KNEE: Primary | ICD-10-CM

## 2019-07-09 DIAGNOSIS — M54.16 LUMBAR RADICULITIS: ICD-10-CM

## 2019-07-09 PROCEDURE — 1101F PT FALLS ASSESS-DOCD LE1/YR: CPT | Mod: CPTII,S$GLB,, | Performed by: ANESTHESIOLOGY

## 2019-07-09 PROCEDURE — 99214 OFFICE O/P EST MOD 30 MIN: CPT | Mod: S$GLB,,, | Performed by: ANESTHESIOLOGY

## 2019-07-09 PROCEDURE — 99999 PR PBB SHADOW E&M-EST. PATIENT-LVL III: ICD-10-PCS | Mod: PBBFAC,,, | Performed by: ANESTHESIOLOGY

## 2019-07-09 PROCEDURE — 99999 PR PBB SHADOW E&M-EST. PATIENT-LVL III: CPT | Mod: PBBFAC,,, | Performed by: ANESTHESIOLOGY

## 2019-07-09 PROCEDURE — 1101F PR PT FALLS ASSESS DOC 0-1 FALLS W/OUT INJ PAST YR: ICD-10-PCS | Mod: CPTII,S$GLB,, | Performed by: ANESTHESIOLOGY

## 2019-07-09 PROCEDURE — 99214 PR OFFICE/OUTPT VISIT, EST, LEVL IV, 30-39 MIN: ICD-10-PCS | Mod: S$GLB,,, | Performed by: ANESTHESIOLOGY

## 2019-07-09 RX ORDER — DICLOFENAC SODIUM 75 MG/1
75 TABLET, DELAYED RELEASE ORAL 2 TIMES DAILY PRN
Qty: 180 TABLET | Refills: 3 | Status: SHIPPED | OUTPATIENT
Start: 2019-07-09 | End: 2020-04-22

## 2019-07-09 RX ORDER — DICLOFENAC SODIUM 50 MG/1
50 TABLET, DELAYED RELEASE ORAL 2 TIMES DAILY
COMMUNITY
End: 2019-07-09

## 2019-07-09 NOTE — PATIENT INSTRUCTIONS
We are going to increase the diclofenac 75 mg tablets to twice daily as needed for your pain.  If this is not helpful, we will start a medication known as gabapentin at bedtime.        Gabapentin capsules or tablets  What is this medicine?  GABAPENTIN (GA ba pen tin) is used to control partial seizures in adults with epilepsy. It is also used to treat certain types of nerve pain.  How should I use this medicine?  Take this medicine by mouth with a glass of water. Follow the directions on the prescription label. You can take it with or without food. If it upsets your stomach, take it with food.Take your medicine at regular intervals. Do not take it more often than directed. Do not stop taking except on your doctor's advice.  If you are directed to break the 600 or 800 mg tablets in half as part of your dose, the extra half tablet should be used for the next dose. If you have not used the extra half tablet within 28 days, it should be thrown away.  A special MedGuide will be given to you by the pharmacist with each prescription and refill. Be sure to read this information carefully each time.  Talk to your pediatrician regarding the use of this medicine in children. Special care may be needed.  What side effects may I notice from receiving this medicine?  Side effects that you should report to your doctor or health care professional as soon as possible:  · allergic reactions like skin rash, itching or hives, swelling of the face, lips, or tongue  · worsening of mood, thoughts or actions of suicide or dying  Side effects that usually do not require medical attention (report to your doctor or health care professional if they continue or are bothersome):  · constipation  · difficulty walking or controlling muscle movements  · dizziness  · nausea  · slurred speech  · tiredness  · tremors  · weight gain  What may interact with this medicine?  Do not take this medicine with any of the following medications:  · other  gabapentin products  This medicine may also interact with the following medications:  · alcohol  · antacids  · antihistamines for allergy, cough and cold  · certain medicines for anxiety or sleep  · certain medicines for depression or psychotic disturbances  · homatropine; hydrocodone  · naproxen  · narcotic medicines (opiates) for pain  · phenothiazines like chlorpromazine, mesoridazine, prochlorperazine, thioridazine  What if I miss a dose?  If you miss a dose, take it as soon as you can. If it is almost time for your next dose, take only that dose. Do not take double or extra doses.  Where should I keep my medicine?  Keep out of reach of children.  This medicine may cause accidental overdose and death if it taken by other adults, children, or pets. Mix any unused medicine with a substance like cat litter or coffee grounds. Then throw the medicine away in a sealed container like a sealed bag or a coffee can with a lid. Do not use the medicine after the expiration date.  Store at room temperature between 15 and 30 degrees C (59 and 86 degrees F).  What should I tell my health care provider before I take this medicine?  They need to know if you have any of these conditions:  · kidney disease  · suicidal thoughts, plans, or attempt; a previous suicide attempt by you or a family member  · an unusual or allergic reaction to gabapentin, other medicines, foods, dyes, or preservatives  · pregnant or trying to get pregnant  · breast-feeding  What should I watch for while using this medicine?  Visit your doctor or health care professional for regular checks on your progress. You may want to keep a record at home of how you feel your condition is responding to treatment. You may want to share this information with your doctor or health care professional at each visit. You should contact your doctor or health care professional if your seizures get worse or if you have any new types of seizures. Do not stop taking this  medicine or any of your seizure medicines unless instructed by your doctor or health care professional. Stopping your medicine suddenly can increase your seizures or their severity.  Wear a medical identification bracelet or chain if you are taking this medicine for seizures, and carry a card that lists all your medications.  You may get drowsy, dizzy, or have blurred vision. Do not drive, use machinery, or do anything that needs mental alertness until you know how this medicine affects you. To reduce dizzy or fainting spells, do not sit or stand up quickly, especially if you are an older patient. Alcohol can increase drowsiness and dizziness. Avoid alcoholic drinks.  Your mouth may get dry. Chewing sugarless gum or sucking hard candy, and drinking plenty of water will help.  The use of this medicine may increase the chance of suicidal thoughts or actions. Pay special attention to how you are responding while on this medicine. Any worsening of mood, or thoughts of suicide or dying should be reported to your health care professional right away.  Women who become pregnant while using this medicine may enroll in the [place] Antiepileptic Drug Pregnancy Registry by calling 1-472.441.3787. This registry collects information about the safety of antiepileptic drug use during pregnancy.  NOTE:This sheet is a summary. It may not cover all possible information. If you have questions about this medicine, talk to your doctor, pharmacist, or health care provider. Copyright© 2017 [employer]

## 2019-07-09 NOTE — PROGRESS NOTES
Subjective:     Patient ID: Anthony Sheldon is a 85 y.o. male.    Chief Complaint: Pain    Consulted by: Self     Disclaimer: This note was generated using voice recognition software.  There may be a typographical errors that were missed during proofreading.      HPI:    Anthony Sheldon is a 85 y.o. male who presents today with left knee pain. He reports that the knee feels like a pressure sensation.  He is s/p left knee coolief in 5/2018 with resolution of his burning, stabbing pain, but this pressure pain never went away.   It is located on the lateral and posterior aspect of his knee.  This pain is described in detail below.    Of note, he received Euflexxa in the right knee with great benefit.    He also reports left-sided low back pain that is long-standing and worsening.  The pain is located in the left middle back and does not radiate.  The pain is worse with walking and with activities cause him to stand up straight.  It is better with rest.    Aggravating factors: Walking, the pain is worse in the morning    Mitigating factors: Coolief, motion    Previously seeing: Dr. Posadas, but patient lives here and would like to establish care here    Interval History (1/8/2019):  He returns today for follow up.  He reports that his low back pain is improving with the home exercises.  Tylenol has been helpful for the pain.    Interval History (6/17/2019):  He returns today for follow up.  He reports that he is having increased pain in his left foot.  This pain began around April of 2019 with no inciting event or injury.  He reports a sharp, stabbing pain on the top of his left foot that occurs when he is lying on his left side.  This resolves when he turns over to his back.  This is associated with some numbness in the same area.  No muscle weakness.  Diclofenac helps.    Interval History (7/9/2019):  He returns today for follow up.  He reports that the L5/S1 ILESI was helpful for the pain in his low back,  but he continues to have the left-sided foot pain. He is planning to increase the diclofenac from 1 tablet daily to 2 tablets daily to the coming days.  He would like to try this before trying any additional treatments..    Physical Therapy: Yes, after his surgery.  Continued pool exercises all summer.  He has been walking until recently when it got cold.  No stretches.    Non-pharmacologic Treatment:     · Ice/Heat: Ice after surgery  · TENS: Never  · Massage: Never   · Chiropractic care: Never  · Acupuncture: Never  · Other: No         Pain Medications:         · Currently taking: Tylenol 500 mg, 1-2 per dose (helpful), diclofenac 75 mg twice daily as needed (usually takes once daily)    · Has tried in the past:    · Opioids: tramadol  · NSAIDS: Naproxen 500 mg (some benefit, a couple of times a week),   · Tylenol: Takes 2-3 times per week  · Muscle relaxants: Never  · TCAs: Never  · SNRIs: Never  · Anticonvulsants: Never  · topical creams: Never  · Other: None    Blood thinners: None    Interventional Therapies:   · 5/18/2018: Left knee coolief radiofrequency:  75% relief  · Euflexxa in right knee 3/2018: Good benefit  · 06/17/2019:  L5/S1 interlaminar SARITHA:  50% benefit, mostly of low back pain    Relevant Surgeries:   · Left TKA    Affecting sleep? No    Affecting daily activities? No    Depressive symptoms? No          · SI/HI? No    Work status: No, retired    Prescription Monitoring Program database:  Reviewed and consistent with medication use as prescribed.    Last 3 PDI Scores 6/17/2019 1/8/2019 11/27/2018   Pain Disability Index (PDI) 13 8 4       Opioid Risk Score       Value Time User    Opioid Risk Score  0 11/27/2018 11:44 AM Olive Eugene LPN          GENERAL:  No weight loss, malaise or fevers.  HEENT:   No recent changes in vision or hearing  NECK:  Negative for lumps, no difficulty with swallowing.  RESPIRATORY:  Negative for cough, wheezing or shortness of breath, patient denies any recent  URI.  CARDIOVASCULAR:  Negative for chest pain, leg swelling or palpitations.  GI:  Negative for abdominal discomfort, blood in stools or black stools or change in bowel habits.  MUSCULOSKELETAL:  See HPI.  SKIN:  Negative for lesions, rash, and itching.  PSYCH:  No mood disorder or recent psychosocial stressors.    HEMATOLOGY/LYMPHOLOGY:  Negative for prolonged bleeding, bruising easily or swollen nodes.    ENDO: No history of diabetes or thyroid dysfunction  NEURO:   No history of headaches, syncope, paralysis, seizures or tremors.  All other reviewed and negative other than HPI.          Past Medical History:   Diagnosis Date    Arthritis     Cataract 2009    had surgery to have removed    Dental bridge present     LOWER PARTIAL    DVT (deep venous thrombosis) 2014    right le after thr    Prostate cancer 05/2019    Wears glasses        Past Surgical History:   Procedure Laterality Date    ARTHROPLASTY-KNEE Left 2/22/2017    [facility]    BIOPSY, WITH TRANSRECTAL US GUIDANCE Bilateral 3/27/2019    [facility]    CYSTOSCOPY N/A 3/27/2019    [facility]    Injection, FACET JOINT INJECTION LEFT L4-5 AND L5-S1 Left 1/21/2019    [facility] OR    Injection, Steroid, Epidural - L5/S1 EPIDURAL STEROID INJECTION N/A 6/17/2019    [facility]    JOINT REPLACEMENT      BILAT HIPS, RIGHT SHOULDER    KNEE ARTHROPLASTY Left     TONSILLECTOMY  1950    TOTAL HIP ARTHROPLASTY Bilateral 2013 & 2014    TOTAL SHOULDER ARTHROPLASTY Right 2005       Review of patient's allergies indicates:  No Known Allergies    Current Outpatient Medications   Medication Sig Dispense Refill    bicalutamide (CASODEX) 50 MG Tab Take 1 tablet (50 mg total) by mouth once daily. 30 tablet 6    levoFLOXacin (LEVAQUIN) 500 MG tablet Take 1 tablet (500 mg total) by mouth once daily.  For bladder infection 10 tablet 0    multivitamin (ONE DAILY MULTIVITAMIN) per tablet Take 1 tablet by mouth once daily.      zolpidem (AMBIEN) 5 MG Tab Take 1 tablet (5 mg total) by mouth nightly as needed. 30 tablet 3     No current facility-administered medications for this visit.        Family History   Problem Relation Age of Onset    Stroke Mother     Bladder Cancer Father        Social History     Socioeconomic History    Marital status:      Spouse name: Not on file    Number of children: Not on file    Years of education: Not on file    Highest education level: Not on file   Occupational History    Not on file   Social Needs    Financial resource strain: Not on file    Food insecurity:     Worry: Not on file     Inability: Not on file    Transportation needs:     Medical: Not on file     Non-medical: Not on file   Tobacco Use    Smoking status: Former Smoker     Types: Cigarettes    Smokeless tobacco: Former User     Quit date: 1/1/1990   Substance and Sexual Activity    Alcohol use: Yes     Alcohol/week: 0.6 oz     Types: 1 Glasses of wine per week     Comment: 1 drink daily    Drug use: No    Sexual activity: Not Currently   Lifestyle    Physical activity:     Days per week: Not on file     Minutes per session: Not on file    Stress: Not on file   Relationships    Social connections:     Talks on phone: Not on file     Gets together: Not on file     Attends Gnosticist service: Not on file     Active member of club or organization: Not on file     Attends meetings of clubs or organizations: Not on file     Relationship status: Not on file   Other Topics Concern    Not on file   Social History Narrative    Not on file       Objective:     There were no vitals filed for this visit.    GEN:  Well developed, well nourished.  No acute distress. No pain behavior.  HEENT:  No trauma.  Mucous membranes moist.  Nares patent bilaterally.  PSYCH: Normal affect. Thought content  appropriate.  CHEST:  Breathing symmetric.  No audible wheezing.  ABD:   · Soft, non-distended.    SKIN:  Warm, pink, dry.  No rash on exposed areas.    EXT:  No cyanosis, clubbing, or edema.  No color change or changes in nail or hair growth.  NEURO/MUSCULOSKELETAL:  Fully alert, oriented, and appropriate. Speech normal dionicio. No cranial nerve deficits.   Gait: Normal.         Previous physical exam:  Motor Strength: 5/5 motor strength throughout lower extremities.   Reflexes:  2+ and symmetric throughout.  Downgoing Babinski's bilaterally.  No clonus or spasticity.  L-Spine:  Decreased ROM with pain on flexion, extension. Positive facet loading on the left.  Negative SLR bilaterally.    SI Joint/Hip:  Negative TERESSA bilaterally.  Negative FADIR bilaterally.  Sensory:  Increased sensation in a left L4, L5, and S1 distribution  Negative TTP over lumbar paraspinals, bilateral SI joints, hips, piriformis muscles, or GTB.      Right knee  Inspection: No erythema, swelling, or redness  ROM: Full    Left knee  Inspection: No erythema, swelling, or redness  ROM: Decreased  Palpation: No pes anserine tenderness and positive crepitus. No patellar tendon tenderness and no patellofemoral tendon tenderness.  No instability on exam.      Imaging:        The imaging studies listed below were independently reviewed by me, and I agree with the findings as documented below.     Narrative     EXAMINATION:  XR LUMBAR SPINE 5 VIEW WITH FLEX AND EXT    CLINICAL HISTORY:  lumbar spondylosis;  Spondylosis without myelopathy or radiculopathy, lumbar region    TECHNIQUE:  Five views of the lumbar spine plus flexion extension views were performed.    COMPARISON:  None.    FINDINGS:  Mild thoracolumbar dextroscoliosis.  Lumbar vertebral bodies are normal alignment.  Mild superior endplate compression fracture of L2.  Prominent Schmorl's node formation of the L3 vertebral body.    Moderate multilevel degenerative disc disease.  Bilateral  "oblique views demonstrate moderate hypertrophic changes within the facet joints most predominant within the lower lumbar spine.    There is 2 mm retrolisthesis of L2 on L3 on the lateral extension view.  This reduces on the neutral and flexion lateral views.  Remaining lumbar vertebral bodies are in alignment.    The SI joints are intact.  There is bone demineralization.    Previous bilateral total hip arthroplasties.      Impression       1. Mild thoracolumbar dextroscoliosis.  2. Mild superior endplate compression fracture of L2.  If there is clinical concern for fracture acuity, further evaluation may be obtained with either nuclear medicine bone scan or an MRI of the lumbar spine.  3. Moderate multilevel degenerative disc disease.  4. Grade 1 retrolisthesis of L2 on L3 on extension which reduces on neutral and flexion views consistent with movement.  5. Bone demineralization.      Electronically signed by: Ortiz Riojas  Date: 11/29/2018  Time: 08:49       Narrative     XR KNEE ORTHO LEFT.  Clinical History provided for exam:"lt knee".  As on 2/22/17, there are surgical changes of left knee arthroplasty.  Position and appearance of the prosthesis are unchanged.  There is a left suprapatellar effusion.  There is moderate diffuse narrowing of the right knee joint.  On the right there are spurs on the distal femur, proximal tibia, and patella. No acute fracture or bony destructive process is seen.  Alignment is normal.      Impression      Surgical changes of left knee arthroplasty.      Electronically signed by: MCKAYLA ZAVALA MD  Date: 01/15/18  Time: 09:18        Narrative     EXAMINATION:  MRI LUMBAR SPINE WITHOUT CONTRAST    CLINICAL HISTORY:  Abnormal xray, lumbar spine, DJD; Abnormal findings on diagnostic imaging of other parts of musculoskeletal system    TECHNIQUE:  Multiplanar, multisequence MR images were acquired from the thoracolumbar junction to the sacrum without the administration of " contrast.    COMPARISON:  November 29, 2018 plain film    FINDINGS:  There is a dextroscoliotic curvature in the upper lumbar region.  There is diffuse disc desiccation and disc space base narrowing.  Superior endplate concavity is noted at L2 and Schmorl's node formation is noted in the superior endplate of L3 and inferior endplate of L2.  There is no evidence of marrow edema to suggest acute compression.  The conus terminates at the L1 level and has an unremarkable appearance.  There are minimal type 1 (edematous) degenerative endplate changes at L1/L2 and there are fatty degenerative endplate changes at L2/L3 and L3/L4.  The individual disc levels appears follows:    T12/L1: There is a left central disc extrusion causing distortion of the left anterolateral canal.  Annular disc bulge and osteophyte formation cause mild narrowing of both foramina.    L1/L2: There is annular disc bulging and mild degenerative facet disease with mild effacement of the anterior thecal sac.  There is left greater than right foraminal narrowing.    L2/L3: There is annular disc bulging with mild effacement of the anterior thecal sac and mild degenerative facet changes are noted bilaterally.  There is mild foraminal narrowing bilaterally.    L3/L4: Moderate degenerative facet changes are noted and there is no evidence of disc protrusion.  There is mild disc bulging into the left foramen.  There is mild narrowing the left foramen.    L4/L5: Annular disc bulge combines with facet and ligamentous hypertrophy to produce moderate canal stenosis.  There is inferior foraminal narrowing bilaterally.  Facet effusions are noted bilaterally.    L5/S1: Degenerative facet changes are noted and there is annular disc bulging with effacement of the anterior thecal sac and the proximal S1 nerve roots bilaterally.      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  There is mild chronic anterior wedging of L1.      Electronically signed by: Israel Morris MD  Date: 11/30/2018  Time: 13:48         Assessment:     Encounter Diagnoses   Name Primary?    Chronic pain disorder     DDD (degenerative disc disease), lumbar     Lumbar radiculitis     Chronic pain of left knee Yes    Status post total left knee replacement        Plan:     Anthony was seen today for follow-up.    Diagnoses and all orders for this visit:    Chronic pain of left knee  -     diclofenac (VOLTAREN) 75 MG EC tablet; Take 1 tablet (75 mg total) by mouth 2 (two) times daily as needed (pain).    Chronic pain disorder  -     diclofenac (VOLTAREN) 75 MG EC tablet; Take 1 tablet (75 mg total) by mouth 2 (two) times daily as needed (pain).    DDD (degenerative disc disease), lumbar  -     diclofenac (VOLTAREN) 75 MG EC tablet; Take 1 tablet (75 mg total) by mouth 2 (two) times daily as needed (pain).    Lumbar radiculitis  -     diclofenac (VOLTAREN) 75 MG EC tablet; Take 1 tablet (75 mg total) by mouth 2 (two) times daily as needed (pain).    Status post total left knee replacement  -     diclofenac (VOLTAREN) 75 MG EC tablet; Take 1 tablet (75 mg total) by mouth 2 (two) times daily as needed (pain).      His pain is consistent with the above.    We discussed the assessment and recommendations.  All available images were reviewed. We discussed the disease process, prognosis, treatment plan, and risks and benefits. The patient is aware of the risks and benefits of the medications being prescribed, common side effects, and proper usage. The following is the plan we agreed on:     1. Agree with increasing diclofenac at 75 mg twice daily as needed.  2. If this is not helpful, he will let me know.  We will then start gabapentin, titrating up to an initial dose of 600 mg QHS.  Information on gabapentin given today  3. Can potentially look to perform a left L5 and S1 transforaminal injection in the future if  needed.  4. Can repeat the left knee radiofrequency as needed.  I discussed that the procedure did not cover the lateral and posterior aspect of the knee, so I would expect for him still have symptoms in these areas.  5. Continue Tylenol  6. Voltaren gel for topical use  7. Alternate ice and heat for symptomatic relief  8. Home exercises for his low back given previously  9. RTC as needed.    Chyna Valdovinos MD  07/09/2019     The above plan and management options were discussed at length with patient. Patient is in agreement with the above and verbalized understanding. It will be communicated with the referring physician via electronic record, fax, or mail.

## 2019-08-21 DIAGNOSIS — M17.11 PRIMARY OSTEOARTHRITIS OF RIGHT KNEE: Primary | ICD-10-CM

## 2019-08-23 ENCOUNTER — LAB VISIT (OUTPATIENT)
Dept: LAB | Facility: HOSPITAL | Age: 84
End: 2019-08-23
Attending: INTERNAL MEDICINE
Payer: MEDICARE

## 2019-08-23 DIAGNOSIS — C61 PROSTATE CANCER: ICD-10-CM

## 2019-08-23 LAB — COMPLEXED PSA SERPL-MCNC: 0.3 NG/ML (ref 0–4)

## 2019-08-23 PROCEDURE — 84153 ASSAY OF PSA TOTAL: CPT

## 2019-08-23 PROCEDURE — 36415 COLL VENOUS BLD VENIPUNCTURE: CPT

## 2019-08-28 ENCOUNTER — OFFICE VISIT (OUTPATIENT)
Dept: HEMATOLOGY/ONCOLOGY | Facility: CLINIC | Age: 84
End: 2019-08-28
Payer: MEDICARE

## 2019-08-28 VITALS
HEART RATE: 67 BPM | BODY MASS INDEX: 34.51 KG/M2 | DIASTOLIC BLOOD PRESSURE: 69 MMHG | SYSTOLIC BLOOD PRESSURE: 147 MMHG | TEMPERATURE: 98 F | HEIGHT: 69 IN | OXYGEN SATURATION: 97 % | RESPIRATION RATE: 16 BRPM | WEIGHT: 233 LBS

## 2019-08-28 DIAGNOSIS — I82.409 DEEP VEIN THROMBOSIS (DVT) OF LOWER EXTREMITY, UNSPECIFIED CHRONICITY, UNSPECIFIED LATERALITY, UNSPECIFIED VEIN: ICD-10-CM

## 2019-08-28 DIAGNOSIS — R97.20 PSA ELEVATION: ICD-10-CM

## 2019-08-28 DIAGNOSIS — Z79.818 ENCOUNTER FOR MONITORING ANDROGEN DEPRIVATION THERAPY: ICD-10-CM

## 2019-08-28 DIAGNOSIS — J02.9 PHARYNGITIS, UNSPECIFIED ETIOLOGY: ICD-10-CM

## 2019-08-28 DIAGNOSIS — M94.9 DISORDER OF CARTILAGE: ICD-10-CM

## 2019-08-28 DIAGNOSIS — C61 PROSTATE CANCER: Primary | ICD-10-CM

## 2019-08-28 DIAGNOSIS — N18.30 CKD (CHRONIC KIDNEY DISEASE) STAGE 3, GFR 30-59 ML/MIN: ICD-10-CM

## 2019-08-28 DIAGNOSIS — D64.9 ANEMIA, UNSPECIFIED TYPE: ICD-10-CM

## 2019-08-28 PROCEDURE — 3288F FALL RISK ASSESSMENT DOCD: CPT | Mod: CPTII,S$GLB,, | Performed by: INTERNAL MEDICINE

## 2019-08-28 PROCEDURE — 99999 PR PBB SHADOW E&M-EST. PATIENT-LVL III: ICD-10-PCS | Mod: PBBFAC,,, | Performed by: INTERNAL MEDICINE

## 2019-08-28 PROCEDURE — 99999 PR PBB SHADOW E&M-EST. PATIENT-LVL III: CPT | Mod: PBBFAC,,, | Performed by: INTERNAL MEDICINE

## 2019-08-28 PROCEDURE — 99215 PR OFFICE/OUTPT VISIT, EST, LEVL V, 40-54 MIN: ICD-10-PCS | Mod: S$GLB,,, | Performed by: INTERNAL MEDICINE

## 2019-08-28 PROCEDURE — 99215 OFFICE O/P EST HI 40 MIN: CPT | Mod: S$GLB,,, | Performed by: INTERNAL MEDICINE

## 2019-08-28 PROCEDURE — 1101F PR PT FALLS ASSESS DOC 0-1 FALLS W/OUT INJ PAST YR: ICD-10-PCS | Mod: CPTII,S$GLB,, | Performed by: INTERNAL MEDICINE

## 2019-08-28 PROCEDURE — 3288F PR FALLS RISK ASSESSMENT DOCUMENTED: ICD-10-PCS | Mod: CPTII,S$GLB,, | Performed by: INTERNAL MEDICINE

## 2019-08-28 PROCEDURE — 1101F PT FALLS ASSESS-DOCD LE1/YR: CPT | Mod: CPTII,S$GLB,, | Performed by: INTERNAL MEDICINE

## 2019-08-28 RX ORDER — AZITHROMYCIN 250 MG/1
TABLET, FILM COATED ORAL
Qty: 6 TABLET | Refills: 0 | Status: SHIPPED | OUTPATIENT
Start: 2019-08-28 | End: 2019-09-02

## 2019-08-28 NOTE — PROGRESS NOTES
CC :  I am tolerating the pills     Anthony Sheldon is a 85 y.o.  Pt referred with prostate carcinoma [place] 9. 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, ISUP grade group 5, involving 85% (9mm) of 1 of 1 cores  Pt finished a course of levaquin for presumed cystitis    He has no fever, no hematuria, no urinary hesitancy   He was to start casodex for prostate cancer     Less PET-CT revealed no evidence of metastatic disease slight bladder thickening no evidence of recurrence  PSA has decreased from 4.8- to 0.3 as of August 2019 after treatment for cystitis     Past Medical History:   Diagnosis Date    Arthritis     Cataract 2009    had surgery to have removed    Dental bridge present     LOWER PARTIAL    DVT (deep venous thrombosis) 2014    right le after thr    Prostate cancer 05/2019    Wears glasses      Tolerating ambien only prn insomnia    Current Outpatient Medications:     bicalutamide (CASODEX) 50 MG Tab, Take 1 tablet (50 mg total) by mouth once daily., Disp: 30 tablet, Rfl: 6    diclofenac (VOLTAREN) 75 MG EC tablet, Take 1 tablet (75 mg total) by mouth 2 (two) times daily as needed (pain)., Disp: 180 tablet, Rfl: 3    multivitamin (ONE DAILY MULTIVITAMIN) per tablet, Take 1 tablet by mouth once daily., Disp: , Rfl:     zolpidem (AMBIEN) 5 MG Tab, Take 1 tablet (5 mg total) by mouth nightly., Disp: 30 tablet, Rfl: 5    Current Facility-Administered Medications:     [START ON 9/4/2019] sodium hyaluronate (EUFLEXXA) 10 mg/mL(mw 2.4 -3.6 million) Syrg 20 mg, 20 mg, Intra-articular, Weekly, Tanja Herrera NP  Review of patient's allergies indicates:  No Known Allergies    Family History   Problem Relation Age of Onset    Stroke Mother     Bladder Cancer Father        CONSTITUTIONAL: No fevers, chills, night sweats, wt. loss, appetite  "changes  SKIN: no rashes or itching  ENT: No headaches, head trauma, vision changes, or eye pain + pnd   LYMPH NODES: None noticed   CV: No chest pain, palpitations.   RESP: No dyspnea on exertion, cough, wheezing, or hemoptysis  GI: No nausea, emesis, diarrhea, constipation, melena, hematochezia, pain.   : No dysuria, hematuria, urgency, or frequency   HEME: No easy bruising, bleeding problems  PSYCHIATRIC: No depression, anxiety, psychosis, hallucinations.  NEURO: No seizures, memory loss, dizziness or difficulty sleeping  MSK: No arthralgias or joint swelling         BP (!) 147/69   Pulse 67   Temp 98.2 °F (36.8 °C) (Oral)   Resp 16   Ht 5' 9" (1.753 m)   Wt 105.7 kg (233 lb)   SpO2 97%   BMI 34.41 kg/m²   Gen: NAD, A and O x3, well groomed and conversant   Psych: pleasant affect, normal thought process  Eyes: Pupils round and non dilated, EOM intact  Nose: Nares patent  OP clear, mucosa patent  PND   Neck: suppple, no JVD, trachea midline, no palpable mass, no adenopathy  Lungs: CTAB, no wheezes, no use of accessory muscles  CV: S1S2 with RRR, No mrg  Abd: soft, NTND, + BS, No HSM, no ascites  Extr: No CCE, MAX, strength normal, good capillary refill  Neuro: steady gait, CNs grossly intact  Skin: intact, no lesions noted  Rheum: No joint swelling    Lab Results   Component Value Date    WBC 5.45 06/26/2019    HGB 12.7 (L) 06/26/2019    HCT 39.5 (L) 06/26/2019    MCV 98 06/26/2019     06/26/2019     CMP  Sodium   Date Value Ref Range Status   06/19/2019 136 136 - 145 mmol/L Final     Potassium   Date Value Ref Range Status   06/19/2019 4.6 3.5 - 5.1 mmol/L Final     Chloride   Date Value Ref Range Status   06/19/2019 104 95 - 110 mmol/L Final     CO2   Date Value Ref Range Status   06/19/2019 23 23 - 29 mmol/L Final     Glucose   Date Value Ref Range Status   06/19/2019 182 (H) 70 - 110 mg/dL Final     BUN, Bld   Date Value Ref Range Status   06/19/2019 34 (H) 8 - 23 mg/dL Final     Creatinine "   Date Value Ref Range Status   06/19/2019 1.4 0.5 - 1.4 mg/dL Final     Calcium   Date Value Ref Range Status   06/19/2019 8.8 8.7 - 10.5 mg/dL Final     Total Protein   Date Value Ref Range Status   06/19/2019 7.3 6.0 - 8.4 g/dL Final     Albumin   Date Value Ref Range Status   06/19/2019 4.1 3.5 - 5.2 g/dL Final     Total Bilirubin   Date Value Ref Range Status   06/19/2019 0.6 0.1 - 1.0 mg/dL Final     Comment:     For infants and newborns, interpretation of results should be based  on gestational age, weight and in agreement with clinical  observations.  Premature Infant recommended reference ranges:  Up to 24 hours.............<8.0 mg/dL  Up to 48 hours............<12.0 mg/dL  3-5 days..................<15.0 mg/dL  6-29 days.................<15.0 mg/dL       Alkaline Phosphatase   Date Value Ref Range Status   06/19/2019 47 (L) 55 - 135 U/L Final     AST   Date Value Ref Range Status   06/19/2019 19 10 - 40 U/L Final     ALT   Date Value Ref Range Status   06/19/2019 15 10 - 44 U/L Final     Anion Gap   Date Value Ref Range Status   06/19/2019 9 8 - 16 mmol/L Final     eGFR if    Date Value Ref Range Status   06/19/2019 52.6 (A) >60 mL/min/1.73 m^2 Final     eGFR if non    Date Value Ref Range Status   06/19/2019 45.5 (A) >60 mL/min/1.73 m^2 Final     Comment:     Calculation used to obtain the estimated glomerular filtration  rate (eGFR) is the CKD-EPI equation.                     Last Resulted: 05/14/19 10:14 Order Details View Encounter Lab and Collection Details Routing Result History            External Result Report     External Result Report   Narrative     EXAMINATION:  CT ABDOMEN PELVIS WITHOUT CONTRAST    CLINICAL HISTORY:  Neoplasm: prostate, staging; Neoplasm of unspecified behavior of other genitourinary organ    TECHNIQUE:  Low dose axial images, sagittal and coronal reformations were obtained from the lung bases to the pubic symphysis.  Oral contrast was not  administered.    COMPARISON:  None    FINDINGS:  The liver, spleen, pancreas, and adrenal glands are unremarkable.  There is a 12 mm calcified stone within the gallbladder.  No biliary dilatation.  The adrenal glands are normal.    There is a small hiatal hernia.  A debris containing 3.6 cm diverticulum of the transverse limb of the duodenum is present.  A few small bowel diverticula are also present proximally.  The small bowel is nondilated.  A normal appendix is present.  There is severe diverticulosis coli.  There is a 10 cm segment proximal to mid sigmoid colon which demonstrates diffuse mural thickening, without accompanying pericolonic fat stranding.    There is moderate calcification of the aorta without aneurysm.  Calcification of the coronary arteries is also present.    There is no nephroureterolithiasis or hydronephrosis.  There is a 10 mm moderately hyperdense finding of the upper pole of the right kidney which is indeterminate.    The urinary bladder and low pelvis are largely obscured by beam hardening artifact from bilateral total hip arthroplasties.  The region of the prostate gland is not well evaluated.  No enlarged lymph nodes are identified.    There are no suspicious osseous lesions.   Impression       No evidence for abdominopelvic metastatic disease.    Severe diverticulosis, with moderate segments sigmoid mural thickening favoring chronic diverticulitis.  Consideration for colonoscopy is suggested to exclude a mass, if the patient has not recently undergone colonoscopy.    Small indeterminate right renal lesion.  Consider further evaluation with renal ultrasound.    Cholelithiasis.  Bilateral total hip arthroplasties.  Atherosclerosis.      Electronically signed by: Ortiz Antunez MD  Date: 05/14/2019  Time: 10:14    Encounter     View Encounter               Alfredo Mccoy MD 6/7/2019       Narrative     CLINICAL INFORMATION:  Male patient, 85 years old, with a history of prostate  cancer diagnosed 5/2019  here for initial staging. Elevated PSA levels.  Patient presents for staging. Primary malignant neoplasm of prostate    COMPARISON:  None available.    TECHNIQUE:  The blood glucose prior to injection was 135 mg/dl. Images were acquired from  the vertex of the skull through the mid thighs. approximately 45-60 minutes  post injection of 12.0 mCi F-18 FDG into the right antecubital fossa. Dilute  oral contrast was given. Axial, coronal and sagittal PET reconstructions with  and without attenuation correction were interpreted.  Corresponding CT images  were acquired and reviewed alongside the PET images. The low dose unenhanced CT  images were used for attenuation correction and anatomic correlation only.    FINDINGS:  The prostate is obscured by beam hardening attenuation artifact from the hip  arthroplasties. No FDG avid adenopathy or evidence of metastatic disease seen.    Tracer distribution is otherwise physiologic.    Additional findings:  - Right shoulder arthroplasty and bilateral total hip arthroplasties with beam  hardening attenuation artifact (obscuring PET/CT findings in these regions).  -Degenerative changes are seen in the spine with no aggressive FDG avid lytic  or blastic lesion identified. There is dextroscoliosis in the lumbar spine.  - Scattered coronary arterial calcifications and atheromatous calcifications in  the aorta with no aneurysm identified.  - Small stones seen in the gallbladder without evidence of acute cholecystitis.  - Marked diffuse diverticulosis without evidence of acute diverticulitis.  - The bladder is partially decompressed. That being said, there is mild  circumferential bladder wall thickening. In the appropriate clinical setting  this may relate to a mild degree of infection or inflammation; consider  correlation with urinalysis.    IMPRESSION:  No evidence of FDG avid malignancy.                   Prostate cancer  -     PSA; Future; Expected date:  08/28/2019  -     CBC auto differential; Future; Expected date: 08/28/2019  -     Comprehensive metabolic panel; Future; Expected date: 08/28/2019    Deep vein thrombosis (DVT) of lower extremity, unspecified chronicity, unspecified laterality, unspecified vein    PSA elevation    CKD (chronic kidney disease) stage 3, GFR 30-59 ml/min  -     PSA; Future; Expected date: 08/28/2019  -     CBC auto differential; Future; Expected date: 08/28/2019  -     Comprehensive metabolic panel; Future; Expected date: 08/28/2019    Anemia, unspecified type  -     PSA; Future; Expected date: 08/28/2019  -     CBC auto differential; Future; Expected date: 08/28/2019  -     Comprehensive metabolic panel; Future; Expected date: 08/28/2019    Pharyngitis, unspecified etiology  -     azithromycin (Z-PHILLIP) 250 MG tablet; Take 2 tablets by mouth on day 1; Take 1 tablet by mouth on days 2-5  Dispense: 6 tablet; Refill: 0    Encounter for monitoring androgen deprivation therapy  -     DXA Bone Density Spine And Hip; Future; Expected date: 11/28/2019    Disorder of cartilage   -     DXA Bone Density Spine And Hip; Future; Expected date: 11/28/2019       Cont trelstar and casodex   RTC 3 months with another diagnostic PSA   Given his age The data are misconstrued and IT is not necessarily recommended to cont to workup  With colonoscopy etc   increase hydration to help ckd   zpack for now   No falls   No pain   Anemia increase iron   Thank you for allowing me to evaluate and participate in the care of this pleasant patient. Please fell free to call me with any questions or concerns.    [place]arm[place],  Chyna Dwyer MD    This note was dictated with Dragon and briefly proofread. Please excuse any sentences that may be unclear or words misspelled

## 2019-09-05 ENCOUNTER — OFFICE VISIT (OUTPATIENT)
Dept: ORTHOPEDICS | Facility: CLINIC | Age: 84
End: 2019-09-05
Payer: MEDICARE

## 2019-09-05 VITALS
HEIGHT: 69 IN | DIASTOLIC BLOOD PRESSURE: 74 MMHG | BODY MASS INDEX: 34.61 KG/M2 | WEIGHT: 233.69 LBS | HEART RATE: 60 BPM | SYSTOLIC BLOOD PRESSURE: 169 MMHG

## 2019-09-05 DIAGNOSIS — M17.11 PRIMARY OSTEOARTHRITIS OF RIGHT KNEE: ICD-10-CM

## 2019-09-05 PROCEDURE — 99999 PR PBB SHADOW E&M-EST. PATIENT-LVL III: ICD-10-PCS | Mod: PBBFAC,,, | Performed by: NURSE PRACTITIONER

## 2019-09-05 PROCEDURE — 99999 PR PBB SHADOW E&M-EST. PATIENT-LVL III: CPT | Mod: PBBFAC,,, | Performed by: NURSE PRACTITIONER

## 2019-09-05 PROCEDURE — 20610 DRAIN/INJ JOINT/BURSA W/O US: CPT | Mod: RT,S$GLB,, | Performed by: NURSE PRACTITIONER

## 2019-09-05 PROCEDURE — 99499 UNLISTED E&M SERVICE: CPT | Mod: S$GLB,,, | Performed by: NURSE PRACTITIONER

## 2019-09-05 PROCEDURE — 20610 PR DRAIN/INJECT LARGE JOINT/BURSA: ICD-10-PCS | Mod: RT,S$GLB,, | Performed by: NURSE PRACTITIONER

## 2019-09-05 PROCEDURE — 99499 NO LOS: ICD-10-PCS | Mod: S$GLB,,, | Performed by: NURSE PRACTITIONER

## 2019-09-12 ENCOUNTER — OFFICE VISIT (OUTPATIENT)
Dept: ORTHOPEDICS | Facility: CLINIC | Age: 84
End: 2019-09-12
Payer: MEDICARE

## 2019-09-12 DIAGNOSIS — M17.0 PRIMARY OSTEOARTHRITIS OF BOTH KNEES: ICD-10-CM

## 2019-09-12 PROCEDURE — 20610 PR DRAIN/INJECT LARGE JOINT/BURSA: ICD-10-PCS | Mod: RT,S$GLB,, | Performed by: NURSE PRACTITIONER

## 2019-09-12 PROCEDURE — 99499 UNLISTED E&M SERVICE: CPT | Mod: S$GLB,,, | Performed by: NURSE PRACTITIONER

## 2019-09-12 PROCEDURE — 99499 NO LOS: ICD-10-PCS | Mod: S$GLB,,, | Performed by: NURSE PRACTITIONER

## 2019-09-12 PROCEDURE — 99999 PR PBB SHADOW E&M-EST. PATIENT-LVL II: CPT | Mod: PBBFAC,,, | Performed by: NURSE PRACTITIONER

## 2019-09-12 PROCEDURE — 99999 PR PBB SHADOW E&M-EST. PATIENT-LVL II: ICD-10-PCS | Mod: PBBFAC,,, | Performed by: NURSE PRACTITIONER

## 2019-09-12 PROCEDURE — 20610 DRAIN/INJ JOINT/BURSA W/O US: CPT | Mod: RT,S$GLB,, | Performed by: NURSE PRACTITIONER

## 2019-09-18 ENCOUNTER — PATIENT MESSAGE (OUTPATIENT)
Dept: HEMATOLOGY/ONCOLOGY | Facility: CLINIC | Age: 84
End: 2019-09-18

## 2019-09-18 DIAGNOSIS — R97.20 ELEVATED PSA: ICD-10-CM

## 2019-09-18 DIAGNOSIS — C61 PROSTATE CANCER: ICD-10-CM

## 2019-09-19 ENCOUNTER — OFFICE VISIT (OUTPATIENT)
Dept: ORTHOPEDICS | Facility: CLINIC | Age: 84
End: 2019-09-19
Payer: MEDICARE

## 2019-09-19 VITALS
BODY MASS INDEX: 34.51 KG/M2 | HEART RATE: 56 BPM | DIASTOLIC BLOOD PRESSURE: 76 MMHG | WEIGHT: 233.69 LBS | SYSTOLIC BLOOD PRESSURE: 177 MMHG

## 2019-09-19 DIAGNOSIS — M17.11 PRIMARY OSTEOARTHRITIS OF RIGHT KNEE: ICD-10-CM

## 2019-09-19 PROCEDURE — 20610 DRAIN/INJ JOINT/BURSA W/O US: CPT | Mod: RT,S$GLB,, | Performed by: NURSE PRACTITIONER

## 2019-09-19 PROCEDURE — 99499 NO LOS: ICD-10-PCS | Mod: S$GLB,,, | Performed by: NURSE PRACTITIONER

## 2019-09-19 PROCEDURE — 99499 UNLISTED E&M SERVICE: CPT | Mod: S$GLB,,, | Performed by: NURSE PRACTITIONER

## 2019-09-19 PROCEDURE — 99999 PR PBB SHADOW E&M-EST. PATIENT-LVL III: ICD-10-PCS | Mod: PBBFAC,,, | Performed by: NURSE PRACTITIONER

## 2019-09-19 PROCEDURE — 20610 PR DRAIN/INJECT LARGE JOINT/BURSA: ICD-10-PCS | Mod: RT,S$GLB,, | Performed by: NURSE PRACTITIONER

## 2019-09-19 PROCEDURE — 99999 PR PBB SHADOW E&M-EST. PATIENT-LVL III: CPT | Mod: PBBFAC,,, | Performed by: NURSE PRACTITIONER

## 2019-09-19 NOTE — PROGRESS NOTES
Anthony Sheldon presents to clinic today for the third right knee Euflexxa injection.    Exam demonstrates the no effusion in the  right knee, and the skin is intact.    Diagnosis: osteoarthritis knee    After time out was performed and patient ID, side, and site were verified, the  right  knee was sterilly prepped in the standard fashion.  A 22-gauge needle was introduced into right knee joint from an brook-lateral site without complication and knee was then injected with 2 ml of Euflexxa.  Sterile dressing was applied.  The patient was instructed to resume activities as tolerated and to call with any problems.     Pt reports good relief and will f/u as needed.

## 2019-09-20 RX ORDER — BICALUTAMIDE 50 MG/1
50 TABLET, FILM COATED ORAL DAILY
Qty: 30 TABLET | Refills: 6 | Status: SHIPPED | OUTPATIENT
Start: 2019-09-20 | End: 2020-03-11

## 2019-10-22 NOTE — PLAN OF CARE
Daily Note     Today's date: 10/22/2019  Patient name: Lisette Clark  : 1948  MRN: [id number]  Referring provider: Maria Isabel Sharp MD  Dx:   Encounter Diagnosis     ICD-10-CM    1  Subacromial bursitis of right shoulder joint M75 [id number]                   Subjective: Patient reports, "I feel marginally better  It's not getting worse"  Objective: See treatment diary below      Assessment: Tolerated treatment well  Patient exhibited good technique with therapeutic exercises and would benefit from continued PT  Progressed scapular strengthening this session, without pain, only muscular fatigue  Cuing needed for correct positioning and mechanics with theraband tasks  Patient reported feeling good following manuals  Continue to progress as able  Plan: Continue per plan of care        Diagnosis:    Precautions:    Manuals 10/15 10/17  10/22     PROM Flexion       Mobs GH AP and Inf Grade III  GH AP and Inf Grade III - RT, PT  GH AP and Inf Grade III - RT, PT                      Exercise Diary        UBE   2/2 mins             SL ER 2# 3x10 4#, 2x10  3#, 2x10      Bent over H-abd        Bent over [employer] over Lower trap        Doorway Stretch    3x 30 sec      Bent over Lats        Supine Punch 4# 3x10 5#, 3x10  5#, 3x10      Sleeper Stretch 10" x 10 10" x 10  10" x 10      TB ER/IR Green 2x10 Blue, 2x10  Black, 3x10  ea     TB Rows Green 2x10 + Ext, Blue, 3x10  + Ext, Black, 3x10      Scap stab into flexion    Bland, 5x      Wall climbs   NV      Wall Clocks    Bland, 5x                                                              Modalities             CP PRN                                  Problem: Patient Care Overview  Goal: Plan of Care Review  Outcome: Ongoing (interventions implemented as appropriate)  Patient aaox4. Good pain control noted with po pain medicine. Patient continent of bowel and bladder. Uses urinal. Patient free from falls will continue to monitor.

## 2019-11-01 ENCOUNTER — HOSPITAL ENCOUNTER (OUTPATIENT)
Dept: RADIOLOGY | Facility: HOSPITAL | Age: 84
Discharge: HOME | End: 2019-11-01
Attending: INTERNAL MEDICINE
Payer: MEDICARE

## 2019-11-01 DIAGNOSIS — M94.9 DISORDER OF CARTILAGE: ICD-10-CM

## 2019-11-01 DIAGNOSIS — Z79.818 ENCOUNTER FOR MONITORING ANDROGEN DEPRIVATION THERAPY: ICD-10-CM

## 2019-11-01 PROCEDURE — 77080 DXA BONE DENSITY AXIAL: CPT | Mod: TC

## 2019-11-01 PROCEDURE — 77080 DXA BONE DENSITY AXIAL: CPT | Mod: 26,,, | Performed by: RADIOLOGY

## 2019-11-01 PROCEDURE — 77080 DEXA BONE DENSITY SPINE HIP: ICD-10-PCS | Mod: 26,,, | Performed by: RADIOLOGY

## 2019-11-08 ENCOUNTER — PATIENT MESSAGE (OUTPATIENT)
Dept: HEMATOLOGY/ONCOLOGY | Facility: CLINIC | Age: 84
End: 2019-11-08

## 2019-11-08 PROBLEM — J32.9 SINUSITIS: Status: RESOLVED | Noted: 2017-04-26 | Resolved: 2019-11-08

## 2019-11-11 ENCOUNTER — TELEPHONE (OUTPATIENT)
Dept: GASTROENTEROLOGY | Facility: CLINIC | Age: 84
End: 2019-11-11

## 2019-11-11 ENCOUNTER — LAB VISIT (OUTPATIENT)
Dept: LAB | Facility: HOSPITAL | Age: 84
End: 2019-11-11
Attending: NURSE PRACTITIONER
Payer: MEDICARE

## 2019-11-11 DIAGNOSIS — R53.83 FATIGUE, UNSPECIFIED TYPE: ICD-10-CM

## 2019-11-11 DIAGNOSIS — C61 PROSTATE CANCER: ICD-10-CM

## 2019-11-11 DIAGNOSIS — N18.30 CKD (CHRONIC KIDNEY DISEASE) STAGE 3, GFR 30-59 ML/MIN: ICD-10-CM

## 2019-11-11 DIAGNOSIS — Z79.899 ENCOUNTER FOR LONG-TERM (CURRENT) USE OF HIGH-RISK MEDICATION: ICD-10-CM

## 2019-11-11 LAB
BILIRUB UR QL STRIP: NEGATIVE
CLARITY UR: CLEAR
COLOR UR: YELLOW
GLUCOSE UR QL STRIP: NEGATIVE
HGB UR QL STRIP: ABNORMAL
KETONES UR QL STRIP: NEGATIVE
LEUKOCYTE ESTERASE UR QL STRIP: NEGATIVE
NITRITE UR QL STRIP: NEGATIVE
PH UR STRIP: 6 [PH] (ref 5–8)
PROT UR QL STRIP: NEGATIVE
SP GR UR STRIP: 1.01 (ref 1–1.03)
URN SPEC COLLECT METH UR: ABNORMAL
UROBILINOGEN UR STRIP-ACNC: NEGATIVE EU/DL

## 2019-11-11 PROCEDURE — 87086 URINE CULTURE/COLONY COUNT: CPT

## 2019-11-11 PROCEDURE — 81003 URINALYSIS AUTO W/O SCOPE: CPT

## 2019-11-11 NOTE — TELEPHONE ENCOUNTER
Spoke with pt wife. Informed referral has been placed for pt to be seen in [facility]. Informed someone from that office will call to schedule an appointment. Verbalized an understanding.     ----- Message from Ale Ramirez sent at 11/11/2019  2:07 PM CST -----  Contact: Patient  Pt needs a new pt appoint for a consult to then schedule have a  colonoscopy. He believes he should be able to schedule the colonoscopy without it. Clarified with him.         Thank you,    Ale Ramirez

## 2019-11-12 LAB — BACTERIA UR CULT: NO GROWTH

## 2019-11-18 ENCOUNTER — INFUSION (OUTPATIENT)
Dept: INFUSION THERAPY | Facility: HOSPITAL | Age: 84
End: 2019-11-18
Payer: MEDICARE

## 2019-11-18 VITALS — HEART RATE: 58 BPM | DIASTOLIC BLOOD PRESSURE: 82 MMHG | TEMPERATURE: 96 F | SYSTOLIC BLOOD PRESSURE: 171 MMHG

## 2019-11-18 DIAGNOSIS — C61 PROSTATE CANCER: Primary | ICD-10-CM

## 2019-11-18 PROCEDURE — 96402 CHEMO HORMON ANTINEOPL SQ/IM: CPT

## 2019-11-18 PROCEDURE — 96372 THER/PROPH/DIAG INJ SC/IM: CPT

## 2019-11-18 PROCEDURE — 63600175 PHARM REV CODE 636 W HCPCS: Mod: JG | Performed by: UROLOGY

## 2019-11-18 RX ADMIN — LEUPROLIDE ACETATE 45 MG: KIT at 10:11

## 2019-11-18 NOTE — PLAN OF CARE
Problem: Adult Inpatient Plan of Care  Goal: Plan of Care Review  Outcome: Ongoing, Progressing    Pt tolerated txt well. AVS refused; verbalized use of pt portal. Spouse at chairside. Ambulates per self.

## 2019-11-18 NOTE — PLAN OF CARE
Problem: Fatigue  Goal: Improved Activity Tolerance  Outcome: Ongoing, Progressing    BP (!) 171/82 (Patient Position: Sitting)   Pulse (!) 58   Temp 96.4 °F (35.8 °C)   Pt here today for lupron injection. VSS. Injection given right gluteal. Secured with bandaid. Next appt May 18 @ 10am. Ambulates without difficulty.

## 2019-12-02 ENCOUNTER — LAB VISIT (OUTPATIENT)
Dept: LAB | Facility: HOSPITAL | Age: 84
End: 2019-12-02
Attending: INTERNAL MEDICINE
Payer: MEDICARE

## 2019-12-02 DIAGNOSIS — N18.30 CKD (CHRONIC KIDNEY DISEASE) STAGE 3, GFR 30-59 ML/MIN: ICD-10-CM

## 2019-12-02 DIAGNOSIS — D64.9 ANEMIA, UNSPECIFIED TYPE: ICD-10-CM

## 2019-12-02 DIAGNOSIS — C61 PROSTATE CANCER: ICD-10-CM

## 2019-12-02 LAB
ALBUMIN SERPL BCP-MCNC: 4.1 G/DL (ref 3.5–5.2)
ALP SERPL-CCNC: 51 U/L (ref 55–135)
ALT SERPL W/O P-5'-P-CCNC: 14 U/L (ref 10–44)
ANION GAP SERPL CALC-SCNC: 11 MMOL/L (ref 8–16)
AST SERPL-CCNC: 19 U/L (ref 10–40)
BASOPHILS # BLD AUTO: 0.03 K/UL (ref 0–0.2)
BASOPHILS NFR BLD: 0.6 % (ref 0–1.9)
BILIRUB SERPL-MCNC: 0.4 MG/DL (ref 0.1–1)
BUN SERPL-MCNC: 27 MG/DL (ref 8–23)
CALCIUM SERPL-MCNC: 9.3 MG/DL (ref 8.7–10.5)
CHLORIDE SERPL-SCNC: 104 MMOL/L (ref 95–110)
CO2 SERPL-SCNC: 26 MMOL/L (ref 23–29)
COMPLEXED PSA SERPL-MCNC: 0.1 NG/ML (ref 0–4)
CREAT SERPL-MCNC: 1.6 MG/DL (ref 0.5–1.4)
DIFFERENTIAL METHOD: ABNORMAL
EOSINOPHIL # BLD AUTO: 0.1 K/UL (ref 0–0.5)
EOSINOPHIL NFR BLD: 2.5 % (ref 0–8)
ERYTHROCYTE [DISTWIDTH] IN BLOOD BY AUTOMATED COUNT: 12.2 % (ref 11.5–14.5)
EST. GFR  (AFRICAN AMERICAN): 44.7 ML/MIN/1.73 M^2
EST. GFR  (NON AFRICAN AMERICAN): 38.7 ML/MIN/1.73 M^2
GLUCOSE SERPL-MCNC: 148 MG/DL (ref 70–110)
HCT VFR BLD AUTO: 36.4 % (ref 40–54)
HGB BLD-MCNC: 12 G/DL (ref 14–18)
IMM GRANULOCYTES # BLD AUTO: 0.01 K/UL (ref 0–0.04)
IMM GRANULOCYTES NFR BLD AUTO: 0.2 % (ref 0–0.5)
IRON SERPL-MCNC: 87 UG/DL (ref 45–160)
LYMPHOCYTES # BLD AUTO: 1.6 K/UL (ref 1–4.8)
LYMPHOCYTES NFR BLD: 33 % (ref 18–48)
MCH RBC QN AUTO: 32.2 PG (ref 27–31)
MCHC RBC AUTO-ENTMCNC: 33 G/DL (ref 32–36)
MCV RBC AUTO: 98 FL (ref 82–98)
MONOCYTES # BLD AUTO: 0.5 K/UL (ref 0.3–1)
MONOCYTES NFR BLD: 11.3 % (ref 4–15)
NEUTROPHILS # BLD AUTO: 2.5 K/UL (ref 1.8–7.7)
NEUTROPHILS NFR BLD: 52.4 % (ref 38–73)
NRBC BLD-RTO: 0 /100 WBC
PLATELET # BLD AUTO: 174 K/UL (ref 150–350)
PMV BLD AUTO: 8.8 FL (ref 9.2–12.9)
POTASSIUM SERPL-SCNC: 4.8 MMOL/L (ref 3.5–5.1)
PROT SERPL-MCNC: 7.3 G/DL (ref 6–8.4)
RBC # BLD AUTO: 3.73 M/UL (ref 4.6–6.2)
SATURATED IRON: 27 % (ref 20–50)
SODIUM SERPL-SCNC: 141 MMOL/L (ref 136–145)
TOTAL IRON BINDING CAPACITY: 326 UG/DL (ref 250–450)
TRANSFERRIN SERPL-MCNC: 220 MG/DL (ref 200–375)
WBC # BLD AUTO: 4.76 K/UL (ref 3.9–12.7)

## 2019-12-02 PROCEDURE — 83540 ASSAY OF IRON: CPT

## 2019-12-02 PROCEDURE — 36415 COLL VENOUS BLD VENIPUNCTURE: CPT

## 2019-12-02 PROCEDURE — 84153 ASSAY OF PSA TOTAL: CPT

## 2019-12-02 PROCEDURE — 80053 COMPREHEN METABOLIC PANEL: CPT

## 2019-12-02 PROCEDURE — 85025 COMPLETE CBC W/AUTO DIFF WBC: CPT

## 2019-12-04 ENCOUNTER — OFFICE VISIT (OUTPATIENT)
Dept: HEMATOLOGY/ONCOLOGY | Facility: CLINIC | Age: 84
End: 2019-12-04
Payer: MEDICARE

## 2019-12-04 VITALS
BODY MASS INDEX: 35.94 KG/M2 | HEART RATE: 56 BPM | HEIGHT: 69 IN | WEIGHT: 242.63 LBS | TEMPERATURE: 97 F | SYSTOLIC BLOOD PRESSURE: 161 MMHG | OXYGEN SATURATION: 99 % | RESPIRATION RATE: 16 BRPM | DIASTOLIC BLOOD PRESSURE: 74 MMHG

## 2019-12-04 DIAGNOSIS — Z79.818 ANDROGEN DEPRIVATION THERAPY: ICD-10-CM

## 2019-12-04 DIAGNOSIS — I82.409 DEEP VEIN THROMBOSIS (DVT) OF LOWER EXTREMITY, UNSPECIFIED CHRONICITY, UNSPECIFIED LATERALITY, UNSPECIFIED VEIN: Primary | ICD-10-CM

## 2019-12-04 DIAGNOSIS — N18.30 CKD (CHRONIC KIDNEY DISEASE) STAGE 3, GFR 30-59 ML/MIN: ICD-10-CM

## 2019-12-04 DIAGNOSIS — C61 PROSTATE CANCER: ICD-10-CM

## 2019-12-04 DIAGNOSIS — R73.9 HYPERGLYCEMIA: ICD-10-CM

## 2019-12-04 PROCEDURE — 1126F PR PAIN SEVERITY QUANTIFIED, NO PAIN PRESENT: ICD-10-PCS | Mod: S$GLB,,, | Performed by: INTERNAL MEDICINE

## 2019-12-04 PROCEDURE — 1159F PR MEDICATION LIST DOCUMENTED IN MEDICAL RECORD: ICD-10-PCS | Mod: S$GLB,,, | Performed by: INTERNAL MEDICINE

## 2019-12-04 PROCEDURE — 99215 PR OFFICE/OUTPT VISIT, EST, LEVL V, 40-54 MIN: ICD-10-PCS | Mod: S$GLB,,, | Performed by: INTERNAL MEDICINE

## 2019-12-04 PROCEDURE — 3288F PR FALLS RISK ASSESSMENT DOCUMENTED: ICD-10-PCS | Mod: CPTII,S$GLB,, | Performed by: INTERNAL MEDICINE

## 2019-12-04 PROCEDURE — 1126F AMNT PAIN NOTED NONE PRSNT: CPT | Mod: S$GLB,,, | Performed by: INTERNAL MEDICINE

## 2019-12-04 PROCEDURE — 1101F PR PT FALLS ASSESS DOC 0-1 FALLS W/OUT INJ PAST YR: ICD-10-PCS | Mod: CPTII,S$GLB,, | Performed by: INTERNAL MEDICINE

## 2019-12-04 PROCEDURE — 99999 PR PBB SHADOW E&M-EST. PATIENT-LVL III: CPT | Mod: PBBFAC,,, | Performed by: INTERNAL MEDICINE

## 2019-12-04 PROCEDURE — 1101F PT FALLS ASSESS-DOCD LE1/YR: CPT | Mod: CPTII,S$GLB,, | Performed by: INTERNAL MEDICINE

## 2019-12-04 PROCEDURE — 1159F MED LIST DOCD IN RCRD: CPT | Mod: S$GLB,,, | Performed by: INTERNAL MEDICINE

## 2019-12-04 PROCEDURE — 99999 PR PBB SHADOW E&M-EST. PATIENT-LVL III: ICD-10-PCS | Mod: PBBFAC,,, | Performed by: INTERNAL MEDICINE

## 2019-12-04 PROCEDURE — 99215 OFFICE O/P EST HI 40 MIN: CPT | Mod: S$GLB,,, | Performed by: INTERNAL MEDICINE

## 2019-12-04 PROCEDURE — 3288F FALL RISK ASSESSMENT DOCD: CPT | Mod: CPTII,S$GLB,, | Performed by: INTERNAL MEDICINE

## 2019-12-04 NOTE — PROGRESS NOTES
CC :  I am tired     Anthony Sheldon is a 85 y.o.  Pt referred with prostate carcinoma Elise 9. 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, ISUP grade group 5, involving 85% (9mm) of 1 of 1 cores  Pt finished a course of levaquin for presumed cystitis    He has no fever, no hematuria, no urinary hesitancy   He was to start casodex for prostate cancer     PET-CT revealed no evidence of metastatic disease slight bladder thickening no evidence of recurrence  PSA has decreased from 4.8- to 0.3 as of August 2019 after treatment for cystitis      December 4, 2019 PSA 0.1    Past Medical History:   Diagnosis Date    Arthritis     Cataract 2009    had surgery to have removed    Dental bridge present     LOWER PARTIAL    DVT (deep venous thrombosis) 2014    right le after thr    Prostate cancer 05/2019    Wears glasses      Tolerating ambien only prn insomnia    Current Outpatient Medications:     bicalutamide (CASODEX) 50 MG Tab, Take 1 tablet (50 mg total) by mouth once daily., Disp: 30 tablet, Rfl: 6    diclofenac (VOLTAREN) 75 MG EC tablet, Take 1 tablet (75 mg total) by mouth 2 (two) times daily as needed (pain)., Disp: 180 tablet, Rfl: 3    multivitamin (ONE DAILY MULTIVITAMIN) per tablet, Take 1 tablet by mouth once daily., Disp: , Rfl:     zolpidem (AMBIEN) 5 MG Tab, Take 1 tablet (5 mg total) by mouth nightly., Disp: 30 tablet, Rfl: 5  Review of patient's allergies indicates:  No Known Allergies    Family History   Problem Relation Age of Onset    Stroke Mother     Bladder Cancer Father        CONSTITUTIONAL: No fevers, chills, night sweats, wt. loss, appetite changes  SKIN: no rashes or itching  ENT: No headaches, head trauma, vision changes, or eye pain + pnd   LYMPH NODES: None noticed   CV: No chest  "pain, palpitations.   RESP: No dyspnea on exertion, cough, wheezing, or hemoptysis  GI: No nausea, emesis, diarrhea, constipation, melena, hematochezia, pain.   : No dysuria, hematuria, urgency, or frequency   HEME: No easy bruising, bleeding problems  PSYCHIATRIC: No depression, anxiety, psychosis, hallucinations.  NEURO: No seizures, memory loss, dizziness or difficulty sleeping  MSK: No arthralgias or joint swelling    He quit taking his meds for pain       BP (!) 161/74 (BP Location: Right arm, Patient Position: Sitting, BP Method: Large (Automatic))   Pulse (!) 56   Temp 97 °F (36.1 °C) (Oral)   Resp 16   Ht 5' 9" (1.753 m)   Wt 110 kg (242 lb 9.6 oz)   SpO2 99%   BMI 35.83 kg/m²   Gen: NAD, A and O x3, well groomed and conversant   Psych: pleasant affect, normal thought process  Eyes: Pupils round and non dilated, EOM intact  Nose: Nares patent  OP clear, mucosa patent  PND   Neck: suppple, no JVD, trachea midline, no palpable mass, no adenopathy  Lungs: CTAB, no wheezes, no use of accessory muscles  CV: S1S2 with RRR, No mrg  Abd: soft, NTND, + BS, No HSM, no ascites  Extr: No CCE, MAX, strength normal, good capillary refill  Neuro: steady gait, CNs grossly intact  Skin: intact, no lesions noted  Rheum: No joint swelling    Lab Results   Component Value Date    WBC 4.76 12/02/2019    HGB 12.0 (L) 12/02/2019    HCT 36.4 (L) 12/02/2019    MCV 98 12/02/2019     12/02/2019     CMP  Sodium   Date Value Ref Range Status   12/02/2019 141 136 - 145 mmol/L Final     Potassium   Date Value Ref Range Status   12/02/2019 4.8 3.5 - 5.1 mmol/L Final     Chloride   Date Value Ref Range Status   12/02/2019 104 95 - 110 mmol/L Final     CO2   Date Value Ref Range Status   12/02/2019 26 23 - 29 mmol/L Final     Glucose   Date Value Ref Range Status   12/02/2019 148 (H) 70 - 110 mg/dL Final     BUN, Bld   Date Value Ref Range Status   12/02/2019 27 (H) 8 - 23 mg/dL Final     Creatinine   Date Value Ref Range " Status   12/02/2019 1.6 (H) 0.5 - 1.4 mg/dL Final     Calcium   Date Value Ref Range Status   12/02/2019 9.3 8.7 - 10.5 mg/dL Final     Total Protein   Date Value Ref Range Status   12/02/2019 7.3 6.0 - 8.4 g/dL Final     Albumin   Date Value Ref Range Status   12/02/2019 4.1 3.5 - 5.2 g/dL Final     Total Bilirubin   Date Value Ref Range Status   12/02/2019 0.4 0.1 - 1.0 mg/dL Final     Comment:     For infants and newborns, interpretation of results should be based  on gestational age, weight and in agreement with clinical  observations.  Premature Infant recommended reference ranges:  Up to 24 hours.............<8.0 mg/dL  Up to 48 hours............<12.0 mg/dL  3-5 days..................<15.0 mg/dL  6-29 days.................<15.0 mg/dL       Alkaline Phosphatase   Date Value Ref Range Status   12/02/2019 51 (L) 55 - 135 U/L Final     AST   Date Value Ref Range Status   12/02/2019 19 10 - 40 U/L Final     ALT   Date Value Ref Range Status   12/02/2019 14 10 - 44 U/L Final     Anion Gap   Date Value Ref Range Status   12/02/2019 11 8 - 16 mmol/L Final     eGFR if    Date Value Ref Range Status   12/02/2019 44.7 (A) >60 mL/min/1.73 m^2 Final     eGFR if non    Date Value Ref Range Status   12/02/2019 38.7 (A) >60 mL/min/1.73 m^2 Final     Comment:     Calculation used to obtain the estimated glomerular filtration  rate (eGFR) is the CKD-EPI equation.                     Last Resulted: 05/14/19 10:14 Order Details View Encounter Lab and Collection Details Routing Result History            External Result Report     External Result Report   Narrative     EXAMINATION:  CT ABDOMEN PELVIS WITHOUT CONTRAST    CLINICAL HISTORY:  Neoplasm: prostate, staging; Neoplasm of unspecified behavior of other genitourinary organ    TECHNIQUE:  Low dose axial images, sagittal and coronal reformations were obtained from the lung bases to the pubic symphysis.  Oral contrast was not  administered.    COMPARISON:  None    FINDINGS:  The liver, spleen, pancreas, and adrenal glands are unremarkable.  There is a 12 mm calcified stone within the gallbladder.  No biliary dilatation.  The adrenal glands are normal.    There is a small hiatal hernia.  A debris containing 3.6 cm diverticulum of the transverse limb of the duodenum is present.  A few small bowel diverticula are also present proximally.  The small bowel is nondilated.  A normal appendix is present.  There is severe diverticulosis coli.  There is a 10 cm segment proximal to mid sigmoid colon which demonstrates diffuse mural thickening, without accompanying pericolonic fat stranding.    There is moderate calcification of the aorta without aneurysm.  Calcification of the coronary arteries is also present.    There is no nephroureterolithiasis or hydronephrosis.  There is a 10 mm moderately hyperdense finding of the upper pole of the right kidney which is indeterminate.    The urinary bladder and low pelvis are largely obscured by beam hardening artifact from bilateral total hip arthroplasties.  The region of the prostate gland is not well evaluated.  No enlarged lymph nodes are identified.    There are no suspicious osseous lesions.   Impression       No evidence for abdominopelvic metastatic disease.    Severe diverticulosis, with moderate segments sigmoid mural thickening favoring chronic diverticulitis.  Consideration for colonoscopy is suggested to exclude a mass, if the patient has not recently undergone colonoscopy.    Small indeterminate right renal lesion.  Consider further evaluation with renal ultrasound.    Cholelithiasis.  Bilateral total hip arthroplasties.  Atherosclerosis.      Electronically signed by: Ortiz Antunez MD  Date: 05/14/2019  Time: 10:14    Encounter     View Encounter               Alfredo Mccoy MD 6/7/2019       Narrative     CLINICAL INFORMATION:  Male patient, 85 years old, with a history of prostate  cancer diagnosed 5/2019  here for initial staging. Elevated PSA levels.  Patient presents for staging. Primary malignant neoplasm of prostate    COMPARISON:  None available.    TECHNIQUE:  The blood glucose prior to injection was 135 mg/dl. Images were acquired from  the vertex of the skull through the mid thighs. approximately 45-60 minutes  post injection of 12.0 mCi F-18 FDG into the right antecubital fossa. Dilute  oral contrast was given. Axial, coronal and sagittal PET reconstructions with  and without attenuation correction were interpreted.  Corresponding CT images  were acquired and reviewed alongside the PET images. The low dose unenhanced CT  images were used for attenuation correction and anatomic correlation only.    FINDINGS:  The prostate is obscured by beam hardening attenuation artifact from the hip  arthroplasties. No FDG avid adenopathy or evidence of metastatic disease seen.    Tracer distribution is otherwise physiologic.    Additional findings:  - Right shoulder arthroplasty and bilateral total hip arthroplasties with beam  hardening attenuation artifact (obscuring PET/CT findings in these regions).  -Degenerative changes are seen in the spine with no aggressive FDG avid lytic  or blastic lesion identified. There is dextroscoliosis in the lumbar spine.  - Scattered coronary arterial calcifications and atheromatous calcifications in  the aorta with no aneurysm identified.  - Small stones seen in the gallbladder without evidence of acute cholecystitis.  - Marked diffuse diverticulosis without evidence of acute diverticulitis.  - The bladder is partially decompressed. That being said, there is mild  circumferential bladder wall thickening. In the appropriate clinical setting  this may relate to a mild degree of infection or inflammation; consider  correlation with urinalysis.    IMPRESSION:  No evidence of FDG avid malignancy.                   Deep vein thrombosis (DVT) of lower extremity,  unspecified chronicity, unspecified laterality, unspecified vein    Prostate cancer    CKD (chronic kidney disease) stage 3, GFR 30-59 ml/min    Hyperglycemia    Androgen deprivation therapy     cont casodex : wife asking if he can cut down his dose : unfortunately no   His PSA is decreased from 26 to 0.1 over 6 months  He reports his BP to be normal usually but they do not check it at home  He states they do not eat a lot of salt but thehy had soup last night  Diet discussed  Must decrease BMi  To PCP for BP discussion   RTC 3 months with psa   His iron levels are stable and his hemoglobin is stable: this is not causing fatigue   Cont trelstar and casodex    increase hydration to help ckd     No falls   No pain      Thank you for allowing me to evaluate and participate in the care of this pleasant patient. Please fell free to call me with any questions or concerns.    Warmly,  Chyna Dwyer MD    This note was dictated with Dragon and briefly proofread. Please excuse any sentences that may be unclear or words misspelled

## 2019-12-04 NOTE — LETTER
December 4, 2019      Perfecto Melgar III, MD  [address] [place] [address]           [facility]  [address] [place] [address]  Phone: [phone]          Patient: Anthony Sheldon   MR Number: [id number]   YOB: 1934   Date of Visit: 12/4/2019       Dear Dr. Perfecto Melgar III:    Thank you for referring Anthony Sheldon to me for evaluation. Attached you will find relevant portions of my assessment and plan of care.    If you have questions, please do not hesitate to call me. I look forward to following Anthony Sheldon along with you.    Sincerely,    Chyna Dwyer MD    Enclosure  CC:  No Recipients    If you would like to receive this communication electronically, please contact [email or web address] or [phone] to request more information on [facility] [employer] access.    For providers and/or their staff who would like to refer a patient to [facility], please contact us through our one-stop-shop provider referral line, [facility], at [phone].    If you feel you have received this communication in error or would no longer like to receive these types of communications, please e-mail [email or web address]

## 2019-12-09 ENCOUNTER — TELEPHONE (OUTPATIENT)
Dept: PAIN MEDICINE | Facility: CLINIC | Age: 84
End: 2019-12-09

## 2019-12-09 ENCOUNTER — OFFICE VISIT (OUTPATIENT)
Dept: PAIN MEDICINE | Facility: CLINIC | Age: 84
End: 2019-12-09
Payer: MEDICARE

## 2019-12-09 VITALS
DIASTOLIC BLOOD PRESSURE: 71 MMHG | HEIGHT: 69 IN | HEART RATE: 71 BPM | TEMPERATURE: 99 F | BODY MASS INDEX: 35.84 KG/M2 | SYSTOLIC BLOOD PRESSURE: 129 MMHG | WEIGHT: 242 LBS

## 2019-12-09 DIAGNOSIS — G89.4 CHRONIC PAIN DISORDER: Primary | ICD-10-CM

## 2019-12-09 DIAGNOSIS — M54.16 LUMBAR RADICULOPATHY: ICD-10-CM

## 2019-12-09 DIAGNOSIS — M51.36 DDD (DEGENERATIVE DISC DISEASE), LUMBAR: ICD-10-CM

## 2019-12-09 DIAGNOSIS — M47.816 LUMBAR SPONDYLOSIS: ICD-10-CM

## 2019-12-09 PROCEDURE — 1125F PR PAIN SEVERITY QUANTIFIED, PAIN PRESENT: ICD-10-PCS | Mod: S$GLB,,, | Performed by: ANESTHESIOLOGY

## 2019-12-09 PROCEDURE — 1159F PR MEDICATION LIST DOCUMENTED IN MEDICAL RECORD: ICD-10-PCS | Mod: S$GLB,,, | Performed by: ANESTHESIOLOGY

## 2019-12-09 PROCEDURE — 1101F PR PT FALLS ASSESS DOC 0-1 FALLS W/OUT INJ PAST YR: ICD-10-PCS | Mod: CPTII,S$GLB,, | Performed by: ANESTHESIOLOGY

## 2019-12-09 PROCEDURE — 99999 PR PBB SHADOW E&M-EST. PATIENT-LVL IV: CPT | Mod: PBBFAC,,, | Performed by: ANESTHESIOLOGY

## 2019-12-09 PROCEDURE — 1125F AMNT PAIN NOTED PAIN PRSNT: CPT | Mod: S$GLB,,, | Performed by: ANESTHESIOLOGY

## 2019-12-09 PROCEDURE — 99999 PR PBB SHADOW E&M-EST. PATIENT-LVL IV: ICD-10-PCS | Mod: PBBFAC,,, | Performed by: ANESTHESIOLOGY

## 2019-12-09 PROCEDURE — 99214 OFFICE O/P EST MOD 30 MIN: CPT | Mod: S$GLB,,, | Performed by: ANESTHESIOLOGY

## 2019-12-09 PROCEDURE — 1101F PT FALLS ASSESS-DOCD LE1/YR: CPT | Mod: CPTII,S$GLB,, | Performed by: ANESTHESIOLOGY

## 2019-12-09 PROCEDURE — 99214 PR OFFICE/OUTPT VISIT, EST, LEVL IV, 30-39 MIN: ICD-10-PCS | Mod: S$GLB,,, | Performed by: ANESTHESIOLOGY

## 2019-12-09 PROCEDURE — 1159F MED LIST DOCD IN RCRD: CPT | Mod: S$GLB,,, | Performed by: ANESTHESIOLOGY

## 2019-12-09 RX ORDER — GABAPENTIN 300 MG/1
300-600 CAPSULE ORAL NIGHTLY
Qty: 60 CAPSULE | Refills: 5 | Status: SHIPPED | OUTPATIENT
Start: 2019-12-09 | End: 2020-05-26 | Stop reason: SDUPTHER

## 2019-12-09 NOTE — PATIENT INSTRUCTIONS
"We are going to start gabapentin for your low back pain.  To help with side effects, I recommend starting this slowly, taking 1 capsule at bedtime.  This medication may make you sleepy, so do not drive until you know how it affect you.  Start this medication at bedtime.  If it does not make you too sleepy, you can increase this to 2 capsules at bedtime.  Some people choose to only take this at night.  Other side effects are generally mild and include diarrhea, nausea, and "forgetfullness."    "

## 2019-12-09 NOTE — H&P (VIEW-ONLY)
Subjective:     Patient ID: Anthony Sheldon is a 85 y.o. male.    Chief Complaint: Pain    Consulted by: Self     Disclaimer: This note was generated using voice recognition software.  There may be a typographical errors that were missed during proofreading.      HPI:    Anthony Sheldon is a 85 y.o. male who presents today with left knee pain. He reports that the knee feels like a pressure sensation.  He is s/p left knee coolief in 5/2018 with resolution of his burning, stabbing pain, but this pressure pain never went away.   It is located on the lateral and posterior aspect of his knee.  This pain is described in detail below.    Of note, he received Euflexxa in the right knee with great benefit.    He also reports left-sided low back pain that is long-standing and worsening.  The pain is located in the left middle back and does not radiate.  The pain is worse with walking and with activities cause him to stand up straight.  It is better with rest.    Aggravating factors: Walking, the pain is worse in the morning    Mitigating factors: Coolief, motion    Previously seeing: Dr. Posadas, but patient lives here and would like to establish care here    Interval History (1/8/2019):  He returns today for follow up.  He reports that his low back pain is improving with the home exercises.  Tylenol has been helpful for the pain.    Interval History (6/17/2019):  He returns today for follow up.  He reports that he is having increased pain in his left foot.  This pain began around April of 2019 with no inciting event or injury.  He reports a sharp, stabbing pain on the top of his left foot that occurs when he is lying on his left side.  This resolves when he turns over to his back.  This is associated with some numbness in the same area.  No muscle weakness.  Diclofenac helps.    Interval History (7/9/2019):  He returns today for follow up.  He reports that the L5/S1 ILESI was helpful for the pain in his low back,  but he continues to have the left-sided foot pain. He is planning to increase the diclofenac from 1 tablet daily to 2 tablets daily to the coming days.  He would like to try this before trying any additional treatments..    Interval History (12/9/2019):  He returns today for follow up.  He reports that his low back pain has been worsening over the past 2 weeks.  He would like to repeat the lumbar epidural.  He reports low back pain radiating to the left side of his foot.  This is similar to previous.  He continues of diclofenac once daily.   He finds this to be helpful.  He continues to have foot cramps at night that wake him from sleep.     Physical Therapy: Yes, after his surgery.  Continued pool exercises all summer.  He has been walking until recently when it got cold.  No stretches.    Non-pharmacologic Treatment:     · Ice/Heat: Ice after surgery  · TENS: Never  · Massage: Never   · Chiropractic care: Never  · Acupuncture: Never  · Other: No         Pain Medications:         · Currently taking: Tylenol 500 mg, 1-2 per dose (helpful), diclofenac 75 mg twice daily as needed (usually takes once daily, twice daily interfered with sleep)    · Has tried in the past:    · Opioids: tramadol  · NSAIDS: Naproxen 500 mg (some benefit, a couple of times a week),   · Tylenol: Takes 2-3 times per week  · Muscle relaxants: Never  · TCAs: Never  · SNRIs: Never  · Anticonvulsants: Never  · topical creams: Never  · Other: None    Blood thinners: None    Interventional Therapies:   · 5/18/2018: Left knee coolief radiofrequency:  75% relief  · Euflexxa in right knee 3/2018: Good benefit  · 06/17/2019:  L5/S1 interlaminar SARITHA:  50% benefit, mostly of low back pain    Relevant Surgeries:   · Left TKA    Affecting sleep? No    Affecting daily activities? No    Depressive symptoms? No          · SI/HI? No    Work status: No, retired    Prescription Monitoring Program database:  Reviewed and consistent with medication use as  prescribed.    Last 3 PDI Scores 12/9/2019 7/9/2019 6/17/2019   Pain Disability Index (PDI) 20 5 13       Opioid Risk Score       Value Time User    Opioid Risk Score  0 11/27/2018 11:44 AM Olive Eugene LPN          GENERAL:  No weight loss, malaise or fevers.  HEENT:   No recent changes in vision or hearing  NECK:  Negative for lumps, no difficulty with swallowing.  RESPIRATORY:  Negative for cough, wheezing or shortness of breath, patient denies any recent URI.  CARDIOVASCULAR:  Negative for chest pain, leg swelling or palpitations.  GI:  Negative for abdominal discomfort, blood in stools or black stools or change in bowel habits.  MUSCULOSKELETAL:  See HPI.  SKIN:  Negative for lesions, rash, and itching.  PSYCH:  No mood disorder or recent psychosocial stressors.    HEMATOLOGY/LYMPHOLOGY:  Negative for prolonged bleeding, bruising easily or swollen nodes.    ENDO: No history of diabetes or thyroid dysfunction  NEURO:   No history of headaches, syncope, paralysis, seizures or tremors.  All other reviewed and negative other than HPI.          Past Medical History:   Diagnosis Date    Arthritis     Cataract 2009    had surgery to have removed    Dental bridge present     LOWER PARTIAL    DVT (deep venous thrombosis) 2014    right le after thr    Prostate cancer 05/2019    Wears glasses        Past Surgical History:   Procedure Laterality Date    BIOPSY WITH TRANSRECTAL ULTRASOUND (TRUS) GUIDANCE Bilateral 3/27/2019    Procedure: BIOPSY, WITH TRANSRECTAL US GUIDANCE;  Surgeon: Niall Blas MD;  Location: [facility] OR;  Service: Urology;  Laterality: Bilateral;    CYSTOSCOPY N/A 3/27/2019    Procedure: CYSTOSCOPY;  Surgeon: Niall Blas MD;  Location: [facility] OR;  Service: Urology;  Laterality: N/A;    EPIDURAL STEROID INJECTION N/A 6/17/2019    Procedure: Injection, Steroid, Epidural - L5/S1 EPIDURAL STEROID INJECTION;  Surgeon: Chyna Valdovinos MD;  Location: [facility] OR;  Service: Pain Management;   Laterality: N/A;    INJECTION OF JOINT Left 1/21/2019    Procedure: Injection, FACET JOINT INJECTION LEFT L4-5 AND L5-S1;  Surgeon: Chyna Valdovinos MD;  Location: [facility] OR;  Service: Pain Management;  Laterality: Left;    JOINT REPLACEMENT      BILAT HIPS, RIGHT SHOULDER    KNEE ARTHROPLASTY Left     TONSILLECTOMY  1950    TOTAL HIP ARTHROPLASTY Bilateral 2013 & 2014    TOTAL SHOULDER ARTHROPLASTY Right 2005       Review of patient's allergies indicates:  No Known Allergies    Current Outpatient Medications   Medication Sig Dispense Refill    bicalutamide (CASODEX) 50 MG Tab Take 1 tablet (50 mg total) by mouth once daily. 30 tablet 6    cetirizine 10 mg Cap       diclofenac (VOLTAREN) 75 MG EC tablet Take 1 tablet (75 mg total) by mouth 2 (two) times daily as needed (pain). 180 tablet 3    multivitamin (ONE DAILY MULTIVITAMIN) per tablet Take 1 tablet by mouth once daily.      zolpidem (AMBIEN) 5 MG Tab Take 1 tablet (5 mg total) by mouth nightly. 30 tablet 5     No current facility-administered medications for this visit.        Family History   Problem Relation Age of Onset    Stroke Mother     Bladder Cancer Father        Social History     Socioeconomic History    Marital status:      Spouse name: Not on file    Number of children: Not on file    Years of education: Not on file    Highest education level: Not on file   Occupational History    Not on file   Social Needs    Financial resource strain: Not on file    Food insecurity:     Worry: Not on file     Inability: Not on file    Transportation needs:     Medical: Not on file     Non-medical: Not on file   Tobacco Use    Smoking status: Former Smoker     Types: Cigarettes    Smokeless tobacco: Former User     Quit date: 1/1/1990   Substance and Sexual Activity    Alcohol use: Yes     Alcohol/week: 1.0 standard drinks     Types: 1 Glasses of wine per week     Comment: 1 drink daily    Drug use: No    Sexual activity: Not  "Currently   Lifestyle    Physical activity:     Days per week: Not on file     Minutes per session: Not on file    Stress: Not on file   Relationships    Social connections:     Talks on phone: Not on file     Gets together: Not on file     Attends [employer] service: Not on file     Active member of club or organization: Not on file     Attends meetings of clubs or organizations: Not on file     Relationship status: Not on file   Other Topics Concern    Not on file   Social History Narrative    Not on file       Objective:     Vitals:    12/09/19 1432   BP: 129/71   Pulse: 71   Temp: 98.7 °F (37.1 °C)   TempSrc: Oral   Weight: 109.8 kg (242 lb)   Height: 5' 9" (1.753 m)   PainSc:   3   PainLoc: Back       GEN:  Well developed, well nourished.  No acute distress. No pain behavior.  HEENT:  No trauma.  Mucous membranes moist.  Nares patent bilaterally.  PSYCH: Normal affect. Thought content appropriate.  CHEST:  Breathing symmetric.  No audible wheezing.  ABD:   · Soft, non-distended.    SKIN:  Warm, pink, dry.  No rash on exposed areas.    EXT:  No cyanosis, clubbing, or edema.  No color change or changes in nail or hair growth.  NEURO/MUSCULOSKELETAL:  Fully alert, oriented, and appropriate. Speech normal dionicio. No cranial nerve deficits.   Gait: Normal.     L-Spine:  Decreased ROM with pain on flexion, extension. Positive facet loading on the left.  Negative SLR bilaterally.    Previous physical exam:  Motor Strength: 5/5 motor strength throughout lower extremities.   Reflexes:  2+ and symmetric throughout.  Downgoing Babinski's bilaterally.  No clonus or spasticity.      SI Joint/Hip:  Negative TERESSA bilaterally.  Negative FADIR bilaterally.  Sensory:  Increased sensation in a left L4, L5, and S1 distribution  Negative TTP over lumbar paraspinals, bilateral SI joints, hips, piriformis muscles, or GTB.      Right knee  Inspection: No erythema, swelling, or redness  ROM: Full    Left knee  Inspection: No " erythema, swelling, or redness  ROM: Decreased  Palpation: No pes anserine tenderness and positive crepitus. No patellar tendon tenderness and no patellofemoral tendon tenderness.  No instability on exam.      Imaging:        The imaging studies listed below were independently reviewed by me, and I agree with the findings as documented below.     Narrative     EXAMINATION:  XR LUMBAR SPINE 5 VIEW WITH FLEX AND EXT    CLINICAL HISTORY:  lumbar spondylosis;  Spondylosis without myelopathy or radiculopathy, lumbar region    TECHNIQUE:  Five views of the lumbar spine plus flexion extension views were performed.    COMPARISON:  None.    FINDINGS:  Mild thoracolumbar dextroscoliosis.  Lumbar vertebral bodies are normal alignment.  Mild superior endplate compression fracture of L2.  Prominent Schmorl's node formation of the L3 vertebral body.    Moderate multilevel degenerative disc disease.  Bilateral oblique views demonstrate moderate hypertrophic changes within the facet joints most predominant within the lower lumbar spine.    There is 2 mm retrolisthesis of L2 on L3 on the lateral extension view.  This reduces on the neutral and flexion lateral views.  Remaining lumbar vertebral bodies are in alignment.    The SI joints are intact.  There is bone demineralization.    Previous bilateral total hip arthroplasties.      Impression       1. Mild thoracolumbar dextroscoliosis.  2. Mild superior endplate compression fracture of L2.  If there is clinical concern for fracture acuity, further evaluation may be obtained with either nuclear medicine bone scan or an MRI of the lumbar spine.  3. Moderate multilevel degenerative disc disease.  4. Grade 1 retrolisthesis of L2 on L3 on extension which reduces on neutral and flexion views consistent with movement.  5. Bone demineralization.      Electronically signed by: Ortzi Riojas  Date: 11/29/2018  Time: 08:49       Narrative     XR KNEE ORTHO LEFT.  Clinical History provided for  "exam:"lt knee".  As on 2/22/17, there are surgical changes of left knee arthroplasty.  Position and appearance of the prosthesis are unchanged.  There is a left suprapatellar effusion.  There is moderate diffuse narrowing of the right knee joint.  On the right there are spurs on the distal femur, proximal tibia, and patella. No acute fracture or bony destructive process is seen.  Alignment is normal.      Impression      Surgical changes of left knee arthroplasty.      Electronically signed by: MCKAYLA ZAVALA MD  Date: 01/15/18  Time: 09:18        Narrative     EXAMINATION:  MRI LUMBAR SPINE WITHOUT CONTRAST    CLINICAL HISTORY:  Abnormal xray, lumbar spine, DJD; Abnormal findings on diagnostic imaging of other parts of musculoskeletal system    TECHNIQUE:  Multiplanar, multisequence MR images were acquired from the thoracolumbar junction to the sacrum without the administration of contrast.    COMPARISON:  November 29, 2018 plain film    FINDINGS:  There is a dextroscoliotic curvature in the upper lumbar region.  There is diffuse disc desiccation and disc space base narrowing.  Superior endplate concavity is noted at L2 and Schmorl's node formation is noted in the superior endplate of L3 and inferior endplate of L2.  There is no evidence of marrow edema to suggest acute compression.  The conus terminates at the L1 level and has an unremarkable appearance.  There are minimal type 1 (edematous) degenerative endplate changes at L1/L2 and there are fatty degenerative endplate changes at L2/L3 and L3/L4.  The individual disc levels appears follows:    T12/L1: There is a left central disc extrusion causing distortion of the left anterolateral canal.  Annular disc bulge and osteophyte formation cause mild narrowing of both foramina.    L1/L2: There is annular disc bulging and mild degenerative facet disease with mild effacement of the anterior thecal sac.  There is left greater than right foraminal narrowing.    L2/L3: " There is annular disc bulging with mild effacement of the anterior thecal sac and mild degenerative facet changes are noted bilaterally.  There is mild foraminal narrowing bilaterally.    L3/L4: Moderate degenerative facet changes are noted and there is no evidence of disc protrusion.  There is mild disc bulging into the left foramen.  There is mild narrowing the left foramen.    L4/L5: Annular disc bulge combines with facet and ligamentous hypertrophy to produce moderate canal stenosis.  There is inferior foraminal narrowing bilaterally.  Facet effusions are noted bilaterally.    L5/S1: Degenerative facet changes are noted and there is annular disc bulging with effacement of the anterior thecal sac and the proximal S1 nerve roots bilaterally.      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  There is mild chronic anterior wedging of L1.      Electronically signed by: Israel Morris MD  Date: 11/30/2018  Time: 13:48         Assessment:     Encounter Diagnoses   Name Primary?    Chronic pain disorder Yes    DDD (degenerative disc disease), lumbar     Lumbar radiculopathy     Lumbar spondylosis        Plan:     Diagnoses and all orders for this visit:    Chronic pain disorder  -     gabapentin (NEURONTIN) 300 MG capsule; Take 1-2 capsules (300-600 mg total) by mouth every evening.    DDD (degenerative disc disease), lumbar  -     gabapentin (NEURONTIN) 300 MG capsule; Take 1-2 capsules (300-600 mg total) by mouth every evening.    Lumbar radiculopathy  -     gabapentin (NEURONTIN) 300 MG capsule; Take 1-2 capsules (300-600 mg total) by mouth every evening.  -     Case Request Operating Room: Injection, Steroid, Epidural - L5/S1 LUMBAR EPIDURAL STEROID INJECTION    Lumbar spondylosis      His pain is consistent with the above.    We discussed the assessment and recommendations.  All available images were reviewed.  We discussed the disease process, prognosis, treatment plan, and risks and benefits. The patient is aware of the risks and benefits of the medications being prescribed, common side effects, and proper usage. The following is the plan we agreed on:     1. Schedule for repeat L5/S1 interlaminar SARITHA. The procedure was explained in detail, including risks, benefits, and alternatives.  All questions answered.  Consent obtained today.  2. Continue diclofenac mg 1-2 times daily as needed  3. Start gabapentin, titrating up to an initial dose of 600 mg QHS.  Benefits, risks, side effects discussed today.  4. Can potentially look to perform a left L5 and S1 transforaminal injection in the future if needed.  5. Can repeat the left knee radiofrequency as needed.  I discussed that the procedure did not cover the lateral and posterior aspect of the knee, so I would expect for him still have symptoms in these areas.  6. Continue Tylenol  7. Voltaren gel for topical use  8. Alternate ice and heat for symptomatic relief  9. Home exercises for his low back given previously  10. RTC 3 weeks after above or sooner if needed.    Chyna Valdovinos MD  12/09/2019     The above plan and management options were discussed at length with patient. Patient is in agreement with the above and verbalized understanding. It will be communicated with the referring physician via electronic record, fax, or mail.

## 2019-12-09 NOTE — TELEPHONE ENCOUNTER
Pt walked into clinic and was seen today 12.09.2019    ----- Message from Jamila Grider sent at 12/9/2019  9:17 AM CST -----  Contact: Pt  .Type:  Apoointment Request    Caller requesting to be sen today.    Name of Caller:  Pt  When is the first available appointment?  1-14-20  Symptoms:  Pain in back and left knee  Best Call Back Number:  .There are no phone numbers on file.  Additional Information:  Pt would like to be seen today   Please advise  Thanks

## 2019-12-09 NOTE — PROGRESS NOTES
Subjective:     Patient ID: Anthony Sheldon is a 85 y.o. male.    Chief Complaint: Pain    Consulted by: Self     Disclaimer: This note was generated using voice recognition software.  There may be a typographical errors that were missed during proofreading.      HPI:    Anthony Sheldon is a 85 y.o. male who presents today with left knee pain. He reports that the knee feels like a pressure sensation.  He is s/p left knee coolief in 5/2018 with resolution of his burning, stabbing pain, but this pressure pain never went away.   It is located on the lateral and posterior aspect of his knee.  This pain is described in detail below.    Of note, he received Euflexxa in the right knee with great benefit.    He also reports left-sided low back pain that is long-standing and worsening.  The pain is located in the left middle back and does not radiate.  The pain is worse with walking and with activities cause him to stand up straight.  It is better with rest.    Aggravating factors: Walking, the pain is worse in the morning    Mitigating factors: Coolief, motion    Previously seeing: Dr. Posadas, but patient lives here and would like to establish care here    Interval History (1/8/2019):  He returns today for follow up.  He reports that his low back pain is improving with the home exercises.  Tylenol has been helpful for the pain.    Interval History (6/17/2019):  He returns today for follow up.  He reports that he is having increased pain in his left foot.  This pain began around April of 2019 with no inciting event or injury.  He reports a sharp, stabbing pain on the top of his left foot that occurs when he is lying on his left side.  This resolves when he turns over to his back.  This is associated with some numbness in the same area.  No muscle weakness.  Diclofenac helps.    Interval History (7/9/2019):  He returns today for follow up.  He reports that the L5/S1 ILESI was helpful for the pain in his low back,  but he continues to have the left-sided foot pain. He is planning to increase the diclofenac from 1 tablet daily to 2 tablets daily to the coming days.  He would like to try this before trying any additional treatments..    Interval History (12/9/2019):  He returns today for follow up.  He reports that his low back pain has been worsening over the past 2 weeks.  He would like to repeat the lumbar epidural.  He reports low back pain radiating to the left side of his foot.  This is similar to previous.  He continues of diclofenac once daily.   He finds this to be helpful.  He continues to have foot cramps at night that wake him from sleep.     Physical Therapy: Yes, after his surgery.  Continued pool exercises all summer.  He has been walking until recently when it got cold.  No stretches.    Non-pharmacologic Treatment:     · Ice/Heat: Ice after surgery  · TENS: Never  · Massage: Never   · Chiropractic care: Never  · Acupuncture: Never  · Other: No         Pain Medications:         · Currently taking: Tylenol 500 mg, 1-2 per dose (helpful), diclofenac 75 mg twice daily as needed (usually takes once daily, twice daily interfered with sleep)    · Has tried in the past:    · Opioids: tramadol  · NSAIDS: Naproxen 500 mg (some benefit, a couple of times a week),   · Tylenol: Takes 2-3 times per week  · Muscle relaxants: Never  · TCAs: Never  · SNRIs: Never  · Anticonvulsants: Never  · topical creams: Never  · Other: None    Blood thinners: None    Interventional Therapies:   · 5/18/2018: Left knee coolief radiofrequency:  75% relief  · Euflexxa in right knee 3/2018: Good benefit  · 06/17/2019:  L5/S1 interlaminar SARITHA:  50% benefit, mostly of low back pain    Relevant Surgeries:   · Left TKA    Affecting sleep? No    Affecting daily activities? No    Depressive symptoms? No          · SI/HI? No    Work status: No, retired    Prescription Monitoring Program database:  Reviewed and consistent with medication use as  prescribed.    Last 3 PDI Scores 12/9/2019 7/9/2019 6/17/2019   Pain Disability Index (PDI) 20 5 13       Opioid Risk Score       Value Time User    Opioid Risk Score  0 11/27/2018 11:44 AM Olive Eugene LPN          GENERAL:  No weight loss, malaise or fevers.  HEENT:   No recent changes in vision or hearing  NECK:  Negative for lumps, no difficulty with swallowing.  RESPIRATORY:  Negative for cough, wheezing or shortness of breath, patient denies any recent URI.  CARDIOVASCULAR:  Negative for chest pain, leg swelling or palpitations.  GI:  Negative for abdominal discomfort, blood in stools or black stools or change in bowel habits.  MUSCULOSKELETAL:  See HPI.  SKIN:  Negative for lesions, rash, and itching.  PSYCH:  No mood disorder or recent psychosocial stressors.    HEMATOLOGY/LYMPHOLOGY:  Negative for prolonged bleeding, bruising easily or swollen nodes.    ENDO: No history of diabetes or thyroid dysfunction  NEURO:   No history of headaches, syncope, paralysis, seizures or tremors.  All other reviewed and negative other than HPI.          Past Medical History:   Diagnosis Date    Arthritis     Cataract 2009    had surgery to have removed    Dental bridge present     LOWER PARTIAL    DVT (deep venous thrombosis) 2014    right le after thr    Prostate cancer 05/2019    Wears glasses        Past Surgical History:   Procedure Laterality Date    BIOPSY WITH TRANSRECTAL ULTRASOUND (TRUS) GUIDANCE Bilateral 3/27/2019    Procedure: BIOPSY, WITH TRANSRECTAL US GUIDANCE;  Surgeon: Niall Blas MD;  Location: [facility] OR;  Service: Urology;  Laterality: Bilateral;    CYSTOSCOPY N/A 3/27/2019    Procedure: CYSTOSCOPY;  Surgeon: Niall Blas MD;  Location: [facility] OR;  Service: Urology;  Laterality: N/A;    EPIDURAL STEROID INJECTION N/A 6/17/2019    Procedure: Injection, Steroid, Epidural - L5/S1 EPIDURAL STEROID INJECTION;  Surgeon: Chyna Valdovinos MD;  Location: [facility] OR;  Service: Pain Management;   Laterality: N/A;    INJECTION OF JOINT Left 1/21/2019    Procedure: Injection, FACET JOINT INJECTION LEFT L4-5 AND L5-S1;  Surgeon: Chyna Valdovinos MD;  Location: [facility] OR;  Service: Pain Management;  Laterality: Left;    JOINT REPLACEMENT      BILAT HIPS, RIGHT SHOULDER    KNEE ARTHROPLASTY Left     TONSILLECTOMY  1950    TOTAL HIP ARTHROPLASTY Bilateral 2013 & 2014    TOTAL SHOULDER ARTHROPLASTY Right 2005       Review of patient's allergies indicates:  No Known Allergies    Current Outpatient Medications   Medication Sig Dispense Refill    bicalutamide (CASODEX) 50 MG Tab Take 1 tablet (50 mg total) by mouth once daily. 30 tablet 6    cetirizine 10 mg Cap       diclofenac (VOLTAREN) 75 MG EC tablet Take 1 tablet (75 mg total) by mouth 2 (two) times daily as needed (pain). 180 tablet 3    multivitamin (ONE DAILY MULTIVITAMIN) per tablet Take 1 tablet by mouth once daily.      zolpidem (AMBIEN) 5 MG Tab Take 1 tablet (5 mg total) by mouth nightly. 30 tablet 5     No current facility-administered medications for this visit.        Family History   Problem Relation Age of Onset    Stroke Mother     Bladder Cancer Father        Social History     Socioeconomic History    Marital status:      Spouse name: Not on file    Number of children: Not on file    Years of education: Not on file    Highest education level: Not on file   Occupational History    Not on file   Social Needs    Financial resource strain: Not on file    Food insecurity:     Worry: Not on file     Inability: Not on file    Transportation needs:     Medical: Not on file     Non-medical: Not on file   Tobacco Use    Smoking status: Former Smoker     Types: Cigarettes    Smokeless tobacco: Former User     Quit date: 1/1/1990   Substance and Sexual Activity    Alcohol use: Yes     Alcohol/week: 1.0 standard drinks     Types: 1 Glasses of wine per week     Comment: 1 drink daily    Drug use: No    Sexual activity: Not  "Currently   Lifestyle    Physical activity:     Days per week: Not on file     Minutes per session: Not on file    Stress: Not on file   Relationships    Social connections:     Talks on phone: Not on file     Gets together: Not on file     Attends [employer] service: Not on file     Active member of club or organization: Not on file     Attends meetings of clubs or organizations: Not on file     Relationship status: Not on file   Other Topics Concern    Not on file   Social History Narrative    Not on file       Objective:     Vitals:    12/09/19 1432   BP: 129/71   Pulse: 71   Temp: 98.7 °F (37.1 °C)   TempSrc: Oral   Weight: 109.8 kg (242 lb)   Height: 5' 9" (1.753 m)   PainSc:   3   PainLoc: Back       GEN:  Well developed, well nourished.  No acute distress. No pain behavior.  HEENT:  No trauma.  Mucous membranes moist.  Nares patent bilaterally.  PSYCH: Normal affect. Thought content appropriate.  CHEST:  Breathing symmetric.  No audible wheezing.  ABD:   · Soft, non-distended.    SKIN:  Warm, pink, dry.  No rash on exposed areas.    EXT:  No cyanosis, clubbing, or edema.  No color change or changes in nail or hair growth.  NEURO/MUSCULOSKELETAL:  Fully alert, oriented, and appropriate. Speech normal dionicio. No cranial nerve deficits.   Gait: Normal.     L-Spine:  Decreased ROM with pain on flexion, extension. Positive facet loading on the left.  Negative SLR bilaterally.    Previous physical exam:  Motor Strength: 5/5 motor strength throughout lower extremities.   Reflexes:  2+ and symmetric throughout.  Downgoing Babinski's bilaterally.  No clonus or spasticity.      SI Joint/Hip:  Negative TERESSA bilaterally.  Negative FADIR bilaterally.  Sensory:  Increased sensation in a left L4, L5, and S1 distribution  Negative TTP over lumbar paraspinals, bilateral SI joints, hips, piriformis muscles, or GTB.      Right knee  Inspection: No erythema, swelling, or redness  ROM: Full    Left knee  Inspection: No " erythema, swelling, or redness  ROM: Decreased  Palpation: No pes anserine tenderness and positive crepitus. No patellar tendon tenderness and no patellofemoral tendon tenderness.  No instability on exam.      Imaging:        The imaging studies listed below were independently reviewed by me, and I agree with the findings as documented below.     Narrative     EXAMINATION:  XR LUMBAR SPINE 5 VIEW WITH FLEX AND EXT    CLINICAL HISTORY:  lumbar spondylosis;  Spondylosis without myelopathy or radiculopathy, lumbar region    TECHNIQUE:  Five views of the lumbar spine plus flexion extension views were performed.    COMPARISON:  None.    FINDINGS:  Mild thoracolumbar dextroscoliosis.  Lumbar vertebral bodies are normal alignment.  Mild superior endplate compression fracture of L2.  Prominent Schmorl's node formation of the L3 vertebral body.    Moderate multilevel degenerative disc disease.  Bilateral oblique views demonstrate moderate hypertrophic changes within the facet joints most predominant within the lower lumbar spine.    There is 2 mm retrolisthesis of L2 on L3 on the lateral extension view.  This reduces on the neutral and flexion lateral views.  Remaining lumbar vertebral bodies are in alignment.    The SI joints are intact.  There is bone demineralization.    Previous bilateral total hip arthroplasties.      Impression       1. Mild thoracolumbar dextroscoliosis.  2. Mild superior endplate compression fracture of L2.  If there is clinical concern for fracture acuity, further evaluation may be obtained with either nuclear medicine bone scan or an MRI of the lumbar spine.  3. Moderate multilevel degenerative disc disease.  4. Grade 1 retrolisthesis of L2 on L3 on extension which reduces on neutral and flexion views consistent with movement.  5. Bone demineralization.      Electronically signed by: Ortiz Riojas  Date: 11/29/2018  Time: 08:49       Narrative     XR KNEE ORTHO LEFT.  Clinical History provided for  "exam:"lt knee".  As on 2/22/17, there are surgical changes of left knee arthroplasty.  Position and appearance of the prosthesis are unchanged.  There is a left suprapatellar effusion.  There is moderate diffuse narrowing of the right knee joint.  On the right there are spurs on the distal femur, proximal tibia, and patella. No acute fracture or bony destructive process is seen.  Alignment is normal.      Impression      Surgical changes of left knee arthroplasty.      Electronically signed by: MCKAYLA ZAVALA MD  Date: 01/15/18  Time: 09:18        Narrative     EXAMINATION:  MRI LUMBAR SPINE WITHOUT CONTRAST    CLINICAL HISTORY:  Abnormal xray, lumbar spine, DJD; Abnormal findings on diagnostic imaging of other parts of musculoskeletal system    TECHNIQUE:  Multiplanar, multisequence MR images were acquired from the thoracolumbar junction to the sacrum without the administration of contrast.    COMPARISON:  November 29, 2018 plain film    FINDINGS:  There is a dextroscoliotic curvature in the upper lumbar region.  There is diffuse disc desiccation and disc space base narrowing.  Superior endplate concavity is noted at L2 and Schmorl's node formation is noted in the superior endplate of L3 and inferior endplate of L2.  There is no evidence of marrow edema to suggest acute compression.  The conus terminates at the L1 level and has an unremarkable appearance.  There are minimal type 1 (edematous) degenerative endplate changes at L1/L2 and there are fatty degenerative endplate changes at L2/L3 and L3/L4.  The individual disc levels appears follows:    T12/L1: There is a left central disc extrusion causing distortion of the left anterolateral canal.  Annular disc bulge and osteophyte formation cause mild narrowing of both foramina.    L1/L2: There is annular disc bulging and mild degenerative facet disease with mild effacement of the anterior thecal sac.  There is left greater than right foraminal narrowing.    L2/L3: " There is annular disc bulging with mild effacement of the anterior thecal sac and mild degenerative facet changes are noted bilaterally.  There is mild foraminal narrowing bilaterally.    L3/L4: Moderate degenerative facet changes are noted and there is no evidence of disc protrusion.  There is mild disc bulging into the left foramen.  There is mild narrowing the left foramen.    L4/L5: Annular disc bulge combines with facet and ligamentous hypertrophy to produce moderate canal stenosis.  There is inferior foraminal narrowing bilaterally.  Facet effusions are noted bilaterally.    L5/S1: Degenerative facet changes are noted and there is annular disc bulging with effacement of the anterior thecal sac and the proximal S1 nerve roots bilaterally.      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  There is mild chronic anterior wedging of L1.      Electronically signed by: Israel Morris MD  Date: 11/30/2018  Time: 13:48         Assessment:     Encounter Diagnoses   Name Primary?    Chronic pain disorder Yes    DDD (degenerative disc disease), lumbar     Lumbar radiculopathy     Lumbar spondylosis        Plan:     Diagnoses and all orders for this visit:    Chronic pain disorder  -     gabapentin (NEURONTIN) 300 MG capsule; Take 1-2 capsules (300-600 mg total) by mouth every evening.    DDD (degenerative disc disease), lumbar  -     gabapentin (NEURONTIN) 300 MG capsule; Take 1-2 capsules (300-600 mg total) by mouth every evening.    Lumbar radiculopathy  -     gabapentin (NEURONTIN) 300 MG capsule; Take 1-2 capsules (300-600 mg total) by mouth every evening.  -     Case Request Operating Room: Injection, Steroid, Epidural - L5/S1 LUMBAR EPIDURAL STEROID INJECTION    Lumbar spondylosis      His pain is consistent with the above.    We discussed the assessment and recommendations.  All available images were reviewed.  We discussed the disease process, prognosis, treatment plan, and risks and benefits. The patient is aware of the risks and benefits of the medications being prescribed, common side effects, and proper usage. The following is the plan we agreed on:     1. Schedule for repeat L5/S1 interlaminar SARITHA. The procedure was explained in detail, including risks, benefits, and alternatives.  All questions answered.  Consent obtained today.  2. Continue diclofenac mg 1-2 times daily as needed  3. Start gabapentin, titrating up to an initial dose of 600 mg QHS.  Benefits, risks, side effects discussed today.  4. Can potentially look to perform a left L5 and S1 transforaminal injection in the future if needed.  5. Can repeat the left knee radiofrequency as needed.  I discussed that the procedure did not cover the lateral and posterior aspect of the knee, so I would expect for him still have symptoms in these areas.  6. Continue Tylenol  7. Voltaren gel for topical use  8. Alternate ice and heat for symptomatic relief  9. Home exercises for his low back given previously  10. RTC 3 weeks after above or sooner if needed.    Chyna Valdovinos MD  12/09/2019     The above plan and management options were discussed at length with patient. Patient is in agreement with the above and verbalized understanding. It will be communicated with the referring physician via electronic record, fax, or mail.

## 2019-12-09 NOTE — LETTER
December 10, 2019      Perfecto Melgar III, MD  [address]  [facility] [place] [address]           [facility]  [address] [facility] [place] [address]  Phone: [phone]  Fax: [phone]          Patient: Anthony Sheldon   MR Number: [id number]   YOB: 1934   Date of Visit: 12/9/2019       Dear Dr. Perfecto Melgar III:    Thank you for referring Anthony Sheldon to me for evaluation. Attached you will find relevant portions of my assessment and plan of care.    If you have questions, please do not hesitate to call me. I look forward to following Anthony Sheldon along with you.    Sincerely,    Chyna Valdovinos MD    Enclosure  CC:  No Recipients    If you would like to receive this communication electronically, please contact [email or web address] or [phone] to request more information on [employer] Link access.    For providers and/or their staff who would like to refer a patient to [facility], please contact us through our one-stop-shop provider referral line, [facility], at [phone].    If you feel you have received this communication in error or would no longer like to receive these types of communications, please e-mail [email or web address]

## 2019-12-10 RX ORDER — SODIUM CHLORIDE, SODIUM LACTATE, POTASSIUM CHLORIDE, CALCIUM CHLORIDE 600; 310; 30; 20 MG/100ML; MG/100ML; MG/100ML; MG/100ML
INJECTION, SOLUTION INTRAVENOUS ONCE AS NEEDED
Status: CANCELLED | OUTPATIENT
Start: 2019-12-10 | End: 2031-05-08

## 2019-12-11 ENCOUNTER — HOSPITAL ENCOUNTER (OUTPATIENT)
Facility: HOSPITAL | Age: 84
Discharge: HOME | End: 2019-12-11
Attending: ANESTHESIOLOGY | Admitting: ANESTHESIOLOGY
Payer: MEDICARE

## 2019-12-11 VITALS
RESPIRATION RATE: 18 BRPM | BODY MASS INDEX: 35.84 KG/M2 | HEART RATE: 59 BPM | TEMPERATURE: 98 F | WEIGHT: 242 LBS | HEIGHT: 69 IN | OXYGEN SATURATION: 96 % | SYSTOLIC BLOOD PRESSURE: 165 MMHG | DIASTOLIC BLOOD PRESSURE: 79 MMHG

## 2019-12-11 DIAGNOSIS — M54.16 LUMBAR RADICULOPATHY: ICD-10-CM

## 2019-12-11 DIAGNOSIS — M51.36 DDD (DEGENERATIVE DISC DISEASE), LUMBAR: Primary | ICD-10-CM

## 2019-12-11 PROCEDURE — 62323 NJX INTERLAMINAR LMBR/SAC: CPT | Mod: ,,, | Performed by: ANESTHESIOLOGY

## 2019-12-11 PROCEDURE — 63600175 PHARM REV CODE 636 W HCPCS: Performed by: ANESTHESIOLOGY

## 2019-12-11 PROCEDURE — 62323 NJX INTERLAMINAR LMBR/SAC: CPT | Performed by: ANESTHESIOLOGY

## 2019-12-11 PROCEDURE — 25500020 PHARM REV CODE 255: Performed by: ANESTHESIOLOGY

## 2019-12-11 PROCEDURE — 62323 PR INJ LUMBAR/SACRAL, W/IMAGING GUIDANCE: ICD-10-PCS | Mod: ,,, | Performed by: ANESTHESIOLOGY

## 2019-12-11 PROCEDURE — 25000003 PHARM REV CODE 250: Performed by: ANESTHESIOLOGY

## 2019-12-11 RX ORDER — METHYLPREDNISOLONE ACETATE 80 MG/ML
INJECTION, SUSPENSION INTRA-ARTICULAR; INTRALESIONAL; INTRAMUSCULAR; SOFT TISSUE
Status: DISCONTINUED | OUTPATIENT
Start: 2019-12-11 | End: 2019-12-11 | Stop reason: HOSPADM

## 2019-12-11 RX ORDER — LIDOCAINE HYDROCHLORIDE 10 MG/ML
INJECTION INFILTRATION; PERINEURAL
Status: DISCONTINUED | OUTPATIENT
Start: 2019-12-11 | End: 2019-12-11 | Stop reason: HOSPADM

## 2019-12-11 RX ORDER — BUPIVACAINE HYDROCHLORIDE 2.5 MG/ML
INJECTION, SOLUTION EPIDURAL; INFILTRATION; INTRACAUDAL
Status: DISCONTINUED | OUTPATIENT
Start: 2019-12-11 | End: 2019-12-11 | Stop reason: HOSPADM

## 2019-12-11 NOTE — OP NOTE
PROCEDURE DATE: 12/11/2019    PROCEDURE:  Lumbar Interlaminar epidural steroid injection at L5-S1 under fluoroscopic guidance.    DIAGNOSIS: LUMBAR DISC DISPLACEMENT WITHOUT MYELOPATHY  POSTOP DIAGNOSIS: SAME    PHYSICIAN: Jade Layton M.D.    MEDICATIONS INJECTED: 80 mg depo-medrol with 2 ml of 0.25% bupivacaine    LOCAL ANESTHETIC INJECTED:    Lidocaine 1% 4 ml total    SEDATION MEDICATIONS: N/A    ESTIMATED BLOOD LOSS:  none    COMPLICATIONS:  none    TECHNIQUE:  Time-out taken to identify patient and procedure prior to starting the procedure.  With the patient laying in a prone position, the area was prepped and draped in the usual sterile fashion using ChloraPrep and a fenestrated drape.  After determining the target level with an AP fluoroscopic view, local anesthetic was given using a 25-gauge 1.5 inch needle by raising a wheal and then infiltrating toward the interlaminar entry space.  A 3.5 inch 20-gauge Touhy needle was introduced under AP fluoroscopic guidance to the interlaminar space of L5-S1. Once the trajectory was established, the needle was visualized in the lateral view and advanced using loss of resistance technique. Once in the desired position, 3 mL contrast was injected to confirm placement and there was no vascular uptake nor intrathecal spread.  The medication was then injected slowly. The patient tolerated the procedure well.      The patient was monitored after the procedure.   They were given post-procedure and discharge instructions to follow at home.  The patient was discharged in a stable condition.

## 2019-12-11 NOTE — PLAN OF CARE
0749  Arrived to pacu s/p steroid injection. Alert and oriented. Skin w/d to touch. Color pink.   0752 up to dress.   0753 out patient instructions given and verbalized understanding,.

## 2019-12-11 NOTE — DISCHARGE SUMMARY
[facility]  Discharge Note  Short Stay    Admit Date: 12/11/2019    Discharge Date and Time: 12/11/2019    Attending Physician: Jade Layton MD    Discharge Provider: Jade Layton MD    Diagnoses:  Active Hospital Problems    Diagnosis  POA    Hyperglycemia [R73.9]  Yes      Resolved Hospital Problems   No resolved problems to display.       Hospital Course: Lumbar interlaminar epidural steroid injection    Discharged Condition: Good    Final Diagnoses:   Active Hospital Problems    Diagnosis  POA    Hyperglycemia [R73.9]  Yes      Resolved Hospital Problems   No resolved problems to display.       Disposition: Home or Self Care    Follow up/Patient Instructions:    Medications:  Reconciled Home Medications:      Medication List      CONTINUE taking these medications    bicalutamide 50 MG Tab  Commonly known as:  CASODEX  Take 1 tablet (50 mg total) by mouth once daily.     cetirizine 10 mg Cap     diclofenac 75 MG EC tablet  Commonly known as:  VOLTAREN  Take 1 tablet (75 mg total) by mouth 2 (two) times daily as needed (pain).     gabapentin 300 MG capsule  Commonly known as:  NEURONTIN  Take 1-2 capsules (300-600 mg total) by mouth every evening.     One Daily Multivitamin per tablet  Generic drug:  multivitamin  Take 1 tablet by mouth once daily.     zolpidem 5 MG Tab  Commonly known as:  AMBIEN  Take 1 tablet (5 mg total) by mouth nightly.          Discharge Procedure Orders   Diet general     Call MD for:  temperature >100.4     Call MD for:  persistent nausea and vomiting     Call MD for:  severe uncontrolled pain     Call MD for:  difficulty breathing, headache or visual disturbances     Call MD for:  redness, tenderness, or signs of infection (pain, swelling, redness, odor or green/yellow discharge around incision site)     Call MD for:  hives     Call MD for:  persistent dizziness or light-headedness     Call MD for:  extreme fatigue        Follow up with MD in 2-3 weeks    Discharge  Procedure Orders (must include Diet, Follow-up, Activity):   Discharge Procedure Orders (must include Diet, Follow-up, Activity)   Diet general     Call MD for:  temperature >100.4     Call MD for:  persistent nausea and vomiting     Call MD for:  severe uncontrolled pain     Call MD for:  difficulty breathing, headache or visual disturbances     Call MD for:  redness, tenderness, or signs of infection (pain, swelling, redness, odor or green/yellow discharge around incision site)     Call MD for:  hives     Call MD for:  persistent dizziness or light-headedness     Call MD for:  extreme fatigue

## 2019-12-11 NOTE — INTERVAL H&P NOTE
The patient has been examined and the H&P has been reviewed:    I concur with the findings and no changes have occurred since H&P was written.     This patient has been cleared for surgery in an ambulatory surgical facility    ASA 3,  Mallampatti Score 3  No history of anesthetic complications  Plan for local anesthesia      Anesthesia/Surgery risks, benefits and alternative options discussed and understood by patient/family.          There are no hospital problems to display for this patient.

## 2020-01-06 ENCOUNTER — PATIENT MESSAGE (OUTPATIENT)
Dept: PAIN MEDICINE | Facility: CLINIC | Age: 85
End: 2020-01-06

## 2020-01-11 ENCOUNTER — HOSPITAL ENCOUNTER (OUTPATIENT)
Facility: HOSPITAL | Age: 85
Discharge: HOME | End: 2020-01-13
Attending: FAMILY MEDICINE
Payer: MEDICARE

## 2020-01-11 DIAGNOSIS — K57.30 DIVERTICULOSIS OF COLON: ICD-10-CM

## 2020-01-11 DIAGNOSIS — K92.1 HEMATOCHEZIA: ICD-10-CM

## 2020-01-11 DIAGNOSIS — K62.5 RECTAL BLEEDING: Primary | ICD-10-CM

## 2020-01-11 LAB
ALBUMIN SERPL BCP-MCNC: 3.8 G/DL (ref 3.5–5.2)
ALP SERPL-CCNC: 45 U/L (ref 55–135)
ALT SERPL W/O P-5'-P-CCNC: 15 U/L (ref 10–44)
ANION GAP SERPL CALC-SCNC: 4 MMOL/L (ref 8–16)
AST SERPL-CCNC: 16 U/L (ref 10–40)
BASOPHILS # BLD AUTO: 0.02 K/UL (ref 0–0.2)
BASOPHILS # BLD AUTO: 0.03 K/UL (ref 0–0.2)
BASOPHILS NFR BLD: 0.3 % (ref 0–1.9)
BASOPHILS NFR BLD: 0.5 % (ref 0–1.9)
BILIRUB SERPL-MCNC: 0.5 MG/DL (ref 0.1–1)
BUN SERPL-MCNC: 44 MG/DL (ref 8–23)
CALCIUM SERPL-MCNC: 8.7 MG/DL (ref 8.7–10.5)
CHLORIDE SERPL-SCNC: 104 MMOL/L (ref 95–110)
CO2 SERPL-SCNC: 30 MMOL/L (ref 23–29)
CREAT SERPL-MCNC: 1.9 MG/DL (ref 0.5–1.4)
DIFFERENTIAL METHOD: ABNORMAL
DIFFERENTIAL METHOD: ABNORMAL
EOSINOPHIL # BLD AUTO: 0.1 K/UL (ref 0–0.5)
EOSINOPHIL # BLD AUTO: 0.1 K/UL (ref 0–0.5)
EOSINOPHIL NFR BLD: 1.6 % (ref 0–8)
EOSINOPHIL NFR BLD: 2 % (ref 0–8)
ERYTHROCYTE [DISTWIDTH] IN BLOOD BY AUTOMATED COUNT: 12.5 % (ref 11.5–14.5)
ERYTHROCYTE [DISTWIDTH] IN BLOOD BY AUTOMATED COUNT: 12.7 % (ref 11.5–14.5)
EST. GFR  (AFRICAN AMERICAN): 36.4 ML/MIN/1.73 M^2
EST. GFR  (NON AFRICAN AMERICAN): 31.4 ML/MIN/1.73 M^2
GLUCOSE SERPL-MCNC: 147 MG/DL (ref 70–110)
HCT VFR BLD AUTO: 28.1 % (ref 40–54)
HCT VFR BLD AUTO: 32.4 % (ref 40–54)
HGB BLD-MCNC: 10.6 G/DL (ref 14–18)
HGB BLD-MCNC: 9.3 G/DL (ref 14–18)
IMM GRANULOCYTES # BLD AUTO: 0.02 K/UL (ref 0–0.04)
IMM GRANULOCYTES # BLD AUTO: 0.03 K/UL (ref 0–0.04)
IMM GRANULOCYTES NFR BLD AUTO: 0.3 % (ref 0–0.5)
IMM GRANULOCYTES NFR BLD AUTO: 0.5 % (ref 0–0.5)
INR PPP: 1.1 (ref 0.8–1.2)
LYMPHOCYTES # BLD AUTO: 1.6 K/UL (ref 1–4.8)
LYMPHOCYTES # BLD AUTO: 2.2 K/UL (ref 1–4.8)
LYMPHOCYTES NFR BLD: 24.8 % (ref 18–48)
LYMPHOCYTES NFR BLD: 32.4 % (ref 18–48)
MCH RBC QN AUTO: 32 PG (ref 27–31)
MCH RBC QN AUTO: 32.4 PG (ref 27–31)
MCHC RBC AUTO-ENTMCNC: 32.7 G/DL (ref 32–36)
MCHC RBC AUTO-ENTMCNC: 33.1 G/DL (ref 32–36)
MCV RBC AUTO: 98 FL (ref 82–98)
MCV RBC AUTO: 98 FL (ref 82–98)
MONOCYTES # BLD AUTO: 0.6 K/UL (ref 0.3–1)
MONOCYTES # BLD AUTO: 0.7 K/UL (ref 0.3–1)
MONOCYTES NFR BLD: 11.1 % (ref 4–15)
MONOCYTES NFR BLD: 9 % (ref 4–15)
NEUTROPHILS # BLD AUTO: 3.6 K/UL (ref 1.8–7.7)
NEUTROPHILS # BLD AUTO: 4.1 K/UL (ref 1.8–7.7)
NEUTROPHILS NFR BLD: 53.5 % (ref 38–73)
NEUTROPHILS NFR BLD: 64 % (ref 38–73)
NRBC BLD-RTO: 0 /100 WBC
NRBC BLD-RTO: 0 /100 WBC
OB PNL STL: POSITIVE
PLATELET # BLD AUTO: 147 K/UL (ref 150–350)
PLATELET # BLD AUTO: 179 K/UL (ref 150–350)
PMV BLD AUTO: 9.2 FL (ref 9.2–12.9)
PMV BLD AUTO: 9.3 FL (ref 9.2–12.9)
POTASSIUM SERPL-SCNC: 4.8 MMOL/L (ref 3.5–5.1)
PROT SERPL-MCNC: 6.7 G/DL (ref 6–8.4)
PROTHROMBIN TIME: 11.6 SEC (ref 9–12.5)
RBC # BLD AUTO: 2.87 M/UL (ref 4.6–6.2)
RBC # BLD AUTO: 3.31 M/UL (ref 4.6–6.2)
SODIUM SERPL-SCNC: 138 MMOL/L (ref 136–145)
WBC # BLD AUTO: 6.33 K/UL (ref 3.9–12.7)
WBC # BLD AUTO: 6.64 K/UL (ref 3.9–12.7)

## 2020-01-11 PROCEDURE — 74176 CT ABDOMEN PELVIS WITHOUT CONTRAST: ICD-10-PCS | Mod: 26,,, | Performed by: RADIOLOGY

## 2020-01-11 PROCEDURE — G0378 HOSPITAL OBSERVATION PER HR: HCPCS

## 2020-01-11 PROCEDURE — 63600175 PHARM REV CODE 636 W HCPCS: Performed by: FAMILY MEDICINE

## 2020-01-11 PROCEDURE — 85025 COMPLETE CBC W/AUTO DIFF WBC: CPT | Mod: 91

## 2020-01-11 PROCEDURE — 96361 HYDRATE IV INFUSION ADD-ON: CPT

## 2020-01-11 PROCEDURE — 80053 COMPREHEN METABOLIC PANEL: CPT

## 2020-01-11 PROCEDURE — 36415 COLL VENOUS BLD VENIPUNCTURE: CPT

## 2020-01-11 PROCEDURE — 82272 OCCULT BLD FECES 1-3 TESTS: CPT

## 2020-01-11 PROCEDURE — 85610 PROTHROMBIN TIME: CPT

## 2020-01-11 PROCEDURE — 99285 EMERGENCY DEPT VISIT HI MDM: CPT | Mod: 25

## 2020-01-11 PROCEDURE — 74176 CT ABD & PELVIS W/O CONTRAST: CPT | Mod: 26,,, | Performed by: RADIOLOGY

## 2020-01-11 PROCEDURE — 74176 CT ABD & PELVIS W/O CONTRAST: CPT | Mod: TC

## 2020-01-11 RX ORDER — SODIUM CHLORIDE 9 MG/ML
1000 INJECTION, SOLUTION INTRAVENOUS
Status: COMPLETED | OUTPATIENT
Start: 2020-01-11 | End: 2020-01-11

## 2020-01-11 RX ORDER — SODIUM CHLORIDE 9 MG/ML
INJECTION, SOLUTION INTRAVENOUS CONTINUOUS
Status: DISCONTINUED | OUTPATIENT
Start: 2020-01-11 | End: 2020-01-13

## 2020-01-11 RX ORDER — SODIUM CHLORIDE 0.9 % (FLUSH) 0.9 %
10 SYRINGE (ML) INJECTION
Status: DISCONTINUED | OUTPATIENT
Start: 2020-01-11 | End: 2020-01-13 | Stop reason: HOSPADM

## 2020-01-11 RX ADMIN — SODIUM CHLORIDE: 0.9 INJECTION, SOLUTION INTRAVENOUS at 02:01

## 2020-01-11 RX ADMIN — SODIUM CHLORIDE: 0.9 INJECTION, SOLUTION INTRAVENOUS at 10:01

## 2020-01-11 RX ADMIN — SODIUM CHLORIDE: 0.9 INJECTION, SOLUTION INTRAVENOUS at 08:01

## 2020-01-11 RX ADMIN — SODIUM CHLORIDE 1000 ML: 0.9 INJECTION, SOLUTION INTRAVENOUS at 07:01

## 2020-01-11 NOTE — ED PROVIDER NOTES
Encounter Date: 1/11/2020       History     Chief Complaint   Patient presents with    Rectal Bleeding     Onset x 1 day ago. Complaint of bright red blood in stool. Patient states bowel movement is mostly blood.     85-year-old male presents complaining of painless hematochezia onset yesterday evening had another episode this morning the blood went down to my foot patient has not had similar problems in the past has had no abdominal pain no known history of diverticulosis or diverticulitis he does have a history of prostate cancer currently under treatment by , he has no history:  Pathology he has not had a colonoscopy in the past but did have the colon guard test I had a 4% chance        Review of patient's allergies indicates:  No Known Allergies  Past Medical History:   Diagnosis Date    Arthritis     Cataract 2009    had surgery to have removed    Dental bridge present     LOWER PARTIAL    DVT (deep venous thrombosis) 2014    right le after thr    Prostate cancer 05/2019    Wears glasses      Past Surgical History:   Procedure Laterality Date    BIOPSY WITH TRANSRECTAL ULTRASOUND (TRUS) GUIDANCE Bilateral 3/27/2019    Procedure: BIOPSY, WITH TRANSRECTAL US GUIDANCE;  Surgeon: Niall Blas MD;  Location: [facility] OR;  Service: Urology;  Laterality: Bilateral;    CYSTOSCOPY N/A 3/27/2019    Procedure: CYSTOSCOPY;  Surgeon: Niall Blas MD;  Location: [facility] OR;  Service: Urology;  Laterality: N/A;    EPIDURAL STEROID INJECTION N/A 6/17/2019    Procedure: Injection, Steroid, Epidural - L5/S1 EPIDURAL STEROID INJECTION;  Surgeon: Chyna Valdovinos MD;  Location: [facility] OR;  Service: Pain Management;  Laterality: N/A;    EPIDURAL STEROID INJECTION N/A 12/11/2019    Procedure: Injection, Steroid, Epidural - L5/S1 LUMBAR EPIDURAL STEROID INJECTION;  Surgeon: Jade Layton MD;  Location: [facility] OR;  Service: Pain Management;  Laterality: N/A;  *FIRST CASE*    INJECTION OF JOINT Left 1/21/2019     Procedure: Injection, FACET JOINT INJECTION LEFT L4-5 AND L5-S1;  Surgeon: Chyna Valdovinos MD;  Location: [facility] OR;  Service: Pain Management;  Laterality: Left;    JOINT REPLACEMENT      BILAT HIPS, RIGHT SHOULDER    KNEE ARTHROPLASTY Left     TONSILLECTOMY  1950    TOTAL HIP ARTHROPLASTY Bilateral 2013 & 2014    TOTAL SHOULDER ARTHROPLASTY Right 2005     Family History   Problem Relation Age of Onset    Stroke Mother     Bladder Cancer Father      Social History     Tobacco Use    Smoking status: Former Smoker     Types: Cigarettes    Smokeless tobacco: Former User     Quit date: 1/1/1990   Substance Use Topics    Alcohol use: Yes     Alcohol/week: 1.0 standard drinks     Types: 1 Glasses of wine per week     Comment: 1 drink daily    Drug use: No     Review of Systems   Constitutional: Negative for fever.   HENT: Negative for sore throat.    Respiratory: Negative for shortness of breath.    Cardiovascular: Negative for chest pain.   Gastrointestinal: Negative for nausea.   Genitourinary: Negative for dysuria.   Musculoskeletal: Negative for back pain.   Skin: Negative for rash.   Neurological: Negative for weakness.   Hematological: Does not bruise/bleed easily.   Psychiatric/Behavioral: Negative for agitation.       Physical Exam     Initial Vitals [01/11/20 0731]   BP Pulse Resp Temp SpO2   134/86 77 20 97.8 °F (36.6 °C) 99 %      MAP       --         Physical Exam    Nursing note and vitals reviewed.  Constitutional: He appears well-developed and well-nourished. He is not diaphoretic. No distress.   HENT:   Head: Normocephalic and atraumatic.   Right Ear: External ear normal.   Left Ear: External ear normal.   Nose: Nose normal.   Mouth/Throat: Oropharynx is clear and moist. No oropharyngeal exudate.   Eyes: EOM are normal.   Neck: Normal range of motion. Neck supple. No tracheal deviation present.   Cardiovascular: Normal rate and regular rhythm.   No murmur heard.  Pulmonary/Chest: Breath  sounds normal. No stridor. No respiratory distress. He has no rales.   Abdominal: Soft. He exhibits no distension and no mass. There is no tenderness. There is no rebound.   Rectal exam patient has red blood dried in the perianal area there been no palpable rectal tumors on digital rectal exam, the prostate is irregular and slightly enlarged there are no perianal abscesses or hemorrhoids   Musculoskeletal: Normal range of motion. He exhibits no edema.   Lymphadenopathy:     He has no cervical adenopathy.   Neurological: He is alert and oriented to person, place, and time. He has normal strength.   Skin: Skin is warm and dry. Capillary refill takes less than 2 seconds. No pallor.   Psychiatric: He has a normal mood and affect.         ED Course   Procedures  Labs Reviewed   CBC W/ AUTO DIFFERENTIAL - Abnormal; Notable for the following components:       Result Value    RBC 3.31 (*)     Hemoglobin 10.6 (*)     Hematocrit 32.4 (*)     Mean Corpuscular Hemoglobin 32.0 (*)     All other components within normal limits   COMPREHENSIVE METABOLIC PANEL - Abnormal; Notable for the following components:    CO2 30 (*)     Glucose 147 (*)     BUN, Bld 44 (*)     Creatinine 1.9 (*)     Alkaline Phosphatase 45 (*)     Anion Gap 4 (*)     eGFR if  36.4 (*)     eGFR if non  31.4 (*)     All other components within normal limits   CBC W/ AUTO DIFFERENTIAL   COMPREHENSIVE METABOLIC PANEL   PROTIME-INR   OCCULT BLOOD X 1, STOOL          Imaging Results          CT Abdomen Pelvis  Without Contrast (Final result)  Result time 01/11/20 08:47:38    Final result by Ortiz Riojas MD (01/11/20 08:47:38)                 Impression:      1. Granulomatous change.  2. Cholelithiasis.  Further evaluation with gallbladder ultrasound as deemed clinically necessary.  3. Mild fatty infiltration of the pancreas.  4. Extensive diverticulosis without CT evidence for acute diverticulitis.  5. Previous bilateral total  hip arthroplasty.  6. Mild bone demineralization.      Electronically signed by: Ortiz Riojas  Date:    01/11/2020  Time:    08:47             Narrative:    EXAMINATION:  CT ABDOMEN PELVIS WITHOUT CONTRAST    CLINICAL HISTORY:  Abdominal pain, unspecified;    TECHNIQUE:  Low dose axial images, sagittal and coronal reformations were obtained from the lung bases to the pubic symphysis.    COMPARISON:  CT 05/14/2019    FINDINGS:  Mild dependent discoid atelectasis at the left lung base.  The right lung base is clear.  No pleural or pericardial effusions.  Calcified right hilar lymph nodes consistent with prior granulomatous disease.    The liver and spleen are normal in size and attenuation.  The gallbladder is distended.  There is a dependent calcified gallstone measuring 1.1 cm in diameter.  There is mild fatty infiltration of the pancreas.  No peripancreatic inflammatory change.  There is a small duodenal diverticulum.  The adrenal glands are unremarkable.    Kidneys are mildly atrophic but of normal attenuation.  No renal calculi.  No changes of hydronephrosis.    Air and stool throughout the colon and rectum.  There are innumerable diverticula scattered throughout the colon.  No significant mesenteric inflammatory change to suggest acute diverticulitis.  There is mild chronic circumferential bowel wall thickening of the proximal sigmoid colon likely representing chronic changes of diverticulitis.  The appendix is normal in caliber.    Evaluation of the pelvis is limited secondary to significant metallic artifact from the patient's previous bilateral total hip arthroplasties.  The bladder partially distended.  Prostate could not be identified.    No significant mesenteric or retroperitoneal lymphadenopathy.    Mild S shaped scoliosis.  Mild bone demineralization.  Mild chronic compression fractures of L1 and L2.  Previous bilateral total hip arthroplasties.                                 Medical Decision Making:    Case discussed with hospitalist                                  Clinical Impression:       ICD-10-CM ICD-9-CM   1. Rectal bleeding K62.5 569.3   2. Hematochezia K92.1 578.1                             Alfredo Hdz MD  01/11/20 1104

## 2020-01-11 NOTE — NURSING
Spoke with Dr. Chowdhury who stated he would see patient in the morning and to call if anything changes. JOVAN, RN

## 2020-01-11 NOTE — NURSING
Patient arrived to room 118 via wheelchair accompanied by CALISTA Mccullough and wife. Patient had bowel movement with BRB upon arriving to the room. Patient denies pain. He is AOx4.

## 2020-01-11 NOTE — PLAN OF CARE
Patient doing well. He is very positive and pleasant. No concerns expressed at this time. He ambulates well. He has had 2 bowel movements today with BRB. Care plan discussed with patient. He verbalized understand

## 2020-01-12 LAB
ANION GAP SERPL CALC-SCNC: 6 MMOL/L (ref 8–16)
BUN SERPL-MCNC: 29 MG/DL (ref 8–23)
CALCIUM SERPL-MCNC: 8.1 MG/DL (ref 8.7–10.5)
CHLORIDE SERPL-SCNC: 107 MMOL/L (ref 95–110)
CO2 SERPL-SCNC: 26 MMOL/L (ref 23–29)
CREAT SERPL-MCNC: 1.5 MG/DL (ref 0.5–1.4)
ERYTHROCYTE [DISTWIDTH] IN BLOOD BY AUTOMATED COUNT: 12.7 % (ref 11.5–14.5)
ERYTHROCYTE [DISTWIDTH] IN BLOOD BY AUTOMATED COUNT: 12.8 % (ref 11.5–14.5)
EST. GFR  (AFRICAN AMERICAN): 48.4 ML/MIN/1.73 M^2
EST. GFR  (NON AFRICAN AMERICAN): 41.8 ML/MIN/1.73 M^2
GLUCOSE SERPL-MCNC: 125 MG/DL (ref 70–110)
HCT VFR BLD AUTO: 28 % (ref 40–54)
HCT VFR BLD AUTO: 30.8 % (ref 40–54)
HGB BLD-MCNC: 9.1 G/DL (ref 14–18)
HGB BLD-MCNC: 9.7 G/DL (ref 14–18)
MCH RBC QN AUTO: 31.6 PG (ref 27–31)
MCH RBC QN AUTO: 31.9 PG (ref 27–31)
MCHC RBC AUTO-ENTMCNC: 31.5 G/DL (ref 32–36)
MCHC RBC AUTO-ENTMCNC: 32.5 G/DL (ref 32–36)
MCV RBC AUTO: 100 FL (ref 82–98)
MCV RBC AUTO: 98 FL (ref 82–98)
PLATELET # BLD AUTO: 134 K/UL (ref 150–350)
PLATELET # BLD AUTO: 164 K/UL (ref 150–350)
PMV BLD AUTO: 9.2 FL (ref 9.2–12.9)
PMV BLD AUTO: 9.5 FL (ref 9.2–12.9)
POTASSIUM SERPL-SCNC: 4.5 MMOL/L (ref 3.5–5.1)
RBC # BLD AUTO: 2.85 M/UL (ref 4.6–6.2)
RBC # BLD AUTO: 3.07 M/UL (ref 4.6–6.2)
SODIUM SERPL-SCNC: 139 MMOL/L (ref 136–145)
WBC # BLD AUTO: 4.94 K/UL (ref 3.9–12.7)
WBC # BLD AUTO: 5.95 K/UL (ref 3.9–12.7)

## 2020-01-12 PROCEDURE — 25000003 PHARM REV CODE 250: Performed by: INTERNAL MEDICINE

## 2020-01-12 PROCEDURE — 99204 PR OFFICE/OUTPT VISIT, NEW, LEVL IV, 45-59 MIN: ICD-10-PCS | Mod: ,,, | Performed by: SURGERY

## 2020-01-12 PROCEDURE — 25000003 PHARM REV CODE 250: Performed by: SURGERY

## 2020-01-12 PROCEDURE — 63600175 PHARM REV CODE 636 W HCPCS: Performed by: SURGERY

## 2020-01-12 PROCEDURE — 80048 BASIC METABOLIC PNL TOTAL CA: CPT

## 2020-01-12 PROCEDURE — 36415 COLL VENOUS BLD VENIPUNCTURE: CPT

## 2020-01-12 PROCEDURE — 96361 HYDRATE IV INFUSION ADD-ON: CPT

## 2020-01-12 PROCEDURE — 99204 OFFICE O/P NEW MOD 45 MIN: CPT | Mod: ,,, | Performed by: SURGERY

## 2020-01-12 PROCEDURE — 85027 COMPLETE CBC AUTOMATED: CPT | Mod: 91

## 2020-01-12 PROCEDURE — G0378 HOSPITAL OBSERVATION PER HR: HCPCS

## 2020-01-12 RX ORDER — LISINOPRIL 10 MG/1
10 TABLET ORAL DAILY
Status: DISCONTINUED | OUTPATIENT
Start: 2020-01-12 | End: 2020-01-13 | Stop reason: HOSPADM

## 2020-01-12 RX ORDER — SYRING-NEEDL,DISP,INSUL,0.3 ML 29 G X1/2"
296 SYRINGE, EMPTY DISPOSABLE MISCELLANEOUS ONCE
Status: COMPLETED | OUTPATIENT
Start: 2020-01-12 | End: 2020-01-12

## 2020-01-12 RX ORDER — ACETAMINOPHEN 325 MG/1
650 TABLET ORAL EVERY 6 HOURS PRN
Status: DISCONTINUED | OUTPATIENT
Start: 2020-01-13 | End: 2020-01-13 | Stop reason: HOSPADM

## 2020-01-12 RX ORDER — ACETAMINOPHEN 325 MG/1
TABLET ORAL
Status: COMPLETED
Start: 2020-01-12 | End: 2020-01-13

## 2020-01-12 RX ADMIN — MAGNESIUM CITRATE 296 ML: 1.75 LIQUID ORAL at 06:01

## 2020-01-12 RX ADMIN — MAGNESIUM CITRATE 296 ML: 1.75 LIQUID ORAL at 02:01

## 2020-01-12 RX ADMIN — SODIUM CHLORIDE: 0.9 INJECTION, SOLUTION INTRAVENOUS at 08:01

## 2020-01-12 RX ADMIN — LISINOPRIL 10 MG: 10 TABLET ORAL at 11:01

## 2020-01-12 NOTE — CONSULTS
[facility] - Med Surg  General Surgery  Consult Note    Patient Name: Anthony Sheldon  MRN: [id number]  Admission Date: 1/11/2020  Attending Physician: Perfecto Melgar III, MD   Consult Physician: Chalri Chowdhury MD  Primary Care Provider: Perfecto Melgar III, MD    Patient information was obtained from patient.     Subjective:     Chief Complaint/Reason for Admission: Rectal bleeding    History of Present Illness:  Anthony Sheldon is a 85 y.o. male with a history of prostate cancer, cataracts, arthritis, previous DVT currently not on blood thinning medications was admitted to the hospital yesterday with hematochezia.  Patient stated that started Friday night.  Bright red blood per rectum.  It then turned dark per his account.  He has had a couple of small bowel movements since that time with a minor amount of blood within them.  No nausea or vomiting.  No history of recent endoscopy.  Patient stated that he underwent a colo Guard at some point in the near past which showed that he had a for percent chance of cancer.  This is consistent with a positive colo Guard.  He did not have follow-up colonoscopy as he did not feel it was indicated.  Patient recently treated for prostate cancer.  PSA has come back down to a normal limit.  He was admitted to the hospital yesterday after arriving to the ER with hematochezia.  Admitted to the floor IV fluid hydration has been ongoing and now surgery called in consultation    Review of patient's allergies indicates:  No Known Allergies    Past Medical History:   Diagnosis Date    Arthritis     Cataract 2009    had surgery to have removed    Dental bridge present     LOWER PARTIAL    DVT (deep venous thrombosis) 2014    right le after thr    Prostate cancer 05/2019    Wears glasses      Past Surgical History:   Procedure Laterality Date    BIOPSY WITH TRANSRECTAL ULTRASOUND (TRUS) GUIDANCE Bilateral 3/27/2019    Procedure: BIOPSY, WITH TRANSRECTAL  US GUIDANCE;  Surgeon: Niall Blas MD;  Location: [facility] OR;  Service: Urology;  Laterality: Bilateral;    CYSTOSCOPY N/A 3/27/2019    Procedure: CYSTOSCOPY;  Surgeon: Niall Blas MD;  Location: [facility] OR;  Service: Urology;  Laterality: N/A;    EPIDURAL STEROID INJECTION N/A 6/17/2019    Procedure: Injection, Steroid, Epidural - L5/S1 EPIDURAL STEROID INJECTION;  Surgeon: Chyna Valdovinos MD;  Location: [facility] OR;  Service: Pain Management;  Laterality: N/A;    EPIDURAL STEROID INJECTION N/A 12/11/2019    Procedure: Injection, Steroid, Epidural - L5/S1 LUMBAR EPIDURAL STEROID INJECTION;  Surgeon: Jade Layton MD;  Location: [facility] OR;  Service: Pain Management;  Laterality: N/A;  *FIRST CASE*    INJECTION OF JOINT Left 1/21/2019    Procedure: Injection, FACET JOINT INJECTION LEFT L4-5 AND L5-S1;  Surgeon: Chyna Valdovinos MD;  Location: [facility] OR;  Service: Pain Management;  Laterality: Left;    JOINT REPLACEMENT      BILAT HIPS, RIGHT SHOULDER    KNEE ARTHROPLASTY Left     TONSILLECTOMY  1950    TOTAL HIP ARTHROPLASTY Bilateral 2013 & 2014    TOTAL SHOULDER ARTHROPLASTY Right 2005     Family History     Problem Relation (Age of Onset)    Bladder Cancer Father    Stroke Mother        Tobacco Use    Smoking status: Former Smoker     Types: Cigarettes    Smokeless tobacco: Former User     Quit date: 1/1/1990   Substance and Sexual Activity    Alcohol use: Yes     Alcohol/week: 1.0 standard drinks     Types: 1 Glasses of wine per week     Comment: 1 drink daily    Drug use: No    Sexual activity: Not Currently     Review of Systems   Constitutional: Negative for appetite change, chills and fever.   HENT: Negative for congestion, dental problem and drooling.    Eyes: Negative for photophobia, discharge and itching.   Respiratory: Negative for apnea and chest tightness.    Cardiovascular: Negative for chest pain, palpitations and leg swelling.   Gastrointestinal: Positive for blood in stool.  Negative for abdominal distention and abdominal pain.   Endocrine: Negative for cold intolerance and heat intolerance.   Genitourinary: Negative for difficulty urinating and dysuria.   Musculoskeletal: Negative for arthralgias and back pain.   Skin: Negative for color change and pallor.   Neurological: Negative for dizziness, facial asymmetry and headaches.   Hematological: Negative for adenopathy. Does not bruise/bleed easily.   Psychiatric/Behavioral: Negative for agitation, behavioral problems and confusion.     Objective:     Vital Signs (Most Recent):  Temp: 96.8 °F (36 °C) (01/12/20 0313)  Pulse: 66 (01/12/20 0736)  Resp: 20 (01/12/20 0313)  BP: (!) 171/78 (01/12/20 0736)  SpO2: 98 % (01/12/20 0736) Vital Signs (24h Range):  Temp:  [96.8 °F (36 °C)-98 °F (36.7 °C)] 96.8 °F (36 °C)  Pulse:  [66-76] 66  Resp:  [16-20] 20  SpO2:  [97 %-100 %] 98 %  BP: (125-172)/(68-79) 171/78     Weight: 110.5 kg (243 lb 9.7 oz)  Body mass index is 35.97 kg/m².    Physical Exam   Constitutional: He is oriented to person, place, and time. He appears well-developed and well-nourished.   HENT:   Head: Normocephalic and atraumatic.   Eyes: Pupils are equal, round, and reactive to light. EOM are normal.   Neck: Normal range of motion. Neck supple. No thyromegaly present.   Cardiovascular: Normal rate and regular rhythm.   No murmur heard.  Pulmonary/Chest: Effort normal and breath sounds normal. No respiratory distress.   Abdominal: Soft. Bowel sounds are normal. He exhibits no distension. There is no tenderness.   Musculoskeletal: Normal range of motion. He exhibits no edema.   Neurological: He is alert and oriented to person, place, and time. No cranial nerve deficit.   Skin: Skin is warm. Capillary refill takes less than 2 seconds. No rash noted. He is not diaphoretic. No erythema.   Psychiatric: He has a normal mood and affect.       Significant Labs:  CBC:   Recent Labs   Lab 01/12/20  0601   WBC 4.94   RBC 2.85*   HGB 9.1*    HCT 28.0*   *   MCV 98   MCH 31.9*   MCHC 32.5     BMP:   Recent Labs   Lab 01/12/20  0601   *      K 4.5      CO2 26   BUN 29*   CREATININE 1.5*   CALCIUM 8.1*     CMP:   Recent Labs   Lab 01/11/20  0744 01/12/20  0601   * 125*   CALCIUM 8.7 8.1*   ALBUMIN 3.8  --    PROT 6.7  --     139   K 4.8 4.5   CO2 30* 26    107   BUN 44* 29*   CREATININE 1.9* 1.5*   ALKPHOS 45*  --    ALT 15  --    AST 16  --    BILITOT 0.5  --      LFTs:   Recent Labs   Lab 01/11/20  0744   ALT 15   AST 16   ALKPHOS 45*   BILITOT 0.5   PROT 6.7   ALBUMIN 3.8     Coagulation:   Recent Labs   Lab 01/11/20  1920   LABPROT 11.6   INR 1.1     Specimen (12h ago, onward)    None        No results for input(s): COLORU, CLARITYU, SPECGRAV, PHUR, PROTEINUA, GLUCOSEU, BILIRUBINCON, BLOODU, WBCU, RBCU, BACTERIA, MUCUS, NITRITE, LEUKOCYTESUR, UROBILINOGEN, HYALINECASTS in the last 168 hours.    Assessment:   Anthony Sheldon is a 85 y.o. male who presents with hematochezia.    Active Diagnoses:    Diagnosis Date Noted POA    Rectal bleeding [K62.5] 01/11/2020 Yes      Problems Resolved During this Admission:     VTE Risk Mitigation (From admission, onward)         Ordered     Place JUAN DANIEL hose  Until discontinued      01/11/20 1017                Medical Decision Making/Plan:  Patient with hematochezia.  Initially bright red.  Now dark per his account.  No recent history of endoscopy.  Recent colo Guard per his account which showed evidence of 4 percent chance of cancer.  Given recent hematochezia, with possible melena, recommendation will be for EGD and colonoscopy tomorrow.  Will plan for bowel prep with Mag citrate this afternoon.  Continue IV fluids in the interim.  Decrease IV fluid rate to 125.  Continue clear liquids until midnight.  NPO past midnight.  Repeat CBC tomorrow morning as well as this evening.  Notify surgery if any clinical changes inpatient.  Thank you for consultation.    Risk and  benefits of EGD and colonoscopy discussed in detail with the patient in the hospital room.  Risk of bleeding further after endoscopy versus perforation of the intestines was discussed.  Patient voiced understanding of risks and benefits.  Possibilities of postprocedural interventions to include but not limited to blood transfusions, continued hospital admission, operative intervention, recurrent need for endoscopy was discussed.  Patient voiced understanding and desires to proceed tomorrow.    Charli Chowdhury MD  General Surgery  [facility] - [facility]

## 2020-01-12 NOTE — SUBJECTIVE & OBJECTIVE
Interval History: still klreports rectal bleeding  No new compolaints    Review of Systems   Gastrointestinal: Positive for anal bleeding.   Neurological: Negative.    All other systems reviewed and are negative.    Objective:     Vital Signs (Most Recent):  Temp: 98.6 °F (37 °C) (01/12/20 1002)  Pulse: 75 (01/12/20 1002)  Resp: 19 (01/12/20 1002)  BP: (!) 191/88 (01/12/20 1002)  SpO2: 99 % (01/12/20 1002) Vital Signs (24h Range):  Temp:  [96.8 °F (36 °C)-98.6 °F (37 °C)] 98.6 °F (37 °C)  Pulse:  [66-76] 75  Resp:  [16-20] 19  SpO2:  [97 %-99 %] 99 %  BP: (125-191)/(71-88) 191/88     Weight: 110.5 kg (243 lb 9.7 oz)  Body mass index is 35.97 kg/m².    Intake/Output Summary (Last 24 hours) at 1/12/2020 1331  Last data filed at 1/12/2020 1300  Gross per 24 hour   Intake 3357.5 ml   Output 500 ml   Net 2857.5 ml      Physical Exam    Significant Labs:   Recent Lab Results       01/12/20  0601   01/11/20  1920        Anion Gap 6       Baso #   0.02     Basophil%   0.3     BUN, Bld 29       Calcium 8.1       Chloride 107       CO2 26       Creatinine 1.5       Differential Method   Automated     eGFR if  48.4       eGFR if non  41.8  Comment:  Calculation used to obtain the estimated glomerular filtration  rate (eGFR) is the CKD-EPI equation.          Eos #   0.1     Eosinophil%   1.6     Glucose 125       Gran # (ANC)   4.1     Gran%   64.0     Hematocrit 28.0 28.1     Hemoglobin 9.1 9.3     Immature Grans (Abs)   0.02  Comment:  Mild elevation in immature granulocytes is non specific and   can be seen in a variety of conditions including stress response,   acute inflammation, trauma and pregnancy. Correlation with other   laboratory and clinical findings is essential.       Immature Granulocytes   0.3     Coumadin Monitoring INR   1.1  Comment:  Coumadin Therapy:  2.0 - 3.0 for INR for all indicators except mechanical heart valves  and antiphospholipid syndromes which should use 2.5 -  3.5.       Lymph #   1.6     Lymph%   24.8     MCH 31.9 32.4     MCHC 32.5 33.1     MCV 98 98     Mono #   0.6     Mono%   9.0     MPV 9.5 9.2     nRBC   0     Platelets 134 147     Potassium 4.5       Protime   11.6     RBC 2.85 2.87     RDW 12.8 12.7     Sodium 139       WBC 4.94 6.33           Significant Imaging: I have reviewed and interpreted all pertinent imaging results/findings within the past 24 hours.

## 2020-01-12 NOTE — PLAN OF CARE
PT RESTING QUIETLY NORMAL UNLABORED BREATHING NO SIGN DISTRESS NOTED. NO C/O PAIN.  STOOL BRIGHT RED BLOOD BY HISTORY. DENIES PAIN. NO C/O DIZZINESS. FALL PRECAUTIONS IN USE.

## 2020-01-12 NOTE — PROGRESS NOTES
[facility] Hospital Medicine  Progress Note    Patient Name: Anthony Sheldon  MRN: [id number]  Patient Class: OP- Observation   Admission Date: 1/11/2020  Length of Stay: 0 days  Attending Physician: Perfecto Melgar III, MD  Primary Care Provider: Perfecto Melgar III, MD        Subjective:     Principal Problem:Rectal bleeding        HPI:  No notes on file    Overview/Hospital Course:  No notes on file    Interval History: still klreports rectal bleeding  No new compolaints    Review of Systems   Gastrointestinal: Positive for anal bleeding.   Neurological: Negative.    All other systems reviewed and are negative.    Objective:     Vital Signs (Most Recent):  Temp: 98.6 °F (37 °C) (01/12/20 1002)  Pulse: 75 (01/12/20 1002)  Resp: 19 (01/12/20 1002)  BP: (!) 191/88 (01/12/20 1002)  SpO2: 99 % (01/12/20 1002) Vital Signs (24h Range):  Temp:  [96.8 °F (36 °C)-98.6 °F (37 °C)] 98.6 °F (37 °C)  Pulse:  [66-76] 75  Resp:  [16-20] 19  SpO2:  [97 %-99 %] 99 %  BP: (125-191)/(71-88) 191/88     Weight: 110.5 kg (243 lb 9.7 oz)  Body mass index is 35.97 kg/m².    Intake/Output Summary (Last 24 hours) at 1/12/2020 1331  Last data filed at 1/12/2020 1300  Gross per 24 hour   Intake 3357.5 ml   Output 500 ml   Net 2857.5 ml      Physical Exam    Significant Labs:   Recent Lab Results       01/12/20  0601   01/11/20  1920        Anion Gap 6       Baso #   0.02     Basophil%   0.3     BUN, Bld 29       Calcium 8.1       Chloride 107       CO2 26       Creatinine 1.5       Differential Method   Automated     eGFR if  48.4       eGFR if non  41.8  Comment:  Calculation used to obtain the estimated glomerular filtration  rate (eGFR) is the CKD-EPI equation.          Eos #   0.1     Eosinophil%   1.6     Glucose 125       Gran # (ANC)   4.1     Gran%   64.0     Hematocrit 28.0 28.1     Hemoglobin 9.1 9.3     Immature Grans (Abs)   0.02  Comment:  Mild elevation in immature  granulocytes is non specific and   can be seen in a variety of conditions including stress response,   acute inflammation, trauma and pregnancy. Correlation with other   laboratory and clinical findings is essential.       Immature Granulocytes   0.3     Coumadin Monitoring INR   1.1  Comment:  Coumadin Therapy:  2.0 - 3.0 for INR for all indicators except mechanical heart valves  and antiphospholipid syndromes which should use 2.5 - 3.5.       Lymph #   1.6     Lymph%   24.8     MCH 31.9 32.4     MCHC 32.5 33.1     MCV 98 98     Mono #   0.6     Mono%   9.0     MPV 9.5 9.2     nRBC   0     Platelets 134 147     Potassium 4.5       Protime   11.6     RBC 2.85 2.87     RDW 12.8 12.7     Sodium 139       WBC 4.94 6.33           Significant Imaging: I have reviewed and interpreted all pertinent imaging results/findings within the past 24 hours.      Assessment/Plan:      * Rectal bleeding  1.12  Continue support  Endoscopy tomorrow          VTE Risk Mitigation (From admission, onward)         Ordered     Place JUAN DANIEL hose  Until discontinued      01/11/20 1017                      Perfecto Melgar III, MD  Department of Hospital Medicine   [facility]

## 2020-01-13 ENCOUNTER — ANESTHESIA EVENT (OUTPATIENT)
Dept: SURGERY | Facility: HOSPITAL | Age: 85
End: 2020-01-13
Payer: MEDICARE

## 2020-01-13 ENCOUNTER — ANESTHESIA (OUTPATIENT)
Dept: SURGERY | Facility: HOSPITAL | Age: 85
End: 2020-01-13
Payer: MEDICARE

## 2020-01-13 VITALS
WEIGHT: 243.63 LBS | HEIGHT: 69 IN | HEART RATE: 64 BPM | BODY MASS INDEX: 36.08 KG/M2 | RESPIRATION RATE: 18 BRPM | OXYGEN SATURATION: 96 % | DIASTOLIC BLOOD PRESSURE: 70 MMHG | SYSTOLIC BLOOD PRESSURE: 135 MMHG | TEMPERATURE: 97 F

## 2020-01-13 LAB
ANION GAP SERPL CALC-SCNC: 6 MMOL/L (ref 8–16)
BUN SERPL-MCNC: 20 MG/DL (ref 8–23)
CALCIUM SERPL-MCNC: 8.4 MG/DL (ref 8.7–10.5)
CHLORIDE SERPL-SCNC: 108 MMOL/L (ref 95–110)
CO2 SERPL-SCNC: 26 MMOL/L (ref 23–29)
CREAT SERPL-MCNC: 1.4 MG/DL (ref 0.5–1.4)
ERYTHROCYTE [DISTWIDTH] IN BLOOD BY AUTOMATED COUNT: 12.8 % (ref 11.5–14.5)
EST. GFR  (AFRICAN AMERICAN): 52.6 ML/MIN/1.73 M^2
EST. GFR  (NON AFRICAN AMERICAN): 45.5 ML/MIN/1.73 M^2
GLUCOSE SERPL-MCNC: 115 MG/DL (ref 70–110)
HCT VFR BLD AUTO: 28.5 % (ref 40–54)
HGB BLD-MCNC: 9.1 G/DL (ref 14–18)
MCH RBC QN AUTO: 31.7 PG (ref 27–31)
MCHC RBC AUTO-ENTMCNC: 31.9 G/DL (ref 32–36)
MCV RBC AUTO: 99 FL (ref 82–98)
PLATELET # BLD AUTO: 141 K/UL (ref 150–350)
PMV BLD AUTO: 9 FL (ref 9.2–12.9)
POTASSIUM SERPL-SCNC: 4.4 MMOL/L (ref 3.5–5.1)
RBC # BLD AUTO: 2.87 M/UL (ref 4.6–6.2)
SODIUM SERPL-SCNC: 140 MMOL/L (ref 136–145)
WBC # BLD AUTO: 4.7 K/UL (ref 3.9–12.7)

## 2020-01-13 PROCEDURE — 45385 COLONOSCOPY W/LESION REMOVAL: CPT | Mod: ,,, | Performed by: SURGERY

## 2020-01-13 PROCEDURE — 45385 COLONOSCOPY W/LESION REMOVAL: CPT | Performed by: SURGERY

## 2020-01-13 PROCEDURE — D9220A PRA ANESTHESIA: ICD-10-PCS | Mod: ANES,,, | Performed by: ANESTHESIOLOGY

## 2020-01-13 PROCEDURE — 88305 TISSUE EXAM BY PATHOLOGIST: CPT | Mod: 26,,, | Performed by: PATHOLOGY

## 2020-01-13 PROCEDURE — 96374 THER/PROPH/DIAG INJ IV PUSH: CPT

## 2020-01-13 PROCEDURE — 43235 EGD DIAGNOSTIC BRUSH WASH: CPT | Performed by: SURGERY

## 2020-01-13 PROCEDURE — 45384 PR COLONOSCOPY,REMV LESN,FORCEP/CAUTERY: ICD-10-PCS | Mod: 59,,, | Performed by: SURGERY

## 2020-01-13 PROCEDURE — 36415 COLL VENOUS BLD VENIPUNCTURE: CPT

## 2020-01-13 PROCEDURE — 37000008 HC ANESTHESIA 1ST 15 MINUTES: Performed by: SURGERY

## 2020-01-13 PROCEDURE — 45381 COLONOSCOPY SUBMUCOUS NJX: CPT | Performed by: SURGERY

## 2020-01-13 PROCEDURE — G0378 HOSPITAL OBSERVATION PER HR: HCPCS

## 2020-01-13 PROCEDURE — 25000003 PHARM REV CODE 250: Performed by: ANESTHESIOLOGY

## 2020-01-13 PROCEDURE — 43235 PR EGD, FLEX, DIAGNOSTIC: ICD-10-PCS | Mod: 51,,, | Performed by: SURGERY

## 2020-01-13 PROCEDURE — 45384 COLONOSCOPY W/LESION REMOVAL: CPT | Mod: 59,,, | Performed by: SURGERY

## 2020-01-13 PROCEDURE — D9220A PRA ANESTHESIA: Mod: CRNA,,, | Performed by: NURSE ANESTHETIST, CERTIFIED REGISTERED

## 2020-01-13 PROCEDURE — 25000003 PHARM REV CODE 250

## 2020-01-13 PROCEDURE — 80048 BASIC METABOLIC PNL TOTAL CA: CPT

## 2020-01-13 PROCEDURE — 63600175 PHARM REV CODE 636 W HCPCS: Performed by: NURSE ANESTHETIST, CERTIFIED REGISTERED

## 2020-01-13 PROCEDURE — 25000003 PHARM REV CODE 250: Performed by: NURSE ANESTHETIST, CERTIFIED REGISTERED

## 2020-01-13 PROCEDURE — 27202059 HC NEEDLE, FNA (ANY): Performed by: SURGERY

## 2020-01-13 PROCEDURE — 37000009 HC ANESTHESIA EA ADD 15 MINS: Performed by: SURGERY

## 2020-01-13 PROCEDURE — 45385 PR COLONOSCOPY,REMV LESN,SNARE: ICD-10-PCS | Mod: ,,, | Performed by: SURGERY

## 2020-01-13 PROCEDURE — D9220A PRA ANESTHESIA: Mod: ANES,,, | Performed by: ANESTHESIOLOGY

## 2020-01-13 PROCEDURE — 27201089 HC SNARE, DISP (ANY): Performed by: SURGERY

## 2020-01-13 PROCEDURE — 45381 COLONOSCOPY SUBMUCOUS NJX: CPT | Mod: 51,,, | Performed by: SURGERY

## 2020-01-13 PROCEDURE — 88305 TISSUE EXAM BY PATHOLOGIST: CPT | Performed by: PATHOLOGY

## 2020-01-13 PROCEDURE — S0028 INJECTION, FAMOTIDINE, 20 MG: HCPCS | Performed by: ANESTHESIOLOGY

## 2020-01-13 PROCEDURE — 88305 TISSUE EXAM BY PATHOLOGIST: ICD-10-PCS | Mod: 26,,, | Performed by: PATHOLOGY

## 2020-01-13 PROCEDURE — 96361 HYDRATE IV INFUSION ADD-ON: CPT | Mod: 59

## 2020-01-13 PROCEDURE — 45381 PR COLONOSCPY,FLEX,W/DIR SUBMUC INJECT: ICD-10-PCS | Mod: 51,,, | Performed by: SURGERY

## 2020-01-13 PROCEDURE — 45384 COLONOSCOPY W/LESION REMOVAL: CPT | Performed by: SURGERY

## 2020-01-13 PROCEDURE — 27201012 HC FORCEPS, HOT/COLD, DISP: Performed by: SURGERY

## 2020-01-13 PROCEDURE — 85027 COMPLETE CBC AUTOMATED: CPT

## 2020-01-13 PROCEDURE — 63600175 PHARM REV CODE 636 W HCPCS: Performed by: SURGERY

## 2020-01-13 PROCEDURE — D9220A PRA ANESTHESIA: ICD-10-PCS | Mod: CRNA,,, | Performed by: NURSE ANESTHETIST, CERTIFIED REGISTERED

## 2020-01-13 PROCEDURE — 43235 EGD DIAGNOSTIC BRUSH WASH: CPT | Mod: 51,,, | Performed by: SURGERY

## 2020-01-13 RX ORDER — DIPHENHYDRAMINE HYDROCHLORIDE 50 MG/ML
12.5 INJECTION INTRAMUSCULAR; INTRAVENOUS
Status: CANCELLED | OUTPATIENT
Start: 2020-01-13

## 2020-01-13 RX ORDER — SODIUM CHLORIDE, SODIUM LACTATE, POTASSIUM CHLORIDE, CALCIUM CHLORIDE 600; 310; 30; 20 MG/100ML; MG/100ML; MG/100ML; MG/100ML
125 INJECTION, SOLUTION INTRAVENOUS CONTINUOUS
Status: CANCELLED | OUTPATIENT
Start: 2020-01-13

## 2020-01-13 RX ORDER — GLYCOPYRROLATE 0.2 MG/ML
INJECTION INTRAMUSCULAR; INTRAVENOUS
Status: DISCONTINUED | OUTPATIENT
Start: 2020-01-13 | End: 2020-01-13

## 2020-01-13 RX ORDER — ONDANSETRON 2 MG/ML
4 INJECTION INTRAMUSCULAR; INTRAVENOUS DAILY PRN
Status: CANCELLED | OUTPATIENT
Start: 2020-01-13

## 2020-01-13 RX ORDER — SODIUM CHLORIDE 9 MG/ML
INJECTION, SOLUTION INTRAVENOUS CONTINUOUS
Status: CANCELLED | OUTPATIENT
Start: 2020-01-13

## 2020-01-13 RX ORDER — SODIUM CHLORIDE, SODIUM LACTATE, POTASSIUM CHLORIDE, CALCIUM CHLORIDE 600; 310; 30; 20 MG/100ML; MG/100ML; MG/100ML; MG/100ML
INJECTION, SOLUTION INTRAVENOUS CONTINUOUS
Status: DISCONTINUED | OUTPATIENT
Start: 2020-01-13 | End: 2020-01-13

## 2020-01-13 RX ORDER — PROPOFOL 10 MG/ML
VIAL (ML) INTRAVENOUS
Status: DISCONTINUED | OUTPATIENT
Start: 2020-01-13 | End: 2020-01-13

## 2020-01-13 RX ORDER — LIDOCAINE HYDROCHLORIDE 10 MG/ML
1 INJECTION, SOLUTION EPIDURAL; INFILTRATION; INTRACAUDAL; PERINEURAL ONCE
Status: DISCONTINUED | OUTPATIENT
Start: 2020-01-13 | End: 2020-01-13

## 2020-01-13 RX ORDER — FAMOTIDINE 10 MG/ML
20 INJECTION INTRAVENOUS ONCE
Status: COMPLETED | OUTPATIENT
Start: 2020-01-13 | End: 2020-01-13

## 2020-01-13 RX ORDER — LIDOCAINE HYDROCHLORIDE 10 MG/ML
INJECTION INFILTRATION; PERINEURAL
Status: DISCONTINUED | OUTPATIENT
Start: 2020-01-13 | End: 2020-01-13

## 2020-01-13 RX ORDER — SODIUM CHLORIDE 9 MG/ML
INJECTION, SOLUTION INTRAVENOUS CONTINUOUS PRN
Status: DISCONTINUED | OUTPATIENT
Start: 2020-01-13 | End: 2020-01-13

## 2020-01-13 RX ADMIN — ACETAMINOPHEN 650 MG: 325 TABLET ORAL at 12:01

## 2020-01-13 RX ADMIN — LIDOCAINE HYDROCHLORIDE 50 MG: 10 INJECTION, SOLUTION INFILTRATION; PERINEURAL at 12:01

## 2020-01-13 RX ADMIN — PROPOFOL 50 MG: 10 INJECTION, EMULSION INTRAVENOUS at 12:01

## 2020-01-13 RX ADMIN — PROPOFOL 50 MG: 10 INJECTION, EMULSION INTRAVENOUS at 01:01

## 2020-01-13 RX ADMIN — GLYCOPYRROLATE 0.4 MG: 0.2 INJECTION INTRAMUSCULAR; INTRAVENOUS at 12:01

## 2020-01-13 RX ADMIN — SODIUM CHLORIDE: 0.9 INJECTION, SOLUTION INTRAVENOUS at 12:01

## 2020-01-13 RX ADMIN — SODIUM CHLORIDE: 0.9 INJECTION, SOLUTION INTRAVENOUS at 04:01

## 2020-01-13 RX ADMIN — FAMOTIDINE 20 MG: 10 INJECTION INTRAVENOUS at 11:01

## 2020-01-13 NOTE — PLAN OF CARE
01/13/20 1549   Final Note   Assessment Type Final Discharge Note   Anticipated Discharge Disposition Home   What phone number can be called within the next 1-3 days to see how you are doing after discharge? [phone]   Hospital Follow Up  Appt(s) scheduled? Yes   Discharge plans and expectations educations in teach back method with documentation complete? Yes   Verbal & written follow up appointments provided to patient & spouse. Also instructed on handout in discharge papers with signs & symptoms to watch out for & when to call the doctor. Demonstrated understanding by verbal feedback. Denies any other discharge needs at this time.

## 2020-01-13 NOTE — NURSING
"PT C/O PAIN TO LOWER BACK PT STATES "MY LOWER BACK IS HURTING BECAUSE I AM NOT GETTING UP AS MUCH." PT SITTING IN CHAIR ON SIDE THE BED. CALLED DR CASTANEDA PHONE AWAITING ON CALL BACK.   "

## 2020-01-13 NOTE — PROVATION PATIENT INSTRUCTIONS
Discharge Summary/Instructions after an Endoscopic Procedure  Patient Name: Anthony Sheldon  Patient MRN: [id number]  Patient YOB: 1934  Monday, January 13, 2020  Charli Chowdhury MD  RESTRICTIONS:  During your procedure today, you received medications for sedation.  These   medications may affect your judgment, balance and coordination.  Therefore,   for 24 hours, you have the following restrictions:   - DO NOT drive a car, operate machinery, make legal/financial decisions,   sign important papers or drink alcohol.    ACTIVITY:  Today: no heavy lifting, straining or running due to procedural   sedation/anesthesia.  The following day: return to full activity including work.  DIET:  Eat and drink normally unless instructed otherwise.     TREATMENT FOR COMMON SIDE EFFECTS:  - Mild abdominal pain, nausea, belching, bloating or excessive gas:  rest,   eat lightly and use a heating pad.  - Sore Throat: treat with throat lozenges and/or gargle with warm salt   water.  - Because air was used during the procedure, expelling large amounts of air   from your rectum or belching is normal.  - If a bowel prep was taken, you may not have a bowel movement for 1-3 days.    This is normal.  SYMPTOMS TO WATCH FOR AND REPORT TO YOUR PHYSICIAN:  1. Abdominal pain or bloating, other than gas cramps.  2. Chest pain.  3. Back pain.  4. Signs of infection such as: chills or fever occurring within 24 hours   after the procedure.  5. Rectal bleeding, which would show as bright red, maroon, or black stools.   (A tablespoon of blood from the rectum is not serious, especially if   hemorrhoids are present.)  6. Vomiting.  7. Weakness or dizziness.  GO DIRECTLY TO THE NEAREST EMERGENCY ROOM IF YOU HAVE ANY OF THE FOLLOWING:      Difficulty breathing              Chills and/or fever over 101 F   Persistent vomiting and/or vomiting blood   Severe abdominal pain   Severe chest pain   Black, tarry stools   Bleeding- more than one  tablespoon   Any other symptom or condition that you feel may need urgent attention  Your doctor recommends these additional instructions:  If any biopsies were taken, your doctors clinic will contact you in 1 to 2   weeks with any results.  - [facility].   - High fiber diet.   - Continue present medications.   - Return to my office in 2 weeks.  For questions, problems or results please call your physician - Charli Chowdhury MD at Work:  [phone].  [facility] EMERGENCY ROOM PHONE NUMBER: [phone]  IF A COMPLICATION OR EMERGENCY SITUATION ARISES AND YOU ARE UNABLE TO REACH   YOUR PHYSICIAN - GO DIRECTLY TO THE EMERGENCY ROOM.  MD Charli Jimenez MD  1/13/2020 1:41:35 PM  This report has been verified and signed electronically.  PROVATION

## 2020-01-13 NOTE — ANESTHESIA POSTPROCEDURE EVALUATION
Anesthesia Post Evaluation    Patient: Anthony Sheldon    Procedure(s) Performed: Procedure(s) (LRB):  COLONOSCOPY (N/A)  EGD (ESOPHAGOGASTRODUODENOSCOPY) (N/A)    Final Anesthesia Type: general    Patient location during evaluation: PACU  Patient participation: Yes- Able to Participate  Level of consciousness: awake and alert  Post-procedure vital signs: reviewed and stable  Pain management: adequate  Airway patency: patent    PONV status at discharge: No PONV  Anesthetic complications: no      Cardiovascular status: blood pressure returned to baseline  Respiratory status: unassisted  Hydration status: euvolemic  Follow-up not needed.          Vitals Value Taken Time   /70 1/13/2020  3:04 PM   Temp 35.9 °C (96.7 °F) 1/13/2020  2:09 PM   Pulse 64 1/13/2020  3:04 PM   Resp 18 1/13/2020  2:09 PM   SpO2 96 % 1/13/2020  2:09 PM         Event Time     Out of Recovery 01/13/2020 14:07:00          Pain/David Score: Pain Rating Prior to Med Admin: 3 (1/13/2020 12:13 AM)  Pain Rating Post Med Admin: 0 (1/13/2020 12:58 AM)  David Score: 10 (1/13/2020  1:40 PM)

## 2020-01-13 NOTE — ANESTHESIA PREPROCEDURE EVALUATION
01/13/2020  Anthony Sheldon is a 85 y.o., male.    Anesthesia Evaluation    I have reviewed the Patient Summary Reports.    I have reviewed the Nursing Notes.   I have reviewed the Medications.     Review of Systems  Anesthesia Hx:  No problems with previous Anesthesia    Hematology/Oncology:  Hematology Normal   Oncology Normal     EENT/Dental:EENT/Dental Normal   Cardiovascular:  Cardiovascular Normal     Pulmonary:  Pulmonary Normal    Renal/:   Chronic Renal Disease, CRI    Musculoskeletal:   Arthritis   Spine Disorders: lumbar    Neurological:   Chronic Pain Syndrome       Physical Exam  General:  Well nourished, Obesity    Airway/Jaw/Neck:  Airway Findings: Mouth Opening: Normal Tongue: Normal  General Airway Assessment: Adult  Mallampati: III       Chest/Lungs:  Chest/Lungs Findings: Clear to auscultation     Heart/Vascular:  Heart Findings: Rate: Normal  Rhythm: Regular Rhythm        Mental Status:  Mental Status Findings:  Cooperative, Alert and Oriented         Anesthesia Plan  Type of Anesthesia, risks & benefits discussed:  Anesthesia Type:  general  Patient's Preference:   Intra-op Monitoring Plan: standard ASA monitors  Intra-op Monitoring Plan Comments:   Post Op Pain Control Plan: IV/PO Opioids PRN  Post Op Pain Control Plan Comments:   Induction:   IV  Beta Blocker:  Patient is not currently on a Beta-Blocker (No further documentation required).       Informed Consent: Patient understands risks and agrees with Anesthesia plan.  Questions answered. Anesthesia consent signed with patient.  ASA Score: 3     Day of Surgery Review of History & Physical: I have interviewed and examined the patient. I have reviewed the patient's H&P dated:            Ready For Surgery From Anesthesia Perspective.

## 2020-01-13 NOTE — PLAN OF CARE
Problem: Fall Injury Risk  Goal: Absence of Fall and Fall-Related Injury  1/13/2020 0844 by Ela Polk RN  Outcome: Ongoing, Progressing  1/13/2020 0843 by Ela Polk RN  Outcome: Ongoing, Progressing  Intervention: Promote Injury-Free Environment  Flowsheets (Taken 1/13/2020 0844)  Safety Promotion/Fall Prevention: -- (PT REFUSED BED ALARM; ESCALATED TO HOUSE SUPERVISOR)     Problem: Adult Inpatient Plan of Care  Goal: Plan of Care Review  Outcome: Ongoing, Progressing  Goal: Absence of Hospital-Acquired Illness or Injury  Outcome: Ongoing, Progressing  Goal: Optimal Comfort and Wellbeing  Outcome: Ongoing, Progressing  Goal: Readiness for Transition of Care  Outcome: Ongoing, Progressing  Goal: Rounds/Family Conference  Outcome: Ongoing, Progressing     Problem: Anemia  Goal: Anemia Symptom Improvement  Outcome: Ongoing, Progressing

## 2020-01-13 NOTE — PLAN OF CARE
Problem: Fall Injury Risk  Goal: Absence of Fall and Fall-Related Injury  Outcome: Ongoing, Progressing     Problem: Adult Inpatient Plan of Care  Goal: Plan of Care Review  Outcome: Ongoing, Progressing  Goal: Absence of Hospital-Acquired Illness or Injury  Outcome: Ongoing, Progressing  Goal: Optimal Comfort and Wellbeing  Outcome: Ongoing, Progressing  Goal: Readiness for Transition of Care  Outcome: Ongoing, Progressing  Goal: Rounds/Family Conference  Outcome: Ongoing, Progressing     Problem: Anemia  Goal: Anemia Symptom Improvement  Outcome: Ongoing, Progressing

## 2020-01-13 NOTE — DISCHARGE SUMMARY
[facility] Hospital Medicine  Discharge Summary      Patient Name: Anthony Sheldon  MRN: [id number]  Admission Date: 1/11/2020  Hospital Length of Stay: 0 days  Discharge Date and Time: No discharge date for patient encounter.  Attending Physician: Park Sauceda III, MD   Discharging Provider: Park Sauceda III, MD  Primary Care Provider: Park Sauceda III, MD      HPI:   No notes on file    Procedure(s) (LRB):  COLONOSCOPY (N/A)  EGD (ESOPHAGOGASTRODUODENOSCOPY) (N/A)      Hospital Course:   1.13  Admitted with brbpr  Treated conservatively  Bleeding resolved  Had egd and colonoscopy  A/p: diverticular bleed  D/c home  Close f/u       Consults:   Consults (From admission, onward)        Status Ordering Provider     Inpatient consult to General Surgery  Once     Provider:  Charli Chowdhury MD    Completed PARK SAUCEDA III          * Rectal bleeding  1.11  Admit  obs  Surgery consult      1.12  Continue support  Endoscopy tomorrow  1.13  Admitted with brbpr  Treated conservatively  Bleeding resolved  Had egd and colonoscopy  A/p: diverticular bleed  D/c home  Close f/u              Final Active Diagnoses:    Diagnosis Date Noted POA    PRINCIPAL PROBLEM:  Rectal bleeding [K62.5] 01/11/2020 Yes      Problems Resolved During this Admission:       Discharged Condition: fair    Disposition: Home or Self Care    Follow Up:  Follow-up Information     Park Sauceda III, MD. Go on 1/21/2020.    Specialties:  Internal Medicine, Cardiology  Why:  appointment time: 10:15am for hospital follow up  Contact information:  [address]  [facility] [address] [phone]             Charli Chowdhury MD. Go on 1/24/2020.    Specialty:  General Surgery  Why:  appointment time: 11:15am for colonoscopy follow up  Contact information:  [address]  [facility] [address] [phone]                 Patient Instructions:      Diet general     Call MD for:  temperature  >100.4     Call MD for:  persistent nausea and vomiting     Call MD for:  severe uncontrolled pain     Call MD for:  difficulty breathing, headache or visual disturbances     Call MD for:  redness, tenderness, or signs of infection (pain, swelling, redness, odor or green/yellow discharge around incision site)     Call MD for:  persistent dizziness or light-headedness       Significant Diagnostic Studies: Labs: All labs within the past 24 hours have been reviewed    Pending Diagnostic Studies:     Procedure Component Value Units Date/Time    Specimen to Pathology, Surgery Gastrointestinal tract [[id number]] Collected:  01/13/20 1325    Order Status:  Sent Lab Status:  In process Updated:  01/13/20 1325         Medications:  Reconciled Home Medications:      Medication List      CONTINUE taking these medications    bicalutamide 50 MG Tab  Commonly known as:  CASODEX  Take 1 tablet (50 mg total) by mouth once daily.     cetirizine 10 mg Cap  Take 10 mg by mouth once daily.     diclofenac 75 MG EC tablet  Commonly known as:  VOLTAREN  Take 1 tablet (75 mg total) by mouth 2 (two) times daily as needed (pain).     gabapentin 300 MG capsule  Commonly known as:  NEURONTIN  Take 1-2 capsules (300-600 mg total) by mouth every evening.     One Daily Multivitamin per tablet  Generic drug:  multivitamin  Take 1 tablet by mouth once daily.     zolpidem 5 MG Tab  Commonly known as:  AMBIEN  Take 1 tablet (5 mg total) by mouth nightly.        ASK your doctor about these medications    azithromycin 250 MG tablet  Commonly known as:  Z-PHILLIP  Take 2 tablets by mouth on day 1; Take 1 tablet by mouth on days 2-5  Ask about: Should I take this medication?            Indwelling Lines/Drains at time of discharge:   Lines/Drains/Airways     Epidural Line                 Perineural Analgesia/Anesthesia Assessment (using dermatomes) Epidural 02/22/17 0642 1055 days                Time spent on the discharge of patient: 40 minutes  Patient was  seen and examined on the date of discharge and determined to be suitable for discharge.         Perfecto Melgar III, MD  Department of Hospital Medicine  [facility]

## 2020-01-13 NOTE — PLAN OF CARE
01/13/20 1433   Discharge Assessment   Assessment Type Discharge Planning Assessment   Confirmed/corrected address and phone number on facesheet? Yes   Assessment information obtained from? Patient;Caregiver   Expected Length of Stay (days) 2   Communicated expected length of stay with patient/caregiver yes   Prior to hospitilization cognitive status: Alert/Oriented   Prior to hospitalization functional status: Independent   Current cognitive status: Alert/Oriented   Current Functional Status: Independent   Lives With spouse   Able to Return to Prior Arrangements yes   Is patient able to care for self after discharge? Yes   Who are your caregiver(s) and their phone number(s)? Shira Sheldon spouse [phone]   Patient's perception of discharge disposition home or selfcare   Readmission Within the Last 30 Days no previous admission in last 30 days   Patient currently being followed by outpatient case management? No   Patient currently receives any other outside agency services? No   Equipment Currently Used at Home none   Do you have any problems affording any of your prescribed medications? No   Is the patient taking medications as prescribed? yes   Does the patient have transportation home? Yes   Does the patient receive services at the [facility]? No   Discharge Plan A Home with family   DME Needed Upon Discharge  none   Patient/Family in Agreement with Plan yes   Patient lives at home with his wife who is at bedside. He is independent & able to still drive. States was supposed to see Dr Melgar on Friday but they received a call they had to reschedule appointments for both of them. Will assist with appointment. Denies any other needs at this time.

## 2020-01-13 NOTE — PLAN OF CARE
01/12/20 1235   DICKENS Message   Medicare Outpatient and Observation Notification regarding financial responsibility Given to patient/caregiver;Explained to patient/caregiver;Signed/date by patient/caregiver   Date DICKENS was signed 01/12/20   Time DICKENS was signed 1237

## 2020-01-13 NOTE — NURSING
PT REFUSED BED ALARM AT THIS TIME. INFORMED THE PT OF HOSPITAL POLICY. PT CONT TO REFUSE. ESCALATED UP TO HOUSE SUP.NANETTE. CL,RN

## 2020-01-13 NOTE — SUBJECTIVE & OBJECTIVE
Past Medical History:   Diagnosis Date    Arthritis     Cataract 2009    had surgery to have removed    Dental bridge present     LOWER PARTIAL    DVT (deep venous thrombosis) 2014    right le after thr    Prostate cancer 05/2019    Wears glasses        Past Surgical History:   Procedure Laterality Date    BIOPSY WITH TRANSRECTAL ULTRASOUND (TRUS) GUIDANCE Bilateral 3/27/2019    Procedure: BIOPSY, WITH TRANSRECTAL US GUIDANCE;  Surgeon: Niall Blas MD;  Location: [facility] OR;  Service: Urology;  Laterality: Bilateral;    CYSTOSCOPY N/A 3/27/2019    Procedure: CYSTOSCOPY;  Surgeon: Niall Blas MD;  Location: [facility] OR;  Service: Urology;  Laterality: N/A;    EPIDURAL STEROID INJECTION N/A 6/17/2019    Procedure: Injection, Steroid, Epidural - L5/S1 EPIDURAL STEROID INJECTION;  Surgeon: Chyna Valdovinos MD;  Location: [facility];  Service: Pain Management;  Laterality: N/A;    EPIDURAL STEROID INJECTION N/A 12/11/2019    Procedure: Injection, Steroid, Epidural - L5/S1 LUMBAR EPIDURAL STEROID INJECTION;  Surgeon: Jade Layton MD;  Location: [facility];  Service: Pain Management;  Laterality: N/A;  *FIRST CASE*    INJECTION OF JOINT Left 1/21/2019    Procedure: Injection, FACET JOINT INJECTION LEFT L4-5 AND L5-S1;  Surgeon: Chyna Valdovinos MD;  Location: [facility];  Service: Pain Management;  Laterality: Left;    JOINT REPLACEMENT      BILAT HIPS, RIGHT SHOULDER    KNEE ARTHROPLASTY Left     TONSILLECTOMY  1950    TOTAL HIP ARTHROPLASTY Bilateral 2013 & 2014    TOTAL SHOULDER ARTHROPLASTY Right 2005       Review of patient's allergies indicates:  No Known Allergies    No current facility-administered medications on file prior to encounter.      Current Outpatient Medications on File Prior to Encounter   Medication Sig    bicalutamide (CASODEX) 50 MG Tab Take 1 tablet (50 mg total) by mouth once daily.    cetirizine 10 mg Cap Take 10 mg by mouth once daily.     diclofenac (VOLTAREN) 75 MG EC  tablet Take 1 tablet (75 mg total) by mouth 2 (two) times daily as needed (pain).    multivitamin (ONE DAILY MULTIVITAMIN) per tablet Take 1 tablet by mouth once daily.    zolpidem (AMBIEN) 5 MG Tab Take 1 tablet (5 mg total) by mouth nightly.    gabapentin (NEURONTIN) 300 MG capsule Take 1-2 capsules (300-600 mg total) by mouth every evening.     Family History     Problem Relation (Age of Onset)    Bladder Cancer Father    Stroke Mother        Tobacco Use    Smoking status: Former Smoker     Types: Cigarettes    Smokeless tobacco: Former User     Quit date: 1/1/1990   Substance and Sexual Activity    Alcohol use: Yes     Alcohol/week: 1.0 standard drinks     Types: 1 Glasses of wine per week     Comment: 1 drink daily    Drug use: No    Sexual activity: Not Currently     Review of Systems   Gastrointestinal: Positive for blood in stool.   Genitourinary: Negative.    All other systems reviewed and are negative.    Objective:     Vital Signs (Most Recent):  Temp: 97.7 °F (36.5 °C) (01/13/20 1035)  Pulse: 60 (01/13/20 1035)  Resp: 12 (01/13/20 1035)  BP: (!) 156/76 (01/13/20 1036)  SpO2: 99 % (01/13/20 1035) Vital Signs (24h Range):  Temp:  [96.3 °F (35.7 °C)-98.8 °F (37.1 °C)] 97.7 °F (36.5 °C)  Pulse:  [60-78] 60  Resp:  [12-18] 12  SpO2:  [96 %-100 %] 99 %  BP: (115-165)/(61-76) 156/76     Weight: 110.5 kg (243 lb 9.7 oz)  Body mass index is 35.97 kg/m².    Physical Exam   Constitutional: He is oriented to person, place, and time. Vital signs are normal. He appears well-developed and well-nourished. He is active.   HENT:   Head: Normocephalic and atraumatic.   Eyes: Pupils are equal, round, and reactive to light. Conjunctivae are normal.   Neck: Normal range of motion. Neck supple. No thyromegaly present.   Cardiovascular: Normal rate, regular rhythm and normal heart sounds.   Pulmonary/Chest: Effort normal and breath sounds normal.   Abdominal: Soft. Normal appearance and bowel sounds are normal. There is  no tenderness.   Musculoskeletal: Normal range of motion.   Neurological: He is alert and oriented to person, place, and time.   Skin: Skin is warm and dry.   Psychiatric: He has a normal mood and affect. His behavior is normal. Judgment and thought content normal.   Nursing note and vitals reviewed.        CRANIAL NERVES     CN III, IV, VI   Pupils are equal, round, and reactive to light.       Significant Labs:   Recent Lab Results       01/13/20  0728   01/13/20  0555   01/12/20  1652        Anion Gap   6       BUN, Bld   20       Calcium   8.4       Chloride   108       CO2   26       Creatinine   1.4       eGFR if    52.6       eGFR if non    45.5  Comment:  Calculation used to obtain the estimated glomerular filtration  rate (eGFR) is the CKD-EPI equation.          Glucose   115       Hematocrit 28.5   30.8     Hemoglobin 9.1   9.7     MCH 31.7   31.6     MCHC 31.9   31.5     MCV 99   100     MPV 9.0   9.2     Platelets 141   164     Potassium   4.4       RBC 2.87   3.07     RDW 12.8   12.7     Sodium   140       WBC 4.70   5.95           Significant Imaging: I have reviewed and interpreted all pertinent imaging results/findings within the past 24 hours.

## 2020-01-13 NOTE — TRANSFER OF CARE
"Anesthesia Transfer of Care Note    Patient: Anthony Sheldon    Procedure(s) Performed: Procedure(s) (LRB):  COLONOSCOPY (N/A)  EGD (ESOPHAGOGASTRODUODENOSCOPY) (N/A)    Patient location: PACU    Anesthesia Type: general    Transport from OR: Transported from OR on room air with adequate spontaneous ventilation    Post pain: adequate analgesia    Post assessment: no apparent anesthetic complications and tolerated procedure well    Post vital signs: stable    Level of consciousness: awake, alert and oriented    Nausea/Vomiting: no nausea/vomiting    Complications: none    Transfer of care protocol was followed      Last vitals:   Visit Vitals  BP (!) 156/76   Pulse 60   Temp 36.5 °C (97.7 °F) (Oral)   Resp 12   Ht 5' 9" (1.753 m)   Wt 110.5 kg (243 lb 9.7 oz)   SpO2 99%   BMI 35.97 kg/m²     "

## 2020-01-13 NOTE — NURSING
1409-PT ARRIVED BACK TO MED SURG ROOM 118 IN W/C. RESP EVEN AND UNLABORED NAD NOTED. VS STABLE AND RECORDED. WIFE AT SIDE. ASSISTED PT TO BED. BED ALARM SET AT THIS TIME. INFORMED PT OF POST OP POLICY R/T TO BED ALARM. PT AGREE AT THIS TIME. 22G TO RIGHT HAND, HEPLOCK AT THIS TIME. CL,RN

## 2020-01-13 NOTE — PROVATION PATIENT INSTRUCTIONS
Discharge Summary/Instructions after an Endoscopic Procedure  Patient Name: Anthony Sheldon  Patient MRN: [id number]  Patient YOB: 1934  Monday, January 13, 2020  Charli Chowdhury MD  RESTRICTIONS:  During your procedure today, you received medications for sedation.  These   medications may affect your judgment, balance and coordination.  Therefore,   for 24 hours, you have the following restrictions:   - DO NOT drive a car, operate machinery, make legal/financial decisions,   sign important papers or drink alcohol.    ACTIVITY:  Today: no heavy lifting, straining or running due to procedural   sedation/anesthesia.  The following day: return to full activity including work.  DIET:  Eat and drink normally unless instructed otherwise.     TREATMENT FOR COMMON SIDE EFFECTS:  - Mild abdominal pain, nausea, belching, bloating or excessive gas:  rest,   eat lightly and use a heating pad.  - Sore Throat: treat with throat lozenges and/or gargle with warm salt   water.  - Because air was used during the procedure, expelling large amounts of air   from your rectum or belching is normal.  - If a bowel prep was taken, you may not have a bowel movement for 1-3 days.    This is normal.  SYMPTOMS TO WATCH FOR AND REPORT TO YOUR PHYSICIAN:  1. Abdominal pain or bloating, other than gas cramps.  2. Chest pain.  3. Back pain.  4. Signs of infection such as: chills or fever occurring within 24 hours   after the procedure.  5. Rectal bleeding, which would show as bright red, maroon, or black stools.   (A tablespoon of blood from the rectum is not serious, especially if   hemorrhoids are present.)  6. Vomiting.  7. Weakness or dizziness.  GO DIRECTLY TO THE NEAREST EMERGENCY ROOM IF YOU HAVE ANY OF THE FOLLOWING:      Difficulty breathing              Chills and/or fever over 101 F   Persistent vomiting and/or vomiting blood   Severe abdominal pain   Severe chest pain   Black, tarry stools   Bleeding- more than one  tablespoon   Any other symptom or condition that you feel may need urgent attention  Your doctor recommends these additional instructions:  If any biopsies were taken, your doctors clinic will contact you in 1 to 2   weeks with any results.  - [facility] to [facility] for [facility] care.   - High fiber diet.   - Continue present medications.   - Await pathology results.   - Repeat colonoscopy in 2 years for surveillance.   - Return to my office in 2 weeks.  For questions, problems or results please call your physician - Charli Chowdhury MD at Work:  [phone].  [facility] EMERGENCY ROOM PHONE NUMBER: [phone]  IF A COMPLICATION OR EMERGENCY SITUATION ARISES AND YOU ARE UNABLE TO REACH   YOUR PHYSICIAN - GO DIRECTLY TO THE EMERGENCY ROOM.  MD Charli Jimenez MD  1/13/2020 1:45:41 PM  This report has been verified and signed electronically.  [employer]ATION

## 2020-01-13 NOTE — NURSING
1610- DISCHARGE INSTRUCTIONS PROVIDED AND REVIEWED WITH PT AND PT WIFE. BOTH VOICED UNDERSTANDING. BRENNEN,RN   1616-ASSISTED PT TO TRANSPORTATION. PT REFUSED W/C. GAIT NOTED STEADY. RESP EVEN AND UNLABORED. NAD NOTED. NO FURTHER NEEDS NOTED. BRENNENRN

## 2020-01-13 NOTE — H&P
[facility] Medicine  History & Physical    Patient Name: Anthony Sheldon  MRN: [id number]  Admission Date: 1/11/2020  Attending Physician: Perfecto Melgar III, MD  Primary Care Provider: Perfecto Melgar III, MD         Patient information was obtained from patient and ER records.     Subjective:     Principal Problem:Rectal bleeding    Chief Complaint:   Chief Complaint   Patient presents with    Rectal Bleeding     Onset x 1 day ago. Complaint of bright red blood in stool. Patient states bowel movement is mostly blood.        HPI: No notes on file    Past Medical History:   Diagnosis Date    Arthritis     Cataract 2009    had surgery to have removed    Dental bridge present     LOWER PARTIAL    DVT (deep venous thrombosis) 2014    right le after thr    Prostate cancer 05/2019    Wears glasses        Past Surgical History:   Procedure Laterality Date    BIOPSY WITH TRANSRECTAL ULTRASOUND (TRUS) GUIDANCE Bilateral 3/27/2019    Procedure: BIOPSY, WITH TRANSRECTAL US GUIDANCE;  Surgeon: Niall Blas MD;  Location: [facility] OR;  Service: Urology;  Laterality: Bilateral;    CYSTOSCOPY N/A 3/27/2019    Procedure: CYSTOSCOPY;  Surgeon: Niall Blas MD;  Location: [facility] OR;  Service: Urology;  Laterality: N/A;    EPIDURAL STEROID INJECTION N/A 6/17/2019    Procedure: Injection, Steroid, Epidural - L5/S1 EPIDURAL STEROID INJECTION;  Surgeon: Chyna Valdovinos MD;  Location: [facility] OR;  Service: Pain Management;  Laterality: N/A;    EPIDURAL STEROID INJECTION N/A 12/11/2019    Procedure: Injection, Steroid, Epidural - L5/S1 LUMBAR EPIDURAL STEROID INJECTION;  Surgeon: Jade Layton MD;  Location: [facility] OR;  Service: Pain Management;  Laterality: N/A;  *FIRST CASE*    INJECTION OF JOINT Left 1/21/2019    Procedure: Injection, FACET JOINT INJECTION LEFT L4-5 AND L5-S1;  Surgeon: Chyna Valdovinos MD;  Location: [facility] OR;  Service: Pain Management;  Laterality: Left;     JOINT REPLACEMENT      BILAT HIPS, RIGHT SHOULDER    KNEE ARTHROPLASTY Left     TONSILLECTOMY  1950    TOTAL HIP ARTHROPLASTY Bilateral 2013 & 2014    TOTAL SHOULDER ARTHROPLASTY Right 2005       Review of patient's allergies indicates:  No Known Allergies    No current facility-administered medications on file prior to encounter.      Current Outpatient Medications on File Prior to Encounter   Medication Sig    bicalutamide (CASODEX) 50 MG Tab Take 1 tablet (50 mg total) by mouth once daily.    cetirizine 10 mg Cap Take 10 mg by mouth once daily.     diclofenac (VOLTAREN) 75 MG EC tablet Take 1 tablet (75 mg total) by mouth 2 (two) times daily as needed (pain).    multivitamin (ONE DAILY MULTIVITAMIN) per tablet Take 1 tablet by mouth once daily.    zolpidem (AMBIEN) 5 MG Tab Take 1 tablet (5 mg total) by mouth nightly.    gabapentin (NEURONTIN) 300 MG capsule Take 1-2 capsules (300-600 mg total) by mouth every evening.     Family History     Problem Relation (Age of Onset)    Bladder Cancer Father    Stroke Mother        Tobacco Use    Smoking status: Former Smoker     Types: Cigarettes    Smokeless tobacco: Former User     Quit date: 1/1/1990   Substance and Sexual Activity    Alcohol use: Yes     Alcohol/week: 1.0 standard drinks     Types: 1 Glasses of wine per week     Comment: 1 drink daily    Drug use: No    Sexual activity: Not Currently     Review of Systems   Gastrointestinal: Positive for blood in stool.   Genitourinary: Negative.    All other systems reviewed and are negative.    Objective:     Vital Signs (Most Recent):  Temp: 97.7 °F (36.5 °C) (01/13/20 1035)  Pulse: 60 (01/13/20 1035)  Resp: 12 (01/13/20 1035)  BP: (!) 156/76 (01/13/20 1036)  SpO2: 99 % (01/13/20 1035) Vital Signs (24h Range):  Temp:  [96.3 °F (35.7 °C)-98.8 °F (37.1 °C)] 97.7 °F (36.5 °C)  Pulse:  [60-78] 60  Resp:  [12-18] 12  SpO2:  [96 %-100 %] 99 %  BP: (115-165)/(61-76) 156/76     Weight: 110.5 kg (243 lb 9.7  oz)  Body mass index is 35.97 kg/m².    Physical Exam   Constitutional: He is oriented to person, place, and time. Vital signs are normal. He appears well-developed and well-nourished. He is active.   HENT:   Head: Normocephalic and atraumatic.   Eyes: Pupils are equal, round, and reactive to light. Conjunctivae are normal.   Neck: Normal range of motion. Neck supple. No thyromegaly present.   Cardiovascular: Normal rate, regular rhythm and normal heart sounds.   Pulmonary/Chest: Effort normal and breath sounds normal.   Abdominal: Soft. Normal appearance and bowel sounds are normal. There is no tenderness.   Musculoskeletal: Normal range of motion.   Neurological: He is alert and oriented to person, place, and time.   Skin: Skin is warm and dry.   Psychiatric: He has a normal mood and affect. His behavior is normal. Judgment and thought content normal.   Nursing note and vitals reviewed.        CRANIAL NERVES     CN III, IV, VI   Pupils are equal, round, and reactive to light.       Significant Labs:   Recent Lab Results       01/13/20  0728   01/13/20  0555   01/12/20  1652        Anion Gap   6       BUN, Bld   20       Calcium   8.4       Chloride   108       CO2   26       Creatinine   1.4       eGFR if    52.6       eGFR if non    45.5  Comment:  Calculation used to obtain the estimated glomerular filtration  rate (eGFR) is the CKD-EPI equation.          Glucose   115       Hematocrit 28.5   30.8     Hemoglobin 9.1   9.7     MCH 31.7   31.6     MCHC 31.9   31.5     MCV 99   100     MPV 9.0   9.2     Platelets 141   164     Potassium   4.4       RBC 2.87   3.07     RDW 12.8   12.7     Sodium   140       WBC 4.70   5.95           Significant Imaging: I have reviewed and interpreted all pertinent imaging results/findings within the past 24 hours.    Assessment/Plan:     * Rectal bleeding  1.11  Admit  obs  Surgery consult      1.12  Continue support  Endoscopy tomorrow          VTE  Risk Mitigation (From admission, onward)         Ordered     Place JUAN DANIEL hose  Until discontinued      01/11/20 1017                   Perfecto Melgar III, MD  Department of Hospital Medicine   [facility]

## 2020-01-13 NOTE — DISCHARGE INSTRUCTIONS
Colonoscopy     A camera attached to a flexible tube with a viewing lens is used to take video pictures.     Colonoscopy is a test to view the inside of your lower digestive tract (colon and rectum). Sometimes it can show the last part of the small intestine (ileum). During the test, small pieces of tissue may be removed for testing. This is called a biopsy. Small growths, such as polyps, may also be removed.   Why is colonoscopy done?  The test is done to help look for colon cancer. And it can help find the source of abdominal pain, bleeding, and changes in bowel habits. It may be needed once a year, depending on factors such as your:  · Age  · Health history  · Family health history  · Symptoms  · Results from any prior colonoscopy  Risks and possible complications  These include:  · Bleeding               · A puncture or tear in the colon   · Risks of anesthesia  · A cancer lesion not being seen  Getting ready   To prepare for the test:  · Talk with your healthcare provider about the risks of the test (see below). Also ask your healthcare provider about alternatives to the test.  · Tell your healthcare provider about any medicines you take. Also tell him or her about any health conditions you may have.  · Make sure your rectum and colon are empty for the test. Follow the diet and bowel prep instructions exactly. If you dont, the test may need to be rescheduled.  · Plan for a friend or family member to drive you home after the test.     Colonoscopy provides an inside view of the entire colon.     You may discuss the results with your doctor right away or at a future visit.  During the test   The test is usually done in the hospital on an outpatient basis. This means you go home the same day. The procedure takes about 30 minutes. During that time:  · You are given relaxing (sedating) medicine through an IV line. You may be drowsy, or fully asleep.  · The healthcare provider will first give you a physical exam to  check for anal and rectal problems.  · Then the anus is lubricated and the scope inserted.  · If you are awake, you may have a feeling similar to needing to have a bowel movement. You may also feel pressure as air is pumped into the colon. Its OK to pass gas during the procedure.  · Biopsy, polyp removal, or other treatments may be done during the test.  After the test   You may have gas right after the test. It can help to try to pass it to help prevent later bloating. Your healthcare provider may discuss the results with you right away. Or you may need to schedule a follow-up visit to talk about the results. After the test, you can go back to your normal eating and other activities. You may be tired from the sedation and need to rest for a few hours.  Date Last Reviewed: 11/1/2016 © 2000-2017 [employer]. [address] [place] [address]. All rights reserved. This information is not intended as a substitute for professional medical care. Always follow your healthcare professional's instructions.        Anesthesia: Monitored Anesthesia Care (MAC)    Youre due to have surgery. During surgery, youll be given medicine called anesthesia. This will keep you comfortable and pain-free. Your surgeon will use monitored anesthesia care (MAC). This sheet tells you more about this type of anesthesia.  What is monitored anesthesia care?  MAC keeps you very drowsy during surgery. You may be awake, but you will likely not remember much. And you wont feel pain. With MAC, medicines are given through an IV line into a vein in your arm or hand. A local anesthetic will usually be injected into the skin and muscle around the surgical site to numb it. The anesthesia provider monitors you during the procedure. He or she checks your heart rate and rhythm, blood pressure, and blood oxygen level.  Anesthesia tools and medicines that may be near you during your procedure  You will likely have:  · A pulse oximeter  on the end of your finger. This measures your blood oxygen level.  · Electrocardiography leads (electrodes) on your chest. These record your heart rate and rhythm.  · Medicines given through an IV. These relax you and prevent pain. You may be awake or sleep lightly. If you have local anesthetic, it is injected directly into your skin.  · A facemask to give you oxygen, if needed.  Risks and possible complications  MAC has some risks. These include:  · Breathing problems  · Nausea and vomiting  · Allergic reaction to the anesthetic    Anesthesia safety  Tips for anesthesia safety include the following:   · Follow all instructions you are given for how long not to eat or drink before your procedure.  · Be sure your healthcare provider knows what medicines you take, especially any anti-inflammatory medicine or blood thinners. This includes aspirin and any other over-the-counter medicines, herbs, and supplements.  · Have an adult family member or friend drive you home after the procedure.  · For the first 24 hours after your surgery:  ¨ Do not drive or use heavy equipment.  ¨ Do not make important decisions or sign documents.  ¨ Avoid alcohol.  ¨ Have someone stay with you, if possible. They can watch for problems and help keep you safe.  Date Last Reviewed: 12/1/2016  © 5190-2212 [employer]. [address] [place] [address], [place] [address]. All rights reserved. This information is not intended as a substitute for professional medical care. Always follow your healthcare professional's instructions.

## 2020-01-13 NOTE — PLAN OF CARE
PT LYING LEFT SIDE. RESTING QUIETLY. NO SIGN DISTRESS NOTED. NPO AFTER MIDNIGHT FOR PROCEDURE THIS AM.

## 2020-01-13 NOTE — PLAN OF CARE
Pt arrived via stretcher. VS stable with no distress noted. Pt awake and denies Nausea and pain. Bed locked and in lowest position. Will continue to monitor.

## 2020-01-13 NOTE — ASSESSMENT & PLAN NOTE
1.11  Admit  obs  Surgery consult      1.12  Continue support  Endoscopy tomorrow  1.13  Admitted with brbpr  Treated conservatively  Bleeding resolved  Had egd and colonoscopy  A/p: diverticular bleed  D/c home  Close f/u

## 2020-01-13 NOTE — HOSPITAL COURSE
1.13  Admitted with brbpr  Treated conservatively  Bleeding resolved  Had egd and colonoscopy  A/p: diverticular bleed  D/c home  Close f/u

## 2020-01-20 ENCOUNTER — OFFICE VISIT (OUTPATIENT)
Dept: PAIN MEDICINE | Facility: CLINIC | Age: 85
End: 2020-01-20
Payer: MEDICARE

## 2020-01-20 VITALS
TEMPERATURE: 98 F | DIASTOLIC BLOOD PRESSURE: 76 MMHG | HEART RATE: 66 BPM | OXYGEN SATURATION: 98 % | BODY MASS INDEX: 35.69 KG/M2 | SYSTOLIC BLOOD PRESSURE: 168 MMHG | HEIGHT: 69 IN | WEIGHT: 240.94 LBS

## 2020-01-20 DIAGNOSIS — M47.816 LUMBAR SPONDYLOSIS: ICD-10-CM

## 2020-01-20 DIAGNOSIS — M51.36 DDD (DEGENERATIVE DISC DISEASE), LUMBAR: ICD-10-CM

## 2020-01-20 DIAGNOSIS — M54.16 LUMBAR RADICULOPATHY: ICD-10-CM

## 2020-01-20 DIAGNOSIS — G89.4 CHRONIC PAIN DISORDER: Primary | ICD-10-CM

## 2020-01-20 PROCEDURE — 1159F MED LIST DOCD IN RCRD: CPT | Mod: S$GLB,,, | Performed by: ANESTHESIOLOGY

## 2020-01-20 PROCEDURE — 1101F PR PT FALLS ASSESS DOC 0-1 FALLS W/OUT INJ PAST YR: ICD-10-PCS | Mod: CPTII,S$GLB,, | Performed by: ANESTHESIOLOGY

## 2020-01-20 PROCEDURE — 99214 PR OFFICE/OUTPT VISIT, EST, LEVL IV, 30-39 MIN: ICD-10-PCS | Mod: S$GLB,,, | Performed by: ANESTHESIOLOGY

## 2020-01-20 PROCEDURE — 99214 OFFICE O/P EST MOD 30 MIN: CPT | Mod: S$GLB,,, | Performed by: ANESTHESIOLOGY

## 2020-01-20 PROCEDURE — 1159F PR MEDICATION LIST DOCUMENTED IN MEDICAL RECORD: ICD-10-PCS | Mod: S$GLB,,, | Performed by: ANESTHESIOLOGY

## 2020-01-20 PROCEDURE — 99999 PR PBB SHADOW E&M-EST. PATIENT-LVL III: ICD-10-PCS | Mod: PBBFAC,,, | Performed by: ANESTHESIOLOGY

## 2020-01-20 PROCEDURE — 1101F PT FALLS ASSESS-DOCD LE1/YR: CPT | Mod: CPTII,S$GLB,, | Performed by: ANESTHESIOLOGY

## 2020-01-20 PROCEDURE — 1125F AMNT PAIN NOTED PAIN PRSNT: CPT | Mod: S$GLB,,, | Performed by: ANESTHESIOLOGY

## 2020-01-20 PROCEDURE — 99999 PR PBB SHADOW E&M-EST. PATIENT-LVL III: CPT | Mod: PBBFAC,,, | Performed by: ANESTHESIOLOGY

## 2020-01-20 PROCEDURE — 1125F PR PAIN SEVERITY QUANTIFIED, PAIN PRESENT: ICD-10-PCS | Mod: S$GLB,,, | Performed by: ANESTHESIOLOGY

## 2020-01-20 NOTE — LETTER
January 24, 2020      Perfecto Melgar III, MD  [address]  [facility] [place] [address]           [facility]  [address] [facility] [place] [address]  Phone: [phone]  Fax: [phone]          Patient: Anthony Sheldon   MR Number: [id number]   YOB: 1934   Date of Visit: 1/20/2020       Dear Dr. Perfecto Melgar III:    Thank you for referring Anthony Sheldon to me for evaluation. Attached you will find relevant portions of my assessment and plan of care.    If you have questions, please do not hesitate to call me. I look forward to following Anthony Sheldon along with you.    Sincerely,    Chyna Valdovinos MD    Enclosure  CC:  No Recipients    If you would like to receive this communication electronically, please contact [email or web address] or [phone] to request more information on [employer] access.    For providers and/or their staff who would like to refer a patient to [facility], please contact us through our one-stop-shop provider referral line, [facility], at [phone].    If you feel you have received this communication in error or would no longer like to receive these types of communications, please e-mail [email or web address]

## 2020-01-20 NOTE — PATIENT INSTRUCTIONS
If you decide you want to do the left sided epidural steroid injection, you can send me a message or call.  We can set this up without an office visit.      If you decide you want to try the new medication, one called Lyrica, to replace the gabapentin, please send me a message or call.  I will send the prescription over to your pharmacy.

## 2020-01-20 NOTE — PROGRESS NOTES
Subjective:     Patient ID: Anthony Sheldon is a 85 y.o. male.    Chief Complaint: Pain    Consulted by: Self     Disclaimer: This note was generated using voice recognition software.  There may be a typographical errors that were missed during proofreading.      HPI:    Anthony Shedlon is a 85 y.o. male who presents today with left knee pain. He reports that the knee feels like a pressure sensation.  He is s/p left knee coolief in 5/2018 with resolution of his burning, stabbing pain, but this pressure pain never went away.   It is located on the lateral and posterior aspect of his knee.  This pain is described in detail below.    Of note, he received Euflexxa in the right knee with great benefit.    He also reports left-sided low back pain that is long-standing and worsening.  The pain is located in the left middle back and does not radiate.  The pain is worse with walking and with activities cause him to stand up straight.  It is better with rest.    Aggravating factors: Walking, the pain is worse in the morning    Mitigating factors: Coolief, motion    Previously seeing: Dr. Posadas, but patient lives here and would like to establish care here    Interval History (1/8/2019):  He returns today for follow up.  He reports that his low back pain is improving with the home exercises.  Tylenol has been helpful for the pain.    Interval History (6/17/2019):  He returns today for follow up.  He reports that he is having increased pain in his left foot.  This pain began around April of 2019 with no inciting event or injury.  He reports a sharp, stabbing pain on the top of his left foot that occurs when he is lying on his left side.  This resolves when he turns over to his back.  This is associated with some numbness in the same area.  No muscle weakness.  Diclofenac helps.    Interval History (7/9/2019):  He returns today for follow up.  He reports that the L5/S1 ILESI was helpful for the pain in his low back,  but he continues to have the left-sided foot pain. He is planning to increase the diclofenac from 1 tablet daily to 2 tablets daily to the coming days.  He would like to try this before trying any additional treatments..    Interval History (12/9/2019):  He returns today for follow up.  He reports that his low back pain has been worsening over the past 2 weeks.  He would like to repeat the lumbar epidural.  He reports low back pain radiating to the left side of his foot.  This is similar to previous.  He continues of diclofenac once daily.   He finds this to be helpful.  He continues to have foot cramps at night that wake him from sleep.     Interval History (1/24/2020):  He returns today for follow up.  He reports that the most recent epidural has provided 50% relief.  Overall, his pain is tolerable.  Continues to report lateral posterior left knee pain as well as lateral left ankle/lateral foot pain. Increasing the gabapentin to 600 mg at bedtime made him too sleepy, so he has stopped this medication.  Of note, he reports noting that his left foot looks smaller than his right    Physical Therapy: Yes, after his surgery.  Continued pool exercises all summer.  He has been walking until recently when it got cold.  No stretches.    Non-pharmacologic Treatment:     · Ice/Heat: Ice after surgery  · TENS: Never  · Massage: Never   · Chiropractic care: Never  · Acupuncture: Never  · Other: No         Pain Medications:         · Currently taking: Tylenol 500 mg, 1-2 per dose (helpful), diclofenac 75 mg twice daily as needed (usually takes once daily, twice daily interfered with sleep)    · Has tried in the past:    · Opioids: tramadol  · NSAIDS: Naproxen 500 mg (some benefit, a couple of times a week),   · Tylenol: Takes 2-3 times per week  · Muscle relaxants: Never  · TCAs: Never  · SNRIs: Never  · Anticonvulsants: Never  · topical creams: Never  · Other: None    Blood thinners: None    Interventional Therapies:    · 5/18/2018: Left knee coolief radiofrequency:  75% relief  · Euflexxa in right knee 3/2018: Good benefit  · 06/17/2019:  L5/S1 interlaminar SARITHA:  50% benefit, mostly of low back pain    Relevant Surgeries:   · Left TKA    Affecting sleep? No    Affecting daily activities? No    Depressive symptoms? No          · SI/HI? No    Work status: No, retired    Prescription Monitoring Program database:  Reviewed and consistent with medication use as prescribed.    Last 3 PDI Scores 1/20/2020 12/9/2019 7/9/2019   Pain Disability Index (PDI) 6 20 5       Opioid Risk Score       Value Time User    Opioid Risk Score  0 11/27/2018 11:44 AM Olive Eugene LPN          GENERAL:  No weight loss, malaise or fevers.  HEENT:   No recent changes in vision or hearing  NECK:  Negative for lumps, no difficulty with swallowing.  RESPIRATORY:  Negative for cough, wheezing or shortness of breath, patient denies any recent URI.  CARDIOVASCULAR:  Negative for chest pain, leg swelling or palpitations.  GI:  Negative for abdominal discomfort, blood in stools or black stools or change in bowel habits.  MUSCULOSKELETAL:  See HPI.  SKIN:  Negative for lesions, rash, and itching.  PSYCH:  No mood disorder or recent psychosocial stressors.    HEMATOLOGY/LYMPHOLOGY:  Negative for prolonged bleeding, bruising easily or swollen nodes.    ENDO: No history of diabetes or thyroid dysfunction  NEURO:   No history of headaches, syncope, paralysis, seizures or tremors.  All other reviewed and negative other than HPI.          Past Medical History:   Diagnosis Date    Arthritis     Cataract 2009    had surgery to have removed    Dental bridge present     LOWER PARTIAL    DVT (deep venous thrombosis) 2014    right le after thr    Prostate cancer 05/2019    Wears glasses        Past Surgical History:   Procedure Laterality Date    BIOPSY WITH TRANSRECTAL ULTRASOUND (TRUS) GUIDANCE Bilateral 3/27/2019    Procedure: BIOPSY, WITH TRANSRECTAL US GUIDANCE;   Surgeon: Niall Blas MD;  Location: [facility] OR;  Service: Urology;  Laterality: Bilateral;    COLONOSCOPY N/A 1/13/2020    Procedure: COLONOSCOPY;  Surgeon: Charli Chowdhury MD;  Location: [facility] ENDO;  Service: General;  Laterality: N/A;    CYSTOSCOPY N/A 3/27/2019    Procedure: CYSTOSCOPY;  Surgeon: Niall Blas MD;  Location: [facility] OR;  Service: Urology;  Laterality: N/A;    EPIDURAL STEROID INJECTION N/A 6/17/2019    Procedure: Injection, Steroid, Epidural - L5/S1 EPIDURAL STEROID INJECTION;  Surgeon: Chyna Valdovinos MD;  Location: [facility] OR;  Service: Pain Management;  Laterality: N/A;    EPIDURAL STEROID INJECTION N/A 12/11/2019    Procedure: Injection, Steroid, Epidural - L5/S1 LUMBAR EPIDURAL STEROID INJECTION;  Surgeon: Jade Layton MD;  Location: [facility] OR;  Service: Pain Management;  Laterality: N/A;  *FIRST CASE*    ESOPHAGOGASTRODUODENOSCOPY N/A 1/13/2020    Procedure: EGD (ESOPHAGOGASTRODUODENOSCOPY);  Surgeon: Charli Chowdhury MD;  Location: [facility] ENDO;  Service: General;  Laterality: N/A;    INJECTION OF JOINT Left 1/21/2019    Procedure: Injection, FACET JOINT INJECTION LEFT L4-5 AND L5-S1;  Surgeon: Chyna Valdovinos MD;  Location: [facility] OR;  Service: Pain Management;  Laterality: Left;    JOINT REPLACEMENT      BILAT HIPS, RIGHT SHOULDER    KNEE ARTHROPLASTY Left     TONSILLECTOMY  1950    TOTAL HIP ARTHROPLASTY Bilateral 2013 & 2014    TOTAL SHOULDER ARTHROPLASTY Right 2005       Review of patient's allergies indicates:  No Known Allergies    Current Outpatient Medications   Medication Sig Dispense Refill    bicalutamide (CASODEX) 50 MG Tab Take 1 tablet (50 mg total) by mouth once daily. 30 tablet 6    cetirizine 10 mg Cap Take 10 mg by mouth once daily.       diclofenac (VOLTAREN) 75 MG EC tablet Take 1 tablet (75 mg total) by mouth 2 (two) times daily as needed (pain). 180 tablet 3    gabapentin (NEURONTIN) 300 MG capsule Take 1-2 capsules (300-600 mg total) by  mouth every evening. 60 capsule 5    iron-vit c-vit b12-folic acid (IRON-C PLUS) tablet Take 1 tablet by mouth once daily. 90 tablet 3    multivitamin (ONE DAILY MULTIVITAMIN) per tablet Take 1 tablet by mouth once daily.      zolpidem (AMBIEN) 5 MG Tab Take 1 tablet (5 mg total) by mouth nightly. 30 tablet 5     No current facility-administered medications for this visit.        Family History   Problem Relation Age of Onset    Stroke Mother     Bladder Cancer Father        Social History     Socioeconomic History    Marital status:      Spouse name: Not on file    Number of children: Not on file    Years of education: Not on file    Highest education level: Not on file   Occupational History    Not on file   Social Needs    Financial resource strain: Not on file    Food insecurity:     Worry: Not on file     Inability: Not on file    Transportation needs:     Medical: Not on file     Non-medical: Not on file   Tobacco Use    Smoking status: Former Smoker     Types: Cigarettes    Smokeless tobacco: Former User     Quit date: 1/1/1990   Substance and Sexual Activity    Alcohol use: Yes     Alcohol/week: 1.0 standard drinks     Types: 1 Glasses of wine per week     Comment: 1 drink daily    Drug use: No    Sexual activity: Not Currently   Lifestyle    Physical activity:     Days per week: Not on file     Minutes per session: Not on file    Stress: Not on file   Relationships    Social connections:     Talks on phone: Not on file     Gets together: Not on file     Attends [employer] service: Not on file     Active member of club or organization: Not on file     Attends meetings of clubs or organizations: Not on file     Relationship status: Not on file   Other Topics Concern    Not on file   Social History Narrative    Not on file       Objective:     Vitals:    01/20/20 1600   BP: (!) 168/76   Pulse: 66   Temp: 98.3 °F (36.8 °C)   SpO2: 98%   Weight: 109.3 kg (240 lb 15.4 oz)   Height: 5'  "9" (1.753 m)   PainSc:   2   PainLoc: Knee       GEN:  Well developed, well nourished.  No acute distress. No pain behavior.  HEENT:  No trauma.  Mucous membranes moist.  Nares patent bilaterally.  PSYCH: Normal affect. Thought content appropriate.  CHEST:  Breathing symmetric.  No audible wheezing.  ABD:   · Soft, non-distended.    SKIN:  Warm, pink, dry.  No rash on exposed areas.    EXT:  No cyanosis, clubbing, or edema.  No color change or changes in nail or hair growth.  NEURO/MUSCULOSKELETAL:  Fully alert, oriented, and appropriate. Speech normal dionicio. No cranial nerve deficits.   Gait: Normal.     L-Spine:  Decreased ROM with pain on flexion, extension. Positive facet loading on the left.  Negative SLR bilaterally.  Motor Strength: 5/5 motor strength throughout lower extremities.   Reflexes:  2+ and symmetric throughout.  Downgoing Babinski's bilaterally.  No clonus or spasticity.  Sensory:  Increased sensation in a left L4, L5, and S1 distribution    Previous physical exam:  SI Joint/Hip:  Negative TERESSA bilaterally.  Negative FADIR bilaterally.  Negative TTP over lumbar paraspinals, bilateral SI joints, hips, piriformis muscles, or GTB.    Right knee  Inspection: No erythema, swelling, or redness  ROM: Full  Left knee  Inspection: No erythema, swelling, or redness  ROM: Decreased  Palpation: No pes anserine tenderness and positive crepitus. No patellar tendon tenderness and no patellofemoral tendon tenderness.  No instability on exam.      Imaging:        The imaging studies listed below were independently reviewed by me, and I agree with the findings as documented below.     Narrative     EXAMINATION:  XR LUMBAR SPINE 5 VIEW WITH FLEX AND EXT    CLINICAL HISTORY:  lumbar spondylosis;  Spondylosis without myelopathy or radiculopathy, lumbar region    TECHNIQUE:  Five views of the lumbar spine plus flexion extension views were performed.    COMPARISON:  None.    FINDINGS:  Mild thoracolumbar " "dextroscoliosis.  Lumbar vertebral bodies are normal alignment.  Mild superior endplate compression fracture of L2.  Prominent Schmorl's node formation of the L3 vertebral body.    Moderate multilevel degenerative disc disease.  Bilateral oblique views demonstrate moderate hypertrophic changes within the facet joints most predominant within the lower lumbar spine.    There is 2 mm retrolisthesis of L2 on L3 on the lateral extension view.  This reduces on the neutral and flexion lateral views.  Remaining lumbar vertebral bodies are in alignment.    The SI joints are intact.  There is bone demineralization.    Previous bilateral total hip arthroplasties.      Impression       1. Mild thoracolumbar dextroscoliosis.  2. Mild superior endplate compression fracture of L2.  If there is clinical concern for fracture acuity, further evaluation may be obtained with either nuclear medicine bone scan or an MRI of the lumbar spine.  3. Moderate multilevel degenerative disc disease.  4. Grade 1 retrolisthesis of L2 on L3 on extension which reduces on neutral and flexion views consistent with movement.  5. Bone demineralization.      Electronically signed by: Ortiz Riojas  Date: 11/29/2018  Time: 08:49       Narrative     XR KNEE ORTHO LEFT.  Clinical History provided for exam:"lt knee".  As on 2/22/17, there are surgical changes of left knee arthroplasty.  Position and appearance of the prosthesis are unchanged.  There is a left suprapatellar effusion.  There is moderate diffuse narrowing of the right knee joint.  On the right there are spurs on the distal femur, proximal tibia, and patella. No acute fracture or bony destructive process is seen.  Alignment is normal.      Impression      Surgical changes of left knee arthroplasty.      Electronically signed by: MCKAYLA ZAVALA MD  Date: 01/15/18  Time: 09:18        Narrative     EXAMINATION:  MRI LUMBAR SPINE WITHOUT CONTRAST    CLINICAL HISTORY:  Abnormal xray, lumbar spine, " DJD; Abnormal findings on diagnostic imaging of other parts of musculoskeletal system    TECHNIQUE:  Multiplanar, multisequence MR images were acquired from the thoracolumbar junction to the sacrum without the administration of contrast.    COMPARISON:  November 29, 2018 plain film    FINDINGS:  There is a dextroscoliotic curvature in the upper lumbar region.  There is diffuse disc desiccation and disc space base narrowing.  Superior endplate concavity is noted at L2 and Schmorl's node formation is noted in the superior endplate of L3 and inferior endplate of L2.  There is no evidence of marrow edema to suggest acute compression.  The conus terminates at the L1 level and has an unremarkable appearance.  There are minimal type 1 (edematous) degenerative endplate changes at L1/L2 and there are fatty degenerative endplate changes at L2/L3 and L3/L4.  The individual disc levels appears follows:    T12/L1: There is a left central disc extrusion causing distortion of the left anterolateral canal.  Annular disc bulge and osteophyte formation cause mild narrowing of both foramina.    L1/L2: There is annular disc bulging and mild degenerative facet disease with mild effacement of the anterior thecal sac.  There is left greater than right foraminal narrowing.    L2/L3: There is annular disc bulging with mild effacement of the anterior thecal sac and mild degenerative facet changes are noted bilaterally.  There is mild foraminal narrowing bilaterally.    L3/L4: Moderate degenerative facet changes are noted and there is no evidence of disc protrusion.  There is mild disc bulging into the left foramen.  There is mild narrowing the left foramen.    L4/L5: Annular disc bulge combines with facet and ligamentous hypertrophy to produce moderate canal stenosis.  There is inferior foraminal narrowing bilaterally.  Facet effusions are noted bilaterally.    L5/S1: Degenerative facet changes are noted and there is annular disc bulging  with effacement of the anterior thecal sac and the proximal S1 nerve roots bilaterally.      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  There is mild chronic anterior wedging of L1.      Electronically signed by: Israel Morris MD  Date: 11/30/2018  Time: 13:48         Assessment:     Encounter Diagnoses   Name Primary?    Chronic pain disorder Yes    Lumbar radiculopathy     DDD (degenerative disc disease), lumbar     Lumbar spondylosis        Plan:     Anthony was seen today for follow-up, knee pain and foot pain.    Diagnoses and all orders for this visit:    Chronic pain disorder    Lumbar radiculopathy    DDD (degenerative disc disease), lumbar    Lumbar spondylosis      His pain is consistent with the above.    We discussed the assessment and recommendations.  All available images were reviewed. We discussed the disease process, prognosis, treatment plan, and risks and benefits. The patient is aware of the risks and benefits of the medications being prescribed, common side effects, and proper usage. The following is the plan we agreed on:     1. Offer to schedule him for a left L5 and S1 transforaminal SARITHA.  He would like to wait.  2. Continue diclofenac mg 1-2 times daily as needed  3. Offer to start Lyrica, he would like to wait.  4. Can repeat the left knee radiofrequency as needed.  I discussed that the procedure did not cover the lateral and posterior aspect of the knee, so I would expect for him still have symptoms in these areas.  5. Continue Tylenol  6. Voltaren gel for topical use  7. Alternate ice and heat for symptomatic relief  8. Home exercises for his low back given previously  9. RTC as needed.    Chyna Valdovinos MD  01/20/2020     The above plan and management options were discussed at length with patient. Patient is in agreement with the above and verbalized understanding. It will be  communicated with the referring physician via electronic record, fax, or mail.

## 2020-01-23 LAB
FINAL PATHOLOGIC DIAGNOSIS: NORMAL
GROSS: NORMAL

## 2020-01-24 ENCOUNTER — OFFICE VISIT (OUTPATIENT)
Dept: SURGERY | Facility: CLINIC | Age: 85
End: 2020-01-24
Payer: MEDICARE

## 2020-01-24 VITALS
OXYGEN SATURATION: 97 % | TEMPERATURE: 98 F | DIASTOLIC BLOOD PRESSURE: 84 MMHG | HEIGHT: 69 IN | SYSTOLIC BLOOD PRESSURE: 137 MMHG | BODY MASS INDEX: 35.1 KG/M2 | HEART RATE: 74 BPM | RESPIRATION RATE: 16 BRPM | WEIGHT: 237 LBS

## 2020-01-24 DIAGNOSIS — K57.90 DIVERTICULOSIS: ICD-10-CM

## 2020-01-24 DIAGNOSIS — K44.9 HIATAL HERNIA: ICD-10-CM

## 2020-01-24 DIAGNOSIS — D12.6 ADENOMATOUS POLYP OF COLON, UNSPECIFIED PART OF COLON: Primary | ICD-10-CM

## 2020-01-24 PROCEDURE — 1126F AMNT PAIN NOTED NONE PRSNT: CPT | Mod: S$GLB,,, | Performed by: SURGERY

## 2020-01-24 PROCEDURE — 1101F PR PT FALLS ASSESS DOC 0-1 FALLS W/OUT INJ PAST YR: ICD-10-PCS | Mod: CPTII,S$GLB,, | Performed by: SURGERY

## 2020-01-24 PROCEDURE — 1126F PR PAIN SEVERITY QUANTIFIED, NO PAIN PRESENT: ICD-10-PCS | Mod: S$GLB,,, | Performed by: SURGERY

## 2020-01-24 PROCEDURE — 1159F MED LIST DOCD IN RCRD: CPT | Mod: S$GLB,,, | Performed by: SURGERY

## 2020-01-24 PROCEDURE — 99213 OFFICE O/P EST LOW 20 MIN: CPT | Mod: S$GLB,,, | Performed by: SURGERY

## 2020-01-24 PROCEDURE — 1101F PT FALLS ASSESS-DOCD LE1/YR: CPT | Mod: CPTII,S$GLB,, | Performed by: SURGERY

## 2020-01-24 PROCEDURE — 1159F PR MEDICATION LIST DOCUMENTED IN MEDICAL RECORD: ICD-10-PCS | Mod: S$GLB,,, | Performed by: SURGERY

## 2020-01-24 PROCEDURE — 99213 PR OFFICE/OUTPT VISIT, EST, LEVL III, 20-29 MIN: ICD-10-PCS | Mod: S$GLB,,, | Performed by: SURGERY

## 2020-01-24 NOTE — PROGRESS NOTES
"[facility]  Follow-up    Subjective:       Patient ID: Anthony Sheldon is a 85 y.o. male.    Chief Complaint: Follow-up (Colonoscopy 1/13/20)      HPI:  Anthony Sheldon is a 85 y.o. male presents today for follow-up examination after EGD and colonoscopy as an inpatient for gastrointestinal bleed.  Patient since hospital admission has had no recurrence of GI bleeding. No nausea vomiting.  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  No active bleeding, likely diverticular bleeding related.  Patient with no issues since hospital admission deep now presents today for follow-up evaluation and counseling.    Review of Systems   Constitutional: Negative for appetite change, chills and fever.   HENT: Negative for congestion, dental problem and drooling.    Eyes: Negative for photophobia, discharge and itching.   Respiratory: Negative for apnea and chest tightness.    Cardiovascular: Negative for chest pain, palpitations and leg swelling.   Gastrointestinal: Negative for abdominal distention and abdominal pain.   Endocrine: Negative for cold intolerance and heat intolerance.   Genitourinary: Negative for difficulty urinating and dysuria.   Musculoskeletal: Negative for arthralgias and back pain.   Skin: Negative for color change and pallor.   Neurological: Negative for dizziness, facial asymmetry and headaches.   Hematological: Negative for adenopathy. Does not bruise/bleed easily.   Psychiatric/Behavioral: Negative for agitation, behavioral problems and confusion.       Objective:      Vitals:    01/24/20 0846   BP: 137/84   BP Location: Left arm   Patient Position: Sitting   BP Method: Large (Automatic)   Pulse: 74   Resp: 16   Temp: 97.6 °F (36.4 °C)   TempSrc: Oral   SpO2: 97%   Weight: 107.5 kg (236 lb 15.9 oz)   Height: 5' 9" (1.753 m)     Physical Exam "   Constitutional: He is oriented to person, place, and time. He appears well-developed and well-nourished.   HENT:   Head: Normocephalic and atraumatic.   Eyes: Pupils are equal, round, and reactive to light. EOM are normal.   Neck: Normal range of motion. Neck supple. No thyromegaly present.   Cardiovascular: Normal rate and regular rhythm.   No murmur heard.  Pulmonary/Chest: Effort normal and breath sounds normal. No respiratory distress.   Abdominal: Soft. Bowel sounds are normal. He exhibits no distension. There is no tenderness.   Musculoskeletal: Normal range of motion. He exhibits no edema.   Neurological: He is alert and oriented to person, place, and time. No cranial nerve deficit.   Skin: Skin is warm. Capillary refill takes less than 2 seconds. No rash noted. He is not diaphoretic. No erythema.   Psychiatric: He has a normal mood and affect.     Pathology, EGD and colonoscopy reports all reviewed today.  Laboratory assessments reviewed.  Iron studies within normal limits.     Assessment:       1. Adenomatous polyp of colon, unspecified part of colon    2. Diverticulosis    3. Hiatal hernia        Plan:   Adenomatous polyp of colon, unspecified part of colon    Diverticulosis    Hiatal hernia        Medical Decision Making/Counseling:  Patient with new problems.  Established patient.    Tubulovillous adenoma of the colon x2.  Patient with tubulovillous adenoma of the colon/rectum x2.  These were removed entirety in colonoscopy.  Recommendation in a patient less than 80 years of age would be repeat colonoscopy in 2 years.  Patient instructed on possible need for repeat endoscopy in a couple of years however patient stated that if something were found the likelihood of he having chemotherapeutic agents or surgical resection very low.  Therefore I discussed with the patient the low need to repeat his endoscopy.  He voiced understanding and agreement.    Diverticulosis.  Patient with diverticular bleeding.   Since stopped.  No recurrence of bleeding episodes.  Recommendation will be high-fiber diet.  Counseling session today regarding diverticulitis with diverticulosis versus diverticular bleeding. Patient voiced understanding and red flag signs associated with such issues.    Hiatal hernia. Patient with small hiatal hernia.  No indication for surgical repair.  Symptoms controlled with as needed medications such as Rolaids and Tums.    Total clinic time today, face-to-face interaction with patient was 30 min, of which greater than half of that time was spent in face-to-face counseling.    Follow up:  As needed    Patient instructed that best way to communicate with my office staff is for patient to get on the [facility] epic patient portal to expedite communication and communication issues that may occur.  Patient was given instructions on how to get on the portal.  I encouraged patient to obtain portal access as well.  Ultimately it is up to the patient to obtain access.  Patient voiced understanding.

## 2020-02-18 ENCOUNTER — LAB VISIT (OUTPATIENT)
Dept: LAB | Facility: HOSPITAL | Age: 85
End: 2020-02-18
Attending: INTERNAL MEDICINE
Payer: MEDICARE

## 2020-02-18 DIAGNOSIS — D64.9 ANEMIA, UNSPECIFIED TYPE: ICD-10-CM

## 2020-02-18 DIAGNOSIS — R53.83 FATIGUE, UNSPECIFIED TYPE: ICD-10-CM

## 2020-02-18 DIAGNOSIS — M17.12 OSTEOARTHRITIS OF LEFT KNEE, UNSPECIFIED OSTEOARTHRITIS TYPE: ICD-10-CM

## 2020-02-18 DIAGNOSIS — K62.5 RECTAL BLEEDING: ICD-10-CM

## 2020-02-18 DIAGNOSIS — N18.30 CKD (CHRONIC KIDNEY DISEASE) STAGE 3, GFR 30-59 ML/MIN: ICD-10-CM

## 2020-02-18 DIAGNOSIS — M47.816 LUMBAR SPONDYLOSIS: ICD-10-CM

## 2020-02-18 DIAGNOSIS — M15.9 PRIMARY OSTEOARTHRITIS INVOLVING MULTIPLE JOINTS: ICD-10-CM

## 2020-02-18 DIAGNOSIS — R73.9 HYPERGLYCEMIA: ICD-10-CM

## 2020-02-18 DIAGNOSIS — I82.409 DEEP VEIN THROMBOSIS (DVT) OF LOWER EXTREMITY, UNSPECIFIED CHRONICITY, UNSPECIFIED LATERALITY, UNSPECIFIED VEIN: ICD-10-CM

## 2020-02-18 DIAGNOSIS — M51.36 DDD (DEGENERATIVE DISC DISEASE), LUMBAR: ICD-10-CM

## 2020-02-18 DIAGNOSIS — Z79.899 ENCOUNTER FOR LONG-TERM (CURRENT) USE OF HIGH-RISK MEDICATION: ICD-10-CM

## 2020-02-18 DIAGNOSIS — R97.20 PSA ELEVATION: ICD-10-CM

## 2020-02-18 DIAGNOSIS — C61 PROSTATE CANCER: ICD-10-CM

## 2020-02-18 DIAGNOSIS — G47.9 SLEEP DISORDER: ICD-10-CM

## 2020-02-18 LAB
BASOPHILS # BLD AUTO: 0.04 K/UL (ref 0–0.2)
BASOPHILS NFR BLD: 0.9 % (ref 0–1.9)
DIFFERENTIAL METHOD: ABNORMAL
EOSINOPHIL # BLD AUTO: 0.1 K/UL (ref 0–0.5)
EOSINOPHIL NFR BLD: 2.7 % (ref 0–8)
ERYTHROCYTE [DISTWIDTH] IN BLOOD BY AUTOMATED COUNT: 13 % (ref 11.5–14.5)
HCT VFR BLD AUTO: 33.5 % (ref 40–54)
HGB BLD-MCNC: 10.8 G/DL (ref 14–18)
IMM GRANULOCYTES # BLD AUTO: 0.05 K/UL (ref 0–0.04)
IMM GRANULOCYTES NFR BLD AUTO: 1.1 % (ref 0–0.5)
LYMPHOCYTES # BLD AUTO: 1.2 K/UL (ref 1–4.8)
LYMPHOCYTES NFR BLD: 26.7 % (ref 18–48)
MCH RBC QN AUTO: 32.1 PG (ref 27–31)
MCHC RBC AUTO-ENTMCNC: 32.2 G/DL (ref 32–36)
MCV RBC AUTO: 100 FL (ref 82–98)
MONOCYTES # BLD AUTO: 0.5 K/UL (ref 0.3–1)
MONOCYTES NFR BLD: 10.1 % (ref 4–15)
NEUTROPHILS # BLD AUTO: 2.6 K/UL (ref 1.8–7.7)
NEUTROPHILS NFR BLD: 58.5 % (ref 38–73)
NRBC BLD-RTO: 0 /100 WBC
PLATELET # BLD AUTO: 206 K/UL (ref 150–350)
PMV BLD AUTO: 9.7 FL (ref 9.2–12.9)
RBC # BLD AUTO: 3.36 M/UL (ref 4.6–6.2)
WBC # BLD AUTO: 4.46 K/UL (ref 3.9–12.7)

## 2020-02-18 PROCEDURE — 36415 COLL VENOUS BLD VENIPUNCTURE: CPT

## 2020-02-18 PROCEDURE — 85025 COMPLETE CBC W/AUTO DIFF WBC: CPT

## 2020-03-04 ENCOUNTER — PATIENT MESSAGE (OUTPATIENT)
Dept: HEMATOLOGY/ONCOLOGY | Facility: CLINIC | Age: 85
End: 2020-03-04

## 2020-03-04 ENCOUNTER — TELEPHONE (OUTPATIENT)
Dept: HEMATOLOGY/ONCOLOGY | Facility: CLINIC | Age: 85
End: 2020-03-04

## 2020-03-04 DIAGNOSIS — C61 PROSTATE CANCER: Primary | ICD-10-CM

## 2020-03-06 ENCOUNTER — LAB VISIT (OUTPATIENT)
Dept: LAB | Facility: HOSPITAL | Age: 85
End: 2020-03-06
Attending: INTERNAL MEDICINE
Payer: MEDICARE

## 2020-03-06 DIAGNOSIS — C61 PROSTATE CANCER: ICD-10-CM

## 2020-03-06 LAB
ALBUMIN SERPL BCP-MCNC: 4.4 G/DL (ref 3.5–5.2)
ALP SERPL-CCNC: 47 U/L (ref 55–135)
ALT SERPL W/O P-5'-P-CCNC: 15 U/L (ref 10–44)
ANION GAP SERPL CALC-SCNC: 8 MMOL/L (ref 8–16)
AST SERPL-CCNC: 21 U/L (ref 10–40)
BASOPHILS # BLD AUTO: 0.05 K/UL (ref 0–0.2)
BASOPHILS NFR BLD: 1 % (ref 0–1.9)
BILIRUB SERPL-MCNC: 0.9 MG/DL (ref 0.1–1)
BUN SERPL-MCNC: 32 MG/DL (ref 8–23)
CALCIUM SERPL-MCNC: 9.3 MG/DL (ref 8.7–10.5)
CHLORIDE SERPL-SCNC: 102 MMOL/L (ref 95–110)
CO2 SERPL-SCNC: 28 MMOL/L (ref 23–29)
COMPLEXED PSA SERPL-MCNC: 0.03 NG/ML (ref 0–4)
CREAT SERPL-MCNC: 1.7 MG/DL (ref 0.5–1.4)
DIFFERENTIAL METHOD: ABNORMAL
EOSINOPHIL # BLD AUTO: 0.1 K/UL (ref 0–0.5)
EOSINOPHIL NFR BLD: 2.6 % (ref 0–8)
ERYTHROCYTE [DISTWIDTH] IN BLOOD BY AUTOMATED COUNT: 12.2 % (ref 11.5–14.5)
EST. GFR  (AFRICAN AMERICAN): 41.6 ML/MIN/1.73 M^2
EST. GFR  (NON AFRICAN AMERICAN): 36 ML/MIN/1.73 M^2
GLUCOSE SERPL-MCNC: 122 MG/DL (ref 70–110)
HCT VFR BLD AUTO: 38.1 % (ref 40–54)
HGB BLD-MCNC: 12.2 G/DL (ref 14–18)
IMM GRANULOCYTES # BLD AUTO: 0.04 K/UL (ref 0–0.04)
IMM GRANULOCYTES NFR BLD AUTO: 0.8 % (ref 0–0.5)
LYMPHOCYTES # BLD AUTO: 1.6 K/UL (ref 1–4.8)
LYMPHOCYTES NFR BLD: 31.4 % (ref 18–48)
MCH RBC QN AUTO: 31.8 PG (ref 27–31)
MCHC RBC AUTO-ENTMCNC: 32 G/DL (ref 32–36)
MCV RBC AUTO: 99 FL (ref 82–98)
MONOCYTES # BLD AUTO: 0.5 K/UL (ref 0.3–1)
MONOCYTES NFR BLD: 9.4 % (ref 4–15)
NEUTROPHILS # BLD AUTO: 2.8 K/UL (ref 1.8–7.7)
NEUTROPHILS NFR BLD: 54.8 % (ref 38–73)
NRBC BLD-RTO: 0 /100 WBC
PLATELET # BLD AUTO: 183 K/UL (ref 150–350)
PMV BLD AUTO: 9.5 FL (ref 9.2–12.9)
POTASSIUM SERPL-SCNC: 4.6 MMOL/L (ref 3.5–5.1)
PROT SERPL-MCNC: 8 G/DL (ref 6–8.4)
RBC # BLD AUTO: 3.84 M/UL (ref 4.6–6.2)
SODIUM SERPL-SCNC: 138 MMOL/L (ref 136–145)
WBC # BLD AUTO: 5.09 K/UL (ref 3.9–12.7)

## 2020-03-06 PROCEDURE — 80053 COMPREHEN METABOLIC PANEL: CPT

## 2020-03-06 PROCEDURE — 36415 COLL VENOUS BLD VENIPUNCTURE: CPT

## 2020-03-06 PROCEDURE — 84153 ASSAY OF PSA TOTAL: CPT

## 2020-03-06 PROCEDURE — 85025 COMPLETE CBC W/AUTO DIFF WBC: CPT

## 2020-03-11 ENCOUNTER — OFFICE VISIT (OUTPATIENT)
Dept: HEMATOLOGY/ONCOLOGY | Facility: CLINIC | Age: 85
End: 2020-03-11
Payer: MEDICARE

## 2020-03-11 VITALS
RESPIRATION RATE: 14 BRPM | SYSTOLIC BLOOD PRESSURE: 188 MMHG | TEMPERATURE: 98 F | BODY MASS INDEX: 35.4 KG/M2 | HEIGHT: 69 IN | DIASTOLIC BLOOD PRESSURE: 86 MMHG | HEART RATE: 65 BPM | OXYGEN SATURATION: 98 % | WEIGHT: 239 LBS

## 2020-03-11 DIAGNOSIS — D64.9 NORMOCHROMIC ANEMIA: ICD-10-CM

## 2020-03-11 DIAGNOSIS — R73.9 HYPERGLYCEMIA: ICD-10-CM

## 2020-03-11 DIAGNOSIS — C61 PROSTATE CANCER: Primary | ICD-10-CM

## 2020-03-11 DIAGNOSIS — N18.30 CKD (CHRONIC KIDNEY DISEASE) STAGE 3, GFR 30-59 ML/MIN: ICD-10-CM

## 2020-03-11 DIAGNOSIS — I82.409 DEEP VEIN THROMBOSIS (DVT) OF LOWER EXTREMITY, UNSPECIFIED CHRONICITY, UNSPECIFIED LATERALITY, UNSPECIFIED VEIN: ICD-10-CM

## 2020-03-11 DIAGNOSIS — K62.5 RECTAL BLEEDING: ICD-10-CM

## 2020-03-11 DIAGNOSIS — Z79.818 ANDROGEN DEPRIVATION THERAPY: ICD-10-CM

## 2020-03-11 DIAGNOSIS — R97.20 PSA ELEVATION: ICD-10-CM

## 2020-03-11 PROCEDURE — 3288F FALL RISK ASSESSMENT DOCD: CPT | Mod: CPTII,S$GLB,, | Performed by: INTERNAL MEDICINE

## 2020-03-11 PROCEDURE — 1125F PR PAIN SEVERITY QUANTIFIED, PAIN PRESENT: ICD-10-PCS | Mod: S$GLB,,, | Performed by: INTERNAL MEDICINE

## 2020-03-11 PROCEDURE — 1159F PR MEDICATION LIST DOCUMENTED IN MEDICAL RECORD: ICD-10-PCS | Mod: S$GLB,,, | Performed by: INTERNAL MEDICINE

## 2020-03-11 PROCEDURE — 3288F PR FALLS RISK ASSESSMENT DOCUMENTED: ICD-10-PCS | Mod: CPTII,S$GLB,, | Performed by: INTERNAL MEDICINE

## 2020-03-11 PROCEDURE — 1125F AMNT PAIN NOTED PAIN PRSNT: CPT | Mod: S$GLB,,, | Performed by: INTERNAL MEDICINE

## 2020-03-11 PROCEDURE — 1101F PR PT FALLS ASSESS DOC 0-1 FALLS W/OUT INJ PAST YR: ICD-10-PCS | Mod: CPTII,S$GLB,, | Performed by: INTERNAL MEDICINE

## 2020-03-11 PROCEDURE — 1101F PT FALLS ASSESS-DOCD LE1/YR: CPT | Mod: CPTII,S$GLB,, | Performed by: INTERNAL MEDICINE

## 2020-03-11 PROCEDURE — 99215 OFFICE O/P EST HI 40 MIN: CPT | Mod: S$GLB,,, | Performed by: INTERNAL MEDICINE

## 2020-03-11 PROCEDURE — 99215 PR OFFICE/OUTPT VISIT, EST, LEVL V, 40-54 MIN: ICD-10-PCS | Mod: S$GLB,,, | Performed by: INTERNAL MEDICINE

## 2020-03-11 PROCEDURE — 1159F MED LIST DOCD IN RCRD: CPT | Mod: S$GLB,,, | Performed by: INTERNAL MEDICINE

## 2020-03-11 PROCEDURE — 99999 PR PBB SHADOW E&M-EST. PATIENT-LVL III: ICD-10-PCS | Mod: PBBFAC,,, | Performed by: INTERNAL MEDICINE

## 2020-03-11 PROCEDURE — 99999 PR PBB SHADOW E&M-EST. PATIENT-LVL III: CPT | Mod: PBBFAC,,, | Performed by: INTERNAL MEDICINE

## 2020-03-11 NOTE — PROGRESS NOTES
CC :  I have pain in my knees    Anthony Sheldon is a 85 y.o.  Pt referred with prostate carcinoma Cora 9. 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, ISUP grade group 5, involving 85% (9mm) of 1 of 1 cores  Pt finished a course of levaquin for presumed cystitis    He has no fever, no hematuria, no urinary hesitancy   He was to start casodex for prostate cancer     PET-CT revealed no evidence of metastatic disease slight bladder thickening no evidence of recurrence  PSA has decreased from 4.8- to 0.3 as of August 2019 after treatment for cystitis      December 4, 2019 PSA 0.1    March 11 2020  Pt wants to quit his medicines , he is tired and cannot lose weight  Continues to gain weight unintentional     Past Medical History:   Diagnosis Date    Arthritis     Cataract 2009    had surgery to have removed    Dental bridge present     LOWER PARTIAL    DVT (deep venous thrombosis) 2014    right le after thr    Prostate cancer 05/2019    Wears glasses      Tolerating ambien only prn insomnia  Patient tolerating nonsteroidal anti-inflammatories for pain  Patient taking Neurontin for paresthesias    Current Outpatient Medications:     bicalutamide (CASODEX) 50 MG Tab, Take 1 tablet (50 mg total) by mouth once daily., Disp: 30 tablet, Rfl: 6    cetirizine 10 mg Cap, Take 10 mg by mouth once daily. , Disp: , Rfl:     diclofenac (VOLTAREN) 75 MG EC tablet, Take 1 tablet (75 mg total) by mouth 2 (two) times daily as needed (pain)., Disp: 180 tablet, Rfl: 3    gabapentin (NEURONTIN) 300 MG capsule, Take 1-2 capsules (300-600 mg total) by mouth every evening. (Patient not taking: Reported on 3/4/2020), Disp: 60 capsule, Rfl: 5    iron-vit c-vit b12-folic acid (IRON-C PLUS) tablet, Take 1 tablet by mouth once daily.,  "Disp: 90 tablet, Rfl: 3    multivitamin (ONE DAILY MULTIVITAMIN) per tablet, Take 1 tablet by mouth once daily., Disp: , Rfl:     zolpidem (AMBIEN) 5 MG Tab, Take 1 tablet (5 mg total) by mouth nightly., Disp: 30 tablet, Rfl: 2  Review of patient's allergies indicates:  No Known Allergies    Family History   Problem Relation Age of Onset    Stroke Mother     Bladder Cancer Father        CONSTITUTIONAL: No fevers, chills, night sweats, wt. loss, appetite changes  SKIN: no rashes or itching  ENT: No headaches, head trauma, vision changes, or eye pain positive seasonal allergies  LYMPH NODES: None noticed   CV: No chest pain, palpitations.   RESP: No dyspnea on exertion, cough, wheezing, or hemoptysis  GI: No nausea, emesis, diarrhea, constipation, melena, hematochezia, pain.   : No dysuria, hematuria, urgency, or frequency   HEME: No easy bruising, bleeding problems  PSYCHIATRIC: No depression, anxiety, psychosis, hallucinations.  NEURO: No seizures, memory loss, dizziness or difficulty sleeping  MSK:  Positive pain post orthopedic procedure    He quit taking his meds for pain       BP (!) 188/86 (BP Location: Left arm, Patient Position: Sitting, BP Method: Large (Automatic))   Pulse 65   Temp 98.4 °F (36.9 °C) (Oral)   Resp 14   Ht 5' 9" (1.753 m)   Wt 108.4 kg (239 lb)   SpO2 98%   BMI 35.29 kg/m²    Gen: NAD, A and O x3, well groomed and conversant   Psych: pleasant affect, normal thought process  Eyes: Pupils round and non dilated, EOM intact  Nose: Nares patent  OP clear, mucosa patent  PND   Neck: suppple, no JVD, trachea midline, no palpable mass, no adenopathy  Lungs: CTAB, no wheezes, no use of accessory muscles  CV: S1S2 with RRR, No mrg  Abd: soft, NTND, + BS, No HSM, no ascites obese  Extr: No CCE, MAX, strength normal, good capillary refill  Neuro: steady gait, CNs grossly intact  Skin: intact, no lesions noted  Rheum:  Limited range of motion due to pain  5d ago 3mo ago 6mo ago 8mo ago   "   PSA DIAGNOSTIC 0.00 - 4.00 ng/mL 0.03  0.10 CM 0.30 CM 4.8High        Lab Results   Component Value Date    WBC 5.09 03/06/2020    HGB 12.2 (L) 03/06/2020    HCT 38.1 (L) 03/06/2020    MCV 99 (H) 03/06/2020     03/06/2020     CMP  Sodium   Date Value Ref Range Status   03/06/2020 138 136 - 145 mmol/L Final     Potassium   Date Value Ref Range Status   03/06/2020 4.6 3.5 - 5.1 mmol/L Final     Chloride   Date Value Ref Range Status   03/06/2020 102 95 - 110 mmol/L Final     CO2   Date Value Ref Range Status   03/06/2020 28 23 - 29 mmol/L Final     Glucose   Date Value Ref Range Status   03/06/2020 122 (H) 70 - 110 mg/dL Final     BUN, Bld   Date Value Ref Range Status   03/06/2020 32 (H) 8 - 23 mg/dL Final     Creatinine   Date Value Ref Range Status   03/06/2020 1.7 (H) 0.5 - 1.4 mg/dL Final     Calcium   Date Value Ref Range Status   03/06/2020 9.3 8.7 - 10.5 mg/dL Final     Total Protein   Date Value Ref Range Status   03/06/2020 8.0 6.0 - 8.4 g/dL Final     Albumin   Date Value Ref Range Status   03/06/2020 4.4 3.5 - 5.2 g/dL Final     Total Bilirubin   Date Value Ref Range Status   03/06/2020 0.9 0.1 - 1.0 mg/dL Final     Comment:     For infants and newborns, interpretation of results should be based  on gestational age, weight and in agreement with clinical  observations.  Premature Infant recommended reference ranges:  Up to 24 hours.............<8.0 mg/dL  Up to 48 hours............<12.0 mg/dL  3-5 days..................<15.0 mg/dL  6-29 days.................<15.0 mg/dL       Alkaline Phosphatase   Date Value Ref Range Status   03/06/2020 47 (L) 55 - 135 U/L Final     AST   Date Value Ref Range Status   03/06/2020 21 10 - 40 U/L Final     ALT   Date Value Ref Range Status   03/06/2020 15 10 - 44 U/L Final     Anion Gap   Date Value Ref Range Status   03/06/2020 8 8 - 16 mmol/L Final     eGFR if    Date Value Ref Range Status   03/06/2020 41.6 (A) >60 mL/min/1.73 m^2 Final     eGFR if  non    Date Value Ref Range Status   03/06/2020 36.0 (A) >60 mL/min/1.73 m^2 Final     Comment:     Calculation used to obtain the estimated glomerular filtration  rate (eGFR) is the CKD-EPI equation.                     Last Resulted: 05/14/19 10:14 Order Details View Encounter Lab and Collection Details Routing Result History            External Result Report     External Result Report   Narrative     EXAMINATION:  CT ABDOMEN PELVIS WITHOUT CONTRAST    CLINICAL HISTORY:  Neoplasm: prostate, staging; Neoplasm of unspecified behavior of other genitourinary organ    TECHNIQUE:  Low dose axial images, sagittal and coronal reformations were obtained from the lung bases to the pubic symphysis.  Oral contrast was not administered.    COMPARISON:  None    FINDINGS:  The liver, spleen, pancreas, and adrenal glands are unremarkable.  There is a 12 mm calcified stone within the gallbladder.  No biliary dilatation.  The adrenal glands are normal.    There is a small hiatal hernia.  A debris containing 3.6 cm diverticulum of the transverse limb of the duodenum is present.  A few small bowel diverticula are also present proximally.  The small bowel is nondilated.  A normal appendix is present.  There is severe diverticulosis coli.  There is a 10 cm segment proximal to mid sigmoid colon which demonstrates diffuse mural thickening, without accompanying pericolonic fat stranding.    There is moderate calcification of the aorta without aneurysm.  Calcification of the coronary arteries is also present.    There is no nephroureterolithiasis or hydronephrosis.  There is a 10 mm moderately hyperdense finding of the upper pole of the right kidney which is indeterminate.    The urinary bladder and low pelvis are largely obscured by beam hardening artifact from bilateral total hip arthroplasties.  The region of the prostate gland is not well evaluated.  No enlarged lymph nodes are identified.    There are no suspicious  osseous lesions.   Impression       No evidence for abdominopelvic metastatic disease.    Severe diverticulosis, with moderate segments sigmoid mural thickening favoring chronic diverticulitis.  Consideration for colonoscopy is suggested to exclude a mass, if the patient has not recently undergone colonoscopy.    Small indeterminate right renal lesion.  Consider further evaluation with renal ultrasound.    Cholelithiasis.  Bilateral total hip arthroplasties.  Atherosclerosis.      Electronically signed by: rOtiz Antunez MD  Date: 05/14/2019  Time: 10:14    Encounter     View Encounter               Alfredo Mccoy MD 6/7/2019       Narrative     CLINICAL INFORMATION:  Male patient, 85 years old, with a history of prostate cancer diagnosed 5/2019  here for initial staging. Elevated PSA levels.  Patient presents for staging. Primary malignant neoplasm of prostate    COMPARISON:  None available.    TECHNIQUE:  The blood glucose prior to injection was 135 mg/dl. Images were acquired from  the vertex of the skull through the mid thighs. approximately 45-60 minutes  post injection of 12.0 mCi F-18 FDG into the right antecubital fossa. Dilute  oral contrast was given. Axial, coronal and sagittal PET reconstructions with  and without attenuation correction were interpreted.  Corresponding CT images  were acquired and reviewed alongside the PET images. The low dose unenhanced CT  images were used for attenuation correction and anatomic correlation only.    FINDINGS:  The prostate is obscured by beam hardening attenuation artifact from the hip  arthroplasties. No FDG avid adenopathy or evidence of metastatic disease seen.    Tracer distribution is otherwise physiologic.    Additional findings:  - Right shoulder arthroplasty and bilateral total hip arthroplasties with beam  hardening attenuation artifact (obscuring PET/CT findings in these regions).  -Degenerative changes are seen in the spine with no aggressive FDG  avid lytic  or blastic lesion identified. There is dextroscoliosis in the lumbar spine.  - Scattered coronary arterial calcifications and atheromatous calcifications in  the aorta with no aneurysm identified.  - Small stones seen in the gallbladder without evidence of acute cholecystitis.  - Marked diffuse diverticulosis without evidence of acute diverticulitis.  - The bladder is partially decompressed. That being said, there is mild  circumferential bladder wall thickening. In the appropriate clinical setting  this may relate to a mild degree of infection or inflammation; consider  correlation with urinalysis.    IMPRESSION:  No evidence of FDG avid malignancy.                   Prostate cancer  -     Vitamin B1; Future; Expected date: 03/11/2020  -     Vitamin B6; Future; Expected date: 03/11/2020  -     MMA; Future; Expected date: 03/11/2020  -     FREE LT CHAIN ANAL; Future; Expected date: 03/11/2020  -     Phosphatidylserine Ab (IgA,IgG,IgM); Future; Expected date: 03/11/2020  -     Immunoglobulins (IgG, IgA, IgM) Quantitative; Future; Expected date: 03/11/2020  -     CBC auto differential; Future; Expected date: 03/11/2020  -     PSA; Future; Expected date: 03/11/2020  -     Comprehensive metabolic panel; Future; Expected date: 03/11/2020  -     Iron and TIBC; Future; Expected date: 03/11/2020    Deep vein thrombosis (DVT) of lower extremity, unspecified chronicity, unspecified laterality, unspecified vein  -     Vitamin B1; Future; Expected date: 03/11/2020  -     Vitamin B6; Future; Expected date: 03/11/2020  -     MMA; Future; Expected date: 03/11/2020  -     FREE LT CHAIN ANAL; Future; Expected date: 03/11/2020  -     Phosphatidylserine Ab (IgA,IgG,IgM); Future; Expected date: 03/11/2020  -     Immunoglobulins (IgG, IgA, IgM) Quantitative; Future; Expected date: 03/11/2020  -     CBC auto differential; Future; Expected date: 03/11/2020  -     PSA; Future; Expected date: 03/11/2020  -     Comprehensive  metabolic panel; Future; Expected date: 03/11/2020  -     Iron and TIBC; Future; Expected date: 03/11/2020    PSA elevation  -     Vitamin B1; Future; Expected date: 03/11/2020  -     Vitamin B6; Future; Expected date: 03/11/2020  -     MMA; Future; Expected date: 03/11/2020  -     FREE LT CHAIN ANAL; Future; Expected date: 03/11/2020  -     Phosphatidylserine Ab (IgA,IgG,IgM); Future; Expected date: 03/11/2020  -     Immunoglobulins (IgG, IgA, IgM) Quantitative; Future; Expected date: 03/11/2020  -     CBC auto differential; Future; Expected date: 03/11/2020  -     PSA; Future; Expected date: 03/11/2020  -     Comprehensive metabolic panel; Future; Expected date: 03/11/2020  -     Iron and TIBC; Future; Expected date: 03/11/2020    CKD (chronic kidney disease) stage 3, GFR 30-59 ml/min    Androgen deprivation therapy    Hyperglycemia    Rectal bleeding    Normochromic anemia      Pt wants to stop casodex fpor now  He will decide on trelstar  He needs to exercise and become more active, lose weight and build muscle   RTC 3 moths with psa    avoid nsaids discussed diet for colitis   'increase hydration for renial insufficiency   Must watch for biochemical failure   Diet discussed  Must decrease BMi  Explain the importance of diet low in sugar specially since his paresthesias and elevated glucose on labs this could cause further nerve damage     His iron levels are stable and his hemoglobin is stable:  Cont trelstar     increase hydration to help ckd     No falls   No pain      Thank you for allowing me to evaluate and participate in the care of this pleasant patient. Please fell free to call me with any questions or concerns.    Warmly,  Chyna Dwyer MD    This note was dictated with Dragon and briefly proofread. Please excuse any sentences that may be unclear or words misspelled

## 2020-03-11 NOTE — LETTER
March 11, 2020      Perfecto Melgar III, MD  [address] [place] [address]           [facility]  [address] [place] [address]  Phone: [phone]          Patient: Anthony Sheldon   MR Number: [id number]   YOB: 1934   Date of Visit: 3/11/2020       Dear Dr. Perfecto Melgar III:    Thank you for referring Anthony Sheldon to me for evaluation. Attached you will find relevant portions of my assessment and plan of care.    If you have questions, please do not hesitate to call me. I look forward to following Anthony Sheldon along with you.    Sincerely,    Chyna Dwyer MD    Enclosure  CC:  No Recipients    If you would like to receive this communication electronically, please contact [email or web address] or [phone] to request more information on [employer] access.    For providers and/or their staff who would like to refer a patient to [facility], please contact us through our one-stop-shop provider referral line, [facility], at [phone].    If you feel you have received this communication in error or would no longer like to receive these types of communications, please e-mail [email or web address]

## 2020-03-18 ENCOUNTER — TELEPHONE (OUTPATIENT)
Dept: PAIN MEDICINE | Facility: CLINIC | Age: 85
End: 2020-03-18

## 2020-03-18 NOTE — TELEPHONE ENCOUNTER
----- Message from Maria L Hensley sent at 3/18/2020  9:31 AM CDT -----  Contact: my chart request  Appointment Request From: Anthony Sheldon    With Provider: Chyna Valdovinos MD [[facility]]    Preferred Date Range: 3/18/2020 - 3/23/2020    Preferred Times: Any time    Reason for visit: pain    Comments:  leg and back pain

## 2020-04-15 ENCOUNTER — LAB VISIT (OUTPATIENT)
Dept: LAB | Facility: HOSPITAL | Age: 85
End: 2020-04-15
Attending: NURSE PRACTITIONER
Payer: MEDICARE

## 2020-04-15 DIAGNOSIS — Z79.899 ENCOUNTER FOR LONG-TERM (CURRENT) USE OF HIGH-RISK MEDICATION: ICD-10-CM

## 2020-04-15 DIAGNOSIS — N18.30 CKD (CHRONIC KIDNEY DISEASE) STAGE 3, GFR 30-59 ML/MIN: ICD-10-CM

## 2020-04-15 DIAGNOSIS — R53.83 FATIGUE, UNSPECIFIED TYPE: ICD-10-CM

## 2020-04-15 DIAGNOSIS — G47.9 SLEEP DISTURBANCE: ICD-10-CM

## 2020-04-15 DIAGNOSIS — D64.9 ANEMIA, UNSPECIFIED TYPE: ICD-10-CM

## 2020-04-15 DIAGNOSIS — C61 PROSTATE CANCER: ICD-10-CM

## 2020-04-15 DIAGNOSIS — Z71.2 ENCOUNTER TO DISCUSS TEST RESULTS: ICD-10-CM

## 2020-04-15 LAB
ALBUMIN SERPL BCP-MCNC: 3.9 G/DL (ref 3.5–5.2)
ALP SERPL-CCNC: 48 U/L (ref 55–135)
ALT SERPL W/O P-5'-P-CCNC: 15 U/L (ref 10–44)
ANION GAP SERPL CALC-SCNC: 6 MMOL/L (ref 8–16)
AST SERPL-CCNC: 20 U/L (ref 10–40)
BASOPHILS # BLD AUTO: 0.03 K/UL (ref 0–0.2)
BASOPHILS NFR BLD: 0.6 % (ref 0–1.9)
BILIRUB SERPL-MCNC: 0.7 MG/DL (ref 0.1–1)
BUN SERPL-MCNC: 30 MG/DL (ref 8–23)
CALCIUM SERPL-MCNC: 9.1 MG/DL (ref 8.7–10.5)
CHLORIDE SERPL-SCNC: 106 MMOL/L (ref 95–110)
CHOLEST SERPL-MCNC: 180 MG/DL (ref 120–199)
CHOLEST/HDLC SERPL: 4.6 {RATIO} (ref 2–5)
CO2 SERPL-SCNC: 28 MMOL/L (ref 23–29)
CREAT SERPL-MCNC: 1.7 MG/DL (ref 0.5–1.4)
DIFFERENTIAL METHOD: ABNORMAL
EOSINOPHIL # BLD AUTO: 0.1 K/UL (ref 0–0.5)
EOSINOPHIL NFR BLD: 2 % (ref 0–8)
ERYTHROCYTE [DISTWIDTH] IN BLOOD BY AUTOMATED COUNT: 12 % (ref 11.5–14.5)
EST. GFR  (AFRICAN AMERICAN): 41.6 ML/MIN/1.73 M^2
EST. GFR  (NON AFRICAN AMERICAN): 36 ML/MIN/1.73 M^2
ESTIMATED AVG GLUCOSE: 114 MG/DL (ref 68–131)
FERRITIN SERPL-MCNC: 53 NG/ML (ref 20–300)
FOLATE SERPL-MCNC: 18.3 NG/ML (ref 4–24)
GLUCOSE SERPL-MCNC: 142 MG/DL (ref 70–110)
HBA1C MFR BLD HPLC: 5.6 % (ref 4.5–6.2)
HCT VFR BLD AUTO: 34.2 % (ref 40–54)
HDLC SERPL-MCNC: 39 MG/DL (ref 40–75)
HDLC SERPL: 21.7 % (ref 20–50)
HGB BLD-MCNC: 11.1 G/DL (ref 14–18)
IMM GRANULOCYTES # BLD AUTO: 0.01 K/UL (ref 0–0.04)
IMM GRANULOCYTES NFR BLD AUTO: 0.2 % (ref 0–0.5)
IRON SERPL-MCNC: 64 UG/DL (ref 45–160)
LDLC SERPL CALC-MCNC: 117 MG/DL (ref 63–159)
LYMPHOCYTES # BLD AUTO: 1.2 K/UL (ref 1–4.8)
LYMPHOCYTES NFR BLD: 24.8 % (ref 18–48)
MCH RBC QN AUTO: 31.4 PG (ref 27–31)
MCHC RBC AUTO-ENTMCNC: 32.5 G/DL (ref 32–36)
MCV RBC AUTO: 97 FL (ref 82–98)
MONOCYTES # BLD AUTO: 0.5 K/UL (ref 0.3–1)
MONOCYTES NFR BLD: 9.4 % (ref 4–15)
NEUTROPHILS # BLD AUTO: 3.2 K/UL (ref 1.8–7.7)
NEUTROPHILS NFR BLD: 63 % (ref 38–73)
NONHDLC SERPL-MCNC: 141 MG/DL
NRBC BLD-RTO: 0 /100 WBC
PLATELET # BLD AUTO: 157 K/UL (ref 150–350)
PMV BLD AUTO: 8.8 FL (ref 9.2–12.9)
POTASSIUM SERPL-SCNC: 4.9 MMOL/L (ref 3.5–5.1)
PROSTATE SPECIFIC ANTIGEN, TOTAL: 0.01 NG/ML (ref 0–4)
PROT SERPL-MCNC: 7 G/DL (ref 6–8.4)
PSA FREE MFR SERPL: NORMAL %
PSA FREE SERPL-MCNC: <0.01 NG/ML (ref 0.01–1.5)
RBC # BLD AUTO: 3.54 M/UL (ref 4.6–6.2)
SATURATED IRON: 20 % (ref 20–50)
SODIUM SERPL-SCNC: 140 MMOL/L (ref 136–145)
T3FREE SERPL-MCNC: 2.3 PG/ML (ref 2.3–4.2)
T4 FREE SERPL-MCNC: 0.74 NG/DL (ref 0.71–1.51)
TOTAL IRON BINDING CAPACITY: 318 UG/DL (ref 250–450)
TRANSFERRIN SERPL-MCNC: 215 MG/DL (ref 200–375)
TRIGL SERPL-MCNC: 120 MG/DL (ref 30–150)
TSH SERPL DL<=0.005 MIU/L-ACNC: 2.59 UIU/ML (ref 0.34–5.6)
VIT B12 SERPL-MCNC: 489 PG/ML (ref 210–950)
WBC # BLD AUTO: 5.01 K/UL (ref 3.9–12.7)

## 2020-04-15 PROCEDURE — 82746 ASSAY OF FOLIC ACID SERUM: CPT

## 2020-04-15 PROCEDURE — 83540 ASSAY OF IRON: CPT

## 2020-04-15 PROCEDURE — 82728 ASSAY OF FERRITIN: CPT

## 2020-04-15 PROCEDURE — 84443 ASSAY THYROID STIM HORMONE: CPT

## 2020-04-15 PROCEDURE — 36415 COLL VENOUS BLD VENIPUNCTURE: CPT

## 2020-04-15 PROCEDURE — 83036 HEMOGLOBIN GLYCOSYLATED A1C: CPT

## 2020-04-15 PROCEDURE — 80053 COMPREHEN METABOLIC PANEL: CPT

## 2020-04-15 PROCEDURE — 84154 ASSAY OF PSA FREE: CPT

## 2020-04-15 PROCEDURE — 80061 LIPID PANEL: CPT

## 2020-04-15 PROCEDURE — 84439 ASSAY OF FREE THYROXINE: CPT

## 2020-04-15 PROCEDURE — 84481 FREE ASSAY (FT-3): CPT

## 2020-04-15 PROCEDURE — 85025 COMPLETE CBC W/AUTO DIFF WBC: CPT

## 2020-04-15 PROCEDURE — 82607 VITAMIN B-12: CPT

## 2020-04-24 PROBLEM — Z12.11 ENCOUNTER FOR SCREENING COLONOSCOPY: Status: RESOLVED | Noted: 2019-05-22 | Resolved: 2020-04-24

## 2020-04-24 PROBLEM — Z96.652 HISTORY OF TOTAL LEFT KNEE REPLACEMENT: Status: RESOLVED | Noted: 2017-02-22 | Resolved: 2020-04-24

## 2020-05-21 ENCOUNTER — PATIENT MESSAGE (OUTPATIENT)
Dept: PAIN MEDICINE | Facility: CLINIC | Age: 85
End: 2020-05-21

## 2020-05-21 ENCOUNTER — TELEPHONE (OUTPATIENT)
Dept: PAIN MEDICINE | Facility: CLINIC | Age: 85
End: 2020-05-21

## 2020-05-21 NOTE — TELEPHONE ENCOUNTER
----- Message from Noa Blanco sent at 5/21/2020 10:05 AM CDT -----  Contact: self  Type: Appointment Request     Name of Caller:patient need to speak with nurse states experiencing lower back pain.   Patient states want appointment with Dr Valdovinos  When is the first available appointment?  Symptoms:  Best Call Back Number: [phone]  Additional Information:

## 2020-05-21 NOTE — TELEPHONE ENCOUNTER
Pt states he is having back pain and needs to be seen. Informed pt that Dr. Valdovinos does not have anymore appointments before her departure. Pt agreed to see Lizbet Madden NP on June 1st due to that being our soonest available appointment.

## 2020-05-25 ENCOUNTER — TELEPHONE (OUTPATIENT)
Dept: PAIN MEDICINE | Facility: CLINIC | Age: 85
End: 2020-05-25

## 2020-05-25 DIAGNOSIS — M51.36 DDD (DEGENERATIVE DISC DISEASE), LUMBAR: ICD-10-CM

## 2020-05-25 DIAGNOSIS — M54.16 LUMBAR RADICULOPATHY: ICD-10-CM

## 2020-05-25 DIAGNOSIS — G89.4 CHRONIC PAIN DISORDER: ICD-10-CM

## 2020-05-25 NOTE — TELEPHONE ENCOUNTER
Refill request:  Gabapentin     Last office visit on 1/20/2020    Gabapentin 300mg  One to two by mouth every evening  #60 with five refills  Start date was on 12/09/2019    Pharmacy:  [facility]

## 2020-05-26 RX ORDER — GABAPENTIN 300 MG/1
300-600 CAPSULE ORAL NIGHTLY
Qty: 60 CAPSULE | Refills: 5 | Status: SHIPPED | OUTPATIENT
Start: 2020-05-26 | End: 2020-06-30

## 2020-06-26 ENCOUNTER — LAB VISIT (OUTPATIENT)
Dept: LAB | Facility: HOSPITAL | Age: 85
End: 2020-06-26
Attending: INTERNAL MEDICINE
Payer: MEDICARE

## 2020-06-26 DIAGNOSIS — R97.20 PSA ELEVATION: ICD-10-CM

## 2020-06-26 DIAGNOSIS — I82.409 DEEP VEIN THROMBOSIS (DVT) OF LOWER EXTREMITY, UNSPECIFIED CHRONICITY, UNSPECIFIED LATERALITY, UNSPECIFIED VEIN: ICD-10-CM

## 2020-06-26 DIAGNOSIS — C61 PROSTATE CANCER: ICD-10-CM

## 2020-06-26 LAB
ALBUMIN SERPL BCP-MCNC: 4 G/DL (ref 3.5–5.2)
ALP SERPL-CCNC: 56 U/L (ref 55–135)
ALT SERPL W/O P-5'-P-CCNC: 31 U/L (ref 10–44)
ANION GAP SERPL CALC-SCNC: 10 MMOL/L (ref 8–16)
AST SERPL-CCNC: 26 U/L (ref 10–40)
BASOPHILS # BLD AUTO: 0.03 K/UL (ref 0–0.2)
BASOPHILS NFR BLD: 0.3 % (ref 0–1.9)
BILIRUB SERPL-MCNC: 0.6 MG/DL (ref 0.1–1)
BUN SERPL-MCNC: 33 MG/DL (ref 8–23)
CALCIUM SERPL-MCNC: 9.5 MG/DL (ref 8.7–10.5)
CHLORIDE SERPL-SCNC: 101 MMOL/L (ref 95–110)
CO2 SERPL-SCNC: 25 MMOL/L (ref 23–29)
COMPLEXED PSA SERPL-MCNC: 0.03 NG/ML (ref 0–4)
CREAT SERPL-MCNC: 1.7 MG/DL (ref 0.5–1.4)
DIFFERENTIAL METHOD: ABNORMAL
EOSINOPHIL # BLD AUTO: 0.1 K/UL (ref 0–0.5)
EOSINOPHIL NFR BLD: 0.8 % (ref 0–8)
ERYTHROCYTE [DISTWIDTH] IN BLOOD BY AUTOMATED COUNT: 13.3 % (ref 11.5–14.5)
EST. GFR  (AFRICAN AMERICAN): 41.3 ML/MIN/1.73 M^2
EST. GFR  (NON AFRICAN AMERICAN): 35.7 ML/MIN/1.73 M^2
GLUCOSE SERPL-MCNC: 123 MG/DL (ref 70–110)
HCT VFR BLD AUTO: 35.1 % (ref 40–54)
HGB BLD-MCNC: 11.6 G/DL (ref 14–18)
IGA SERPL-MCNC: 287 MG/DL (ref 40–350)
IGG SERPL-MCNC: 1159 MG/DL (ref 650–1600)
IGM SERPL-MCNC: 43 MG/DL (ref 50–300)
IMM GRANULOCYTES # BLD AUTO: 0.02 K/UL (ref 0–0.04)
IMM GRANULOCYTES NFR BLD AUTO: 0.2 % (ref 0–0.5)
IRON SERPL-MCNC: 73 UG/DL (ref 45–160)
LYMPHOCYTES # BLD AUTO: 1.3 K/UL (ref 1–4.8)
LYMPHOCYTES NFR BLD: 14.7 % (ref 18–48)
MCH RBC QN AUTO: 30.9 PG (ref 27–31)
MCHC RBC AUTO-ENTMCNC: 33 G/DL (ref 32–36)
MCV RBC AUTO: 94 FL (ref 82–98)
MONOCYTES # BLD AUTO: 0.8 K/UL (ref 0.3–1)
MONOCYTES NFR BLD: 9.3 % (ref 4–15)
NEUTROPHILS # BLD AUTO: 6.4 K/UL (ref 1.8–7.7)
NEUTROPHILS NFR BLD: 74.7 % (ref 38–73)
NRBC BLD-RTO: 0 /100 WBC
PLATELET # BLD AUTO: 177 K/UL (ref 150–350)
PMV BLD AUTO: 9 FL (ref 9.2–12.9)
POTASSIUM SERPL-SCNC: 4.6 MMOL/L (ref 3.5–5.1)
PROT SERPL-MCNC: 7.4 G/DL (ref 6–8.4)
RBC # BLD AUTO: 3.75 M/UL (ref 4.6–6.2)
SATURATED IRON: 23 % (ref 20–50)
SODIUM SERPL-SCNC: 136 MMOL/L (ref 136–145)
TOTAL IRON BINDING CAPACITY: 312 UG/DL (ref 250–450)
TRANSFERRIN SERPL-MCNC: 211 MG/DL (ref 200–375)
WBC # BLD AUTO: 8.63 K/UL (ref 3.9–12.7)

## 2020-06-26 PROCEDURE — 85025 COMPLETE CBC W/AUTO DIFF WBC: CPT

## 2020-06-26 PROCEDURE — 80053 COMPREHEN METABOLIC PANEL: CPT

## 2020-06-26 PROCEDURE — 83921 ORGANIC ACID SINGLE QUANT: CPT

## 2020-06-26 PROCEDURE — 82784 ASSAY IGA/IGD/IGG/IGM EACH: CPT | Mod: 59

## 2020-06-26 PROCEDURE — 84153 ASSAY OF PSA TOTAL: CPT

## 2020-06-26 PROCEDURE — 84207 ASSAY OF VITAMIN B-6: CPT

## 2020-06-26 PROCEDURE — 83520 IMMUNOASSAY QUANT NOS NONAB: CPT | Mod: 59

## 2020-06-26 PROCEDURE — 83540 ASSAY OF IRON: CPT

## 2020-06-26 PROCEDURE — 84425 ASSAY OF VITAMIN B-1: CPT

## 2020-06-26 PROCEDURE — 36415 COLL VENOUS BLD VENIPUNCTURE: CPT

## 2020-06-29 LAB
KAPPA LC SER QL IA: 4.67 MG/DL (ref 0.33–1.94)
KAPPA LC/LAMBDA SER IA: 1.55 (ref 0.26–1.65)
LAMBDA LC SER QL IA: 3.02 MG/DL (ref 0.57–2.63)

## 2020-06-30 LAB — PYRIDOXAL SERPL-MCNC: 74 UG/L (ref 5–50)

## 2020-07-01 LAB
METHYLMALONATE SERPL-SCNC: 0.63 UMOL/L
VIT B1 BLD-MCNC: 62 UG/L (ref 38–122)

## 2020-08-25 PROBLEM — M25.562 CHRONIC PAIN OF LEFT KNEE: Status: RESOLVED | Noted: 2018-05-11 | Resolved: 2020-08-25

## 2020-08-25 PROBLEM — K62.5 RECTAL BLEEDING: Status: RESOLVED | Noted: 2020-01-11 | Resolved: 2020-08-25

## 2020-08-25 PROBLEM — G89.29 CHRONIC PAIN OF LEFT KNEE: Status: RESOLVED | Noted: 2018-05-11 | Resolved: 2020-08-25

## 2020-11-04 ENCOUNTER — HOSPITAL ENCOUNTER (OUTPATIENT)
Dept: CARDIOLOGY | Facility: HOSPITAL | Age: 85
Discharge: HOME | End: 2020-11-04
Attending: NURSE PRACTITIONER
Payer: MEDICARE

## 2020-11-04 ENCOUNTER — HOSPITAL ENCOUNTER (OUTPATIENT)
Dept: RADIOLOGY | Facility: HOSPITAL | Age: 85
Discharge: HOME | End: 2020-11-04
Attending: NURSE PRACTITIONER
Payer: MEDICARE

## 2020-11-04 VITALS — DIASTOLIC BLOOD PRESSURE: 50 MMHG | SYSTOLIC BLOOD PRESSURE: 137 MMHG | HEART RATE: 90 BPM

## 2020-11-04 DIAGNOSIS — R60.0 PEDAL EDEMA: ICD-10-CM

## 2020-11-04 DIAGNOSIS — R68.89 ACTIVITY INTOLERANCE: ICD-10-CM

## 2020-11-04 DIAGNOSIS — R06.02 SHORTNESS OF BREATH: ICD-10-CM

## 2020-11-04 LAB
CV STRESS BASE HR: 70 BPM
DIASTOLIC BLOOD PRESSURE: 55 MMHG
EJECTION FRACTION- HIGH: 65 %
END DIASTOLIC INDEX-HIGH: 153 ML/M2
END DIASTOLIC INDEX-LOW: 93 ML/M2
END SYSTOLIC INDEX-HIGH: 71 ML/M2
END SYSTOLIC INDEX-LOW: 31 ML/M2
NUC REST DIASTOLIC VOLUME INDEX: 94
NUC REST EJECTION FRACTION: 51
NUC REST SYSTOLIC VOLUME INDEX: 46
NUC STRESS DIASTOLIC VOLUME INDEX: 90
NUC STRESS EJECTION FRACTION: 51 %
NUC STRESS SYSTOLIC VOLUME INDEX: 44
OHS CV CPX 85 PERCENT MAX PREDICTED HEART RATE MALE: 114
OHS CV CPX MAX PREDICTED HEART RATE: 134
OHS CV CPX PATIENT IS FEMALE: 0
OHS CV CPX PATIENT IS MALE: 1
OHS CV CPX PEAK DIASTOLIC BLOOD PRESSURE: 55 MMHG
OHS CV CPX PEAK HEAR RATE: 94 BPM
OHS CV CPX PEAK RATE PRESSURE PRODUCT: NORMAL
OHS CV CPX PEAK SYSTOLIC BLOOD PRESSURE: 140 MMHG
OHS CV CPX PERCENT MAX PREDICTED HEART RATE ACHIEVED: 70
OHS CV CPX RATE PRESSURE PRODUCT PRESENTING: 9800
RETIRED EF AND QEF - SEE NOTES: 53 %
SYSTOLIC BLOOD PRESSURE: 140 MMHG

## 2020-11-04 PROCEDURE — 71046 X-RAY EXAM CHEST 2 VIEWS: CPT | Mod: TC,FY

## 2020-11-04 PROCEDURE — 93018 STRESS TEST WITH MYOCARDIAL PERFUSION (CUPID ONLY): ICD-10-PCS | Mod: ,,, | Performed by: INTERNAL MEDICINE

## 2020-11-04 PROCEDURE — 93018 CV STRESS TEST I&R ONLY: CPT | Mod: ,,, | Performed by: INTERNAL MEDICINE

## 2020-11-04 PROCEDURE — A9500 TC99M SESTAMIBI: HCPCS

## 2020-11-04 PROCEDURE — 78452 HT MUSCLE IMAGE SPECT MULT: CPT | Mod: 26,,, | Performed by: INTERNAL MEDICINE

## 2020-11-04 PROCEDURE — 71046 X-RAY EXAM CHEST 2 VIEWS: CPT | Mod: 26,,, | Performed by: RADIOLOGY

## 2020-11-04 PROCEDURE — 93017 CV STRESS TEST TRACING ONLY: CPT

## 2020-11-04 PROCEDURE — 63600175 PHARM REV CODE 636 W HCPCS: Performed by: NURSE PRACTITIONER

## 2020-11-04 PROCEDURE — 78452 STRESS TEST WITH MYOCARDIAL PERFUSION (CUPID ONLY): ICD-10-PCS | Mod: 26,,, | Performed by: INTERNAL MEDICINE

## 2020-11-04 PROCEDURE — 71046 XR CHEST PA AND LATERAL: ICD-10-PCS | Mod: 26,,, | Performed by: RADIOLOGY

## 2020-11-04 PROCEDURE — 93016 CV STRESS TEST SUPVJ ONLY: CPT | Mod: ,,, | Performed by: INTERNAL MEDICINE

## 2020-11-04 PROCEDURE — 93016 STRESS TEST WITH MYOCARDIAL PERFUSION (CUPID ONLY): ICD-10-PCS | Mod: ,,, | Performed by: INTERNAL MEDICINE

## 2020-11-04 RX ORDER — REGADENOSON 0.08 MG/ML
0.4 INJECTION, SOLUTION INTRAVENOUS ONCE
Status: COMPLETED | OUTPATIENT
Start: 2020-11-04 | End: 2020-11-04

## 2020-11-04 RX ADMIN — REGADENOSON 0.4 MG: 0.08 INJECTION, SOLUTION INTRAVENOUS at 09:11

## 2021-01-06 ENCOUNTER — PATIENT MESSAGE (OUTPATIENT)
Dept: ADMINISTRATIVE | Facility: OTHER | Age: 86
End: 2021-01-06

## 2021-01-14 ENCOUNTER — IMMUNIZATION (OUTPATIENT)
Dept: FAMILY MEDICINE | Facility: CLINIC | Age: 86
End: 2021-01-14
Payer: MEDICARE

## 2021-01-14 DIAGNOSIS — Z23 NEED FOR VACCINATION: ICD-10-CM

## 2021-01-14 PROCEDURE — 91301 COVID-19, MRNA, LNP-S, PF, 100 MCG/0.5 ML DOSE VACCINE: ICD-10-PCS | Mod: ,,, | Performed by: FAMILY MEDICINE

## 2021-01-14 PROCEDURE — 0011A COVID-19, MRNA, LNP-S, PF, 100 MCG/0.5 ML DOSE VACCINE: ICD-10-PCS | Mod: ,,, | Performed by: FAMILY MEDICINE

## 2021-01-14 PROCEDURE — 0011A COVID-19, MRNA, LNP-S, PF, 100 MCG/0.5 ML DOSE VACCINE: CPT | Mod: ,,, | Performed by: FAMILY MEDICINE

## 2021-01-14 PROCEDURE — 91301 COVID-19, MRNA, LNP-S, PF, 100 MCG/0.5 ML DOSE VACCINE: CPT | Mod: ,,, | Performed by: FAMILY MEDICINE

## 2021-01-29 ENCOUNTER — PATIENT MESSAGE (OUTPATIENT)
Dept: ORTHOPEDICS | Facility: CLINIC | Age: 86
End: 2021-01-29

## 2021-02-11 ENCOUNTER — IMMUNIZATION (OUTPATIENT)
Dept: FAMILY MEDICINE | Facility: CLINIC | Age: 86
End: 2021-02-11
Payer: MEDICARE

## 2021-02-11 DIAGNOSIS — Z23 NEED FOR VACCINATION: Primary | ICD-10-CM

## 2021-02-11 PROCEDURE — 91301 COVID-19, MRNA, LNP-S, PF, 100 MCG/0.5 ML DOSE VACCINE: ICD-10-PCS | Mod: S$GLB,,, | Performed by: FAMILY MEDICINE

## 2021-02-11 PROCEDURE — 0012A COVID-19, MRNA, LNP-S, PF, 100 MCG/0.5 ML DOSE VACCINE: ICD-10-PCS | Mod: CV19,S$GLB,, | Performed by: FAMILY MEDICINE

## 2021-02-11 PROCEDURE — 91301 COVID-19, MRNA, LNP-S, PF, 100 MCG/0.5 ML DOSE VACCINE: CPT | Mod: S$GLB,,, | Performed by: FAMILY MEDICINE

## 2021-02-11 PROCEDURE — 0012A COVID-19, MRNA, LNP-S, PF, 100 MCG/0.5 ML DOSE VACCINE: CPT | Mod: CV19,S$GLB,, | Performed by: FAMILY MEDICINE

## 2021-02-18 ENCOUNTER — HOSPITAL ENCOUNTER (OUTPATIENT)
Dept: RADIOLOGY | Facility: HOSPITAL | Age: 86
Discharge: HOME | End: 2021-02-18
Attending: NURSE PRACTITIONER
Payer: MEDICARE

## 2021-02-18 DIAGNOSIS — R06.02 SHORTNESS OF BREATH: ICD-10-CM

## 2021-02-18 PROCEDURE — 71250 CT CHEST WITHOUT CONTRAST: ICD-10-PCS | Mod: 26,,, | Performed by: RADIOLOGY

## 2021-02-18 PROCEDURE — 71250 CT THORAX DX C-: CPT | Mod: 26,,, | Performed by: RADIOLOGY

## 2021-02-18 PROCEDURE — 71250 CT THORAX DX C-: CPT | Mod: TC

## 2021-02-19 ENCOUNTER — HOSPITAL ENCOUNTER (OUTPATIENT)
Dept: CARDIOLOGY | Facility: HOSPITAL | Age: 86
Discharge: HOME | End: 2021-02-19
Attending: NURSE PRACTITIONER
Payer: MEDICARE

## 2021-02-19 DIAGNOSIS — R60.0 LOWER EXTREMITY EDEMA: ICD-10-CM

## 2021-02-19 DIAGNOSIS — R06.02 SHORTNESS OF BREATH: ICD-10-CM

## 2021-02-20 ENCOUNTER — HOSPITAL ENCOUNTER (OUTPATIENT)
Facility: HOSPITAL | Age: 86
Discharge: HOME | End: 2021-02-25
Attending: HOSPITALIST | Admitting: HOSPITALIST
Payer: MEDICARE

## 2021-02-20 DIAGNOSIS — I27.20 PULMONARY HYPERTENSION: ICD-10-CM

## 2021-02-20 DIAGNOSIS — I26.99 PE (PULMONARY THROMBOEMBOLISM): ICD-10-CM

## 2021-02-20 DIAGNOSIS — R07.9 ACUTE CHEST PAIN: ICD-10-CM

## 2021-02-20 DIAGNOSIS — R06.02 SHORTNESS OF BREATH: ICD-10-CM

## 2021-02-20 DIAGNOSIS — M79.604 PAIN IN BOTH LOWER EXTREMITIES: ICD-10-CM

## 2021-02-20 DIAGNOSIS — E11.65 TYPE 2 DIABETES MELLITUS WITH HYPERGLYCEMIA, WITHOUT LONG-TERM CURRENT USE OF INSULIN: ICD-10-CM

## 2021-02-20 DIAGNOSIS — I26.99 PULMONARY EMBOLISM, UNSPECIFIED CHRONICITY, UNSPECIFIED PULMONARY EMBOLISM TYPE, UNSPECIFIED WHETHER ACUTE COR PULMONALE PRESENT: Primary | ICD-10-CM

## 2021-02-20 DIAGNOSIS — I50.9 ACUTE CONGESTIVE HEART FAILURE, UNSPECIFIED HEART FAILURE TYPE: ICD-10-CM

## 2021-02-20 DIAGNOSIS — M79.605 PAIN IN BOTH LOWER EXTREMITIES: ICD-10-CM

## 2021-02-20 DIAGNOSIS — R06.09 DYSPNEA ON EXERTION: ICD-10-CM

## 2021-02-20 DIAGNOSIS — N18.32 STAGE 3B CHRONIC KIDNEY DISEASE: ICD-10-CM

## 2021-02-20 DIAGNOSIS — J96.00 ACUTE RESPIRATORY FAILURE, UNSPECIFIED WHETHER WITH HYPOXIA OR HYPERCAPNIA: ICD-10-CM

## 2021-02-20 DIAGNOSIS — N17.9 AKI (ACUTE KIDNEY INJURY): ICD-10-CM

## 2021-02-20 PROBLEM — G47.00 INSOMNIA: Status: ACTIVE | Noted: 2021-02-20

## 2021-02-20 LAB
ALBUMIN SERPL BCP-MCNC: 4 G/DL (ref 3.5–5.2)
ALP SERPL-CCNC: 59 U/L (ref 55–135)
ALT SERPL W/O P-5'-P-CCNC: 21 U/L (ref 10–44)
ANION GAP SERPL CALC-SCNC: 12 MMOL/L (ref 8–16)
AST SERPL-CCNC: 21 U/L (ref 10–40)
BACTERIA #/AREA URNS HPF: NORMAL /HPF
BASOPHILS # BLD AUTO: 0.05 K/UL (ref 0–0.2)
BASOPHILS NFR BLD: 0.6 % (ref 0–1.9)
BILIRUB SERPL-MCNC: 0.9 MG/DL (ref 0.1–1)
BILIRUB UR QL STRIP: NEGATIVE
BNP SERPL-MCNC: 102 PG/ML (ref 0–99)
BUN SERPL-MCNC: 29 MG/DL (ref 8–23)
CALCIUM SERPL-MCNC: 9.2 MG/DL (ref 8.7–10.5)
CHLORIDE SERPL-SCNC: 105 MMOL/L (ref 95–110)
CLARITY UR: CLEAR
CO2 SERPL-SCNC: 23 MMOL/L (ref 23–29)
COLOR UR: YELLOW
CREAT SERPL-MCNC: 1.9 MG/DL (ref 0.5–1.4)
DIFFERENTIAL METHOD: ABNORMAL
EOSINOPHIL # BLD AUTO: 0.2 K/UL (ref 0–0.5)
EOSINOPHIL NFR BLD: 1.9 % (ref 0–8)
ERYTHROCYTE [DISTWIDTH] IN BLOOD BY AUTOMATED COUNT: 13.3 % (ref 11.5–14.5)
EST. GFR  (AFRICAN AMERICAN): 36.1 ML/MIN/1.73 M^2
EST. GFR  (NON AFRICAN AMERICAN): 31.2 ML/MIN/1.73 M^2
GLUCOSE SERPL-MCNC: 226 MG/DL (ref 70–110)
GLUCOSE UR QL STRIP: NEGATIVE
HCT VFR BLD AUTO: 37.1 % (ref 40–54)
HGB BLD-MCNC: 12.1 G/DL (ref 14–18)
HGB UR QL STRIP: ABNORMAL
HYALINE CASTS #/AREA URNS LPF: 0 /LPF
IMM GRANULOCYTES # BLD AUTO: 0.02 K/UL (ref 0–0.04)
IMM GRANULOCYTES NFR BLD AUTO: 0.3 % (ref 0–0.5)
INR PPP: 1.1 (ref 0.8–1.2)
KETONES UR QL STRIP: NEGATIVE
LEUKOCYTE ESTERASE UR QL STRIP: NEGATIVE
LIPASE SERPL-CCNC: 19 U/L (ref 4–60)
LYMPHOCYTES # BLD AUTO: 1.5 K/UL (ref 1–4.8)
LYMPHOCYTES NFR BLD: 19.5 % (ref 18–48)
MCH RBC QN AUTO: 30.9 PG (ref 27–31)
MCHC RBC AUTO-ENTMCNC: 32.6 G/DL (ref 32–36)
MCV RBC AUTO: 95 FL (ref 82–98)
MICROSCOPIC COMMENT: NORMAL
MONOCYTES # BLD AUTO: 0.7 K/UL (ref 0.3–1)
MONOCYTES NFR BLD: 8.4 % (ref 4–15)
NEUTROPHILS # BLD AUTO: 5.4 K/UL (ref 1.8–7.7)
NEUTROPHILS NFR BLD: 69.3 % (ref 38–73)
NITRITE UR QL STRIP: NEGATIVE
NRBC BLD-RTO: 0 /100 WBC
PH UR STRIP: 6 [PH] (ref 5–8)
PLATELET # BLD AUTO: 159 K/UL (ref 150–350)
PMV BLD AUTO: 9.7 FL (ref 9.2–12.9)
POTASSIUM SERPL-SCNC: 4.3 MMOL/L (ref 3.5–5.1)
PROT SERPL-MCNC: 7.6 G/DL (ref 6–8.4)
PROT UR QL STRIP: ABNORMAL
PROTHROMBIN TIME: 11.4 SEC (ref 9–12.5)
RBC # BLD AUTO: 3.92 M/UL (ref 4.6–6.2)
RBC #/AREA URNS HPF: 2 /HPF (ref 0–4)
SARS-COV-2 RDRP RESP QL NAA+PROBE: NEGATIVE
SODIUM SERPL-SCNC: 140 MMOL/L (ref 136–145)
SP GR UR STRIP: 1.01 (ref 1–1.03)
TROPONIN I SERPL DL<=0.01 NG/ML-MCNC: 0.03 NG/ML (ref 0.02–0.5)
URN SPEC COLLECT METH UR: ABNORMAL
UROBILINOGEN UR STRIP-ACNC: NEGATIVE EU/DL
WBC # BLD AUTO: 7.76 K/UL (ref 3.9–12.7)
WBC #/AREA URNS HPF: 1 /HPF (ref 0–5)

## 2021-02-20 PROCEDURE — 93005 ELECTROCARDIOGRAM TRACING: CPT

## 2021-02-20 PROCEDURE — 96374 THER/PROPH/DIAG INJ IV PUSH: CPT

## 2021-02-20 PROCEDURE — 80053 COMPREHEN METABOLIC PANEL: CPT

## 2021-02-20 PROCEDURE — 93010 EKG 12-LEAD: ICD-10-PCS | Mod: ,,, | Performed by: INTERNAL MEDICINE

## 2021-02-20 PROCEDURE — 25000003 PHARM REV CODE 250: Performed by: EMERGENCY MEDICINE

## 2021-02-20 PROCEDURE — 71045 X-RAY EXAM CHEST 1 VIEW: CPT | Mod: TC,FY

## 2021-02-20 PROCEDURE — 63600175 PHARM REV CODE 636 W HCPCS: Performed by: EMERGENCY MEDICINE

## 2021-02-20 PROCEDURE — 83880 ASSAY OF NATRIURETIC PEPTIDE: CPT

## 2021-02-20 PROCEDURE — 71045 XR CHEST AP PORTABLE: ICD-10-PCS | Mod: 26,,, | Performed by: RADIOLOGY

## 2021-02-20 PROCEDURE — 85610 PROTHROMBIN TIME: CPT

## 2021-02-20 PROCEDURE — G0378 HOSPITAL OBSERVATION PER HR: HCPCS | Mod: CS

## 2021-02-20 PROCEDURE — G0378 HOSPITAL OBSERVATION PER HR: HCPCS

## 2021-02-20 PROCEDURE — 93010 ELECTROCARDIOGRAM REPORT: CPT | Mod: ,,, | Performed by: INTERNAL MEDICINE

## 2021-02-20 PROCEDURE — 83690 ASSAY OF LIPASE: CPT

## 2021-02-20 PROCEDURE — 99285 EMERGENCY DEPT VISIT HI MDM: CPT | Mod: 25

## 2021-02-20 PROCEDURE — 84484 ASSAY OF TROPONIN QUANT: CPT

## 2021-02-20 PROCEDURE — U0002 COVID-19 LAB TEST NON-CDC: HCPCS

## 2021-02-20 PROCEDURE — 81000 URINALYSIS NONAUTO W/SCOPE: CPT

## 2021-02-20 PROCEDURE — 71045 X-RAY EXAM CHEST 1 VIEW: CPT | Mod: 26,,, | Performed by: RADIOLOGY

## 2021-02-20 PROCEDURE — 85025 COMPLETE CBC W/AUTO DIFF WBC: CPT

## 2021-02-20 RX ORDER — ONDANSETRON 4 MG/1
4 TABLET, ORALLY DISINTEGRATING ORAL EVERY 6 HOURS PRN
Status: DISCONTINUED | OUTPATIENT
Start: 2021-02-20 | End: 2021-02-25 | Stop reason: HOSPADM

## 2021-02-20 RX ORDER — FLUTICASONE FUROATE AND VILANTEROL 100; 25 UG/1; UG/1
1 POWDER RESPIRATORY (INHALATION) DAILY
Status: DISCONTINUED | OUTPATIENT
Start: 2021-02-21 | End: 2021-02-25 | Stop reason: HOSPADM

## 2021-02-20 RX ORDER — INSULIN ASPART 100 [IU]/ML
0-5 INJECTION, SOLUTION INTRAVENOUS; SUBCUTANEOUS
Status: DISCONTINUED | OUTPATIENT
Start: 2021-02-20 | End: 2021-02-25

## 2021-02-20 RX ORDER — HEPARIN SODIUM 5000 [USP'U]/ML
5000 INJECTION, SOLUTION INTRAVENOUS; SUBCUTANEOUS EVERY 8 HOURS
Status: DISCONTINUED | OUTPATIENT
Start: 2021-02-21 | End: 2021-02-21

## 2021-02-20 RX ORDER — FLUTICASONE PROPIONATE 50 MCG
1 SPRAY, SUSPENSION (ML) NASAL DAILY PRN
Status: DISCONTINUED | OUTPATIENT
Start: 2021-02-20 | End: 2021-02-25 | Stop reason: HOSPADM

## 2021-02-20 RX ORDER — IBUPROFEN 200 MG
24 TABLET ORAL
Status: DISCONTINUED | OUTPATIENT
Start: 2021-02-20 | End: 2021-02-25

## 2021-02-20 RX ORDER — SODIUM CHLORIDE 0.9 % (FLUSH) 0.9 %
10 SYRINGE (ML) INJECTION
Status: DISCONTINUED | OUTPATIENT
Start: 2021-02-20 | End: 2021-02-25 | Stop reason: HOSPADM

## 2021-02-20 RX ORDER — ZOLPIDEM TARTRATE 5 MG/1
5 TABLET ORAL NIGHTLY PRN
Status: DISCONTINUED | OUTPATIENT
Start: 2021-02-20 | End: 2021-02-25 | Stop reason: HOSPADM

## 2021-02-20 RX ORDER — GLUCAGON 1 MG
1 KIT INJECTION
Status: DISCONTINUED | OUTPATIENT
Start: 2021-02-20 | End: 2021-02-25

## 2021-02-20 RX ORDER — FUROSEMIDE 10 MG/ML
40 INJECTION INTRAMUSCULAR; INTRAVENOUS
Status: COMPLETED | OUTPATIENT
Start: 2021-02-20 | End: 2021-02-20

## 2021-02-20 RX ORDER — CETIRIZINE HYDROCHLORIDE 10 MG/1
10 TABLET ORAL DAILY PRN
Status: DISCONTINUED | OUTPATIENT
Start: 2021-02-20 | End: 2021-02-25 | Stop reason: HOSPADM

## 2021-02-20 RX ORDER — IBUPROFEN 200 MG
16 TABLET ORAL
Status: DISCONTINUED | OUTPATIENT
Start: 2021-02-20 | End: 2021-02-25

## 2021-02-20 RX ORDER — ACETAMINOPHEN 325 MG/1
650 TABLET ORAL EVERY 4 HOURS PRN
Status: DISCONTINUED | OUTPATIENT
Start: 2021-02-20 | End: 2021-02-25 | Stop reason: HOSPADM

## 2021-02-20 RX ORDER — FUROSEMIDE 10 MG/ML
40 INJECTION INTRAMUSCULAR; INTRAVENOUS DAILY
Status: DISCONTINUED | OUTPATIENT
Start: 2021-02-21 | End: 2021-02-22

## 2021-02-20 RX ADMIN — NITROGLYCERIN 1 INCH: 20 OINTMENT TOPICAL at 06:02

## 2021-02-20 RX ADMIN — FUROSEMIDE 40 MG: 10 INJECTION, SOLUTION INTRAMUSCULAR; INTRAVENOUS at 06:02

## 2021-02-21 LAB
ANION GAP SERPL CALC-SCNC: 12 MMOL/L (ref 8–16)
BASOPHILS # BLD AUTO: 0.04 K/UL (ref 0–0.2)
BASOPHILS NFR BLD: 0.6 % (ref 0–1.9)
BUN SERPL-MCNC: 29 MG/DL (ref 8–23)
CALCIUM SERPL-MCNC: 8.9 MG/DL (ref 8.7–10.5)
CHLORIDE SERPL-SCNC: 102 MMOL/L (ref 95–110)
CO2 SERPL-SCNC: 25 MMOL/L (ref 23–29)
CREAT SERPL-MCNC: 1.8 MG/DL (ref 0.5–1.4)
D DIMER PPP IA.FEU-MCNC: 11.36 MG/L FEU
DIFFERENTIAL METHOD: ABNORMAL
EOSINOPHIL # BLD AUTO: 0.2 K/UL (ref 0–0.5)
EOSINOPHIL NFR BLD: 2.3 % (ref 0–8)
ERYTHROCYTE [DISTWIDTH] IN BLOOD BY AUTOMATED COUNT: 13.4 % (ref 11.5–14.5)
EST. GFR  (AFRICAN AMERICAN): 38.5 ML/MIN/1.73 M^2
EST. GFR  (NON AFRICAN AMERICAN): 33.3 ML/MIN/1.73 M^2
GLUCOSE SERPL-MCNC: 197 MG/DL (ref 70–110)
HCT VFR BLD AUTO: 35.8 % (ref 40–54)
HGB BLD-MCNC: 11.6 G/DL (ref 14–18)
IMM GRANULOCYTES # BLD AUTO: 0.02 K/UL (ref 0–0.04)
IMM GRANULOCYTES NFR BLD AUTO: 0.3 % (ref 0–0.5)
LYMPHOCYTES # BLD AUTO: 1.3 K/UL (ref 1–4.8)
LYMPHOCYTES NFR BLD: 18.8 % (ref 18–48)
MAGNESIUM SERPL-MCNC: 1.9 MG/DL (ref 1.6–2.6)
MCH RBC QN AUTO: 31 PG (ref 27–31)
MCHC RBC AUTO-ENTMCNC: 32.4 G/DL (ref 32–36)
MCV RBC AUTO: 96 FL (ref 82–98)
MONOCYTES # BLD AUTO: 0.6 K/UL (ref 0.3–1)
MONOCYTES NFR BLD: 8.6 % (ref 4–15)
NEUTROPHILS # BLD AUTO: 4.9 K/UL (ref 1.8–7.7)
NEUTROPHILS NFR BLD: 69.4 % (ref 38–73)
NRBC BLD-RTO: 0 /100 WBC
PHOSPHATE SERPL-MCNC: 4.3 MG/DL (ref 2.7–4.5)
PLATELET # BLD AUTO: 165 K/UL (ref 150–350)
PMV BLD AUTO: 9.6 FL (ref 9.2–12.9)
POCT GLUCOSE: 183 MG/DL (ref 70–110)
POCT GLUCOSE: 195 MG/DL (ref 70–110)
POCT GLUCOSE: 197 MG/DL (ref 70–110)
POCT GLUCOSE: 202 MG/DL (ref 70–110)
POTASSIUM SERPL-SCNC: 4.1 MMOL/L (ref 3.5–5.1)
RBC # BLD AUTO: 3.74 M/UL (ref 4.6–6.2)
SODIUM SERPL-SCNC: 139 MMOL/L (ref 136–145)
WBC # BLD AUTO: 7.09 K/UL (ref 3.9–12.7)

## 2021-02-21 PROCEDURE — 80048 BASIC METABOLIC PNL TOTAL CA: CPT

## 2021-02-21 PROCEDURE — 25000003 PHARM REV CODE 250: Performed by: HOSPITALIST

## 2021-02-21 PROCEDURE — 85379 FIBRIN DEGRADATION QUANT: CPT

## 2021-02-21 PROCEDURE — 85025 COMPLETE CBC W/AUTO DIFF WBC: CPT

## 2021-02-21 PROCEDURE — 83735 ASSAY OF MAGNESIUM: CPT

## 2021-02-21 PROCEDURE — 96376 TX/PRO/DX INJ SAME DRUG ADON: CPT

## 2021-02-21 PROCEDURE — G0378 HOSPITAL OBSERVATION PER HR: HCPCS | Mod: CS

## 2021-02-21 PROCEDURE — 27000221 HC OXYGEN, UP TO 24 HOURS

## 2021-02-21 PROCEDURE — 96372 THER/PROPH/DIAG INJ SC/IM: CPT

## 2021-02-21 PROCEDURE — 63600175 PHARM REV CODE 636 W HCPCS: Performed by: HOSPITALIST

## 2021-02-21 PROCEDURE — 84100 ASSAY OF PHOSPHORUS: CPT

## 2021-02-21 PROCEDURE — 36415 COLL VENOUS BLD VENIPUNCTURE: CPT

## 2021-02-21 RX ADMIN — HEPARIN SODIUM 5000 UNITS: 5000 INJECTION, SOLUTION INTRAVENOUS; SUBCUTANEOUS at 02:02

## 2021-02-21 RX ADMIN — HEPARIN SODIUM 5000 UNITS: 5000 INJECTION, SOLUTION INTRAVENOUS; SUBCUTANEOUS at 06:02

## 2021-02-21 RX ADMIN — FUROSEMIDE 40 MG: 10 INJECTION, SOLUTION INTRAMUSCULAR; INTRAVENOUS at 09:02

## 2021-02-21 RX ADMIN — ZOLPIDEM TARTRATE 5 MG: 5 TABLET ORAL at 10:02

## 2021-02-21 RX ADMIN — APIXABAN 2.5 MG: 2.5 TABLET, FILM COATED ORAL at 05:02

## 2021-02-21 RX ADMIN — ACETAMINOPHEN 650 MG: 325 TABLET ORAL at 03:02

## 2021-02-22 ENCOUNTER — PATIENT MESSAGE (OUTPATIENT)
Dept: FAMILY MEDICINE | Facility: CLINIC | Age: 86
End: 2021-02-22

## 2021-02-22 PROBLEM — I26.99 PULMONARY EMBOLISM: Status: ACTIVE | Noted: 2021-02-22

## 2021-02-22 LAB
ANION GAP SERPL CALC-SCNC: 12 MMOL/L (ref 8–16)
BASOPHILS # BLD AUTO: 0.04 K/UL (ref 0–0.2)
BASOPHILS NFR BLD: 0.7 % (ref 0–1.9)
BUN SERPL-MCNC: 28 MG/DL (ref 8–23)
CALCIUM SERPL-MCNC: 8.8 MG/DL (ref 8.7–10.5)
CHLORIDE SERPL-SCNC: 105 MMOL/L (ref 95–110)
CO2 SERPL-SCNC: 23 MMOL/L (ref 23–29)
CREAT SERPL-MCNC: 1.7 MG/DL (ref 0.5–1.4)
DIFFERENTIAL METHOD: ABNORMAL
EOSINOPHIL # BLD AUTO: 0.2 K/UL (ref 0–0.5)
EOSINOPHIL NFR BLD: 3.7 % (ref 0–8)
ERYTHROCYTE [DISTWIDTH] IN BLOOD BY AUTOMATED COUNT: 13.2 % (ref 11.5–14.5)
EST. GFR  (AFRICAN AMERICAN): 41.3 ML/MIN/1.73 M^2
EST. GFR  (NON AFRICAN AMERICAN): 35.7 ML/MIN/1.73 M^2
GLUCOSE SERPL-MCNC: 175 MG/DL (ref 70–110)
HCT VFR BLD AUTO: 34.8 % (ref 40–54)
HGB BLD-MCNC: 11.5 G/DL (ref 14–18)
IMM GRANULOCYTES # BLD AUTO: 0.01 K/UL (ref 0–0.04)
IMM GRANULOCYTES NFR BLD AUTO: 0.2 % (ref 0–0.5)
LYMPHOCYTES # BLD AUTO: 1.3 K/UL (ref 1–4.8)
LYMPHOCYTES NFR BLD: 22.3 % (ref 18–48)
MAGNESIUM SERPL-MCNC: 1.8 MG/DL (ref 1.6–2.6)
MCH RBC QN AUTO: 30.8 PG (ref 27–31)
MCHC RBC AUTO-ENTMCNC: 33 G/DL (ref 32–36)
MCV RBC AUTO: 93 FL (ref 82–98)
MONOCYTES # BLD AUTO: 0.5 K/UL (ref 0.3–1)
MONOCYTES NFR BLD: 8.9 % (ref 4–15)
NEUTROPHILS # BLD AUTO: 3.7 K/UL (ref 1.8–7.7)
NEUTROPHILS NFR BLD: 64.2 % (ref 38–73)
NRBC BLD-RTO: 0 /100 WBC
PHOSPHATE SERPL-MCNC: 3.7 MG/DL (ref 2.7–4.5)
PLATELET # BLD AUTO: 158 K/UL (ref 150–350)
PMV BLD AUTO: 9.8 FL (ref 9.2–12.9)
POCT GLUCOSE: 155 MG/DL (ref 70–110)
POCT GLUCOSE: 187 MG/DL (ref 70–110)
POCT GLUCOSE: 207 MG/DL (ref 70–110)
POCT GLUCOSE: 216 MG/DL (ref 70–110)
POTASSIUM SERPL-SCNC: 3.8 MMOL/L (ref 3.5–5.1)
RBC # BLD AUTO: 3.73 M/UL (ref 4.6–6.2)
SODIUM SERPL-SCNC: 140 MMOL/L (ref 136–145)
WBC # BLD AUTO: 5.75 K/UL (ref 3.9–12.7)

## 2021-02-22 PROCEDURE — 25000242 PHARM REV CODE 250 ALT 637 W/ HCPCS: Performed by: HOSPITALIST

## 2021-02-22 PROCEDURE — 25000003 PHARM REV CODE 250: Performed by: HOSPITALIST

## 2021-02-22 PROCEDURE — 97166 OT EVAL MOD COMPLEX 45 MIN: CPT

## 2021-02-22 PROCEDURE — 25500020 PHARM REV CODE 255: Performed by: INTERNAL MEDICINE

## 2021-02-22 PROCEDURE — 80048 BASIC METABOLIC PNL TOTAL CA: CPT

## 2021-02-22 PROCEDURE — 97161 PT EVAL LOW COMPLEX 20 MIN: CPT

## 2021-02-22 PROCEDURE — 94640 AIRWAY INHALATION TREATMENT: CPT

## 2021-02-22 PROCEDURE — 27000221 HC OXYGEN, UP TO 24 HOURS

## 2021-02-22 PROCEDURE — 83735 ASSAY OF MAGNESIUM: CPT

## 2021-02-22 PROCEDURE — 85025 COMPLETE CBC W/AUTO DIFF WBC: CPT

## 2021-02-22 PROCEDURE — 94761 N-INVAS EAR/PLS OXIMETRY MLT: CPT

## 2021-02-22 PROCEDURE — 84100 ASSAY OF PHOSPHORUS: CPT

## 2021-02-22 PROCEDURE — 63600175 PHARM REV CODE 636 W HCPCS: Performed by: HOSPITALIST

## 2021-02-22 PROCEDURE — 36415 COLL VENOUS BLD VENIPUNCTURE: CPT

## 2021-02-22 PROCEDURE — 96376 TX/PRO/DX INJ SAME DRUG ADON: CPT | Mod: 59

## 2021-02-22 PROCEDURE — 97530 THERAPEUTIC ACTIVITIES: CPT

## 2021-02-22 PROCEDURE — G0378 HOSPITAL OBSERVATION PER HR: HCPCS | Mod: CS

## 2021-02-22 RX ORDER — BENZONATATE 100 MG/1
200 CAPSULE ORAL
Status: DISCONTINUED | OUTPATIENT
Start: 2021-02-22 | End: 2021-02-25 | Stop reason: HOSPADM

## 2021-02-22 RX ORDER — GUAIFENESIN/DEXTROMETHORPHAN 100-10MG/5
10 SYRUP ORAL EVERY 6 HOURS PRN
Status: DISCONTINUED | OUTPATIENT
Start: 2021-02-22 | End: 2021-02-25 | Stop reason: HOSPADM

## 2021-02-22 RX ADMIN — ZOLPIDEM TARTRATE 5 MG: 5 TABLET ORAL at 11:02

## 2021-02-22 RX ADMIN — FUROSEMIDE 40 MG: 10 INJECTION, SOLUTION INTRAMUSCULAR; INTRAVENOUS at 09:02

## 2021-02-22 RX ADMIN — SULFUR HEXAFLUORIDE 5 ML: KIT at 03:02

## 2021-02-22 RX ADMIN — APIXABAN 2.5 MG: 2.5 TABLET, FILM COATED ORAL at 09:02

## 2021-02-22 RX ADMIN — FLUTICASONE FUROATE AND VILANTEROL TRIFENATATE 1 PUFF: 100; 25 POWDER RESPIRATORY (INHALATION) at 08:02

## 2021-02-22 RX ADMIN — ACETAMINOPHEN 650 MG: 325 TABLET ORAL at 08:02

## 2021-02-22 RX ADMIN — APIXABAN 10 MG: 2.5 TABLET, FILM COATED ORAL at 08:02

## 2021-02-22 RX ADMIN — BENZONATATE 200 MG: 100 CAPSULE ORAL at 06:02

## 2021-02-23 LAB
ANION GAP SERPL CALC-SCNC: 9 MMOL/L (ref 8–16)
AORTIC ROOT ANNULUS: 4.47 CM
AORTIC VALVE CUSP SEPERATION: 1.01 CM
AV INDEX (PROSTH): 0.52
AV MEAN GRADIENT: 5 MMHG
AV PEAK GRADIENT: 9 MMHG
AV VALVE AREA: 2.03 CM2
AV VELOCITY RATIO: 0.53
BASOPHILS # BLD AUTO: 0.05 K/UL (ref 0–0.2)
BASOPHILS NFR BLD: 0.8 % (ref 0–1.9)
BSA FOR ECHO PROCEDURE: 2.33 M2
BUN SERPL-MCNC: 33 MG/DL (ref 8–23)
CALCIUM SERPL-MCNC: 8.8 MG/DL (ref 8.7–10.5)
CHLORIDE SERPL-SCNC: 104 MMOL/L (ref 95–110)
CO2 SERPL-SCNC: 26 MMOL/L (ref 23–29)
CREAT SERPL-MCNC: 1.8 MG/DL (ref 0.5–1.4)
CV ECHO LV RWT: 0.75 CM
DIFFERENTIAL METHOD: ABNORMAL
DOP CALC AO PEAK VEL: 1.53 M/S
DOP CALC AO VTI: 24.4 CM
DOP CALC LVOT AREA: 3.9 CM2
DOP CALC LVOT DIAMETER: 2.23 CM
DOP CALC LVOT PEAK VEL: 0.81 M/S
DOP CALC LVOT STROKE VOLUME: 49.46 CM3
DOP CALCLVOT PEAK VEL VTI: 12.67 CM
E WAVE DECELERATION TIME: 231.1 MSEC
E/A RATIO: 0.64
E/E' RATIO: 11.54 M/S
ECHO LV POSTERIOR WALL: 1.17 CM (ref 0.6–1.1)
EOSINOPHIL # BLD AUTO: 0.3 K/UL (ref 0–0.5)
EOSINOPHIL NFR BLD: 4.2 % (ref 0–8)
ERYTHROCYTE [DISTWIDTH] IN BLOOD BY AUTOMATED COUNT: 13.2 % (ref 11.5–14.5)
EST. GFR  (AFRICAN AMERICAN): 38.5 ML/MIN/1.73 M^2
EST. GFR  (NON AFRICAN AMERICAN): 33.3 ML/MIN/1.73 M^2
FRACTIONAL SHORTENING: 28 % (ref 28–44)
GLUCOSE SERPL-MCNC: 173 MG/DL (ref 70–110)
HCT VFR BLD AUTO: 35.3 % (ref 40–54)
HGB BLD-MCNC: 11.6 G/DL (ref 14–18)
IMM GRANULOCYTES # BLD AUTO: 0.01 K/UL (ref 0–0.04)
IMM GRANULOCYTES NFR BLD AUTO: 0.2 % (ref 0–0.5)
INTERVENTRICULAR SEPTUM: 1.32 CM (ref 0.6–1.1)
IRON SERPL-MCNC: 38 UG/DL (ref 45–160)
IVRT: 83.73 MSEC
LA MAJOR: 4.66 CM
LA MINOR: 2.37 CM
LEFT ATRIUM SIZE: 4.04 CM
LEFT ATRIUM VOLUME INDEX MOD: 10.2 ML/M2
LEFT ATRIUM VOLUME MOD: 22.91 CM3
LEFT INTERNAL DIMENSION IN SYSTOLE: 2.24 CM (ref 2.1–4)
LEFT VENTRICLE DIASTOLIC VOLUME INDEX: 17 ML/M2
LEFT VENTRICLE DIASTOLIC VOLUME: 38.24 ML
LEFT VENTRICLE MASS INDEX: 54 G/M2
LEFT VENTRICLE SYSTOLIC VOLUME INDEX: 7.5 ML/M2
LEFT VENTRICLE SYSTOLIC VOLUME: 16.89 ML
LEFT VENTRICULAR INTERNAL DIMENSION IN DIASTOLE: 3.11 CM (ref 3.5–6)
LEFT VENTRICULAR MASS: 121.69 G
LV LATERAL E/E' RATIO: 9.38 M/S
LV SEPTAL E/E' RATIO: 15 M/S
LYMPHOCYTES # BLD AUTO: 1.3 K/UL (ref 1–4.8)
LYMPHOCYTES NFR BLD: 20.7 % (ref 18–48)
MAGNESIUM SERPL-MCNC: 1.9 MG/DL (ref 1.6–2.6)
MCH RBC QN AUTO: 30.9 PG (ref 27–31)
MCHC RBC AUTO-ENTMCNC: 32.9 G/DL (ref 32–36)
MCV RBC AUTO: 94 FL (ref 82–98)
MONOCYTES # BLD AUTO: 0.6 K/UL (ref 0.3–1)
MONOCYTES NFR BLD: 9.4 % (ref 4–15)
MV MEAN GRADIENT: 1 MMHG
MV PEAK A VEL: 1.17 M/S
MV PEAK E VEL: 0.75 M/S
MV PEAK GRADIENT: 5 MMHG
MV STENOSIS PRESSURE HALF TIME: 67.02 MS
MV VALVE AREA P 1/2 METHOD: 3.28 CM2
NEUTROPHILS # BLD AUTO: 4.2 K/UL (ref 1.8–7.7)
NEUTROPHILS NFR BLD: 64.7 % (ref 38–73)
NRBC BLD-RTO: 0 /100 WBC
OB PNL STL: NEGATIVE
PHOSPHATE SERPL-MCNC: 4 MG/DL (ref 2.7–4.5)
PISA TR MAX VEL: 2.97 M/S
PLATELET # BLD AUTO: 159 K/UL (ref 150–350)
PMV BLD AUTO: 9.6 FL (ref 9.2–12.9)
POCT GLUCOSE: 160 MG/DL (ref 70–110)
POCT GLUCOSE: 186 MG/DL (ref 70–110)
POCT GLUCOSE: 207 MG/DL (ref 70–110)
POTASSIUM SERPL-SCNC: 3.9 MMOL/L (ref 3.5–5.1)
PV MEAN GRADIENT: 1 MMHG
PV PEAK VELOCITY: 0.71 CM/S
RA MAJOR: 4.67 CM
RA WIDTH: 2.8 CM
RBC # BLD AUTO: 3.75 M/UL (ref 4.6–6.2)
RETICS/RBC NFR AUTO: 2.5 % (ref 0.4–2)
RIGHT VENTRICULAR END-DIASTOLIC DIMENSION: 4.81 CM
SATURATED IRON: 12 % (ref 20–50)
SODIUM SERPL-SCNC: 139 MMOL/L (ref 136–145)
TDI LATERAL: 0.08 M/S
TDI SEPTAL: 0.05 M/S
TDI: 0.07 M/S
TOTAL IRON BINDING CAPACITY: 315 UG/DL (ref 250–450)
TR MAX PG: 35 MMHG
TRANSFERRIN SERPL-MCNC: 213 MG/DL (ref 200–375)
TRICUSPID ANNULAR PLANE SYSTOLIC EXCURSION: 1.23 CM
WBC # BLD AUTO: 6.46 K/UL (ref 3.9–12.7)

## 2021-02-23 PROCEDURE — 27000221 HC OXYGEN, UP TO 24 HOURS

## 2021-02-23 PROCEDURE — 83735 ASSAY OF MAGNESIUM: CPT

## 2021-02-23 PROCEDURE — 99225 PR SUBSEQUENT OBSERVATION CARE,LEVEL II: CPT | Mod: ,,, | Performed by: FAMILY MEDICINE

## 2021-02-23 PROCEDURE — 25000003 PHARM REV CODE 250: Performed by: HOSPITALIST

## 2021-02-23 PROCEDURE — 99225 PR SUBSEQUENT OBSERVATION CARE,LEVEL II: ICD-10-PCS | Mod: ,,, | Performed by: FAMILY MEDICINE

## 2021-02-23 PROCEDURE — 85025 COMPLETE CBC W/AUTO DIFF WBC: CPT

## 2021-02-23 PROCEDURE — 80048 BASIC METABOLIC PNL TOTAL CA: CPT

## 2021-02-23 PROCEDURE — 36415 COLL VENOUS BLD VENIPUNCTURE: CPT

## 2021-02-23 PROCEDURE — 85045 AUTOMATED RETICULOCYTE COUNT: CPT

## 2021-02-23 PROCEDURE — 83540 ASSAY OF IRON: CPT

## 2021-02-23 PROCEDURE — 99900035 HC TECH TIME PER 15 MIN (STAT)

## 2021-02-23 PROCEDURE — G0378 HOSPITAL OBSERVATION PER HR: HCPCS | Mod: CS

## 2021-02-23 PROCEDURE — 84100 ASSAY OF PHOSPHORUS: CPT

## 2021-02-23 PROCEDURE — 97110 THERAPEUTIC EXERCISES: CPT

## 2021-02-23 PROCEDURE — 82746 ASSAY OF FOLIC ACID SERUM: CPT

## 2021-02-23 PROCEDURE — 82272 OCCULT BLD FECES 1-3 TESTS: CPT

## 2021-02-23 PROCEDURE — 97116 GAIT TRAINING THERAPY: CPT | Mod: CQ

## 2021-02-23 PROCEDURE — 82608 B-12 BINDING CAPACITY: CPT

## 2021-02-23 RX ADMIN — BENZONATATE 200 MG: 100 CAPSULE ORAL at 01:02

## 2021-02-23 RX ADMIN — BENZONATATE 200 MG: 100 CAPSULE ORAL at 08:02

## 2021-02-23 RX ADMIN — GUAIFENESIN AND DEXTROMETHORPHAN 10 ML: 100; 10 SYRUP ORAL at 05:02

## 2021-02-23 RX ADMIN — APIXABAN 10 MG: 2.5 TABLET, FILM COATED ORAL at 08:02

## 2021-02-23 RX ADMIN — FLUTICASONE FUROATE AND VILANTEROL TRIFENATATE 1 PUFF: 100; 25 POWDER RESPIRATORY (INHALATION) at 08:02

## 2021-02-24 ENCOUNTER — TELEPHONE (OUTPATIENT)
Dept: HEMATOLOGY/ONCOLOGY | Facility: CLINIC | Age: 86
End: 2021-02-24

## 2021-02-24 LAB
ANION GAP SERPL CALC-SCNC: 11 MMOL/L (ref 8–16)
BASOPHILS # BLD AUTO: 0.05 K/UL (ref 0–0.2)
BASOPHILS NFR BLD: 0.7 % (ref 0–1.9)
BUN SERPL-MCNC: 34 MG/DL (ref 8–23)
CALCIUM SERPL-MCNC: 9 MG/DL (ref 8.7–10.5)
CHLORIDE SERPL-SCNC: 103 MMOL/L (ref 95–110)
CO2 SERPL-SCNC: 26 MMOL/L (ref 23–29)
CREAT SERPL-MCNC: 1.9 MG/DL (ref 0.5–1.4)
DIFFERENTIAL METHOD: ABNORMAL
EOSINOPHIL # BLD AUTO: 0.3 K/UL (ref 0–0.5)
EOSINOPHIL NFR BLD: 3.8 % (ref 0–8)
ERYTHROCYTE [DISTWIDTH] IN BLOOD BY AUTOMATED COUNT: 13.4 % (ref 11.5–14.5)
EST. GFR  (AFRICAN AMERICAN): 36.1 ML/MIN/1.73 M^2
EST. GFR  (NON AFRICAN AMERICAN): 31.2 ML/MIN/1.73 M^2
FOLATE SERPL-MCNC: 14.8 NG/ML (ref 4–24)
GLUCOSE SERPL-MCNC: 182 MG/DL (ref 70–110)
HCT VFR BLD AUTO: 37.3 % (ref 40–54)
HGB BLD-MCNC: 12.1 G/DL (ref 14–18)
IMM GRANULOCYTES # BLD AUTO: 0.02 K/UL (ref 0–0.04)
IMM GRANULOCYTES NFR BLD AUTO: 0.3 % (ref 0–0.5)
LYMPHOCYTES # BLD AUTO: 1.7 K/UL (ref 1–4.8)
LYMPHOCYTES NFR BLD: 22.9 % (ref 18–48)
MAGNESIUM SERPL-MCNC: 2 MG/DL (ref 1.6–2.6)
MCH RBC QN AUTO: 30.9 PG (ref 27–31)
MCHC RBC AUTO-ENTMCNC: 32.4 G/DL (ref 32–36)
MCV RBC AUTO: 95 FL (ref 82–98)
MONOCYTES # BLD AUTO: 0.6 K/UL (ref 0.3–1)
MONOCYTES NFR BLD: 8.8 % (ref 4–15)
NEUTROPHILS # BLD AUTO: 4.6 K/UL (ref 1.8–7.7)
NEUTROPHILS NFR BLD: 63.5 % (ref 38–73)
NRBC BLD-RTO: 0 /100 WBC
PHOSPHATE SERPL-MCNC: 4 MG/DL (ref 2.7–4.5)
PLATELET # BLD AUTO: 201 K/UL (ref 150–350)
PMV BLD AUTO: 9.6 FL (ref 9.2–12.9)
POCT GLUCOSE: 173 MG/DL (ref 70–110)
POCT GLUCOSE: 193 MG/DL (ref 70–110)
POTASSIUM SERPL-SCNC: 4.2 MMOL/L (ref 3.5–5.1)
RBC # BLD AUTO: 3.92 M/UL (ref 4.6–6.2)
SODIUM SERPL-SCNC: 140 MMOL/L (ref 136–145)
WBC # BLD AUTO: 7.2 K/UL (ref 3.9–12.7)

## 2021-02-24 PROCEDURE — G0378 HOSPITAL OBSERVATION PER HR: HCPCS

## 2021-02-24 PROCEDURE — 80048 BASIC METABOLIC PNL TOTAL CA: CPT

## 2021-02-24 PROCEDURE — 97116 GAIT TRAINING THERAPY: CPT

## 2021-02-24 PROCEDURE — 85025 COMPLETE CBC W/AUTO DIFF WBC: CPT

## 2021-02-24 PROCEDURE — 83735 ASSAY OF MAGNESIUM: CPT

## 2021-02-24 PROCEDURE — 99225 PR SUBSEQUENT OBSERVATION CARE,LEVEL II: CPT | Mod: ,,, | Performed by: FAMILY MEDICINE

## 2021-02-24 PROCEDURE — 36415 COLL VENOUS BLD VENIPUNCTURE: CPT

## 2021-02-24 PROCEDURE — 25000003 PHARM REV CODE 250: Performed by: HOSPITALIST

## 2021-02-24 PROCEDURE — 84100 ASSAY OF PHOSPHORUS: CPT

## 2021-02-24 PROCEDURE — 99225 PR SUBSEQUENT OBSERVATION CARE,LEVEL II: ICD-10-PCS | Mod: ,,, | Performed by: FAMILY MEDICINE

## 2021-02-24 RX ADMIN — BENZONATATE 200 MG: 100 CAPSULE ORAL at 02:02

## 2021-02-24 RX ADMIN — BENZONATATE 200 MG: 100 CAPSULE ORAL at 09:02

## 2021-02-24 RX ADMIN — APIXABAN 10 MG: 2.5 TABLET, FILM COATED ORAL at 09:02

## 2021-02-24 RX ADMIN — BENZONATATE 200 MG: 100 CAPSULE ORAL at 08:02

## 2021-02-24 RX ADMIN — FLUTICASONE FUROATE AND VILANTEROL TRIFENATATE 1 PUFF: 100; 25 POWDER RESPIRATORY (INHALATION) at 08:02

## 2021-02-24 RX ADMIN — APIXABAN 10 MG: 2.5 TABLET, FILM COATED ORAL at 08:02

## 2021-02-25 VITALS
WEIGHT: 242.5 LBS | HEART RATE: 72 BPM | RESPIRATION RATE: 20 BRPM | HEIGHT: 69 IN | TEMPERATURE: 97 F | BODY MASS INDEX: 35.92 KG/M2 | SYSTOLIC BLOOD PRESSURE: 141 MMHG | OXYGEN SATURATION: 97 % | DIASTOLIC BLOOD PRESSURE: 62 MMHG

## 2021-02-25 PROBLEM — R06.02 SHORTNESS OF BREATH: Status: RESOLVED | Noted: 2021-02-20 | Resolved: 2021-02-25

## 2021-02-25 PROBLEM — G47.00 INSOMNIA: Status: RESOLVED | Noted: 2021-02-20 | Resolved: 2021-02-25

## 2021-02-25 LAB
ANION GAP SERPL CALC-SCNC: 8 MMOL/L (ref 8–16)
BASOPHILS # BLD AUTO: 0.05 K/UL (ref 0–0.2)
BASOPHILS NFR BLD: 1 % (ref 0–1.9)
BUN SERPL-MCNC: 34 MG/DL (ref 8–23)
CALCIUM SERPL-MCNC: 8.7 MG/DL (ref 8.7–10.5)
CHLORIDE SERPL-SCNC: 105 MMOL/L (ref 95–110)
CO2 SERPL-SCNC: 25 MMOL/L (ref 23–29)
CREAT SERPL-MCNC: 1.7 MG/DL (ref 0.5–1.4)
DIFFERENTIAL METHOD: ABNORMAL
EOSINOPHIL # BLD AUTO: 0.2 K/UL (ref 0–0.5)
EOSINOPHIL NFR BLD: 4.1 % (ref 0–8)
ERYTHROCYTE [DISTWIDTH] IN BLOOD BY AUTOMATED COUNT: 13.2 % (ref 11.5–14.5)
EST. GFR  (AFRICAN AMERICAN): 41.3 ML/MIN/1.73 M^2
EST. GFR  (NON AFRICAN AMERICAN): 35.7 ML/MIN/1.73 M^2
GLUCOSE SERPL-MCNC: 163 MG/DL (ref 70–110)
HCT VFR BLD AUTO: 33.8 % (ref 40–54)
HGB BLD-MCNC: 11 G/DL (ref 14–18)
IMM GRANULOCYTES # BLD AUTO: 0.02 K/UL (ref 0–0.04)
IMM GRANULOCYTES NFR BLD AUTO: 0.4 % (ref 0–0.5)
LYMPHOCYTES # BLD AUTO: 1.3 K/UL (ref 1–4.8)
LYMPHOCYTES NFR BLD: 25.1 % (ref 18–48)
MAGNESIUM SERPL-MCNC: 2.1 MG/DL (ref 1.6–2.6)
MCH RBC QN AUTO: 30.6 PG (ref 27–31)
MCHC RBC AUTO-ENTMCNC: 32.5 G/DL (ref 32–36)
MCV RBC AUTO: 94 FL (ref 82–98)
MONOCYTES # BLD AUTO: 0.5 K/UL (ref 0.3–1)
MONOCYTES NFR BLD: 9.4 % (ref 4–15)
NEUTROPHILS # BLD AUTO: 3.1 K/UL (ref 1.8–7.7)
NEUTROPHILS NFR BLD: 60 % (ref 38–73)
NRBC BLD-RTO: 0 /100 WBC
PHOSPHATE SERPL-MCNC: 3.7 MG/DL (ref 2.7–4.5)
PLATELET # BLD AUTO: 179 K/UL (ref 150–350)
PMV BLD AUTO: 9.5 FL (ref 9.2–12.9)
POCT GLUCOSE: 169 MG/DL (ref 70–110)
POCT GLUCOSE: 180 MG/DL (ref 70–110)
POTASSIUM SERPL-SCNC: 4 MMOL/L (ref 3.5–5.1)
RBC # BLD AUTO: 3.59 M/UL (ref 4.6–6.2)
SODIUM SERPL-SCNC: 138 MMOL/L (ref 136–145)
WBC # BLD AUTO: 5.1 K/UL (ref 3.9–12.7)

## 2021-02-25 PROCEDURE — 85025 COMPLETE CBC W/AUTO DIFF WBC: CPT

## 2021-02-25 PROCEDURE — 99217 PR OBSERVATION CARE DISCHARGE: CPT | Mod: ,,, | Performed by: FAMILY MEDICINE

## 2021-02-25 PROCEDURE — 25000003 PHARM REV CODE 250: Performed by: HOSPITALIST

## 2021-02-25 PROCEDURE — 80048 BASIC METABOLIC PNL TOTAL CA: CPT

## 2021-02-25 PROCEDURE — 84100 ASSAY OF PHOSPHORUS: CPT

## 2021-02-25 PROCEDURE — 36415 COLL VENOUS BLD VENIPUNCTURE: CPT

## 2021-02-25 PROCEDURE — G0378 HOSPITAL OBSERVATION PER HR: HCPCS

## 2021-02-25 PROCEDURE — 83735 ASSAY OF MAGNESIUM: CPT

## 2021-02-25 PROCEDURE — 27000221 HC OXYGEN, UP TO 24 HOURS

## 2021-02-25 PROCEDURE — 25000242 PHARM REV CODE 250 ALT 637 W/ HCPCS: Performed by: HOSPITALIST

## 2021-02-25 PROCEDURE — 99217 PR OBSERVATION CARE DISCHARGE: ICD-10-PCS | Mod: ,,, | Performed by: FAMILY MEDICINE

## 2021-02-25 RX ADMIN — FLUTICASONE PROPIONATE 50 MCG: 50 SPRAY, METERED NASAL at 08:02

## 2021-02-25 RX ADMIN — APIXABAN 10 MG: 2.5 TABLET, FILM COATED ORAL at 08:02

## 2021-02-25 RX ADMIN — BENZONATATE 200 MG: 100 CAPSULE ORAL at 08:02

## 2021-02-25 RX ADMIN — BENZONATATE 200 MG: 100 CAPSULE ORAL at 04:02

## 2021-02-25 RX ADMIN — FLUTICASONE FUROATE AND VILANTEROL TRIFENATATE 1 PUFF: 100; 25 POWDER RESPIRATORY (INHALATION) at 09:02

## 2021-02-28 LAB — VIT B12 USB SERPL-MCNC: 1058 PG/ML (ref 800–2600)

## 2021-03-02 ENCOUNTER — TELEPHONE (OUTPATIENT)
Dept: UROLOGY | Facility: CLINIC | Age: 86
End: 2021-03-02

## 2021-03-08 ENCOUNTER — LAB VISIT (OUTPATIENT)
Dept: LAB | Facility: HOSPITAL | Age: 86
End: 2021-03-08
Attending: INTERNAL MEDICINE
Payer: MEDICARE

## 2021-03-08 DIAGNOSIS — N18.9 ANEMIA OF CHRONIC RENAL FAILURE: ICD-10-CM

## 2021-03-08 DIAGNOSIS — T81.718A IATROGENIC PULMONARY EMBOLISM AND INFARCTION: Primary | ICD-10-CM

## 2021-03-08 DIAGNOSIS — D63.1 ANEMIA OF CHRONIC RENAL FAILURE: ICD-10-CM

## 2021-03-08 DIAGNOSIS — E11.22 TYPE 2 DIABETES MELLITUS WITH END-STAGE RENAL DISEASE: ICD-10-CM

## 2021-03-08 DIAGNOSIS — I26.99 IATROGENIC PULMONARY EMBOLISM AND INFARCTION: Primary | ICD-10-CM

## 2021-03-08 DIAGNOSIS — N18.6 TYPE 2 DIABETES MELLITUS WITH END-STAGE RENAL DISEASE: ICD-10-CM

## 2021-03-08 DIAGNOSIS — N18.30 CHRONIC KIDNEY DISEASE, STAGE III (MODERATE): ICD-10-CM

## 2021-03-08 LAB
ALBUMIN SERPL BCP-MCNC: 4.2 G/DL (ref 3.5–5.2)
ALP SERPL-CCNC: 62 U/L (ref 55–135)
ALT SERPL W/O P-5'-P-CCNC: 15 U/L (ref 10–44)
ANION GAP SERPL CALC-SCNC: 11 MMOL/L (ref 8–16)
AST SERPL-CCNC: 19 U/L (ref 10–40)
BASOPHILS # BLD AUTO: 0.04 K/UL (ref 0–0.2)
BASOPHILS NFR BLD: 0.6 % (ref 0–1.9)
BILIRUB SERPL-MCNC: 0.8 MG/DL (ref 0.1–1)
BUN SERPL-MCNC: 28 MG/DL (ref 8–23)
CALCIUM SERPL-MCNC: 9.5 MG/DL (ref 8.7–10.5)
CHLORIDE SERPL-SCNC: 101 MMOL/L (ref 95–110)
CO2 SERPL-SCNC: 26 MMOL/L (ref 23–29)
CREAT SERPL-MCNC: 1.7 MG/DL (ref 0.5–1.4)
DIFFERENTIAL METHOD: ABNORMAL
EOSINOPHIL # BLD AUTO: 0.1 K/UL (ref 0–0.5)
EOSINOPHIL NFR BLD: 1.8 % (ref 0–8)
ERYTHROCYTE [DISTWIDTH] IN BLOOD BY AUTOMATED COUNT: 13.3 % (ref 11.5–14.5)
EST. GFR  (AFRICAN AMERICAN): 41.3 ML/MIN/1.73 M^2
EST. GFR  (NON AFRICAN AMERICAN): 35.7 ML/MIN/1.73 M^2
GLUCOSE SERPL-MCNC: 129 MG/DL (ref 70–110)
HCT VFR BLD AUTO: 36.7 % (ref 40–54)
HGB BLD-MCNC: 12 G/DL (ref 14–18)
IMM GRANULOCYTES # BLD AUTO: 0.01 K/UL (ref 0–0.04)
IMM GRANULOCYTES NFR BLD AUTO: 0.2 % (ref 0–0.5)
LYMPHOCYTES # BLD AUTO: 1.4 K/UL (ref 1–4.8)
LYMPHOCYTES NFR BLD: 20.6 % (ref 18–48)
MCH RBC QN AUTO: 30.9 PG (ref 27–31)
MCHC RBC AUTO-ENTMCNC: 32.7 G/DL (ref 32–36)
MCV RBC AUTO: 95 FL (ref 82–98)
MONOCYTES # BLD AUTO: 0.5 K/UL (ref 0.3–1)
MONOCYTES NFR BLD: 8 % (ref 4–15)
NEUTROPHILS # BLD AUTO: 4.5 K/UL (ref 1.8–7.7)
NEUTROPHILS NFR BLD: 68.8 % (ref 38–73)
NRBC BLD-RTO: 0 /100 WBC
PLATELET # BLD AUTO: 237 K/UL (ref 150–350)
PMV BLD AUTO: 9.7 FL (ref 9.2–12.9)
POTASSIUM SERPL-SCNC: 3.8 MMOL/L (ref 3.5–5.1)
PROT SERPL-MCNC: 7.8 G/DL (ref 6–8.4)
RBC # BLD AUTO: 3.88 M/UL (ref 4.6–6.2)
SODIUM SERPL-SCNC: 138 MMOL/L (ref 136–145)
TSH SERPL DL<=0.005 MIU/L-ACNC: 1.62 UIU/ML (ref 0.34–5.6)
WBC # BLD AUTO: 6.59 K/UL (ref 3.9–12.7)

## 2021-03-08 PROCEDURE — 85025 COMPLETE CBC W/AUTO DIFF WBC: CPT | Performed by: INTERNAL MEDICINE

## 2021-03-08 PROCEDURE — 80053 COMPREHEN METABOLIC PANEL: CPT | Performed by: INTERNAL MEDICINE

## 2021-03-08 PROCEDURE — 36415 COLL VENOUS BLD VENIPUNCTURE: CPT | Performed by: INTERNAL MEDICINE

## 2021-03-08 PROCEDURE — 83880 ASSAY OF NATRIURETIC PEPTIDE: CPT | Performed by: INTERNAL MEDICINE

## 2021-03-08 PROCEDURE — 84443 ASSAY THYROID STIM HORMONE: CPT | Performed by: INTERNAL MEDICINE

## 2021-03-09 LAB — BNP SERPL-MCNC: 17 PG/ML (ref 0–99)

## 2021-03-17 ENCOUNTER — PATIENT MESSAGE (OUTPATIENT)
Dept: HEMATOLOGY/ONCOLOGY | Facility: CLINIC | Age: 86
End: 2021-03-17

## 2021-03-18 ENCOUNTER — OFFICE VISIT (OUTPATIENT)
Dept: HEMATOLOGY/ONCOLOGY | Facility: CLINIC | Age: 86
End: 2021-03-18
Payer: MEDICARE

## 2021-03-18 VITALS
OXYGEN SATURATION: 96 % | WEIGHT: 240 LBS | HEART RATE: 86 BPM | DIASTOLIC BLOOD PRESSURE: 75 MMHG | BODY MASS INDEX: 35.55 KG/M2 | SYSTOLIC BLOOD PRESSURE: 152 MMHG | HEIGHT: 69 IN

## 2021-03-18 DIAGNOSIS — I82.409 DEEP VEIN THROMBOSIS (DVT) OF LOWER EXTREMITY, UNSPECIFIED CHRONICITY, UNSPECIFIED LATERALITY, UNSPECIFIED VEIN: ICD-10-CM

## 2021-03-18 DIAGNOSIS — C61 PROSTATE CANCER: ICD-10-CM

## 2021-03-18 DIAGNOSIS — R97.20 ELEVATED PSA MEASUREMENT: ICD-10-CM

## 2021-03-18 DIAGNOSIS — Z79.01 ANTICOAGULATED: ICD-10-CM

## 2021-03-18 DIAGNOSIS — D50.9 IRON DEFICIENCY ANEMIA, UNSPECIFIED IRON DEFICIENCY ANEMIA TYPE: Primary | ICD-10-CM

## 2021-03-18 DIAGNOSIS — R97.20 ELEVATED PSA: ICD-10-CM

## 2021-03-18 PROCEDURE — 1157F ADVNC CARE PLAN IN RCRD: CPT | Mod: S$GLB,,, | Performed by: INTERNAL MEDICINE

## 2021-03-18 PROCEDURE — 1101F PT FALLS ASSESS-DOCD LE1/YR: CPT | Mod: CPTII,S$GLB,, | Performed by: INTERNAL MEDICINE

## 2021-03-18 PROCEDURE — 99215 PR OFFICE/OUTPT VISIT, EST, LEVL V, 40-54 MIN: ICD-10-PCS | Mod: S$GLB,,, | Performed by: INTERNAL MEDICINE

## 2021-03-18 PROCEDURE — 99215 OFFICE O/P EST HI 40 MIN: CPT | Mod: S$GLB,,, | Performed by: INTERNAL MEDICINE

## 2021-03-18 PROCEDURE — 1159F PR MEDICATION LIST DOCUMENTED IN MEDICAL RECORD: ICD-10-PCS | Mod: S$GLB,,, | Performed by: INTERNAL MEDICINE

## 2021-03-18 PROCEDURE — 1126F AMNT PAIN NOTED NONE PRSNT: CPT | Mod: S$GLB,,, | Performed by: INTERNAL MEDICINE

## 2021-03-18 PROCEDURE — 99999 PR PBB SHADOW E&M-EST. PATIENT-LVL IV: CPT | Mod: PBBFAC,,, | Performed by: INTERNAL MEDICINE

## 2021-03-18 PROCEDURE — 1159F MED LIST DOCD IN RCRD: CPT | Mod: S$GLB,,, | Performed by: INTERNAL MEDICINE

## 2021-03-18 PROCEDURE — 99999 PR PBB SHADOW E&M-EST. PATIENT-LVL IV: ICD-10-PCS | Mod: PBBFAC,,, | Performed by: INTERNAL MEDICINE

## 2021-03-18 PROCEDURE — 1126F PR PAIN SEVERITY QUANTIFIED, NO PAIN PRESENT: ICD-10-PCS | Mod: S$GLB,,, | Performed by: INTERNAL MEDICINE

## 2021-03-18 PROCEDURE — 1157F PR ADVANCE CARE PLAN OR EQUIV PRESENT IN MEDICAL RECORD: ICD-10-PCS | Mod: S$GLB,,, | Performed by: INTERNAL MEDICINE

## 2021-03-18 PROCEDURE — 3288F PR FALLS RISK ASSESSMENT DOCUMENTED: ICD-10-PCS | Mod: CPTII,S$GLB,, | Performed by: INTERNAL MEDICINE

## 2021-03-18 PROCEDURE — 1101F PR PT FALLS ASSESS DOC 0-1 FALLS W/OUT INJ PAST YR: ICD-10-PCS | Mod: CPTII,S$GLB,, | Performed by: INTERNAL MEDICINE

## 2021-03-18 PROCEDURE — 3288F FALL RISK ASSESSMENT DOCD: CPT | Mod: CPTII,S$GLB,, | Performed by: INTERNAL MEDICINE

## 2021-03-18 RX ORDER — BICALUTAMIDE 50 MG/1
50 TABLET, FILM COATED ORAL DAILY
Qty: 30 TABLET | Refills: 8 | Status: SHIPPED | OUTPATIENT
Start: 2021-03-18 | End: 2021-07-06 | Stop reason: SDUPTHER

## 2021-03-18 RX ORDER — HEPARIN 100 UNIT/ML
5 SYRINGE INTRAVENOUS
Status: CANCELLED | OUTPATIENT
Start: 2021-03-18

## 2021-03-18 RX ORDER — SODIUM CHLORIDE 9 MG/ML
INJECTION, SOLUTION INTRAVENOUS CONTINUOUS
Status: CANCELLED | OUTPATIENT
Start: 2021-03-18

## 2021-03-18 RX ORDER — SODIUM CHLORIDE 0.9 % (FLUSH) 0.9 %
10 SYRINGE (ML) INJECTION
Status: CANCELLED | OUTPATIENT
Start: 2021-03-18

## 2021-03-22 ENCOUNTER — INFUSION (OUTPATIENT)
Dept: INFUSION THERAPY | Facility: HOSPITAL | Age: 86
End: 2021-03-22
Payer: MEDICARE

## 2021-03-22 VITALS
OXYGEN SATURATION: 97 % | DIASTOLIC BLOOD PRESSURE: 64 MMHG | SYSTOLIC BLOOD PRESSURE: 131 MMHG | HEART RATE: 75 BPM | RESPIRATION RATE: 18 BRPM

## 2021-03-22 DIAGNOSIS — R97.20 ELEVATED PSA: Primary | ICD-10-CM

## 2021-03-22 DIAGNOSIS — D50.9 IRON DEFICIENCY ANEMIA, UNSPECIFIED IRON DEFICIENCY ANEMIA TYPE: ICD-10-CM

## 2021-03-22 DIAGNOSIS — C61 PROSTATE CANCER: ICD-10-CM

## 2021-03-22 PROCEDURE — 25000003 PHARM REV CODE 250: Performed by: INTERNAL MEDICINE

## 2021-03-22 PROCEDURE — 63600175 PHARM REV CODE 636 W HCPCS: Mod: JG | Performed by: INTERNAL MEDICINE

## 2021-03-22 PROCEDURE — 96365 THER/PROPH/DIAG IV INF INIT: CPT

## 2021-03-22 PROCEDURE — 96402 CHEMO HORMON ANTINEOPL SQ/IM: CPT

## 2021-03-22 RX ORDER — HEPARIN 100 UNIT/ML
5 SYRINGE INTRAVENOUS
Status: DISCONTINUED | OUTPATIENT
Start: 2021-03-22 | End: 2021-03-22 | Stop reason: HOSPADM

## 2021-03-22 RX ORDER — SODIUM CHLORIDE 0.9 % (FLUSH) 0.9 %
10 SYRINGE (ML) INJECTION
Status: DISCONTINUED | OUTPATIENT
Start: 2021-03-22 | End: 2021-03-22 | Stop reason: HOSPADM

## 2021-03-22 RX ORDER — SODIUM CHLORIDE 9 MG/ML
INJECTION, SOLUTION INTRAVENOUS CONTINUOUS
Status: CANCELLED | OUTPATIENT
Start: 2021-03-29

## 2021-03-22 RX ORDER — HEPARIN 100 UNIT/ML
5 SYRINGE INTRAVENOUS
Status: CANCELLED | OUTPATIENT
Start: 2021-03-29

## 2021-03-22 RX ORDER — SODIUM CHLORIDE 0.9 % (FLUSH) 0.9 %
10 SYRINGE (ML) INJECTION
Status: CANCELLED | OUTPATIENT
Start: 2021-03-29

## 2021-03-22 RX ORDER — SODIUM CHLORIDE 9 MG/ML
INJECTION, SOLUTION INTRAVENOUS CONTINUOUS
Status: DISCONTINUED | OUTPATIENT
Start: 2021-03-22 | End: 2021-03-22 | Stop reason: HOSPADM

## 2021-03-22 RX ADMIN — LEUPROLIDE ACETATE 45 MG: KIT at 02:03

## 2021-03-22 RX ADMIN — FERRIC CARBOXYMALTOSE INJECTION 750 MG: 50 INJECTION, SOLUTION INTRAVENOUS at 01:03

## 2021-03-22 RX ADMIN — SODIUM CHLORIDE: 9 INJECTION, SOLUTION INTRAVENOUS at 01:03

## 2021-03-23 ENCOUNTER — OFFICE VISIT (OUTPATIENT)
Dept: UROLOGY | Facility: CLINIC | Age: 86
End: 2021-03-23
Payer: MEDICARE

## 2021-03-23 VITALS
BODY MASS INDEX: 35.77 KG/M2 | SYSTOLIC BLOOD PRESSURE: 136 MMHG | HEART RATE: 76 BPM | HEIGHT: 69 IN | DIASTOLIC BLOOD PRESSURE: 71 MMHG | WEIGHT: 241.5 LBS

## 2021-03-23 DIAGNOSIS — C61 MALIGNANT NEOPLASM OF PROSTATE: ICD-10-CM

## 2021-03-23 DIAGNOSIS — C61 PROSTATE CANCER: Primary | ICD-10-CM

## 2021-03-23 LAB
BILIRUB SERPL-MCNC: ABNORMAL MG/DL
BLOOD URINE, POC: ABNORMAL
CLARITY, POC UA: CLEAR
COLOR, POC UA: YELLOW
GLUCOSE UR QL STRIP: ABNORMAL
KETONES UR QL STRIP: ABNORMAL
LEUKOCYTE ESTERASE URINE, POC: ABNORMAL
NITRITE, POC UA: ABNORMAL
PH, POC UA: 6.5
POC RESIDUAL URINE VOLUME: 0 ML (ref 0–100)
PROTEIN, POC: 30
SPECIFIC GRAVITY, POC UA: 1.02
UROBILINOGEN, POC UA: ABNORMAL

## 2021-03-23 PROCEDURE — 1159F PR MEDICATION LIST DOCUMENTED IN MEDICAL RECORD: ICD-10-PCS | Mod: S$GLB,,, | Performed by: UROLOGY

## 2021-03-23 PROCEDURE — 99215 PR OFFICE/OUTPT VISIT, EST, LEVL V, 40-54 MIN: ICD-10-PCS | Mod: 25,S$GLB,, | Performed by: UROLOGY

## 2021-03-23 PROCEDURE — 81002 POCT URINE DIPSTICK WITHOUT MICROSCOPE: ICD-10-PCS | Mod: S$GLB,,, | Performed by: UROLOGY

## 2021-03-23 PROCEDURE — 81002 URINALYSIS NONAUTO W/O SCOPE: CPT | Mod: S$GLB,,, | Performed by: UROLOGY

## 2021-03-23 PROCEDURE — 99215 OFFICE O/P EST HI 40 MIN: CPT | Mod: 25,S$GLB,, | Performed by: UROLOGY

## 2021-03-23 PROCEDURE — 99999 PR PBB SHADOW E&M-EST. PATIENT-LVL IV: ICD-10-PCS | Mod: PBBFAC,,, | Performed by: UROLOGY

## 2021-03-23 PROCEDURE — 1157F PR ADVANCE CARE PLAN OR EQUIV PRESENT IN MEDICAL RECORD: ICD-10-PCS | Mod: S$GLB,,, | Performed by: UROLOGY

## 2021-03-23 PROCEDURE — 1157F ADVNC CARE PLAN IN RCRD: CPT | Mod: S$GLB,,, | Performed by: UROLOGY

## 2021-03-23 PROCEDURE — 99999 PR PBB SHADOW E&M-EST. PATIENT-LVL IV: CPT | Mod: PBBFAC,,, | Performed by: UROLOGY

## 2021-03-23 PROCEDURE — 51798 POCT BLADDER SCAN: ICD-10-PCS | Mod: S$GLB,,, | Performed by: UROLOGY

## 2021-03-23 PROCEDURE — 1159F MED LIST DOCD IN RCRD: CPT | Mod: S$GLB,,, | Performed by: UROLOGY

## 2021-03-23 PROCEDURE — 51798 US URINE CAPACITY MEASURE: CPT | Mod: S$GLB,,, | Performed by: UROLOGY

## 2021-03-24 ENCOUNTER — TELEPHONE (OUTPATIENT)
Dept: HEMATOLOGY/ONCOLOGY | Facility: CLINIC | Age: 86
End: 2021-03-24

## 2021-03-24 ENCOUNTER — PATIENT MESSAGE (OUTPATIENT)
Dept: HEMATOLOGY/ONCOLOGY | Facility: CLINIC | Age: 86
End: 2021-03-24

## 2021-03-25 ENCOUNTER — TELEPHONE (OUTPATIENT)
Dept: UROLOGY | Facility: CLINIC | Age: 86
End: 2021-03-25

## 2021-03-29 ENCOUNTER — INFUSION (OUTPATIENT)
Dept: INFUSION THERAPY | Facility: HOSPITAL | Age: 86
End: 2021-03-29
Payer: MEDICARE

## 2021-03-29 VITALS
RESPIRATION RATE: 18 BRPM | OXYGEN SATURATION: 97 % | SYSTOLIC BLOOD PRESSURE: 145 MMHG | TEMPERATURE: 98 F | DIASTOLIC BLOOD PRESSURE: 67 MMHG | HEART RATE: 64 BPM

## 2021-03-29 DIAGNOSIS — R97.20 ELEVATED PSA MEASUREMENT: ICD-10-CM

## 2021-03-29 DIAGNOSIS — C61 PROSTATE CANCER: Primary | ICD-10-CM

## 2021-03-29 DIAGNOSIS — D50.9 IRON DEFICIENCY ANEMIA, UNSPECIFIED IRON DEFICIENCY ANEMIA TYPE: ICD-10-CM

## 2021-03-29 LAB
BASOPHILS # BLD AUTO: 0.03 K/UL (ref 0–0.2)
BASOPHILS NFR BLD: 0.5 % (ref 0–1.9)
COMPLEXED PSA SERPL-MCNC: 5.2 NG/ML (ref 0–4)
DIFFERENTIAL METHOD: ABNORMAL
EOSINOPHIL # BLD AUTO: 0.1 K/UL (ref 0–0.5)
EOSINOPHIL NFR BLD: 1.6 % (ref 0–8)
ERYTHROCYTE [DISTWIDTH] IN BLOOD BY AUTOMATED COUNT: 14.2 % (ref 11.5–14.5)
HCT VFR BLD AUTO: 33.8 % (ref 40–54)
HGB BLD-MCNC: 11 G/DL (ref 14–18)
IMM GRANULOCYTES # BLD AUTO: 0.02 K/UL (ref 0–0.04)
IMM GRANULOCYTES NFR BLD AUTO: 0.3 % (ref 0–0.5)
LYMPHOCYTES # BLD AUTO: 1.1 K/UL (ref 1–4.8)
LYMPHOCYTES NFR BLD: 17.1 % (ref 18–48)
MCH RBC QN AUTO: 31.1 PG (ref 27–31)
MCHC RBC AUTO-ENTMCNC: 32.5 G/DL (ref 32–36)
MCV RBC AUTO: 96 FL (ref 82–98)
MONOCYTES # BLD AUTO: 0.5 K/UL (ref 0.3–1)
MONOCYTES NFR BLD: 8 % (ref 4–15)
NEUTROPHILS # BLD AUTO: 4.6 K/UL (ref 1.8–7.7)
NEUTROPHILS NFR BLD: 72.5 % (ref 38–73)
NRBC BLD-RTO: 0 /100 WBC
PLATELET # BLD AUTO: 199 K/UL (ref 150–450)
PMV BLD AUTO: 9.8 FL (ref 9.2–12.9)
RBC # BLD AUTO: 3.54 M/UL (ref 4.6–6.2)
WBC # BLD AUTO: 6.39 K/UL (ref 3.9–12.7)

## 2021-03-29 PROCEDURE — 85025 COMPLETE CBC W/AUTO DIFF WBC: CPT | Performed by: INTERNAL MEDICINE

## 2021-03-29 PROCEDURE — 63600175 PHARM REV CODE 636 W HCPCS: Mod: JG | Performed by: INTERNAL MEDICINE

## 2021-03-29 PROCEDURE — 84153 ASSAY OF PSA TOTAL: CPT | Performed by: INTERNAL MEDICINE

## 2021-03-29 PROCEDURE — 25000003 PHARM REV CODE 250: Performed by: INTERNAL MEDICINE

## 2021-03-29 PROCEDURE — 96365 THER/PROPH/DIAG IV INF INIT: CPT

## 2021-03-29 RX ORDER — SODIUM CHLORIDE 0.9 % (FLUSH) 0.9 %
10 SYRINGE (ML) INJECTION
Status: DISCONTINUED | OUTPATIENT
Start: 2021-03-29 | End: 2021-03-29 | Stop reason: HOSPADM

## 2021-03-29 RX ORDER — HEPARIN 100 UNIT/ML
5 SYRINGE INTRAVENOUS
Status: CANCELLED | OUTPATIENT
Start: 2021-03-29

## 2021-03-29 RX ORDER — SODIUM CHLORIDE 9 MG/ML
INJECTION, SOLUTION INTRAVENOUS CONTINUOUS
Status: CANCELLED | OUTPATIENT
Start: 2021-03-29

## 2021-03-29 RX ORDER — HEPARIN 100 UNIT/ML
5 SYRINGE INTRAVENOUS
Status: DISCONTINUED | OUTPATIENT
Start: 2021-03-29 | End: 2021-03-29 | Stop reason: HOSPADM

## 2021-03-29 RX ORDER — SODIUM CHLORIDE 9 MG/ML
INJECTION, SOLUTION INTRAVENOUS CONTINUOUS
Status: DISCONTINUED | OUTPATIENT
Start: 2021-03-29 | End: 2021-03-29 | Stop reason: HOSPADM

## 2021-03-29 RX ORDER — SODIUM CHLORIDE 0.9 % (FLUSH) 0.9 %
10 SYRINGE (ML) INJECTION
Status: CANCELLED | OUTPATIENT
Start: 2021-03-29

## 2021-03-29 RX ADMIN — SODIUM CHLORIDE: 9 INJECTION, SOLUTION INTRAVENOUS at 12:03

## 2021-03-29 RX ADMIN — FERRIC CARBOXYMALTOSE INJECTION 750 MG: 50 INJECTION, SOLUTION INTRAVENOUS at 12:03

## 2021-04-05 ENCOUNTER — LAB VISIT (OUTPATIENT)
Dept: LAB | Facility: HOSPITAL | Age: 86
End: 2021-04-05
Attending: PHYSICIAN ASSISTANT
Payer: MEDICARE

## 2021-04-05 DIAGNOSIS — C61 PROSTATE CANCER: ICD-10-CM

## 2021-04-05 DIAGNOSIS — C61 PROSTATE CANCER: Primary | ICD-10-CM

## 2021-04-05 LAB
ALBUMIN SERPL BCP-MCNC: 3.9 G/DL (ref 3.5–5.2)
ALP SERPL-CCNC: 52 U/L (ref 55–135)
ALT SERPL W/O P-5'-P-CCNC: 17 U/L (ref 10–44)
ANION GAP SERPL CALC-SCNC: 9 MMOL/L (ref 8–16)
AST SERPL-CCNC: 21 U/L (ref 10–40)
BILIRUB SERPL-MCNC: 0.4 MG/DL (ref 0.1–1)
BUN SERPL-MCNC: 20 MG/DL (ref 8–23)
CALCIUM SERPL-MCNC: 8.9 MG/DL (ref 8.7–10.5)
CHLORIDE SERPL-SCNC: 107 MMOL/L (ref 95–110)
CO2 SERPL-SCNC: 25 MMOL/L (ref 23–29)
CREAT SERPL-MCNC: 1.4 MG/DL (ref 0.5–1.4)
EST. GFR  (AFRICAN AMERICAN): 52.2 ML/MIN/1.73 M^2
EST. GFR  (NON AFRICAN AMERICAN): 45.2 ML/MIN/1.73 M^2
GLUCOSE SERPL-MCNC: 110 MG/DL (ref 70–110)
POTASSIUM SERPL-SCNC: 4.1 MMOL/L (ref 3.5–5.1)
PROT SERPL-MCNC: 7.3 G/DL (ref 6–8.4)
SODIUM SERPL-SCNC: 141 MMOL/L (ref 136–145)

## 2021-04-05 PROCEDURE — 36415 COLL VENOUS BLD VENIPUNCTURE: CPT | Performed by: PHYSICIAN ASSISTANT

## 2021-04-05 PROCEDURE — 80053 COMPREHEN METABOLIC PANEL: CPT | Performed by: PHYSICIAN ASSISTANT

## 2021-04-07 ENCOUNTER — OFFICE VISIT (OUTPATIENT)
Dept: HEMATOLOGY/ONCOLOGY | Facility: CLINIC | Age: 86
End: 2021-04-07
Payer: MEDICARE

## 2021-04-07 VITALS
WEIGHT: 242.06 LBS | TEMPERATURE: 96 F | HEART RATE: 67 BPM | OXYGEN SATURATION: 99 % | BODY MASS INDEX: 35.85 KG/M2 | DIASTOLIC BLOOD PRESSURE: 76 MMHG | SYSTOLIC BLOOD PRESSURE: 158 MMHG | HEIGHT: 69 IN | RESPIRATION RATE: 18 BRPM

## 2021-04-07 DIAGNOSIS — C61 PROSTATE CANCER: Primary | ICD-10-CM

## 2021-04-07 DIAGNOSIS — I26.99 PULMONARY EMBOLISM, OTHER, UNSPECIFIED CHRONICITY, UNSPECIFIED WHETHER ACUTE COR PULMONALE PRESENT: ICD-10-CM

## 2021-04-07 DIAGNOSIS — N18.31 STAGE 3A CHRONIC KIDNEY DISEASE: ICD-10-CM

## 2021-04-07 PROCEDURE — 1159F MED LIST DOCD IN RCRD: CPT | Mod: S$GLB,,, | Performed by: INTERNAL MEDICINE

## 2021-04-07 PROCEDURE — 99214 PR OFFICE/OUTPT VISIT, EST, LEVL IV, 30-39 MIN: ICD-10-PCS | Mod: S$GLB,,, | Performed by: INTERNAL MEDICINE

## 2021-04-07 PROCEDURE — 1126F AMNT PAIN NOTED NONE PRSNT: CPT | Mod: S$GLB,,, | Performed by: INTERNAL MEDICINE

## 2021-04-07 PROCEDURE — 99999 PR PBB SHADOW E&M-EST. PATIENT-LVL V: CPT | Mod: PBBFAC,,, | Performed by: INTERNAL MEDICINE

## 2021-04-07 PROCEDURE — 1101F PR PT FALLS ASSESS DOC 0-1 FALLS W/OUT INJ PAST YR: ICD-10-PCS | Mod: CPTII,S$GLB,, | Performed by: INTERNAL MEDICINE

## 2021-04-07 PROCEDURE — 1159F PR MEDICATION LIST DOCUMENTED IN MEDICAL RECORD: ICD-10-PCS | Mod: S$GLB,,, | Performed by: INTERNAL MEDICINE

## 2021-04-07 PROCEDURE — 99214 OFFICE O/P EST MOD 30 MIN: CPT | Mod: S$GLB,,, | Performed by: INTERNAL MEDICINE

## 2021-04-07 PROCEDURE — 3288F FALL RISK ASSESSMENT DOCD: CPT | Mod: CPTII,S$GLB,, | Performed by: INTERNAL MEDICINE

## 2021-04-07 PROCEDURE — 99999 PR PBB SHADOW E&M-EST. PATIENT-LVL V: ICD-10-PCS | Mod: PBBFAC,,, | Performed by: INTERNAL MEDICINE

## 2021-04-07 PROCEDURE — 1157F PR ADVANCE CARE PLAN OR EQUIV PRESENT IN MEDICAL RECORD: ICD-10-PCS | Mod: S$GLB,,, | Performed by: INTERNAL MEDICINE

## 2021-04-07 PROCEDURE — 1101F PT FALLS ASSESS-DOCD LE1/YR: CPT | Mod: CPTII,S$GLB,, | Performed by: INTERNAL MEDICINE

## 2021-04-07 PROCEDURE — 3288F PR FALLS RISK ASSESSMENT DOCUMENTED: ICD-10-PCS | Mod: CPTII,S$GLB,, | Performed by: INTERNAL MEDICINE

## 2021-04-07 PROCEDURE — 1126F PR PAIN SEVERITY QUANTIFIED, NO PAIN PRESENT: ICD-10-PCS | Mod: S$GLB,,, | Performed by: INTERNAL MEDICINE

## 2021-04-07 PROCEDURE — 1157F ADVNC CARE PLAN IN RCRD: CPT | Mod: S$GLB,,, | Performed by: INTERNAL MEDICINE

## 2021-04-08 ENCOUNTER — TELEPHONE (OUTPATIENT)
Dept: HEMATOLOGY/ONCOLOGY | Facility: CLINIC | Age: 86
End: 2021-04-08

## 2021-04-26 ENCOUNTER — HOSPITAL ENCOUNTER (OUTPATIENT)
Dept: RADIOLOGY | Facility: HOSPITAL | Age: 86
Discharge: HOME | End: 2021-04-26
Attending: INTERNAL MEDICINE
Payer: MEDICARE

## 2021-04-26 DIAGNOSIS — C61 PROSTATE CANCER: ICD-10-CM

## 2021-04-26 DIAGNOSIS — K57.92 DIVERTICULITIS: Primary | ICD-10-CM

## 2021-04-26 PROCEDURE — 25500020 PHARM REV CODE 255: Performed by: INTERNAL MEDICINE

## 2021-04-26 PROCEDURE — 74176 CT ABD & PELVIS W/O CONTRAST: CPT | Mod: 26,,, | Performed by: RADIOLOGY

## 2021-04-26 PROCEDURE — 74176 CT ABDOMEN PELVIS WITHOUT CONTRAST: ICD-10-PCS | Mod: 26,,, | Performed by: RADIOLOGY

## 2021-04-26 PROCEDURE — 78306 BONE IMAGING WHOLE BODY: CPT | Mod: 26,,, | Performed by: RADIOLOGY

## 2021-04-26 PROCEDURE — 78306 NM BONE SCAN WHOLE BODY: ICD-10-PCS | Mod: 26,,, | Performed by: RADIOLOGY

## 2021-04-26 PROCEDURE — A9698 NON-RAD CONTRAST MATERIALNOC: HCPCS | Performed by: INTERNAL MEDICINE

## 2021-04-26 PROCEDURE — A9503 TC99M MEDRONATE: HCPCS

## 2021-04-26 PROCEDURE — 74176 CT ABD & PELVIS W/O CONTRAST: CPT | Mod: TC

## 2021-04-26 RX ORDER — CIPROFLOXACIN 250 MG/1
250 TABLET, FILM COATED ORAL EVERY 12 HOURS
Qty: 14 TABLET | Refills: 0 | Status: SHIPPED | OUTPATIENT
Start: 2021-04-26 | End: 2021-05-03

## 2021-04-26 RX ORDER — METRONIDAZOLE 500 MG/1
500 TABLET ORAL EVERY 8 HOURS
Qty: 21 TABLET | Refills: 0 | Status: SHIPPED | OUTPATIENT
Start: 2021-04-26 | End: 2021-05-17

## 2021-04-26 RX ADMIN — IOHEXOL 1000 ML: 9 SOLUTION ORAL at 07:04

## 2021-04-30 ENCOUNTER — TELEPHONE (OUTPATIENT)
Dept: FAMILY MEDICINE | Facility: CLINIC | Age: 86
End: 2021-04-30

## 2021-04-30 ENCOUNTER — OFFICE VISIT (OUTPATIENT)
Dept: HEMATOLOGY/ONCOLOGY | Facility: CLINIC | Age: 86
End: 2021-04-30
Payer: MEDICARE

## 2021-04-30 VITALS
HEART RATE: 75 BPM | DIASTOLIC BLOOD PRESSURE: 77 MMHG | OXYGEN SATURATION: 96 % | HEIGHT: 69 IN | TEMPERATURE: 97 F | SYSTOLIC BLOOD PRESSURE: 153 MMHG | BODY MASS INDEX: 35.27 KG/M2 | RESPIRATION RATE: 16 BRPM | WEIGHT: 238.13 LBS

## 2021-04-30 DIAGNOSIS — S32.020A COMPRESSION FRACTURE OF L2 VERTEBRA, INITIAL ENCOUNTER: ICD-10-CM

## 2021-04-30 DIAGNOSIS — Z79.818 ANDROGEN DEPRIVATION THERAPY: ICD-10-CM

## 2021-04-30 DIAGNOSIS — I27.82 OTHER CHRONIC PULMONARY EMBOLISM WITHOUT ACUTE COR PULMONALE: ICD-10-CM

## 2021-04-30 DIAGNOSIS — C61 PROSTATE CANCER: Primary | ICD-10-CM

## 2021-04-30 PROCEDURE — 1157F ADVNC CARE PLAN IN RCRD: CPT | Mod: S$GLB,,, | Performed by: INTERNAL MEDICINE

## 2021-04-30 PROCEDURE — 99999 PR PBB SHADOW E&M-EST. PATIENT-LVL V: ICD-10-PCS | Mod: PBBFAC,,, | Performed by: INTERNAL MEDICINE

## 2021-04-30 PROCEDURE — 1126F AMNT PAIN NOTED NONE PRSNT: CPT | Mod: S$GLB,,, | Performed by: INTERNAL MEDICINE

## 2021-04-30 PROCEDURE — 1159F PR MEDICATION LIST DOCUMENTED IN MEDICAL RECORD: ICD-10-PCS | Mod: S$GLB,,, | Performed by: INTERNAL MEDICINE

## 2021-04-30 PROCEDURE — 1101F PT FALLS ASSESS-DOCD LE1/YR: CPT | Mod: CPTII,S$GLB,, | Performed by: INTERNAL MEDICINE

## 2021-04-30 PROCEDURE — 3288F PR FALLS RISK ASSESSMENT DOCUMENTED: ICD-10-PCS | Mod: CPTII,S$GLB,, | Performed by: INTERNAL MEDICINE

## 2021-04-30 PROCEDURE — 1126F PR PAIN SEVERITY QUANTIFIED, NO PAIN PRESENT: ICD-10-PCS | Mod: S$GLB,,, | Performed by: INTERNAL MEDICINE

## 2021-04-30 PROCEDURE — 1159F MED LIST DOCD IN RCRD: CPT | Mod: S$GLB,,, | Performed by: INTERNAL MEDICINE

## 2021-04-30 PROCEDURE — 99213 PR OFFICE/OUTPT VISIT, EST, LEVL III, 20-29 MIN: ICD-10-PCS | Mod: S$GLB,,, | Performed by: INTERNAL MEDICINE

## 2021-04-30 PROCEDURE — 3288F FALL RISK ASSESSMENT DOCD: CPT | Mod: CPTII,S$GLB,, | Performed by: INTERNAL MEDICINE

## 2021-04-30 PROCEDURE — 99999 PR PBB SHADOW E&M-EST. PATIENT-LVL V: CPT | Mod: PBBFAC,,, | Performed by: INTERNAL MEDICINE

## 2021-04-30 PROCEDURE — 1157F PR ADVANCE CARE PLAN OR EQUIV PRESENT IN MEDICAL RECORD: ICD-10-PCS | Mod: S$GLB,,, | Performed by: INTERNAL MEDICINE

## 2021-04-30 PROCEDURE — 99213 OFFICE O/P EST LOW 20 MIN: CPT | Mod: S$GLB,,, | Performed by: INTERNAL MEDICINE

## 2021-04-30 PROCEDURE — 1101F PR PT FALLS ASSESS DOC 0-1 FALLS W/OUT INJ PAST YR: ICD-10-PCS | Mod: CPTII,S$GLB,, | Performed by: INTERNAL MEDICINE

## 2021-05-04 ENCOUNTER — TELEPHONE (OUTPATIENT)
Dept: PAIN MEDICINE | Facility: CLINIC | Age: 86
End: 2021-05-04

## 2021-05-04 ENCOUNTER — OFFICE VISIT (OUTPATIENT)
Dept: SPINE | Facility: CLINIC | Age: 86
End: 2021-05-04
Payer: MEDICARE

## 2021-05-04 VITALS — BODY MASS INDEX: 35.27 KG/M2 | WEIGHT: 238.13 LBS | HEIGHT: 69 IN

## 2021-05-04 DIAGNOSIS — G89.29 CHRONIC BILATERAL LOW BACK PAIN WITHOUT SCIATICA: Primary | ICD-10-CM

## 2021-05-04 DIAGNOSIS — S32.020A COMPRESSION FRACTURE OF L2 VERTEBRA, INITIAL ENCOUNTER: ICD-10-CM

## 2021-05-04 DIAGNOSIS — M54.50 CHRONIC BILATERAL LOW BACK PAIN WITHOUT SCIATICA: Primary | ICD-10-CM

## 2021-05-04 PROCEDURE — 1157F PR ADVANCE CARE PLAN OR EQUIV PRESENT IN MEDICAL RECORD: ICD-10-PCS | Mod: S$GLB,,, | Performed by: PHYSICAL MEDICINE & REHABILITATION

## 2021-05-04 PROCEDURE — 99204 PR OFFICE/OUTPT VISIT, NEW, LEVL IV, 45-59 MIN: ICD-10-PCS | Mod: S$GLB,,, | Performed by: PHYSICAL MEDICINE & REHABILITATION

## 2021-05-04 PROCEDURE — 1126F AMNT PAIN NOTED NONE PRSNT: CPT | Mod: S$GLB,,, | Performed by: PHYSICAL MEDICINE & REHABILITATION

## 2021-05-04 PROCEDURE — 1159F PR MEDICATION LIST DOCUMENTED IN MEDICAL RECORD: ICD-10-PCS | Mod: S$GLB,,, | Performed by: PHYSICAL MEDICINE & REHABILITATION

## 2021-05-04 PROCEDURE — 99204 OFFICE O/P NEW MOD 45 MIN: CPT | Mod: S$GLB,,, | Performed by: PHYSICAL MEDICINE & REHABILITATION

## 2021-05-04 PROCEDURE — 1126F PR PAIN SEVERITY QUANTIFIED, NO PAIN PRESENT: ICD-10-PCS | Mod: S$GLB,,, | Performed by: PHYSICAL MEDICINE & REHABILITATION

## 2021-05-04 PROCEDURE — 3288F FALL RISK ASSESSMENT DOCD: CPT | Mod: CPTII,S$GLB,, | Performed by: PHYSICAL MEDICINE & REHABILITATION

## 2021-05-04 PROCEDURE — 1159F MED LIST DOCD IN RCRD: CPT | Mod: S$GLB,,, | Performed by: PHYSICAL MEDICINE & REHABILITATION

## 2021-05-04 PROCEDURE — 1157F ADVNC CARE PLAN IN RCRD: CPT | Mod: S$GLB,,, | Performed by: PHYSICAL MEDICINE & REHABILITATION

## 2021-05-04 PROCEDURE — 1101F PR PT FALLS ASSESS DOC 0-1 FALLS W/OUT INJ PAST YR: ICD-10-PCS | Mod: CPTII,S$GLB,, | Performed by: PHYSICAL MEDICINE & REHABILITATION

## 2021-05-04 PROCEDURE — 3288F PR FALLS RISK ASSESSMENT DOCUMENTED: ICD-10-PCS | Mod: CPTII,S$GLB,, | Performed by: PHYSICAL MEDICINE & REHABILITATION

## 2021-05-04 PROCEDURE — 1101F PT FALLS ASSESS-DOCD LE1/YR: CPT | Mod: CPTII,S$GLB,, | Performed by: PHYSICAL MEDICINE & REHABILITATION

## 2021-05-05 DIAGNOSIS — M47.816 LUMBAR SPONDYLOSIS: Primary | ICD-10-CM

## 2021-05-07 ENCOUNTER — PATIENT MESSAGE (OUTPATIENT)
Dept: ORTHOPEDICS | Facility: CLINIC | Age: 86
End: 2021-05-07

## 2021-05-19 ENCOUNTER — HOSPITAL ENCOUNTER (OUTPATIENT)
Dept: RADIOLOGY | Facility: HOSPITAL | Age: 86
Discharge: HOME | End: 2021-05-19
Attending: ORTHOPAEDIC SURGERY
Payer: MEDICARE

## 2021-05-19 DIAGNOSIS — M17.0 PRIMARY OSTEOARTHRITIS OF BOTH KNEES: ICD-10-CM

## 2021-05-19 DIAGNOSIS — M17.0 PRIMARY OSTEOARTHRITIS OF BOTH KNEES: Primary | ICD-10-CM

## 2021-05-19 PROCEDURE — 73562 X-RAY EXAM OF KNEE 3: CPT | Mod: 26,50,, | Performed by: RADIOLOGY

## 2021-05-19 PROCEDURE — 73562 X-RAY EXAM OF KNEE 3: CPT | Mod: TC,50,FY

## 2021-05-19 PROCEDURE — 73562 XR KNEE 3 VIEW BILATERAL: ICD-10-PCS | Mod: 26,50,, | Performed by: RADIOLOGY

## 2021-05-20 ENCOUNTER — TELEPHONE (OUTPATIENT)
Dept: PAIN MEDICINE | Facility: CLINIC | Age: 86
End: 2021-05-20

## 2021-05-21 ENCOUNTER — HOSPITAL ENCOUNTER (OUTPATIENT)
Dept: RADIOLOGY | Facility: HOSPITAL | Age: 86
Discharge: HOME | End: 2021-05-21
Attending: INTERNAL MEDICINE
Payer: MEDICARE

## 2021-05-21 PROCEDURE — 93970 EXTREMITY STUDY: CPT | Mod: TC

## 2021-05-21 PROCEDURE — 93970 EXTREMITY STUDY: CPT | Mod: 26,,, | Performed by: RADIOLOGY

## 2021-05-21 PROCEDURE — 93970 US LOWER EXTREMITY VEINS BILATERAL: ICD-10-PCS | Mod: 26,,, | Performed by: RADIOLOGY

## 2021-05-24 ENCOUNTER — TELEPHONE (OUTPATIENT)
Dept: PAIN MEDICINE | Facility: CLINIC | Age: 86
End: 2021-05-24

## 2021-05-24 ENCOUNTER — LAB VISIT (OUTPATIENT)
Dept: FAMILY MEDICINE | Facility: CLINIC | Age: 86
End: 2021-05-24
Payer: MEDICARE

## 2021-05-24 DIAGNOSIS — Z01.818 PRE-OP TESTING: ICD-10-CM

## 2021-05-24 DIAGNOSIS — M47.816 LUMBAR SPONDYLOSIS: Primary | ICD-10-CM

## 2021-05-24 PROCEDURE — U0003 INFECTIOUS AGENT DETECTION BY NUCLEIC ACID (DNA OR RNA); SEVERE ACUTE RESPIRATORY SYNDROME CORONAVIRUS 2 (SARS-COV-2) (CORONAVIRUS DISEASE [COVID-19]), AMPLIFIED PROBE TECHNIQUE, MAKING USE OF HIGH THROUGHPUT TECHNOLOGIES AS DESCRIBED BY CMS-2020-01-R: HCPCS | Performed by: ANESTHESIOLOGY

## 2021-05-24 PROCEDURE — U0005 INFEC AGEN DETEC AMPLI PROBE: HCPCS | Performed by: ANESTHESIOLOGY

## 2021-05-25 LAB — SARS-COV-2 RNA RESP QL NAA+PROBE: NOT DETECTED

## 2021-05-26 ENCOUNTER — HOSPITAL ENCOUNTER (OUTPATIENT)
Facility: HOSPITAL | Age: 86
Discharge: HOME | End: 2021-05-28
Attending: EMERGENCY MEDICINE | Admitting: FAMILY MEDICINE
Payer: MEDICARE

## 2021-05-26 DIAGNOSIS — N17.9 ACUTE KIDNEY INJURY: ICD-10-CM

## 2021-05-26 DIAGNOSIS — K57.92 DIVERTICULITIS: Primary | ICD-10-CM

## 2021-05-26 DIAGNOSIS — R50.9 FEVER AND CHILLS: ICD-10-CM

## 2021-05-26 DIAGNOSIS — R06.02 SHORTNESS OF BREATH: ICD-10-CM

## 2021-05-26 DIAGNOSIS — E86.0 DEHYDRATION: ICD-10-CM

## 2021-05-26 LAB
ALBUMIN SERPL BCP-MCNC: 3.8 G/DL (ref 3.5–5.2)
ALP SERPL-CCNC: 57 U/L (ref 55–135)
ALT SERPL W/O P-5'-P-CCNC: 18 U/L (ref 10–44)
ANION GAP SERPL CALC-SCNC: 14 MMOL/L (ref 8–16)
AST SERPL-CCNC: 19 U/L (ref 10–40)
BASOPHILS # BLD AUTO: 0.03 K/UL (ref 0–0.2)
BASOPHILS NFR BLD: 0.6 % (ref 0–1.9)
BILIRUB SERPL-MCNC: 0.7 MG/DL (ref 0.1–1)
BNP SERPL-MCNC: 24 PG/ML (ref 0–99)
BUN SERPL-MCNC: 19 MG/DL (ref 8–23)
CALCIUM SERPL-MCNC: 9.1 MG/DL (ref 8.7–10.5)
CHLORIDE SERPL-SCNC: 102 MMOL/L (ref 95–110)
CK SERPL-CCNC: 42 U/L (ref 20–200)
CO2 SERPL-SCNC: 22 MMOL/L (ref 23–29)
CREAT SERPL-MCNC: 1.6 MG/DL (ref 0.5–1.4)
DIFFERENTIAL METHOD: ABNORMAL
EOSINOPHIL # BLD AUTO: 0 K/UL (ref 0–0.5)
EOSINOPHIL NFR BLD: 0.8 % (ref 0–8)
ERYTHROCYTE [DISTWIDTH] IN BLOOD BY AUTOMATED COUNT: 13.3 % (ref 11.5–14.5)
EST. GFR  (AFRICAN AMERICAN): 44.1 ML/MIN/1.73 M^2
EST. GFR  (NON AFRICAN AMERICAN): 38.2 ML/MIN/1.73 M^2
GLUCOSE SERPL-MCNC: 172 MG/DL (ref 70–110)
HCT VFR BLD AUTO: 36 % (ref 40–54)
HGB BLD-MCNC: 12.3 G/DL (ref 14–18)
IMM GRANULOCYTES # BLD AUTO: 0.02 K/UL (ref 0–0.04)
IMM GRANULOCYTES NFR BLD AUTO: 0.4 % (ref 0–0.5)
LIPASE SERPL-CCNC: 8 U/L (ref 4–60)
LYMPHOCYTES # BLD AUTO: 0.5 K/UL (ref 1–4.8)
LYMPHOCYTES NFR BLD: 9.4 % (ref 18–48)
MCH RBC QN AUTO: 31.9 PG (ref 27–31)
MCHC RBC AUTO-ENTMCNC: 34.2 G/DL (ref 32–36)
MCV RBC AUTO: 94 FL (ref 82–98)
MONOCYTES # BLD AUTO: 0.4 K/UL (ref 0.3–1)
MONOCYTES NFR BLD: 7.6 % (ref 4–15)
NEUTROPHILS # BLD AUTO: 4.2 K/UL (ref 1.8–7.7)
NEUTROPHILS NFR BLD: 81.2 % (ref 38–73)
NRBC BLD-RTO: 0 /100 WBC
PLATELET # BLD AUTO: 158 K/UL (ref 150–450)
PMV BLD AUTO: 9.6 FL (ref 9.2–12.9)
POTASSIUM SERPL-SCNC: 4.2 MMOL/L (ref 3.5–5.1)
PROT SERPL-MCNC: 7.1 G/DL (ref 6–8.4)
RBC # BLD AUTO: 3.85 M/UL (ref 4.6–6.2)
SARS-COV-2 RDRP RESP QL NAA+PROBE: NEGATIVE
SODIUM SERPL-SCNC: 138 MMOL/L (ref 136–145)
TROPONIN I SERPL DL<=0.01 NG/ML-MCNC: <0.006 NG/ML (ref 0–0.03)
WBC # BLD AUTO: 5.12 K/UL (ref 3.9–12.7)

## 2021-05-26 PROCEDURE — 93005 ELECTROCARDIOGRAM TRACING: CPT

## 2021-05-26 PROCEDURE — U0002 COVID-19 LAB TEST NON-CDC: HCPCS | Performed by: EMERGENCY MEDICINE

## 2021-05-26 PROCEDURE — 25000003 PHARM REV CODE 250: Performed by: EMERGENCY MEDICINE

## 2021-05-26 PROCEDURE — 87040 BLOOD CULTURE FOR BACTERIA: CPT | Performed by: EMERGENCY MEDICINE

## 2021-05-26 PROCEDURE — 63600175 PHARM REV CODE 636 W HCPCS: Performed by: EMERGENCY MEDICINE

## 2021-05-26 PROCEDURE — 82550 ASSAY OF CK (CPK): CPT | Performed by: EMERGENCY MEDICINE

## 2021-05-26 PROCEDURE — 71045 X-RAY EXAM CHEST 1 VIEW: CPT | Mod: 26,,, | Performed by: RADIOLOGY

## 2021-05-26 PROCEDURE — 84484 ASSAY OF TROPONIN QUANT: CPT | Performed by: EMERGENCY MEDICINE

## 2021-05-26 PROCEDURE — 99223 1ST HOSP IP/OBS HIGH 75: CPT | Mod: AI,,, | Performed by: FAMILY MEDICINE

## 2021-05-26 PROCEDURE — 71045 X-RAY EXAM CHEST 1 VIEW: CPT | Mod: TC,FY

## 2021-05-26 PROCEDURE — G0378 HOSPITAL OBSERVATION PER HR: HCPCS

## 2021-05-26 PROCEDURE — 94760 N-INVAS EAR/PLS OXIMETRY 1: CPT

## 2021-05-26 PROCEDURE — 83880 ASSAY OF NATRIURETIC PEPTIDE: CPT | Performed by: EMERGENCY MEDICINE

## 2021-05-26 PROCEDURE — 25000003 PHARM REV CODE 250: Performed by: FAMILY MEDICINE

## 2021-05-26 PROCEDURE — 71045 XR CHEST AP PORTABLE: ICD-10-PCS | Mod: 26,,, | Performed by: RADIOLOGY

## 2021-05-26 PROCEDURE — 96374 THER/PROPH/DIAG INJ IV PUSH: CPT

## 2021-05-26 PROCEDURE — 99223 PR INITIAL HOSPITAL CARE,LEVL III: ICD-10-PCS | Mod: AI,,, | Performed by: FAMILY MEDICINE

## 2021-05-26 PROCEDURE — 21400001 HC TELEMETRY ROOM

## 2021-05-26 PROCEDURE — 74176 CT ABD & PELVIS W/O CONTRAST: CPT | Mod: 26,,, | Performed by: RADIOLOGY

## 2021-05-26 PROCEDURE — 80053 COMPREHEN METABOLIC PANEL: CPT | Performed by: EMERGENCY MEDICINE

## 2021-05-26 PROCEDURE — 74176 CT ABD & PELVIS W/O CONTRAST: CPT | Mod: TC

## 2021-05-26 PROCEDURE — 85025 COMPLETE CBC W/AUTO DIFF WBC: CPT | Performed by: EMERGENCY MEDICINE

## 2021-05-26 PROCEDURE — 83690 ASSAY OF LIPASE: CPT | Performed by: EMERGENCY MEDICINE

## 2021-05-26 PROCEDURE — 74176 CT ABDOMEN PELVIS WITHOUT CONTRAST: ICD-10-PCS | Mod: 26,,, | Performed by: RADIOLOGY

## 2021-05-26 PROCEDURE — 63600175 PHARM REV CODE 636 W HCPCS: Performed by: FAMILY MEDICINE

## 2021-05-26 PROCEDURE — 96361 HYDRATE IV INFUSION ADD-ON: CPT

## 2021-05-26 PROCEDURE — 99285 EMERGENCY DEPT VISIT HI MDM: CPT | Mod: 25

## 2021-05-26 RX ORDER — SODIUM CHLORIDE, SODIUM LACTATE, POTASSIUM CHLORIDE, CALCIUM CHLORIDE 600; 310; 30; 20 MG/100ML; MG/100ML; MG/100ML; MG/100ML
INJECTION, SOLUTION INTRAVENOUS CONTINUOUS
Status: DISCONTINUED | OUTPATIENT
Start: 2021-05-26 | End: 2021-05-28 | Stop reason: HOSPADM

## 2021-05-26 RX ORDER — FLUTICASONE PROPIONATE 50 MCG
1 SPRAY, SUSPENSION (ML) NASAL DAILY
Status: DISCONTINUED | OUTPATIENT
Start: 2021-05-27 | End: 2021-05-28 | Stop reason: HOSPADM

## 2021-05-26 RX ORDER — ONDANSETRON 2 MG/ML
4 INJECTION INTRAMUSCULAR; INTRAVENOUS EVERY 8 HOURS PRN
Status: DISCONTINUED | OUTPATIENT
Start: 2021-05-26 | End: 2021-05-28 | Stop reason: HOSPADM

## 2021-05-26 RX ORDER — TRAMADOL HYDROCHLORIDE 50 MG/1
50 TABLET ORAL EVERY 8 HOURS PRN
Status: DISCONTINUED | OUTPATIENT
Start: 2021-05-26 | End: 2021-05-28 | Stop reason: HOSPADM

## 2021-05-26 RX ORDER — FAMOTIDINE 10 MG/ML
20 INJECTION INTRAVENOUS DAILY
Status: DISCONTINUED | OUTPATIENT
Start: 2021-05-27 | End: 2021-05-28 | Stop reason: HOSPADM

## 2021-05-26 RX ORDER — MORPHINE SULFATE 4 MG/ML
2 INJECTION, SOLUTION INTRAMUSCULAR; INTRAVENOUS EVERY 4 HOURS PRN
Status: DISCONTINUED | OUTPATIENT
Start: 2021-05-26 | End: 2021-05-28 | Stop reason: HOSPADM

## 2021-05-26 RX ORDER — ACETAMINOPHEN 325 MG/1
650 TABLET ORAL EVERY 6 HOURS PRN
Status: DISCONTINUED | OUTPATIENT
Start: 2021-05-26 | End: 2021-05-28 | Stop reason: HOSPADM

## 2021-05-26 RX ORDER — FAMOTIDINE 10 MG/ML
20 INJECTION INTRAVENOUS EVERY 12 HOURS
Status: DISCONTINUED | OUTPATIENT
Start: 2021-05-26 | End: 2021-05-26

## 2021-05-26 RX ORDER — SODIUM CHLORIDE 0.9 % (FLUSH) 0.9 %
10 SYRINGE (ML) INJECTION
Status: DISCONTINUED | OUTPATIENT
Start: 2021-05-26 | End: 2021-05-28 | Stop reason: HOSPADM

## 2021-05-26 RX ORDER — PROCHLORPERAZINE EDISYLATE 5 MG/ML
5 INJECTION INTRAMUSCULAR; INTRAVENOUS EVERY 6 HOURS PRN
Status: DISCONTINUED | OUTPATIENT
Start: 2021-05-26 | End: 2021-05-28 | Stop reason: HOSPADM

## 2021-05-26 RX ORDER — METRONIDAZOLE 500 MG/100ML
500 INJECTION, SOLUTION INTRAVENOUS
Status: DISCONTINUED | OUTPATIENT
Start: 2021-05-26 | End: 2021-05-26

## 2021-05-26 RX ORDER — CETIRIZINE HYDROCHLORIDE 10 MG/1
10 TABLET ORAL DAILY
Status: DISCONTINUED | OUTPATIENT
Start: 2021-05-27 | End: 2021-05-28 | Stop reason: HOSPADM

## 2021-05-26 RX ADMIN — TRAMADOL HYDROCHLORIDE 50 MG: 50 TABLET, FILM COATED ORAL at 03:05

## 2021-05-26 RX ADMIN — APIXABAN 5 MG: 2.5 TABLET, FILM COATED ORAL at 08:05

## 2021-05-26 RX ADMIN — SODIUM CHLORIDE, SODIUM LACTATE, POTASSIUM CHLORIDE, AND CALCIUM CHLORIDE: .6; .31; .03; .02 INJECTION, SOLUTION INTRAVENOUS at 04:05

## 2021-05-26 RX ADMIN — SODIUM CHLORIDE 1 G: 9 INJECTION, SOLUTION INTRAVENOUS at 11:05

## 2021-05-26 RX ADMIN — ACETAMINOPHEN 650 MG: 325 TABLET ORAL at 03:05

## 2021-05-27 LAB
ALBUMIN SERPL BCP-MCNC: 3.4 G/DL (ref 3.5–5.2)
ALP SERPL-CCNC: 48 U/L (ref 55–135)
ALT SERPL W/O P-5'-P-CCNC: 17 U/L (ref 10–44)
ANION GAP SERPL CALC-SCNC: 12 MMOL/L (ref 8–16)
AST SERPL-CCNC: 19 U/L (ref 10–40)
BASOPHILS # BLD AUTO: 0.02 K/UL (ref 0–0.2)
BASOPHILS NFR BLD: 0.5 % (ref 0–1.9)
BILIRUB SERPL-MCNC: 0.7 MG/DL (ref 0.1–1)
BUN SERPL-MCNC: 17 MG/DL (ref 8–23)
CALCIUM SERPL-MCNC: 8.7 MG/DL (ref 8.7–10.5)
CHLORIDE SERPL-SCNC: 102 MMOL/L (ref 95–110)
CO2 SERPL-SCNC: 22 MMOL/L (ref 23–29)
CREAT SERPL-MCNC: 1.4 MG/DL (ref 0.5–1.4)
DIFFERENTIAL METHOD: ABNORMAL
EOSINOPHIL # BLD AUTO: 0 K/UL (ref 0–0.5)
EOSINOPHIL NFR BLD: 0.7 % (ref 0–8)
ERYTHROCYTE [DISTWIDTH] IN BLOOD BY AUTOMATED COUNT: 13.2 % (ref 11.5–14.5)
EST. GFR  (AFRICAN AMERICAN): 51.9 ML/MIN/1.73 M^2
EST. GFR  (NON AFRICAN AMERICAN): 44.9 ML/MIN/1.73 M^2
GLUCOSE SERPL-MCNC: 115 MG/DL (ref 70–110)
HCT VFR BLD AUTO: 34.1 % (ref 40–54)
HGB BLD-MCNC: 11.7 G/DL (ref 14–18)
IMM GRANULOCYTES # BLD AUTO: 0.02 K/UL (ref 0–0.04)
IMM GRANULOCYTES NFR BLD AUTO: 0.5 % (ref 0–0.5)
LYMPHOCYTES # BLD AUTO: 0.8 K/UL (ref 1–4.8)
LYMPHOCYTES NFR BLD: 18 % (ref 18–48)
MCH RBC QN AUTO: 32.1 PG (ref 27–31)
MCHC RBC AUTO-ENTMCNC: 34.3 G/DL (ref 32–36)
MCV RBC AUTO: 94 FL (ref 82–98)
MONOCYTES # BLD AUTO: 0.6 K/UL (ref 0.3–1)
MONOCYTES NFR BLD: 13.4 % (ref 4–15)
NEUTROPHILS # BLD AUTO: 2.8 K/UL (ref 1.8–7.7)
NEUTROPHILS NFR BLD: 66.9 % (ref 38–73)
NRBC BLD-RTO: 0 /100 WBC
PLATELET # BLD AUTO: 135 K/UL (ref 150–450)
PMV BLD AUTO: 9.1 FL (ref 9.2–12.9)
POTASSIUM SERPL-SCNC: 4.1 MMOL/L (ref 3.5–5.1)
PROT SERPL-MCNC: 6.4 G/DL (ref 6–8.4)
RBC # BLD AUTO: 3.64 M/UL (ref 4.6–6.2)
SODIUM SERPL-SCNC: 136 MMOL/L (ref 136–145)
WBC # BLD AUTO: 4.17 K/UL (ref 3.9–12.7)

## 2021-05-27 PROCEDURE — 85025 COMPLETE CBC W/AUTO DIFF WBC: CPT | Performed by: FAMILY MEDICINE

## 2021-05-27 PROCEDURE — 25000003 PHARM REV CODE 250: Performed by: FAMILY MEDICINE

## 2021-05-27 PROCEDURE — 96361 HYDRATE IV INFUSION ADD-ON: CPT

## 2021-05-27 PROCEDURE — G0378 HOSPITAL OBSERVATION PER HR: HCPCS

## 2021-05-27 PROCEDURE — 25000003 PHARM REV CODE 250: Performed by: HOSPITALIST

## 2021-05-27 PROCEDURE — 99225 PR SUBSEQUENT OBSERVATION CARE,LEVEL II: CPT | Mod: ,,, | Performed by: FAMILY MEDICINE

## 2021-05-27 PROCEDURE — 25000242 PHARM REV CODE 250 ALT 637 W/ HCPCS: Performed by: FAMILY MEDICINE

## 2021-05-27 PROCEDURE — 96375 TX/PRO/DX INJ NEW DRUG ADDON: CPT

## 2021-05-27 PROCEDURE — 99225 PR SUBSEQUENT OBSERVATION CARE,LEVEL II: ICD-10-PCS | Mod: ,,, | Performed by: FAMILY MEDICINE

## 2021-05-27 PROCEDURE — 63600175 PHARM REV CODE 636 W HCPCS: Performed by: FAMILY MEDICINE

## 2021-05-27 PROCEDURE — 96376 TX/PRO/DX INJ SAME DRUG ADON: CPT

## 2021-05-27 PROCEDURE — 36415 COLL VENOUS BLD VENIPUNCTURE: CPT | Performed by: FAMILY MEDICINE

## 2021-05-27 PROCEDURE — 25000003 PHARM REV CODE 250

## 2021-05-27 PROCEDURE — 80053 COMPREHEN METABOLIC PANEL: CPT | Performed by: FAMILY MEDICINE

## 2021-05-27 RX ORDER — TRAZODONE HYDROCHLORIDE 50 MG/1
50 TABLET ORAL NIGHTLY PRN
Status: DISCONTINUED | OUTPATIENT
Start: 2021-05-27 | End: 2021-05-28 | Stop reason: HOSPADM

## 2021-05-27 RX ORDER — ZOLPIDEM TARTRATE 5 MG/1
5 TABLET ORAL NIGHTLY PRN
Status: DISCONTINUED | OUTPATIENT
Start: 2021-05-27 | End: 2021-05-28 | Stop reason: HOSPADM

## 2021-05-27 RX ORDER — TRAZODONE HYDROCHLORIDE 50 MG/1
TABLET ORAL
Status: COMPLETED
Start: 2021-05-27 | End: 2021-05-27

## 2021-05-27 RX ADMIN — APIXABAN 5 MG: 2.5 TABLET, FILM COATED ORAL at 08:05

## 2021-05-27 RX ADMIN — SODIUM CHLORIDE 1 G: 9 INJECTION, SOLUTION INTRAVENOUS at 12:05

## 2021-05-27 RX ADMIN — CETIRIZINE HYDROCHLORIDE 10 MG: 10 TABLET, FILM COATED ORAL at 09:05

## 2021-05-27 RX ADMIN — FLUTICASONE PROPIONATE 50 MCG: 50 SPRAY, METERED NASAL at 09:05

## 2021-05-27 RX ADMIN — TRAZODONE HYDROCHLORIDE: 50 TABLET, FILM COATED ORAL at 03:05

## 2021-05-27 RX ADMIN — TRAZODONE HYDROCHLORIDE 25 MG: 50 TABLET ORAL at 03:05

## 2021-05-27 RX ADMIN — APIXABAN 5 MG: 2.5 TABLET, FILM COATED ORAL at 09:05

## 2021-05-27 RX ADMIN — FAMOTIDINE 20 MG: 10 INJECTION INTRAVENOUS at 09:05

## 2021-05-27 RX ADMIN — ZOLPIDEM TARTRATE 5 MG: 5 TABLET ORAL at 08:05

## 2021-05-27 RX ADMIN — SODIUM CHLORIDE, SODIUM LACTATE, POTASSIUM CHLORIDE, AND CALCIUM CHLORIDE: .6; .31; .03; .02 INJECTION, SOLUTION INTRAVENOUS at 10:05

## 2021-05-28 VITALS
OXYGEN SATURATION: 97 % | TEMPERATURE: 98 F | RESPIRATION RATE: 18 BRPM | SYSTOLIC BLOOD PRESSURE: 177 MMHG | BODY MASS INDEX: 34.61 KG/M2 | WEIGHT: 233.69 LBS | HEIGHT: 69 IN | DIASTOLIC BLOOD PRESSURE: 80 MMHG | HEART RATE: 77 BPM

## 2021-05-28 LAB
ALBUMIN SERPL BCP-MCNC: 3.2 G/DL (ref 3.5–5.2)
ALP SERPL-CCNC: 44 U/L (ref 55–135)
ALT SERPL W/O P-5'-P-CCNC: 14 U/L (ref 10–44)
ANION GAP SERPL CALC-SCNC: 11 MMOL/L (ref 8–16)
AST SERPL-CCNC: 16 U/L (ref 10–40)
BASOPHILS # BLD AUTO: 0.04 K/UL (ref 0–0.2)
BASOPHILS NFR BLD: 0.9 % (ref 0–1.9)
BILIRUB SERPL-MCNC: 0.6 MG/DL (ref 0.1–1)
BUN SERPL-MCNC: 17 MG/DL (ref 8–23)
CALCIUM SERPL-MCNC: 8.4 MG/DL (ref 8.7–10.5)
CHLORIDE SERPL-SCNC: 104 MMOL/L (ref 95–110)
CO2 SERPL-SCNC: 23 MMOL/L (ref 23–29)
CREAT SERPL-MCNC: 1.4 MG/DL (ref 0.5–1.4)
DIFFERENTIAL METHOD: ABNORMAL
EOSINOPHIL # BLD AUTO: 0.1 K/UL (ref 0–0.5)
EOSINOPHIL NFR BLD: 2.4 % (ref 0–8)
ERYTHROCYTE [DISTWIDTH] IN BLOOD BY AUTOMATED COUNT: 13.4 % (ref 11.5–14.5)
EST. GFR  (AFRICAN AMERICAN): 51.9 ML/MIN/1.73 M^2
EST. GFR  (NON AFRICAN AMERICAN): 44.9 ML/MIN/1.73 M^2
GLUCOSE SERPL-MCNC: 117 MG/DL (ref 70–110)
HCT VFR BLD AUTO: 32.3 % (ref 40–54)
HGB BLD-MCNC: 11.4 G/DL (ref 14–18)
IMM GRANULOCYTES # BLD AUTO: 0.01 K/UL (ref 0–0.04)
IMM GRANULOCYTES NFR BLD AUTO: 0.2 % (ref 0–0.5)
LYMPHOCYTES # BLD AUTO: 1.1 K/UL (ref 1–4.8)
LYMPHOCYTES NFR BLD: 24.1 % (ref 18–48)
MCH RBC QN AUTO: 32.7 PG (ref 27–31)
MCHC RBC AUTO-ENTMCNC: 35.3 G/DL (ref 32–36)
MCV RBC AUTO: 93 FL (ref 82–98)
MONOCYTES # BLD AUTO: 0.7 K/UL (ref 0.3–1)
MONOCYTES NFR BLD: 14.5 % (ref 4–15)
NEUTROPHILS # BLD AUTO: 2.7 K/UL (ref 1.8–7.7)
NEUTROPHILS NFR BLD: 57.9 % (ref 38–73)
NRBC BLD-RTO: 0 /100 WBC
PLATELET # BLD AUTO: 135 K/UL (ref 150–450)
PMV BLD AUTO: 9.8 FL (ref 9.2–12.9)
POTASSIUM SERPL-SCNC: 3.8 MMOL/L (ref 3.5–5.1)
PROT SERPL-MCNC: 6 G/DL (ref 6–8.4)
RBC # BLD AUTO: 3.49 M/UL (ref 4.6–6.2)
SODIUM SERPL-SCNC: 138 MMOL/L (ref 136–145)
WBC # BLD AUTO: 4.61 K/UL (ref 3.9–12.7)

## 2021-05-28 PROCEDURE — 25000242 PHARM REV CODE 250 ALT 637 W/ HCPCS: Performed by: FAMILY MEDICINE

## 2021-05-28 PROCEDURE — G0378 HOSPITAL OBSERVATION PER HR: HCPCS

## 2021-05-28 PROCEDURE — 96361 HYDRATE IV INFUSION ADD-ON: CPT

## 2021-05-28 PROCEDURE — 99217 PR OBSERVATION CARE DISCHARGE: ICD-10-PCS | Mod: ,,, | Performed by: FAMILY MEDICINE

## 2021-05-28 PROCEDURE — 80053 COMPREHEN METABOLIC PANEL: CPT | Performed by: FAMILY MEDICINE

## 2021-05-28 PROCEDURE — 36415 COLL VENOUS BLD VENIPUNCTURE: CPT | Performed by: FAMILY MEDICINE

## 2021-05-28 PROCEDURE — 63600175 PHARM REV CODE 636 W HCPCS: Performed by: FAMILY MEDICINE

## 2021-05-28 PROCEDURE — 96376 TX/PRO/DX INJ SAME DRUG ADON: CPT

## 2021-05-28 PROCEDURE — 99217 PR OBSERVATION CARE DISCHARGE: CPT | Mod: ,,, | Performed by: FAMILY MEDICINE

## 2021-05-28 PROCEDURE — 97802 MEDICAL NUTRITION INDIV IN: CPT

## 2021-05-28 PROCEDURE — 25000003 PHARM REV CODE 250: Performed by: FAMILY MEDICINE

## 2021-05-28 PROCEDURE — 85025 COMPLETE CBC W/AUTO DIFF WBC: CPT | Performed by: FAMILY MEDICINE

## 2021-05-28 RX ORDER — FAMOTIDINE 20 MG/1
20 TABLET, FILM COATED ORAL 2 TIMES DAILY
Qty: 60 TABLET | Refills: 11 | Status: SHIPPED | OUTPATIENT
Start: 2021-05-28 | End: 2021-10-07

## 2021-05-28 RX ORDER — AMOXICILLIN AND CLAVULANATE POTASSIUM 875; 125 MG/1; MG/1
1 TABLET, FILM COATED ORAL EVERY 12 HOURS
Qty: 20 TABLET | Refills: 0 | Status: SHIPPED | OUTPATIENT
Start: 2021-05-28 | End: 2021-06-16 | Stop reason: ALTCHOICE

## 2021-05-28 RX ADMIN — SODIUM CHLORIDE 1 G: 9 INJECTION, SOLUTION INTRAVENOUS at 12:05

## 2021-05-28 RX ADMIN — FLUTICASONE PROPIONATE 50 MCG: 50 SPRAY, METERED NASAL at 09:05

## 2021-05-28 RX ADMIN — FAMOTIDINE 20 MG: 10 INJECTION INTRAVENOUS at 09:05

## 2021-05-28 RX ADMIN — APIXABAN 5 MG: 2.5 TABLET, FILM COATED ORAL at 09:05

## 2021-05-28 RX ADMIN — CETIRIZINE HYDROCHLORIDE 10 MG: 10 TABLET, FILM COATED ORAL at 09:05

## 2021-05-31 LAB — BACTERIA BLD CULT: NORMAL

## 2021-06-16 PROBLEM — Z86.711 HISTORY OF PULMONARY EMBOLUS (PE): Status: ACTIVE | Noted: 2021-02-22

## 2021-06-16 PROBLEM — I50.9 ACUTE CONGESTIVE HEART FAILURE: Status: RESOLVED | Noted: 2021-02-20 | Resolved: 2021-06-16

## 2021-06-21 ENCOUNTER — PATIENT MESSAGE (OUTPATIENT)
Dept: PAIN MEDICINE | Facility: CLINIC | Age: 86
End: 2021-06-21

## 2021-07-06 DIAGNOSIS — C61 PROSTATE CANCER: ICD-10-CM

## 2021-07-06 DIAGNOSIS — R97.20 ELEVATED PSA MEASUREMENT: ICD-10-CM

## 2021-07-06 RX ORDER — BICALUTAMIDE 50 MG/1
50 TABLET, FILM COATED ORAL DAILY
Qty: 30 TABLET | Refills: 3 | Status: SHIPPED | OUTPATIENT
Start: 2021-07-06 | End: 2021-07-06

## 2021-07-06 RX ORDER — BICALUTAMIDE 50 MG/1
50 TABLET, FILM COATED ORAL DAILY
Qty: 30 TABLET | Refills: 3 | Status: SHIPPED | OUTPATIENT
Start: 2021-07-06 | End: 2021-10-28 | Stop reason: SDUPTHER

## 2021-07-12 ENCOUNTER — HOSPITAL ENCOUNTER (OUTPATIENT)
Facility: AMBULARY SURGERY CENTER | Age: 86
Discharge: HOME | End: 2021-07-12
Attending: ANESTHESIOLOGY | Admitting: ANESTHESIOLOGY
Payer: MEDICARE

## 2021-07-12 DIAGNOSIS — M47.816 LUMBAR SPONDYLOSIS: Primary | ICD-10-CM

## 2021-07-12 DIAGNOSIS — M47.896 OTHER SPONDYLOSIS, LUMBAR REGION: ICD-10-CM

## 2021-07-12 PROCEDURE — 64493 INJ PARAVERT F JNT L/S 1 LEV: CPT | Mod: 50,,, | Performed by: ANESTHESIOLOGY

## 2021-07-12 PROCEDURE — 64493 PR INJ DX/THER AGNT PARAVERT FACET JOINT,IMG GUIDE,LUMBAR/SAC,1ST LVL: ICD-10-PCS | Mod: 50,,, | Performed by: ANESTHESIOLOGY

## 2021-07-12 PROCEDURE — 64494 INJ PARAVERT F JNT L/S 2 LEV: CPT | Mod: RT | Performed by: ANESTHESIOLOGY

## 2021-07-12 PROCEDURE — 64493 INJ PARAVERT F JNT L/S 1 LEV: CPT | Mod: RT | Performed by: ANESTHESIOLOGY

## 2021-07-12 PROCEDURE — 64494 INJ PARAVERT F JNT L/S 2 LEV: CPT | Mod: RT,,, | Performed by: ANESTHESIOLOGY

## 2021-07-12 PROCEDURE — 64494 PR INJ DX/THER AGNT PARAVERT FACET JOINT,IMG GUIDE,LUMBAR/SAC, 2ND LEVEL: ICD-10-PCS | Mod: LT,,, | Performed by: ANESTHESIOLOGY

## 2021-07-12 RX ORDER — SODIUM CHLORIDE, SODIUM LACTATE, POTASSIUM CHLORIDE, CALCIUM CHLORIDE 600; 310; 30; 20 MG/100ML; MG/100ML; MG/100ML; MG/100ML
INJECTION, SOLUTION INTRAVENOUS ONCE AS NEEDED
Status: DISCONTINUED | OUTPATIENT
Start: 2021-07-12 | End: 2021-07-12 | Stop reason: HOSPADM

## 2021-07-12 RX ORDER — LIDOCAINE HYDROCHLORIDE 10 MG/ML
INJECTION, SOLUTION EPIDURAL; INFILTRATION; INTRACAUDAL; PERINEURAL
Status: DISCONTINUED | OUTPATIENT
Start: 2021-07-12 | End: 2021-07-12 | Stop reason: HOSPADM

## 2021-07-12 RX ORDER — BUPIVACAINE HYDROCHLORIDE 5 MG/ML
INJECTION, SOLUTION EPIDURAL; INTRACAUDAL
Status: DISCONTINUED | OUTPATIENT
Start: 2021-07-12 | End: 2021-07-12 | Stop reason: HOSPADM

## 2021-07-13 ENCOUNTER — TELEPHONE (OUTPATIENT)
Dept: PAIN MEDICINE | Facility: CLINIC | Age: 86
End: 2021-07-13

## 2021-07-13 VITALS
HEART RATE: 71 BPM | RESPIRATION RATE: 18 BRPM | HEIGHT: 69 IN | OXYGEN SATURATION: 98 % | SYSTOLIC BLOOD PRESSURE: 178 MMHG | WEIGHT: 229 LBS | BODY MASS INDEX: 33.92 KG/M2 | DIASTOLIC BLOOD PRESSURE: 77 MMHG | TEMPERATURE: 98 F

## 2021-07-13 DIAGNOSIS — M17.12 OSTEOARTHRITIS OF LEFT KNEE, UNSPECIFIED OSTEOARTHRITIS TYPE: ICD-10-CM

## 2021-07-13 DIAGNOSIS — M47.816 LUMBAR SPONDYLOSIS: Primary | ICD-10-CM

## 2021-07-16 ENCOUNTER — LAB VISIT (OUTPATIENT)
Dept: LAB | Facility: HOSPITAL | Age: 86
End: 2021-07-16
Payer: MEDICARE

## 2021-07-16 DIAGNOSIS — C61 PROSTATE CANCER: ICD-10-CM

## 2021-07-16 LAB
ALBUMIN SERPL BCP-MCNC: 3.9 G/DL (ref 3.5–5.2)
ALP SERPL-CCNC: 64 U/L (ref 55–135)
ALT SERPL W/O P-5'-P-CCNC: 17 U/L (ref 10–44)
ANION GAP SERPL CALC-SCNC: 10 MMOL/L (ref 8–16)
AST SERPL-CCNC: 14 U/L (ref 10–40)
BASOPHILS # BLD AUTO: 0.04 K/UL (ref 0–0.2)
BASOPHILS NFR BLD: 0.7 % (ref 0–1.9)
BILIRUB SERPL-MCNC: 0.5 MG/DL (ref 0.1–1)
BUN SERPL-MCNC: 26 MG/DL (ref 8–23)
CALCIUM SERPL-MCNC: 9.9 MG/DL (ref 8.7–10.5)
CHLORIDE SERPL-SCNC: 104 MMOL/L (ref 95–110)
CO2 SERPL-SCNC: 25 MMOL/L (ref 23–29)
COMPLEXED PSA SERPL-MCNC: 0.07 NG/ML (ref 0–4)
CREAT SERPL-MCNC: 1.7 MG/DL (ref 0.5–1.4)
DIFFERENTIAL METHOD: ABNORMAL
EOSINOPHIL # BLD AUTO: 0.1 K/UL (ref 0–0.5)
EOSINOPHIL NFR BLD: 1.4 % (ref 0–8)
ERYTHROCYTE [DISTWIDTH] IN BLOOD BY AUTOMATED COUNT: 12.6 % (ref 11.5–14.5)
EST. GFR  (AFRICAN AMERICAN): 41 ML/MIN/1.73 M^2
EST. GFR  (NON AFRICAN AMERICAN): 35.5 ML/MIN/1.73 M^2
GLUCOSE SERPL-MCNC: 244 MG/DL (ref 70–110)
HCT VFR BLD AUTO: 35.8 % (ref 40–54)
HGB BLD-MCNC: 12.1 G/DL (ref 14–18)
IMM GRANULOCYTES # BLD AUTO: 0.03 K/UL (ref 0–0.04)
IMM GRANULOCYTES NFR BLD AUTO: 0.5 % (ref 0–0.5)
LYMPHOCYTES # BLD AUTO: 1.3 K/UL (ref 1–4.8)
LYMPHOCYTES NFR BLD: 23.8 % (ref 18–48)
MCH RBC QN AUTO: 33.8 PG (ref 27–31)
MCHC RBC AUTO-ENTMCNC: 33.8 G/DL (ref 32–36)
MCV RBC AUTO: 100 FL (ref 82–98)
MONOCYTES # BLD AUTO: 0.5 K/UL (ref 0.3–1)
MONOCYTES NFR BLD: 9.4 % (ref 4–15)
NEUTROPHILS # BLD AUTO: 3.6 K/UL (ref 1.8–7.7)
NEUTROPHILS NFR BLD: 64.2 % (ref 38–73)
NRBC BLD-RTO: 0 /100 WBC
PLATELET # BLD AUTO: 169 K/UL (ref 150–450)
PMV BLD AUTO: 8.8 FL (ref 9.2–12.9)
POTASSIUM SERPL-SCNC: 4.5 MMOL/L (ref 3.5–5.1)
PROT SERPL-MCNC: 7.3 G/DL (ref 6–8.4)
RBC # BLD AUTO: 3.58 M/UL (ref 4.6–6.2)
SODIUM SERPL-SCNC: 139 MMOL/L (ref 136–145)
WBC # BLD AUTO: 5.54 K/UL (ref 3.9–12.7)

## 2021-07-16 PROCEDURE — 80053 COMPREHEN METABOLIC PANEL: CPT | Performed by: INTERNAL MEDICINE

## 2021-07-16 PROCEDURE — 85025 COMPLETE CBC W/AUTO DIFF WBC: CPT | Performed by: INTERNAL MEDICINE

## 2021-07-16 PROCEDURE — 36415 COLL VENOUS BLD VENIPUNCTURE: CPT | Performed by: INTERNAL MEDICINE

## 2021-07-16 PROCEDURE — 84153 ASSAY OF PSA TOTAL: CPT | Performed by: INTERNAL MEDICINE

## 2021-07-23 ENCOUNTER — OFFICE VISIT (OUTPATIENT)
Dept: HEMATOLOGY/ONCOLOGY | Facility: CLINIC | Age: 86
End: 2021-07-23
Payer: MEDICARE

## 2021-07-23 VITALS
TEMPERATURE: 96 F | SYSTOLIC BLOOD PRESSURE: 159 MMHG | DIASTOLIC BLOOD PRESSURE: 74 MMHG | RESPIRATION RATE: 18 BRPM | HEIGHT: 69 IN | BODY MASS INDEX: 35.69 KG/M2 | HEART RATE: 72 BPM | WEIGHT: 240.94 LBS | OXYGEN SATURATION: 99 %

## 2021-07-23 DIAGNOSIS — Z86.711 HISTORY OF PULMONARY EMBOLUS (PE): ICD-10-CM

## 2021-07-23 DIAGNOSIS — C61 PROSTATE CANCER: Primary | ICD-10-CM

## 2021-07-23 DIAGNOSIS — N18.30 STAGE 3 CHRONIC KIDNEY DISEASE, UNSPECIFIED WHETHER STAGE 3A OR 3B CKD: ICD-10-CM

## 2021-07-23 DIAGNOSIS — N17.9 ACUTE KIDNEY INJURY: ICD-10-CM

## 2021-07-23 DIAGNOSIS — R73.9 HYPERGLYCEMIA: ICD-10-CM

## 2021-07-23 PROCEDURE — 1101F PR PT FALLS ASSESS DOC 0-1 FALLS W/OUT INJ PAST YR: ICD-10-PCS | Mod: CPTII,S$GLB,, | Performed by: INTERNAL MEDICINE

## 2021-07-23 PROCEDURE — 1159F PR MEDICATION LIST DOCUMENTED IN MEDICAL RECORD: ICD-10-PCS | Mod: CPTII,S$GLB,, | Performed by: INTERNAL MEDICINE

## 2021-07-23 PROCEDURE — 1159F MED LIST DOCD IN RCRD: CPT | Mod: CPTII,S$GLB,, | Performed by: INTERNAL MEDICINE

## 2021-07-23 PROCEDURE — 99999 PR PBB SHADOW E&M-EST. PATIENT-LVL V: ICD-10-PCS | Mod: PBBFAC,,, | Performed by: INTERNAL MEDICINE

## 2021-07-23 PROCEDURE — 3288F FALL RISK ASSESSMENT DOCD: CPT | Mod: CPTII,S$GLB,, | Performed by: INTERNAL MEDICINE

## 2021-07-23 PROCEDURE — 1157F ADVNC CARE PLAN IN RCRD: CPT | Mod: CPTII,S$GLB,, | Performed by: INTERNAL MEDICINE

## 2021-07-23 PROCEDURE — 3288F PR FALLS RISK ASSESSMENT DOCUMENTED: ICD-10-PCS | Mod: CPTII,S$GLB,, | Performed by: INTERNAL MEDICINE

## 2021-07-23 PROCEDURE — 1101F PT FALLS ASSESS-DOCD LE1/YR: CPT | Mod: CPTII,S$GLB,, | Performed by: INTERNAL MEDICINE

## 2021-07-23 PROCEDURE — 1125F AMNT PAIN NOTED PAIN PRSNT: CPT | Mod: CPTII,S$GLB,, | Performed by: INTERNAL MEDICINE

## 2021-07-23 PROCEDURE — 99214 PR OFFICE/OUTPT VISIT, EST, LEVL IV, 30-39 MIN: ICD-10-PCS | Mod: S$GLB,,, | Performed by: INTERNAL MEDICINE

## 2021-07-23 PROCEDURE — 1125F PR PAIN SEVERITY QUANTIFIED, PAIN PRESENT: ICD-10-PCS | Mod: CPTII,S$GLB,, | Performed by: INTERNAL MEDICINE

## 2021-07-23 PROCEDURE — 99214 OFFICE O/P EST MOD 30 MIN: CPT | Mod: S$GLB,,, | Performed by: INTERNAL MEDICINE

## 2021-07-23 PROCEDURE — 1157F PR ADVANCE CARE PLAN OR EQUIV PRESENT IN MEDICAL RECORD: ICD-10-PCS | Mod: CPTII,S$GLB,, | Performed by: INTERNAL MEDICINE

## 2021-07-23 PROCEDURE — 99999 PR PBB SHADOW E&M-EST. PATIENT-LVL V: CPT | Mod: PBBFAC,,, | Performed by: INTERNAL MEDICINE

## 2021-07-27 ENCOUNTER — TELEPHONE (OUTPATIENT)
Dept: HEMATOLOGY/ONCOLOGY | Facility: CLINIC | Age: 86
End: 2021-07-27

## 2021-07-30 ENCOUNTER — LAB VISIT (OUTPATIENT)
Dept: FAMILY MEDICINE | Facility: CLINIC | Age: 86
End: 2021-07-30
Payer: MEDICARE

## 2021-07-30 DIAGNOSIS — Z01.818 PRE-OP TESTING: Primary | ICD-10-CM

## 2021-07-30 DIAGNOSIS — Z01.818 PRE-OP TESTING: ICD-10-CM

## 2021-07-30 PROCEDURE — U0003 INFECTIOUS AGENT DETECTION BY NUCLEIC ACID (DNA OR RNA); SEVERE ACUTE RESPIRATORY SYNDROME CORONAVIRUS 2 (SARS-COV-2) (CORONAVIRUS DISEASE [COVID-19]), AMPLIFIED PROBE TECHNIQUE, MAKING USE OF HIGH THROUGHPUT TECHNOLOGIES AS DESCRIBED BY CMS-2020-01-R: HCPCS | Performed by: ANESTHESIOLOGY

## 2021-07-30 PROCEDURE — U0005 INFEC AGEN DETEC AMPLI PROBE: HCPCS | Performed by: ANESTHESIOLOGY

## 2021-07-31 LAB
SARS-COV-2 RNA RESP QL NAA+PROBE: NOT DETECTED
SARS-COV-2- CYCLE NUMBER: -1

## 2021-08-02 ENCOUNTER — TELEPHONE (OUTPATIENT)
Dept: PAIN MEDICINE | Facility: CLINIC | Age: 86
End: 2021-08-02

## 2021-08-02 ENCOUNTER — HOSPITAL ENCOUNTER (OUTPATIENT)
Facility: AMBULARY SURGERY CENTER | Age: 86
Discharge: HOME | End: 2021-08-02
Attending: ANESTHESIOLOGY | Admitting: ANESTHESIOLOGY
Payer: MEDICARE

## 2021-08-02 DIAGNOSIS — M47.896 OTHER SPONDYLOSIS, LUMBAR REGION: Primary | ICD-10-CM

## 2021-08-02 PROCEDURE — 99152 PR MOD CONSCIOUS SEDATION, SAME PHYS, 5+ YRS, FIRST 15 MIN: ICD-10-PCS | Mod: ,,, | Performed by: ANESTHESIOLOGY

## 2021-08-02 PROCEDURE — 64635 DESTROY LUMB/SAC FACET JNT: CPT | Mod: RT | Performed by: ANESTHESIOLOGY

## 2021-08-02 PROCEDURE — 64636 PR DESTROY L/S FACET JNT ADDL: ICD-10-PCS | Mod: RT,,, | Performed by: ANESTHESIOLOGY

## 2021-08-02 PROCEDURE — 64636 DESTROY L/S FACET JNT ADDL: CPT | Mod: RT | Performed by: ANESTHESIOLOGY

## 2021-08-02 PROCEDURE — 64635 PR DESTROY LUMB/SAC FACET JNT: ICD-10-PCS | Mod: 50,,, | Performed by: ANESTHESIOLOGY

## 2021-08-02 PROCEDURE — 64636 DESTROY L/S FACET JNT ADDL: CPT | Mod: RT,,, | Performed by: ANESTHESIOLOGY

## 2021-08-02 PROCEDURE — 64635 DESTROY LUMB/SAC FACET JNT: CPT | Mod: 50,,, | Performed by: ANESTHESIOLOGY

## 2021-08-02 PROCEDURE — 99152 MOD SED SAME PHYS/QHP 5/>YRS: CPT | Mod: ,,, | Performed by: ANESTHESIOLOGY

## 2021-08-02 RX ORDER — MIDAZOLAM HYDROCHLORIDE 1 MG/ML
INJECTION INTRAMUSCULAR; INTRAVENOUS
Status: DISCONTINUED | OUTPATIENT
Start: 2021-08-02 | End: 2021-08-02 | Stop reason: HOSPADM

## 2021-08-02 RX ORDER — LIDOCAINE HYDROCHLORIDE 20 MG/ML
INJECTION, SOLUTION EPIDURAL; INFILTRATION; INTRACAUDAL; PERINEURAL
Status: DISCONTINUED | OUTPATIENT
Start: 2021-08-02 | End: 2021-08-02 | Stop reason: HOSPADM

## 2021-08-02 RX ORDER — METHYLPREDNISOLONE ACETATE 80 MG/ML
INJECTION, SUSPENSION INTRA-ARTICULAR; INTRALESIONAL; INTRAMUSCULAR; SOFT TISSUE
Status: DISCONTINUED | OUTPATIENT
Start: 2021-08-02 | End: 2021-08-02 | Stop reason: HOSPADM

## 2021-08-02 RX ORDER — FENTANYL CITRATE 50 UG/ML
INJECTION, SOLUTION INTRAMUSCULAR; INTRAVENOUS
Status: DISCONTINUED | OUTPATIENT
Start: 2021-08-02 | End: 2021-08-02 | Stop reason: HOSPADM

## 2021-08-02 RX ORDER — BUPIVACAINE HYDROCHLORIDE 2.5 MG/ML
INJECTION, SOLUTION EPIDURAL; INFILTRATION; INTRACAUDAL
Status: DISCONTINUED | OUTPATIENT
Start: 2021-08-02 | End: 2021-08-02 | Stop reason: HOSPADM

## 2021-08-02 RX ORDER — LIDOCAINE HYDROCHLORIDE 10 MG/ML
INJECTION, SOLUTION EPIDURAL; INFILTRATION; INTRACAUDAL; PERINEURAL
Status: DISCONTINUED | OUTPATIENT
Start: 2021-08-02 | End: 2021-08-02 | Stop reason: HOSPADM

## 2021-08-02 RX ORDER — SODIUM CHLORIDE, SODIUM LACTATE, POTASSIUM CHLORIDE, CALCIUM CHLORIDE 600; 310; 30; 20 MG/100ML; MG/100ML; MG/100ML; MG/100ML
INJECTION, SOLUTION INTRAVENOUS ONCE AS NEEDED
Status: COMPLETED | OUTPATIENT
Start: 2021-08-02 | End: 2021-08-02

## 2021-08-02 RX ADMIN — SODIUM CHLORIDE, SODIUM LACTATE, POTASSIUM CHLORIDE, CALCIUM CHLORIDE: 600; 310; 30; 20 INJECTION, SOLUTION INTRAVENOUS at 12:08

## 2021-08-03 ENCOUNTER — TELEPHONE (OUTPATIENT)
Dept: HEMATOLOGY/ONCOLOGY | Facility: CLINIC | Age: 86
End: 2021-08-03

## 2021-08-03 VITALS
TEMPERATURE: 98 F | SYSTOLIC BLOOD PRESSURE: 143 MMHG | OXYGEN SATURATION: 97 % | HEART RATE: 72 BPM | DIASTOLIC BLOOD PRESSURE: 76 MMHG | WEIGHT: 229 LBS | RESPIRATION RATE: 20 BRPM | HEIGHT: 69 IN | BODY MASS INDEX: 33.92 KG/M2

## 2021-08-09 ENCOUNTER — PATIENT MESSAGE (OUTPATIENT)
Dept: PAIN MEDICINE | Facility: CLINIC | Age: 86
End: 2021-08-09

## 2021-08-11 ENCOUNTER — TELEPHONE (OUTPATIENT)
Dept: PAIN MEDICINE | Facility: CLINIC | Age: 86
End: 2021-08-11

## 2021-08-11 DIAGNOSIS — M17.12 OSTEOARTHRITIS OF LEFT KNEE, UNSPECIFIED OSTEOARTHRITIS TYPE: Primary | ICD-10-CM

## 2021-08-13 ENCOUNTER — TELEPHONE (OUTPATIENT)
Dept: PAIN MEDICINE | Facility: CLINIC | Age: 86
End: 2021-08-13

## 2021-08-16 ENCOUNTER — PATIENT MESSAGE (OUTPATIENT)
Dept: PAIN MEDICINE | Facility: CLINIC | Age: 86
End: 2021-08-16

## 2021-08-18 ENCOUNTER — IMMUNIZATION (OUTPATIENT)
Dept: FAMILY MEDICINE | Facility: CLINIC | Age: 86
End: 2021-08-18
Payer: MEDICARE

## 2021-08-18 ENCOUNTER — CLINICAL SUPPORT (OUTPATIENT)
Dept: PAIN MEDICINE | Facility: CLINIC | Age: 86
End: 2021-08-18
Payer: MEDICARE

## 2021-08-18 VITALS
HEART RATE: 76 BPM | HEIGHT: 69 IN | DIASTOLIC BLOOD PRESSURE: 82 MMHG | BODY MASS INDEX: 35.1 KG/M2 | WEIGHT: 237 LBS | SYSTOLIC BLOOD PRESSURE: 159 MMHG

## 2021-08-18 DIAGNOSIS — G89.29 CHRONIC PAIN OF RIGHT KNEE: ICD-10-CM

## 2021-08-18 DIAGNOSIS — Z23 NEED FOR VACCINATION: Primary | ICD-10-CM

## 2021-08-18 DIAGNOSIS — M25.561 CHRONIC PAIN OF RIGHT KNEE: ICD-10-CM

## 2021-08-18 DIAGNOSIS — M17.11 PRIMARY OSTEOARTHRITIS OF RIGHT KNEE: Primary | ICD-10-CM

## 2021-08-18 PROCEDURE — 99214 PR OFFICE/OUTPT VISIT, EST, LEVL IV, 30-39 MIN: ICD-10-PCS | Mod: 25,S$GLB,, | Performed by: ANESTHESIOLOGY

## 2021-08-18 PROCEDURE — 20610 LARGE JOINT ASPIRATION/INJECTION: R KNEE: ICD-10-PCS | Mod: RT,S$GLB,, | Performed by: ANESTHESIOLOGY

## 2021-08-18 PROCEDURE — 91301 COVID-19, MRNA, LNP-S, PF, 100 MCG/0.5 ML DOSE VACCINE: CPT | Mod: S$GLB,,, | Performed by: FAMILY MEDICINE

## 2021-08-18 PROCEDURE — 0013A COVID-19, MRNA, LNP-S, PF, 100 MCG/0.5 ML DOSE VACCINE: CPT | Mod: CV19,S$GLB,, | Performed by: FAMILY MEDICINE

## 2021-08-18 PROCEDURE — 20610 DRAIN/INJ JOINT/BURSA W/O US: CPT | Mod: RT,S$GLB,, | Performed by: ANESTHESIOLOGY

## 2021-08-18 PROCEDURE — 91301 COVID-19, MRNA, LNP-S, PF, 100 MCG/0.5 ML DOSE VACCINE: ICD-10-PCS | Mod: S$GLB,,, | Performed by: FAMILY MEDICINE

## 2021-08-18 PROCEDURE — 99214 OFFICE O/P EST MOD 30 MIN: CPT | Mod: 25,S$GLB,, | Performed by: ANESTHESIOLOGY

## 2021-08-18 PROCEDURE — 0013A COVID-19, MRNA, LNP-S, PF, 100 MCG/0.5 ML DOSE VACCINE: ICD-10-PCS | Mod: CV19,S$GLB,, | Performed by: FAMILY MEDICINE

## 2021-08-18 PROCEDURE — 99999 PR PBB SHADOW E&M-EST. PATIENT-LVL III: CPT | Mod: PBBFAC,,, | Performed by: ANESTHESIOLOGY

## 2021-08-18 PROCEDURE — 99999 PR PBB SHADOW E&M-EST. PATIENT-LVL III: ICD-10-PCS | Mod: PBBFAC,,, | Performed by: ANESTHESIOLOGY

## 2021-09-20 ENCOUNTER — OFFICE VISIT (OUTPATIENT)
Dept: PAIN MEDICINE | Facility: CLINIC | Age: 86
End: 2021-09-20
Payer: MEDICARE

## 2021-09-20 VITALS
HEART RATE: 75 BPM | HEIGHT: 69 IN | OXYGEN SATURATION: 97 % | DIASTOLIC BLOOD PRESSURE: 79 MMHG | WEIGHT: 237 LBS | BODY MASS INDEX: 35.1 KG/M2 | TEMPERATURE: 99 F | SYSTOLIC BLOOD PRESSURE: 154 MMHG

## 2021-09-20 DIAGNOSIS — M17.12 OSTEOARTHRITIS OF LEFT KNEE, UNSPECIFIED OSTEOARTHRITIS TYPE: ICD-10-CM

## 2021-09-20 DIAGNOSIS — M17.11 PRIMARY OSTEOARTHRITIS OF RIGHT KNEE: Primary | ICD-10-CM

## 2021-09-20 DIAGNOSIS — M54.16 LUMBAR RADICULOPATHY: ICD-10-CM

## 2021-09-20 DIAGNOSIS — M47.816 LUMBAR SPONDYLOSIS: ICD-10-CM

## 2021-09-20 DIAGNOSIS — M51.36 DDD (DEGENERATIVE DISC DISEASE), LUMBAR: ICD-10-CM

## 2021-09-20 PROCEDURE — 99024 PR POST-OP FOLLOW-UP VISIT: ICD-10-PCS | Mod: S$GLB,,, | Performed by: NURSE PRACTITIONER

## 2021-09-20 PROCEDURE — 1157F ADVNC CARE PLAN IN RCRD: CPT | Mod: CPTII,S$GLB,, | Performed by: NURSE PRACTITIONER

## 2021-09-20 PROCEDURE — 99999 PR PBB SHADOW E&M-EST. PATIENT-LVL III: ICD-10-PCS | Mod: PBBFAC,,, | Performed by: NURSE PRACTITIONER

## 2021-09-20 PROCEDURE — 1159F PR MEDICATION LIST DOCUMENTED IN MEDICAL RECORD: ICD-10-PCS | Mod: CPTII,S$GLB,, | Performed by: NURSE PRACTITIONER

## 2021-09-20 PROCEDURE — 1157F PR ADVANCE CARE PLAN OR EQUIV PRESENT IN MEDICAL RECORD: ICD-10-PCS | Mod: CPTII,S$GLB,, | Performed by: NURSE PRACTITIONER

## 2021-09-20 PROCEDURE — 99024 POSTOP FOLLOW-UP VISIT: CPT | Mod: S$GLB,,, | Performed by: NURSE PRACTITIONER

## 2021-09-20 PROCEDURE — 1125F PR PAIN SEVERITY QUANTIFIED, PAIN PRESENT: ICD-10-PCS | Mod: CPTII,S$GLB,, | Performed by: NURSE PRACTITIONER

## 2021-09-20 PROCEDURE — 1159F MED LIST DOCD IN RCRD: CPT | Mod: CPTII,S$GLB,, | Performed by: NURSE PRACTITIONER

## 2021-09-20 PROCEDURE — 1125F AMNT PAIN NOTED PAIN PRSNT: CPT | Mod: CPTII,S$GLB,, | Performed by: NURSE PRACTITIONER

## 2021-09-20 PROCEDURE — 99999 PR PBB SHADOW E&M-EST. PATIENT-LVL III: CPT | Mod: PBBFAC,,, | Performed by: NURSE PRACTITIONER

## 2021-09-20 PROCEDURE — 1101F PT FALLS ASSESS-DOCD LE1/YR: CPT | Mod: CPTII,S$GLB,, | Performed by: NURSE PRACTITIONER

## 2021-09-20 PROCEDURE — 3288F PR FALLS RISK ASSESSMENT DOCUMENTED: ICD-10-PCS | Mod: CPTII,S$GLB,, | Performed by: NURSE PRACTITIONER

## 2021-09-20 PROCEDURE — 1101F PR PT FALLS ASSESS DOC 0-1 FALLS W/OUT INJ PAST YR: ICD-10-PCS | Mod: CPTII,S$GLB,, | Performed by: NURSE PRACTITIONER

## 2021-09-20 PROCEDURE — 3288F FALL RISK ASSESSMENT DOCD: CPT | Mod: CPTII,S$GLB,, | Performed by: NURSE PRACTITIONER

## 2021-09-29 ENCOUNTER — LAB VISIT (OUTPATIENT)
Dept: LAB | Facility: HOSPITAL | Age: 86
End: 2021-09-29
Attending: INTERNAL MEDICINE
Payer: MEDICARE

## 2021-09-29 DIAGNOSIS — C61 PROSTATE CANCER: ICD-10-CM

## 2021-09-29 LAB
ALBUMIN SERPL BCP-MCNC: 3.9 G/DL (ref 3.5–5.2)
ALP SERPL-CCNC: 57 U/L (ref 55–135)
ALT SERPL W/O P-5'-P-CCNC: 12 U/L (ref 10–44)
ANION GAP SERPL CALC-SCNC: 12 MMOL/L (ref 8–16)
AST SERPL-CCNC: 14 U/L (ref 10–40)
BASOPHILS # BLD AUTO: 0.05 K/UL (ref 0–0.2)
BASOPHILS NFR BLD: 0.8 % (ref 0–1.9)
BILIRUB SERPL-MCNC: 0.6 MG/DL (ref 0.1–1)
BUN SERPL-MCNC: 30 MG/DL (ref 8–23)
CALCIUM SERPL-MCNC: 10.5 MG/DL (ref 8.7–10.5)
CHLORIDE SERPL-SCNC: 104 MMOL/L (ref 95–110)
CO2 SERPL-SCNC: 24 MMOL/L (ref 23–29)
COMPLEXED PSA SERPL-MCNC: 0.02 NG/ML (ref 0–4)
CREAT SERPL-MCNC: 1.8 MG/DL (ref 0.5–1.4)
DIFFERENTIAL METHOD: ABNORMAL
EOSINOPHIL # BLD AUTO: 0.1 K/UL (ref 0–0.5)
EOSINOPHIL NFR BLD: 1.4 % (ref 0–8)
ERYTHROCYTE [DISTWIDTH] IN BLOOD BY AUTOMATED COUNT: 12.3 % (ref 11.5–14.5)
EST. GFR  (AFRICAN AMERICAN): 38.3 ML/MIN/1.73 M^2
EST. GFR  (NON AFRICAN AMERICAN): 33.1 ML/MIN/1.73 M^2
GLUCOSE SERPL-MCNC: 158 MG/DL (ref 70–110)
HCT VFR BLD AUTO: 36.4 % (ref 40–54)
HGB BLD-MCNC: 12.4 G/DL (ref 14–18)
IMM GRANULOCYTES # BLD AUTO: 0.02 K/UL (ref 0–0.04)
IMM GRANULOCYTES NFR BLD AUTO: 0.3 % (ref 0–0.5)
LYMPHOCYTES # BLD AUTO: 1.5 K/UL (ref 1–4.8)
LYMPHOCYTES NFR BLD: 22.9 % (ref 18–48)
MCH RBC QN AUTO: 33.5 PG (ref 27–31)
MCHC RBC AUTO-ENTMCNC: 34.1 G/DL (ref 32–36)
MCV RBC AUTO: 98 FL (ref 82–98)
MONOCYTES # BLD AUTO: 0.5 K/UL (ref 0.3–1)
MONOCYTES NFR BLD: 7.5 % (ref 4–15)
NEUTROPHILS # BLD AUTO: 4.3 K/UL (ref 1.8–7.7)
NEUTROPHILS NFR BLD: 67.1 % (ref 38–73)
NRBC BLD-RTO: 0 /100 WBC
PLATELET # BLD AUTO: 193 K/UL (ref 150–450)
PMV BLD AUTO: 9.2 FL (ref 9.2–12.9)
POTASSIUM SERPL-SCNC: 4.8 MMOL/L (ref 3.5–5.1)
PROT SERPL-MCNC: 7.5 G/DL (ref 6–8.4)
RBC # BLD AUTO: 3.7 M/UL (ref 4.6–6.2)
SODIUM SERPL-SCNC: 140 MMOL/L (ref 136–145)
WBC # BLD AUTO: 6.42 K/UL (ref 3.9–12.7)

## 2021-09-29 PROCEDURE — 85025 COMPLETE CBC W/AUTO DIFF WBC: CPT | Performed by: INTERNAL MEDICINE

## 2021-09-29 PROCEDURE — 80053 COMPREHEN METABOLIC PANEL: CPT | Performed by: INTERNAL MEDICINE

## 2021-09-29 PROCEDURE — 84153 ASSAY OF PSA TOTAL: CPT | Performed by: INTERNAL MEDICINE

## 2021-10-01 ENCOUNTER — INFUSION (OUTPATIENT)
Dept: INFUSION THERAPY | Facility: HOSPITAL | Age: 86
End: 2021-10-01
Attending: INTERNAL MEDICINE
Payer: MEDICARE

## 2021-10-01 ENCOUNTER — OFFICE VISIT (OUTPATIENT)
Dept: HEMATOLOGY/ONCOLOGY | Facility: CLINIC | Age: 86
End: 2021-10-01
Payer: MEDICARE

## 2021-10-01 VITALS
HEIGHT: 69 IN | BODY MASS INDEX: 35.98 KG/M2 | HEART RATE: 66 BPM | OXYGEN SATURATION: 100 % | SYSTOLIC BLOOD PRESSURE: 149 MMHG | DIASTOLIC BLOOD PRESSURE: 72 MMHG | RESPIRATION RATE: 18 BRPM | WEIGHT: 242.94 LBS | TEMPERATURE: 96 F

## 2021-10-01 VITALS
DIASTOLIC BLOOD PRESSURE: 72 MMHG | SYSTOLIC BLOOD PRESSURE: 149 MMHG | RESPIRATION RATE: 18 BRPM | WEIGHT: 242.94 LBS | HEIGHT: 69 IN | OXYGEN SATURATION: 100 % | TEMPERATURE: 97 F | HEART RATE: 66 BPM | BODY MASS INDEX: 35.98 KG/M2

## 2021-10-01 DIAGNOSIS — N18.30 STAGE 3 CHRONIC KIDNEY DISEASE, UNSPECIFIED WHETHER STAGE 3A OR 3B CKD: ICD-10-CM

## 2021-10-01 DIAGNOSIS — C61 PROSTATE CANCER: Primary | ICD-10-CM

## 2021-10-01 DIAGNOSIS — Z86.711 HISTORY OF PULMONARY EMBOLUS (PE): ICD-10-CM

## 2021-10-01 DIAGNOSIS — M15.9 PRIMARY OSTEOARTHRITIS INVOLVING MULTIPLE JOINTS: ICD-10-CM

## 2021-10-01 PROCEDURE — 1159F PR MEDICATION LIST DOCUMENTED IN MEDICAL RECORD: ICD-10-PCS | Mod: CPTII,S$GLB,, | Performed by: INTERNAL MEDICINE

## 2021-10-01 PROCEDURE — 99214 PR OFFICE/OUTPT VISIT, EST, LEVL IV, 30-39 MIN: ICD-10-PCS | Mod: S$GLB,,, | Performed by: INTERNAL MEDICINE

## 2021-10-01 PROCEDURE — 1126F PR PAIN SEVERITY QUANTIFIED, NO PAIN PRESENT: ICD-10-PCS | Mod: CPTII,S$GLB,, | Performed by: INTERNAL MEDICINE

## 2021-10-01 PROCEDURE — 1126F AMNT PAIN NOTED NONE PRSNT: CPT | Mod: CPTII,S$GLB,, | Performed by: INTERNAL MEDICINE

## 2021-10-01 PROCEDURE — 99214 OFFICE O/P EST MOD 30 MIN: CPT | Mod: S$GLB,,, | Performed by: INTERNAL MEDICINE

## 2021-10-01 PROCEDURE — 1157F ADVNC CARE PLAN IN RCRD: CPT | Mod: CPTII,S$GLB,, | Performed by: INTERNAL MEDICINE

## 2021-10-01 PROCEDURE — 63600175 PHARM REV CODE 636 W HCPCS: Mod: JG | Performed by: INTERNAL MEDICINE

## 2021-10-01 PROCEDURE — 96402 CHEMO HORMON ANTINEOPL SQ/IM: CPT

## 2021-10-01 PROCEDURE — 3288F FALL RISK ASSESSMENT DOCD: CPT | Mod: CPTII,S$GLB,, | Performed by: INTERNAL MEDICINE

## 2021-10-01 PROCEDURE — 1101F PT FALLS ASSESS-DOCD LE1/YR: CPT | Mod: CPTII,S$GLB,, | Performed by: INTERNAL MEDICINE

## 2021-10-01 PROCEDURE — 99999 PR PBB SHADOW E&M-EST. PATIENT-LVL V: ICD-10-PCS | Mod: PBBFAC,,, | Performed by: INTERNAL MEDICINE

## 2021-10-01 PROCEDURE — 1159F MED LIST DOCD IN RCRD: CPT | Mod: CPTII,S$GLB,, | Performed by: INTERNAL MEDICINE

## 2021-10-01 PROCEDURE — 99999 PR PBB SHADOW E&M-EST. PATIENT-LVL V: CPT | Mod: PBBFAC,,, | Performed by: INTERNAL MEDICINE

## 2021-10-01 PROCEDURE — 1101F PR PT FALLS ASSESS DOC 0-1 FALLS W/OUT INJ PAST YR: ICD-10-PCS | Mod: CPTII,S$GLB,, | Performed by: INTERNAL MEDICINE

## 2021-10-01 PROCEDURE — 1157F PR ADVANCE CARE PLAN OR EQUIV PRESENT IN MEDICAL RECORD: ICD-10-PCS | Mod: CPTII,S$GLB,, | Performed by: INTERNAL MEDICINE

## 2021-10-01 PROCEDURE — 3288F PR FALLS RISK ASSESSMENT DOCUMENTED: ICD-10-PCS | Mod: CPTII,S$GLB,, | Performed by: INTERNAL MEDICINE

## 2021-10-01 RX ADMIN — LEUPROLIDE ACETATE 45 MG: KIT at 08:10

## 2021-10-28 ENCOUNTER — PATIENT MESSAGE (OUTPATIENT)
Dept: HEMATOLOGY/ONCOLOGY | Facility: CLINIC | Age: 86
End: 2021-10-28
Payer: MEDICARE

## 2021-10-28 DIAGNOSIS — C61 PROSTATE CANCER: ICD-10-CM

## 2021-10-28 DIAGNOSIS — R97.20 ELEVATED PSA MEASUREMENT: ICD-10-CM

## 2021-10-29 RX ORDER — BICALUTAMIDE 50 MG/1
50 TABLET, FILM COATED ORAL DAILY
Qty: 30 TABLET | Refills: 3 | Status: SHIPPED | OUTPATIENT
Start: 2021-10-29 | End: 2022-03-04 | Stop reason: SDUPTHER

## 2021-11-05 ENCOUNTER — HOSPITAL ENCOUNTER (OUTPATIENT)
Dept: RADIOLOGY | Facility: HOSPITAL | Age: 86
Discharge: HOME | End: 2021-11-05
Attending: INTERNAL MEDICINE
Payer: MEDICARE

## 2021-11-05 DIAGNOSIS — S32.020A COMPRESSION FRACTURE OF L2 VERTEBRA, INITIAL ENCOUNTER: ICD-10-CM

## 2021-11-05 PROCEDURE — 77080 DXA BONE DENSITY AXIAL: CPT | Mod: TC

## 2021-11-05 PROCEDURE — 77080 DEXA BONE DENSITY SPINE HIP: ICD-10-PCS | Mod: 26,,, | Performed by: RADIOLOGY

## 2021-11-05 PROCEDURE — 77080 DXA BONE DENSITY AXIAL: CPT | Mod: 26,,, | Performed by: RADIOLOGY

## 2021-12-01 ENCOUNTER — PATIENT MESSAGE (OUTPATIENT)
Dept: HEMATOLOGY/ONCOLOGY | Facility: CLINIC | Age: 86
End: 2021-12-01
Payer: MEDICARE

## 2021-12-02 ENCOUNTER — PATIENT MESSAGE (OUTPATIENT)
Dept: PAIN MEDICINE | Facility: CLINIC | Age: 86
End: 2021-12-02
Payer: MEDICARE

## 2021-12-02 ENCOUNTER — PATIENT MESSAGE (OUTPATIENT)
Dept: HEMATOLOGY/ONCOLOGY | Facility: CLINIC | Age: 86
End: 2021-12-02
Payer: MEDICARE

## 2021-12-21 ENCOUNTER — OFFICE VISIT (OUTPATIENT)
Dept: PAIN MEDICINE | Facility: CLINIC | Age: 86
End: 2021-12-21
Payer: MEDICARE

## 2021-12-21 VITALS
HEART RATE: 74 BPM | WEIGHT: 242 LBS | DIASTOLIC BLOOD PRESSURE: 83 MMHG | SYSTOLIC BLOOD PRESSURE: 145 MMHG | HEIGHT: 69 IN | BODY MASS INDEX: 35.84 KG/M2

## 2021-12-21 DIAGNOSIS — M51.36 DDD (DEGENERATIVE DISC DISEASE), LUMBAR: Primary | ICD-10-CM

## 2021-12-21 DIAGNOSIS — M47.896 OTHER SPONDYLOSIS, LUMBAR REGION: ICD-10-CM

## 2021-12-21 DIAGNOSIS — M54.16 LUMBAR RADICULOPATHY: ICD-10-CM

## 2021-12-21 PROCEDURE — 99214 PR OFFICE/OUTPT VISIT, EST, LEVL IV, 30-39 MIN: ICD-10-PCS | Mod: S$GLB,,, | Performed by: ANESTHESIOLOGY

## 2021-12-21 PROCEDURE — 99999 PR PBB SHADOW E&M-EST. PATIENT-LVL IV: ICD-10-PCS | Mod: PBBFAC,,, | Performed by: ANESTHESIOLOGY

## 2021-12-21 PROCEDURE — 1157F PR ADVANCE CARE PLAN OR EQUIV PRESENT IN MEDICAL RECORD: ICD-10-PCS | Mod: CPTII,S$GLB,, | Performed by: ANESTHESIOLOGY

## 2021-12-21 PROCEDURE — 1157F ADVNC CARE PLAN IN RCRD: CPT | Mod: CPTII,S$GLB,, | Performed by: ANESTHESIOLOGY

## 2021-12-21 PROCEDURE — 99214 OFFICE O/P EST MOD 30 MIN: CPT | Mod: S$GLB,,, | Performed by: ANESTHESIOLOGY

## 2021-12-21 PROCEDURE — 99999 PR PBB SHADOW E&M-EST. PATIENT-LVL IV: CPT | Mod: PBBFAC,,, | Performed by: ANESTHESIOLOGY

## 2021-12-21 RX ORDER — TRAMADOL HYDROCHLORIDE 50 MG/1
50 TABLET ORAL EVERY 6 HOURS PRN
Qty: 30 TABLET | Refills: 0 | Status: SHIPPED | OUTPATIENT
Start: 2021-12-21 | End: 2022-02-14 | Stop reason: SDUPTHER

## 2022-01-05 ENCOUNTER — LAB VISIT (OUTPATIENT)
Dept: LAB | Facility: HOSPITAL | Age: 87
End: 2022-01-05
Attending: INTERNAL MEDICINE
Payer: MEDICARE

## 2022-01-05 DIAGNOSIS — C61 PROSTATE CANCER: ICD-10-CM

## 2022-01-05 LAB — COMPLEXED PSA SERPL-MCNC: 0.01 NG/ML (ref 0–4)

## 2022-01-05 PROCEDURE — 84153 ASSAY OF PSA TOTAL: CPT | Performed by: INTERNAL MEDICINE

## 2022-01-05 PROCEDURE — 36415 COLL VENOUS BLD VENIPUNCTURE: CPT | Performed by: INTERNAL MEDICINE

## 2022-01-07 ENCOUNTER — OFFICE VISIT (OUTPATIENT)
Dept: HEMATOLOGY/ONCOLOGY | Facility: CLINIC | Age: 87
End: 2022-01-07
Payer: MEDICARE

## 2022-01-07 VITALS
HEIGHT: 69 IN | WEIGHT: 242.75 LBS | TEMPERATURE: 98 F | BODY MASS INDEX: 35.95 KG/M2 | SYSTOLIC BLOOD PRESSURE: 191 MMHG | DIASTOLIC BLOOD PRESSURE: 89 MMHG | HEART RATE: 74 BPM

## 2022-01-07 DIAGNOSIS — C61 PROSTATE CANCER: Primary | ICD-10-CM

## 2022-01-07 PROCEDURE — 99213 OFFICE O/P EST LOW 20 MIN: CPT | Mod: S$GLB,,, | Performed by: INTERNAL MEDICINE

## 2022-01-07 PROCEDURE — 1160F PR REVIEW ALL MEDS BY PRESCRIBER/CLIN PHARMACIST DOCUMENTED: ICD-10-PCS | Mod: CPTII,S$GLB,, | Performed by: INTERNAL MEDICINE

## 2022-01-07 PROCEDURE — 1159F PR MEDICATION LIST DOCUMENTED IN MEDICAL RECORD: ICD-10-PCS | Mod: CPTII,S$GLB,, | Performed by: INTERNAL MEDICINE

## 2022-01-07 PROCEDURE — 99999 PR PBB SHADOW E&M-EST. PATIENT-LVL III: ICD-10-PCS | Mod: PBBFAC,,, | Performed by: INTERNAL MEDICINE

## 2022-01-07 PROCEDURE — 1125F PR PAIN SEVERITY QUANTIFIED, PAIN PRESENT: ICD-10-PCS | Mod: CPTII,S$GLB,, | Performed by: INTERNAL MEDICINE

## 2022-01-07 PROCEDURE — 1160F RVW MEDS BY RX/DR IN RCRD: CPT | Mod: CPTII,S$GLB,, | Performed by: INTERNAL MEDICINE

## 2022-01-07 PROCEDURE — 99999 PR PBB SHADOW E&M-EST. PATIENT-LVL III: CPT | Mod: PBBFAC,,, | Performed by: INTERNAL MEDICINE

## 2022-01-07 PROCEDURE — 1125F AMNT PAIN NOTED PAIN PRSNT: CPT | Mod: CPTII,S$GLB,, | Performed by: INTERNAL MEDICINE

## 2022-01-07 PROCEDURE — 1157F ADVNC CARE PLAN IN RCRD: CPT | Mod: CPTII,S$GLB,, | Performed by: INTERNAL MEDICINE

## 2022-01-07 PROCEDURE — 99213 PR OFFICE/OUTPT VISIT, EST, LEVL III, 20-29 MIN: ICD-10-PCS | Mod: S$GLB,,, | Performed by: INTERNAL MEDICINE

## 2022-01-07 PROCEDURE — 1157F PR ADVANCE CARE PLAN OR EQUIV PRESENT IN MEDICAL RECORD: ICD-10-PCS | Mod: CPTII,S$GLB,, | Performed by: INTERNAL MEDICINE

## 2022-01-07 PROCEDURE — 1159F MED LIST DOCD IN RCRD: CPT | Mod: CPTII,S$GLB,, | Performed by: INTERNAL MEDICINE

## 2022-01-10 ENCOUNTER — CLINICAL SUPPORT (OUTPATIENT)
Dept: REHABILITATION | Facility: HOSPITAL | Age: 87
End: 2022-01-10
Attending: ANESTHESIOLOGY
Payer: MEDICARE

## 2022-01-10 DIAGNOSIS — Z74.09 IMPAIRED FUNCTIONAL MOBILITY, BALANCE, GAIT, AND ENDURANCE: Primary | ICD-10-CM

## 2022-01-10 DIAGNOSIS — M51.36 DDD (DEGENERATIVE DISC DISEASE), LUMBAR: ICD-10-CM

## 2022-01-10 DIAGNOSIS — M54.16 LUMBAR RADICULOPATHY: ICD-10-CM

## 2022-01-10 DIAGNOSIS — M47.896 OTHER SPONDYLOSIS, LUMBAR REGION: ICD-10-CM

## 2022-01-10 PROCEDURE — 97110 THERAPEUTIC EXERCISES: CPT | Mod: PN

## 2022-01-10 PROCEDURE — 97162 PT EVAL MOD COMPLEX 30 MIN: CPT | Mod: PN

## 2022-01-10 NOTE — PLAN OF CARE
[facility]   Physical Therapy Initial Evaluation     Date: 1/10/2022   Name: Anthony Sheldon  Clinic Number: [id number]    Therapy Diagnosis:   Encounter Diagnoses   Name Primary?    Impaired functional mobility, balance, gait, and endurance Yes    DDD (degenerative disc disease), lumbar     Lumbar radiculopathy     Other spondylosis, lumbar region      Physician: Jade Layton MD    Physician Orders: PT Eval and Treat Back  Medical Diagnosis from Referral: DDD lumbar, lumbar radiculopathy, other spondylosis lumbar region  Evaluation Date: 1/10/2022  Authorization Period Expiration: 12/22/2022  Plan of Care Expiration: 2/22/2022  Progress Note Due: 2/7/2022  Visit # / Visits authorized: 1/ 12   FOTO: Next survey due week of 1/24/2022    Precautions: Fall and cancer     Time In: 0802  Time Out: 0850  Total Appointment Time (timed & untimed codes): 43 minutes      SUBJECTIVE   Date of onset: Chronic 12/2021    History of current condition - Anthony reports: he has a history of chronic lower back pain of several years duration.  Treatments have included: 8/18/2021: Right knee Monovisc injection - 80% benefit  8/2/2021: Radiofrequency ablation of the L3, L4, and L5 medial branch nerves on the bilateral-side   · 5/18/2018: Left knee coolief radiofrequency:  75% relief  · Euflexxa in right knee 3/2018: Good benefit  · 06/17/2019:  L5/S1 interlaminar SARITHA:  50% benefit, mostly of low back pain   He has recently tried lumbar injections but they only last about a month.  He is now being referred to PT.      Falls: No    Imaging, none: recently    Prior Therapy: Yes following a knee surgery and following each MINO (last MINO was about 4-5 years ago)   Social History:  lives alone in a raised home with 1 flight of steps to enter.  There is a lift that patient uses.    Occupation: Retired.    Prior Level of Function: chronic problem.    Current Level of Function: Limited standing about 5-10  "minutes, difficulty with bending, getting out of chair, car transfers, walking tolerance limited to about 10 min ("that's more related to stamina"), sleep is minimally disturbed.      Pain:  Current 3/10, worst 6/10, best 0/10   Location: central lower back   Description: intermittent sharp pain.    Aggravating Factors: bending, standing, walking  Easing Factors: Standing up straight after bending, sitting in firm chair, medication    Patients goals: To get a little more stamina, to get a little more balance.       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    Prostate cancer 05/2019    PSA elevation 10/7/2015    Wears glasses        Surgical History:   Anthony Sheldon  has a past surgical history that includes Tonsillectomy (1950); Total hip arthroplasty (Bilateral, 2013 & 2014); Total shoulder arthroplasty (Right, 2005); Joint replacement; Knee Arthroplasty (Left); Injection of joint (Left, 1/21/2019); Biopsy with transrectal ultrasound (TRUS) guidance (Bilateral, 3/27/2019); Cystoscopy (N/A, 3/27/2019); Epidural steroid injection (N/A, 6/17/2019); Epidural steroid injection (N/A, 12/11/2019); Colonoscopy (N/A, 1/13/2020); Esophagogastroduodenoscopy (N/A, 1/13/2020); Injection of anesthetic agent around medial branch nerves innervating lumbar facet joint (Bilateral, 7/12/2021); and Radiofrequency thermocoagulation (Bilateral, 8/2/2021).    Medications:   Anthony has a current medication list which includes the following prescription(s): apixaban, " bicalutamide, doxycycline, multivitamin, tramadol, and tramadol.    Allergies:   Review of patient's allergies indicates:  No Known Allergies       OBJECTIVE     Posture: Standing: pelvis level, R shoulder higher than L, Hips externally rotated, decreased lumbar lordosis, increased thoracic kyphosis, moderate rounded shoulder and forward head posture.  Sitting R shoulder higher than L. Flattened to minimally reversed lumbar lordosis, increased thoracic kyphosis, moderate rounded shoulder and forward head posture.    Palpation: Tenderness present B lumbar region  Sensation: Reports pre-existing numbness around L knee/ankle following knee replacement.    Range of Motion/Strength:   Thoracic/Lumbar AROM: Pain/Dysfunction with Movement:   Flexion                       75* - 45* = 30*    Extension                  10* - 7* = 3*    Right side bending    35*    Left side bending      25*    Right rotation            50%    Left rotation              50%    B LE ROM grossly WFL except active external rotation R to 5%, L to 15*.     Strength:  Hip flexion 4-/5, ext 4/5, abd 4/5, add 4+/5          Knee extension 4-/5, flexion 4/5          Ankle DF 4-/5  Flexibility: Hamstring length R 133*, L 125*; piriformis moderate to severe tightness B; calves moderate tightness B.    Gait: Without AD  Analysis: Increased stance width, decreased foot clearance, decreased step length  Bed Mobility:Independent sit > supine but required assistance for supine > sit on plinth.    Transfers: Independent but with increased UE support and extra time   Special Tests: N/A    Limitation/Restriction for FOTO Lumbar Spine Survey    Therapist reviewed FOTO scores for Anthony Sheldon on 1/10/2022.   FOTO documents entered into [employer] - see Media section.    Limitation Score: 58%         TREATMENT     Total Treatment time (time-based codes) separate from Evaluation: 8 minutes      Anthony received the treatments listed below:      therapeutic exercises  to develop flexibility for 8 minutes including:   Standing calf stretch using wedge 3x30s ea   Seated hamstring stretch 3x30s ea   Seated piriformis stretch (modified) 3x30s ea      PATIENT EDUCATION AND HOME EXERCISES     Education provided:   - Initiated instruction in HEP    Written Home Exercises Provided: yes. Exercises were reviewed and Anthony was able to demonstrate them prior to the end of the session.  Anthony demonstrated good  understanding of the education provided. See EMR under Patient Instructions for exercises provided during therapy sessions.    ASSESSMENT     Anthony is a 87 y.o. male referred to outpatient Physical Therapy with a medical diagnosis of DDD lumbar, lumbar radiculopathy, other spondylosis lumbar region. Patient presents with increased pain, decreased lumbar and hip ROM, impaired posture, increased tenderness in affected region, decreased LE flexibility, decreased LE and core strength, decreased endurance for usual activities (patient reported).  These problems contribute to impaired functional mobility, decreased activity tolerance, and impaired balance.  He should benefit from skilled PT services to address these problems in order to minimize functional deficits.      Patient prognosis is Fair.   Patientt will benefit from skilled outpatient Physical Therapy to address the deficits stated above and in the chart below, provide patient /family education, and to maximize patientt's level of independence.     Plan of care discussed with patient: Yes  Patient's spiritual, cultural and educational needs considered and patient is agreeable to the plan of care and goals as stated below:     Anticipated Barriers for therapy: potential compliance with HEP    Medical Necessity is demonstrated by the following  History  Co-morbidities and personal factors that may impact the plan of care Co-morbidities:   advanced age, difficulty sleeping, high BMI, history of cancer and prior hip  surgery    Personal Factors:   age     high   Examination  Body Structures and Functions, activity limitations and participation restrictions that may impact the plan of care Body Regions:   back  lower extremities    Body Systems:    gross symmetry  ROM  strength  balance  gait  transfers    Participation Restrictions:   None    Activity limitations:   Learning and applying knowledge  no deficits    General Tasks and Commands  no deficits    Communication  no deficits    Mobility  lifting and carrying objects  walking  driving (bike, car, motorcycle)    Self care  dressing    Domestic Life  shopping  cooking    Interactions/Relationships  no deficits    Life Areas  no deficits    Community and Social Life  community life  recreation and leisure         high   Clinical Presentation evolving clinical presentation with changing clinical characteristics moderate   Decision Making/ Complexity Score: moderate     Goals:  Short Term Goals: 3 weeks    1) Decrease max pain to < 5/10   2) Facilitate improved LE flexibility   3) Facilitate improved posture in sitting and standing   4) Increase lumbar ROM to allow for patient to perform LE dressing without significant increase in pain    Long Term Goals: 6 weeks    1) Patient will consistently perform sit <> stand transfers with minimal difficulty and minimal increase in symptoms   2) Patient will stand > 20 min without increased LB symptoms/fatigue to allow for cooking activities   3) Patient will increase walking tolerance to > 40 min to allow for completion of grocery shopping without significant increase in symptoms   4) Patient will consistently perform car transfers without increased difficulty   5) Improve functional deficit to < 45% as indicated by FOTO Lumbar Spine Survey    PLAN   Plan of care Certification: 1/10/2022 to 2/22/2022.    Outpatient Physical Therapy 2 times weekly for 6 weeks to include the following interventions: Gait Training, Manual Therapy, Moist  Heat/ Ice, Patient Education, Therapeutic Activities, Therapeutic Exercise and Dry Needling.     Stacie Elena, PT      I CERTIFY THE NEED FOR THESE SERVICES FURNISHED UNDER THIS PLAN OF TREATMENT AND WHILE UNDER MY CARE   Physician's comments:     Physician's Signature: ___________________________________________________

## 2022-01-10 NOTE — PATIENT INSTRUCTIONS
Gastroc / Plantar Fascia, Standing        Stand, one foot on wedge (slanted at about 30°), heel resting on floor. Keep toes straight and hands on wall. With leg straight, press entire body forward. Hold 30 seconds.  Repeat 3 times per session. Do 1 sessions per day.    Copyright © I. All rights reserved.   Chair Sitting        Sit at edge of seat, spine straight, one leg extended. Put a hand on each thigh and bend forward from the hip, keeping spine straight. Allow hand on extended leg to reach toward toes. Support upper body with other arm. Hold 30 seconds.  Repeat 3 times per session. Do 1 sessions per day.    Copyright © I. All rights reserved.   Piriformis Stretch, Sitting        Sit, one ankle on opposite knee, same-side hand on crossed knee. Push down on knee, keeping spine straight. Lean torso forward, with flat back, until tension is felt in hamstrings and gluteals of crossed-leg side. Hold 30 seconds.  Repeat 3 times per session. Do 1 sessions per day.  Copyright © I. All rights reserved.

## 2022-01-13 ENCOUNTER — CLINICAL SUPPORT (OUTPATIENT)
Dept: REHABILITATION | Facility: HOSPITAL | Age: 87
End: 2022-01-13
Attending: ANESTHESIOLOGY
Payer: MEDICARE

## 2022-01-13 DIAGNOSIS — M54.16 LUMBAR RADICULOPATHY: ICD-10-CM

## 2022-01-13 DIAGNOSIS — Z74.09 IMPAIRED FUNCTIONAL MOBILITY, BALANCE, GAIT, AND ENDURANCE: ICD-10-CM

## 2022-01-13 DIAGNOSIS — M47.896 OTHER SPONDYLOSIS, LUMBAR REGION: ICD-10-CM

## 2022-01-13 DIAGNOSIS — M51.36 DDD (DEGENERATIVE DISC DISEASE), LUMBAR: ICD-10-CM

## 2022-01-13 PROCEDURE — 97110 THERAPEUTIC EXERCISES: CPT | Mod: PN

## 2022-01-13 NOTE — PROGRESS NOTES
"[facility]   Physical Therapy Treatment Note     Name: Anthony Sheldon  Clinic Number: [id number]    Therapy Diagnosis:   Encounter Diagnoses   Name Primary?    Impaired functional mobility, balance, gait, and endurance     DDD (degenerative disc disease), lumbar     Lumbar radiculopathy     Other spondylosis, lumbar region      Physician: Jade Lyaton MD    Visit Date: 1/13/2022    Physician Orders: PT Eval and Treat Back  Medical Diagnosis from Referral: DDD lumbar, lumbar radiculopathy, other spondylosis lumbar region  Evaluation Date: 1/10/2022  Authorization Period Expiration: 12/22/2022  Plan of Care Expiration: 2/22/2022  Progress Note Due: 2/7/2022  Visit # / Visits authorized: 2/ 12   FOTO: Next survey due week of 1/24/2022     Precautions: Fall and cancer        PTA Visit #: 0/5     Time In: 0805  Time Out: 0842  Total Billable Time: 35 minutes    SUBJECTIVE     Pt reports: "I'm pitiful.  I'm more sore than I was in high school football".  He was compliant with home exercise program.  Response to previous treatment: Increased soreness   Functional change: None yet noted    Pain: 4/10  Location: bilateral back      OBJECTIVE     Objective Measures updated at progress report unless specified.     Treatment     Anthony received the treatments listed below:      therapeutic exercises to develop strength, ROM, flexibility, posture and core stabilization for 35 minutes including:   Nu-step recumbent stepper L2 x 8'   Standing calf stretch using wedge 3x30s ea              Seated hamstring stretch 3x30s ea              Seated piriformis stretch (modified) 3x30s ea   Supine: 2x10 ea    Posterior pelvic tilt    Lower trunk rotation    Hip add ball squeeze    Hip abd clamshell    Single knee to chest 3x30s ea     Patient Education and Home Exercises     Home Exercises Provided and Patient Education Provided     Education provided:   - Progressed exercise instruction.  Educated " patient to continue previous exercises at home.  Will update illustrated instructions next visit.      Written Home Exercises Provided: Patient instructed to cont prior HEP. Exercises were reviewed and Anthony was able to demonstrate them prior to the end of the session.  Anthony demonstrated good  understanding of the education provided. See EMR under Patient Instructions for exercises provided during therapy sessions    ASSESSMENT     Patient completed exercises as instructed without significant difficulty except for single knee to chest.  Tolerated hip rotator stretch with less discomfort.      Anthony Is progressing slowly towards his goals.   Pt prognosis is Fair.     Pt will continue to benefit from skilled outpatient physical therapy to address the deficits listed in the problem list box on initial evaluation, provide pt/family education and to maximize pt's level of independence in the home and community environment.     Pt's spiritual, cultural and educational needs considered and pt agreeable to plan of care and goals.     Anticipated barriers to physical therapy: potential compliance with HEP       Goals:   Short Term Goals:                1) Decrease max pain to < 5/10 Minimal progress              2) Facilitate improved LE flexibility Minimal progress              3) Facilitate improved posture in sitting and standing Minimal progress              4) Increase lumbar ROM to allow for patient to perform LE dressing without significant increase in pain Minimal progress     Long Term Goals:                1) Patient will consistently perform sit <> stand transfers with minimal difficulty and minimal increase in symptoms Minimal progress              2) Patient will stand > 20 min without increased LB symptoms/fatigue to allow for cooking activities Ongoing              3) Patient will increase walking tolerance to > 40 min to allow for completion of grocery shopping without significant increase in symptoms  Ongoing               4) Patient will consistently perform car transfers without increased difficulty Ongoing              5) Improve functional deficit to < 45% as indicated by FOTO Lumbar Spine Survey Ongoing    6) Patient will be independent in complete HEP   Initiated    PLAN     ROM/flexibility exercises, core strengthening exercises, postural correction.  Add manual therapy as needed    Stacie Elena, PT

## 2022-01-18 ENCOUNTER — CLINICAL SUPPORT (OUTPATIENT)
Dept: REHABILITATION | Facility: HOSPITAL | Age: 87
End: 2022-01-18
Attending: ANESTHESIOLOGY
Payer: MEDICARE

## 2022-01-18 DIAGNOSIS — Z74.09 IMPAIRED FUNCTIONAL MOBILITY, BALANCE, GAIT, AND ENDURANCE: ICD-10-CM

## 2022-01-18 DIAGNOSIS — M47.896 OTHER SPONDYLOSIS, LUMBAR REGION: ICD-10-CM

## 2022-01-18 DIAGNOSIS — M51.36 DDD (DEGENERATIVE DISC DISEASE), LUMBAR: ICD-10-CM

## 2022-01-18 DIAGNOSIS — M54.16 LUMBAR RADICULOPATHY: ICD-10-CM

## 2022-01-18 PROCEDURE — 97110 THERAPEUTIC EXERCISES: CPT | Mod: PN

## 2022-01-18 NOTE — PROGRESS NOTES
"[facility]   Physical Therapy Treatment Note     Name: Anthony Sheldon  Clinic Number: [id number]    Therapy Diagnosis:   Encounter Diagnoses   Name Primary?    Impaired functional mobility, balance, gait, and endurance     DDD (degenerative disc disease), lumbar     Lumbar radiculopathy     Other spondylosis, lumbar region      Physician: Jade Layton MD    Visit Date: 1/18/2022    Physician Orders: PT Eval and Treat Back  Medical Diagnosis from Referral: DDD lumbar, lumbar radiculopathy, other spondylosis lumbar region  Evaluation Date: 1/10/2022  Authorization Period Expiration: 12/22/2022  Plan of Care Expiration: 2/22/2022  Progress Note Due: 2/7/2022  Visit # / Visits authorized: 3/ 12   FOTO: Next survey due week of 1/24/2022     Precautions: Fall and cancer        PTA Visit #: 0/5     Time In: 0802  Time Out: 0840  Total Billable Time: 38 minutes    SUBJECTIVE     Pt reports: "I see a little difference in the stamina and the L leg isn't as bad as it was"  He was compliant with home exercise program.  Response to previous treatment: Less sore  Functional change: Increased activity tolerance    Pain: 8/10  Location: bilateral back  ("Not getting any better")    OBJECTIVE     Objective Measures updated at progress report unless specified.     Treatment     Anthony received the treatments listed below:      therapeutic exercises to develop strength, ROM, flexibility, posture and core stabilization for 38 minutes including:   Nu-step recumbent stepper L3 x 8'   Standing calf stretch using wedge 3x30s ea              Seated hamstring stretch 3x30s ea              Seated piriformis stretch (modified) 3x30s ea   Seated flexion 10 x 5s   Supine: 2x10 ea    Posterior pelvic tilt    Lower trunk rotation    Hip add ball squeeze    Hip abd clamshell    Single knee to chest 3x30s ea    Bridging x 10     Patient Education and Home Exercises     Home Exercises Provided and Patient " Education Provided     Education provided:   - Added seated flexion and bridging.      Written Home Exercises Provided: Patient instructed to cont prior HEP. Issued illustrated instructions for added exercises.  Exercises were reviewed and Anthony was able to demonstrate them prior to the end of the session.  Anthony demonstrated good  understanding of the education provided. See EMR under Patient Instructions for exercises provided during therapy sessions.  Issued green theraband for home use.     ASSESSMENT     Improved tolerance for exercises.   Increased activity tolerance.      Anthony Is progressing slowly towards his goals.   Pt prognosis is Fair.     Pt will continue to benefit from skilled outpatient physical therapy to address the deficits listed in the problem list box on initial evaluation, provide pt/family education and to maximize pt's level of independence in the home and community environment.     Pt's spiritual, cultural and educational needs considered and pt agreeable to plan of care and goals.     Anticipated barriers to physical therapy: potential compliance with HEP       Goals:   Short Term Goals:                1) Decrease max pain to < 5/10 Minimal progress              2) Facilitate improved LE flexibility Progressing              3) Facilitate improved posture in sitting and standing Minimal progress              4) Increase lumbar ROM to allow for patient to perform LE dressing without significant increase in pain Minimal progress     Long Term Goals:                1) Patient will consistently perform sit <> stand transfers with minimal difficulty and minimal increase in symptoms Minimal progress              2) Patient will stand > 20 min without increased LB symptoms/fatigue to allow for cooking activities Minimal progress              3) Patient will increase walking tolerance to > 40 min to allow for completion of grocery shopping without significant increase in symptoms Minimal  progress               4) Patient will consistently perform car transfers without increased difficulty Ongoing              5) Improve functional deficit to < 45% as indicated by FOTO Lumbar Spine Survey Ongoing    6) Patient will be independent in complete HEP   Progressing    PLAN     Increase time on Nu-step.  Add mini squats.      Stacie Elena, PT

## 2022-01-18 NOTE — PATIENT INSTRUCTIONS
Sitting        Sit in chair with knees spread apart. Bend forward toward floor. Comfortable stretch should be felt in lower back.  Hold 5 seconds.  Repeat 10 times per session. Do 1-2 sessions per day.    Copyright © [employer]. All rights reserved.   Pelvic Tilt        Flatten back by tightening stomach muscles and buttocks.  Repeat 10 times per set. Do 2 sets per session. Do 1 sessions per day.     [email or web address]     Copyright © [employer]. All rights reserved.   Knee-to-Chest Stretch: Unilateral        With hand behind right knee, pull knee in to chest until a comfortable stretch is felt in lower back and buttocks. Keep back relaxed. Hold 30 seconds.  Repeat 3 times per set. Do 1 sets per session. Do 1 sessions per day.     [email or web address]     Copyright © [employer]. All rights reserved.   Lower Trunk Rotation Stretch        Keeping back flat and feet together, rotate knees to left side. Hold 3 seconds.  Repeat 10 times per set. Do 2 sets per session. Do 1 sessions per day.     [email or web address]     Copyright © [employer]. All rights reserved.   Strengthening: Hip Adduction - Isometric        With ball or folded pillow between knees, squeeze knees together. Hold 3 seconds.  Repeat 10 times per set. Do 2 sets per session. Do 1 sessions per day.     [email or web address]     Copyright © [employer]. All rights reserved.   Strengthening: Hip Abductor - Resisted        With band looped around both legs above knees, push thighs apart.  Repeat 10 times per set. Do 2 sets per session. Do 1 sessions per day.     [email or web address]     Copyright © [employer]. All rights reserved.   Bridging        Slowly raise buttocks from floor, keeping stomach tight.  Repeat 10 times per set. Do 2 sets per session. Do 1 sessions per day.     [email or web address]     Copyright © [employer]. All rights reserved.

## 2022-01-20 ENCOUNTER — CLINICAL SUPPORT (OUTPATIENT)
Dept: REHABILITATION | Facility: HOSPITAL | Age: 87
End: 2022-01-20
Attending: ANESTHESIOLOGY
Payer: MEDICARE

## 2022-01-20 DIAGNOSIS — Z74.09 IMPAIRED FUNCTIONAL MOBILITY, BALANCE, GAIT, AND ENDURANCE: ICD-10-CM

## 2022-01-20 DIAGNOSIS — M47.896 OTHER SPONDYLOSIS, LUMBAR REGION: ICD-10-CM

## 2022-01-20 DIAGNOSIS — M54.16 LUMBAR RADICULOPATHY: ICD-10-CM

## 2022-01-20 DIAGNOSIS — M51.36 DDD (DEGENERATIVE DISC DISEASE), LUMBAR: ICD-10-CM

## 2022-01-20 PROCEDURE — 97110 THERAPEUTIC EXERCISES: CPT | Mod: PN

## 2022-01-20 NOTE — PROGRESS NOTES
"[facility]   Physical Therapy Treatment Note     Name: Anthony Sheldon  Clinic Number: [id number]    Therapy Diagnosis:   Encounter Diagnoses   Name Primary?    Impaired functional mobility, balance, gait, and endurance     DDD (degenerative disc disease), lumbar     Lumbar radiculopathy     Other spondylosis, lumbar region      Physician: Jade Layton MD    Visit Date: 1/20/2022    Physician Orders: PT Eval and Treat Back  Medical Diagnosis from Referral: DDD lumbar, lumbar radiculopathy, other spondylosis lumbar region  Evaluation Date: 1/10/2022  Authorization Period Expiration: 12/22/2022  Plan of Care Expiration: 2/22/2022  Progress Note Due: 2/7/2022  Visit # / Visits authorized: 4/ 12   FOTO: Next survey due week of 1/24/2022     Precautions: Fall and cancer        PTA Visit #: 0/5     Time In: 0803  Time Out: 0845  Total Billable Time: 40 minutes    SUBJECTIVE     Pt reports: "I'm less sore; it's more stiff  He was compliant with home exercise program.  Response to previous treatment: Less sore  Functional change: Increased activity tolerance    Pain: 3/10  Location: bilateral back  ("Not getting any better")    OBJECTIVE     Objective Measures updated at progress report unless specified.     Treatment     Anthony received the treatments listed below:      therapeutic exercises to develop strength, ROM, flexibility, posture and core stabilization for 40 minutes including:   Nu-step recumbent stepper L3 x 8'   Standing calf stretch using wedge 3x30s ea   Mini squat x 10   Heel raises 2x10              Seated hamstring stretch 3x30s ea              Seated piriformis stretch (modified) 3x30s ea   Seated flexion 10 x 5s   Supine: 2x10 ea    Posterior pelvic tilt    Lower trunk rotation    Hip add ball squeeze    Hip abd clamshell    Single knee to chest 3x30s ea    Bridging 2 x 10    Double KTC using physioball 2x10     Patient Education and Home Exercises     Home Exercises " Provided and Patient Education Provided     Education provided:   - Progressed exercise program.      Written Home Exercises Provided: Patient instructed to cont prior HEP. Issued illustrated instructions for added exercises.  Exercises were reviewed and Anthony was able to demonstrate them prior to the end of the session.  Anthony demonstrated good  understanding of the education provided. See EMR under Patient Instructions for exercises provided during therapy sessions.  Issued green theraband for home use.     ASSESSMENT     Activity tolerance improving.  Soreness decreasing.  Reporting less back pain this date.       Anthony Is progressing slowly towards his goals.   Pt prognosis is Fair.     Pt will continue to benefit from skilled outpatient physical therapy to address the deficits listed in the problem list box on initial evaluation, provide pt/family education and to maximize pt's level of independence in the home and community environment.     Pt's spiritual, cultural and educational needs considered and pt agreeable to plan of care and goals.     Anticipated barriers to physical therapy: potential compliance with HEP       Goals:   Short Term Goals:                1) Decrease max pain to < 5/10 Progressing              2) Facilitate improved LE flexibility Progressing              3) Facilitate improved posture in sitting and standing Minimal progress              4) Increase lumbar ROM to allow for patient to perform LE dressing without significant increase in pain Progressing     Long Term Goals:                1) Patient will consistently perform sit <> stand transfers with minimal difficulty and minimal increase in symptoms Minimal progress              2) Patient will stand > 20 min without increased LB symptoms/fatigue to allow for cooking activities Minimal progress              3) Patient will increase walking tolerance to > 40 min to allow for completion of grocery shopping without significant  increase in symptoms Minimal progress               4) Patient will consistently perform car transfers without increased difficulty Minimal progress              5) Improve functional deficit to < 45% as indicated by FOTO Lumbar Spine Survey Ongoing    6) Patient will be independent in complete HEP   Progressing    PLAN     Increase aerobic exercise to facilitate increased activity tolerance.  Progress stretching and core strengthening as patient tolerates.     Stacie Elena, PT

## 2022-01-21 ENCOUNTER — PATIENT MESSAGE (OUTPATIENT)
Dept: HEMATOLOGY/ONCOLOGY | Facility: CLINIC | Age: 87
End: 2022-01-21
Payer: MEDICARE

## 2022-01-25 ENCOUNTER — CLINICAL SUPPORT (OUTPATIENT)
Dept: REHABILITATION | Facility: HOSPITAL | Age: 87
End: 2022-01-25
Attending: ANESTHESIOLOGY
Payer: MEDICARE

## 2022-01-25 DIAGNOSIS — M47.896 OTHER SPONDYLOSIS, LUMBAR REGION: ICD-10-CM

## 2022-01-25 DIAGNOSIS — M51.36 DDD (DEGENERATIVE DISC DISEASE), LUMBAR: ICD-10-CM

## 2022-01-25 DIAGNOSIS — Z74.09 IMPAIRED FUNCTIONAL MOBILITY, BALANCE, GAIT, AND ENDURANCE: ICD-10-CM

## 2022-01-25 DIAGNOSIS — M54.16 LUMBAR RADICULOPATHY: ICD-10-CM

## 2022-01-25 PROCEDURE — 97110 THERAPEUTIC EXERCISES: CPT | Mod: PN

## 2022-01-25 NOTE — PROGRESS NOTES
"[facility]   Physical Therapy Treatment Note     Name: Anthony Sheldon  Clinic Number: [id number]    Therapy Diagnosis:   Encounter Diagnoses   Name Primary?    Impaired functional mobility, balance, gait, and endurance     DDD (degenerative disc disease), lumbar     Lumbar radiculopathy     Other spondylosis, lumbar region      Physician: Jade Layton MD    Visit Date: 1/25/2022    Physician Orders: PT Eval and Treat Back  Medical Diagnosis from Referral: DDD lumbar, lumbar radiculopathy, other spondylosis lumbar region  Evaluation Date: 1/10/2022  Authorization Period Expiration: 12/22/2022  Plan of Care Expiration: 2/22/2022  Progress Note Due: 2/7/2022  Visit # / Visits authorized: 5/ 12   FOTO: Next survey due week of 1/24/2022     Precautions: Fall and cancer        PTA Visit #: 0/5     Time In: 0803  Time Out: 0846  Total Billable Time: 40 minutes    SUBJECTIVE     Pt reports: "I hate to admit it but this is helping"  He was compliant with home exercise program.  Response to previous treatment: Felt better  Functional change: Increased activity tolerance    Pain: 3/10  Location: bilateral back, knees    OBJECTIVE     Objective Measures updated at progress report unless specified.     Treatment     Anthony received the treatments listed below:      therapeutic exercises to develop strength, ROM, flexibility, posture and core stabilization for 40 minutes including:   Nu-step recumbent stepper L3 x 10'   Standing calf stretch using wedge 3x30s ea   Mini squat x 10   Heel raises 2x10              Seated hamstring stretch 3x30s ea              Seated piriformis stretch (modified) 3x30s ea   Seated flexion 10 x 5s   Supine: 2x10 ea    Posterior pelvic tilt    Lower trunk rotation    Hip add ball squeeze    Hip abd clamshell with red theraloop    Single knee to chest 3x30s ea    Bridging 2 x 10    Double KTC using physioball 2x10    FOTO Lumbar Spine Survey 47% residual " functional deficit     Patient Education and Home Exercises     Home Exercises Provided and Patient Education Provided     Education provided:   - Progressed exercise program.      Written Home Exercises Provided: Patient instructed to cont prior HEP. Issued illustrated instructions for added exercises.  Exercises were reviewed and Anthony was able to demonstrate them prior to the end of the session.  Anthony demonstrated good  understanding of the education provided. See EMR under Patient Instructions for exercises provided during therapy sessions.      ASSESSMENT     Doing well with exercises.  Flexibility improving.  Improved functional deficit score as indicated by FOTO Lumbar Spine Survey.        Anthony Is progressing slowly towards his goals.   Pt prognosis is Fair.     Pt will continue to benefit from skilled outpatient physical therapy to address the deficits listed in the problem list box on initial evaluation, provide pt/family education and to maximize pt's level of independence in the home and community environment.     Pt's spiritual, cultural and educational needs considered and pt agreeable to plan of care and goals.     Anticipated barriers to physical therapy: potential compliance with HEP       Goals:   Short Term Goals:                1) Decrease max pain to < 5/10 Progressing              2) Facilitate improved LE flexibility Progressing              3) Facilitate improved posture in sitting and standing Minimal progress              4) Increase lumbar ROM to allow for patient to perform LE dressing without significant increase in pain Progressing     Long Term Goals:                1) Patient will consistently perform sit <> stand transfers with minimal difficulty and minimal increase in symptoms Progressing              2) Patient will stand > 20 min without increased LB symptoms/fatigue to allow for cooking activities Minimal progress              3) Patient will increase walking tolerance to >  40 min to allow for completion of grocery shopping without significant increase in symptoms Minimal progress               4) Patient will consistently perform car transfers without increased difficulty Minimal progress              5) Improve functional deficit to < 45% as indicated by FOTO Lumbar Spine Survey Progressing     6) Patient will be independent in complete HEP   Progressing    PLAN     Core strengthening activities, Progressive stretching, Aerobic activities to improve activity tolerance    Stacie Elena, PT

## 2022-01-27 ENCOUNTER — CLINICAL SUPPORT (OUTPATIENT)
Dept: REHABILITATION | Facility: HOSPITAL | Age: 87
End: 2022-01-27
Attending: ANESTHESIOLOGY
Payer: MEDICARE

## 2022-01-27 DIAGNOSIS — M47.896 OTHER SPONDYLOSIS, LUMBAR REGION: ICD-10-CM

## 2022-01-27 DIAGNOSIS — M51.36 DDD (DEGENERATIVE DISC DISEASE), LUMBAR: ICD-10-CM

## 2022-01-27 DIAGNOSIS — M54.16 LUMBAR RADICULOPATHY: ICD-10-CM

## 2022-01-27 DIAGNOSIS — Z74.09 IMPAIRED FUNCTIONAL MOBILITY, BALANCE, GAIT, AND ENDURANCE: ICD-10-CM

## 2022-01-27 PROCEDURE — 97110 THERAPEUTIC EXERCISES: CPT | Mod: KX,PN

## 2022-01-27 NOTE — PROGRESS NOTES
"[facility]   Physical Therapy Treatment Note     Name: Anthony Sheldon  Clinic Number: [id number]    Therapy Diagnosis:   Encounter Diagnoses   Name Primary?    Impaired functional mobility, balance, gait, and endurance     DDD (degenerative disc disease), lumbar     Lumbar radiculopathy     Other spondylosis, lumbar region      Physician: Jade Layton MD    Visit Date: 1/27/2022    Physician Orders: PT Eval and Treat Back  Medical Diagnosis from Referral: DDD lumbar, lumbar radiculopathy, other spondylosis lumbar region  Evaluation Date: 1/10/2022  Authorization Period Expiration: 12/22/2022  Plan of Care Expiration: 2/22/2022  Progress Note Due: 2/7/2022  Visit # / Visits authorized: 5/ 12   FOTO: Next survey due week of 2/14/2022     Precautions: Fall and cancer        PTA Visit #: 0/5     Time In: 0803  Time Out: 0847  Total Billable Time: 40 minutes    SUBJECTIVE     Pt reports: Doing pretty well  He was compliant with home exercise program.  Response to previous treatment: Felt okay  Functional change: Decreased difficulty with sit <> stand transfers    Pain: 3-4/10  Location: bilateral back, knees    OBJECTIVE     Objective Measures updated at progress report unless specified.     Treatment     Anthony received the treatments listed below:      therapeutic exercises to develop strength, ROM, flexibility, posture and core stabilization for 40 minutes including (new exercises in bold print):   Nu-step recumbent stepper L4 x 10'   Standing calf stretch using wedge 3x30s ea   Mini squat x 10   Heel raises 2x10   Standing toe taps 6" step x 2'               Seated hamstring stretch 3x30s ea              Seated piriformis stretch (modified) 3x30s ea   Seated flexion 10 x 5s   Supine: 2x10 ea    Posterior pelvic tilt    Lower trunk rotation    Hip add ball squeeze    Hip abd clamshell with red theraloop    Single knee to chest 3x30s ea    Bridging 2 x 10    Double KTC using " physioball 2x10       Patient Education and Home Exercises     Home Exercises Provided and Patient Education Provided     Education provided:   - Added standing alternating toe taps     Written Home Exercises Provided: Patient instructed to cont prior HEP. Issued illustrated instructions for added exercises.  Exercises were reviewed and Anthony was able to demonstrate them prior to the end of the session.  Anthony demonstrated good  understanding of the education provided. See EMR under Patient Instructions for exercises provided during therapy sessions.      ASSESSMENT     Increased flexibility, core strength improving, activity tolerance improving    Anthony Is progressing slowly towards his goals.   Pt prognosis is Fair.     Pt will continue to benefit from skilled outpatient physical therapy to address the deficits listed in the problem list box on initial evaluation, provide pt/family education and to maximize pt's level of independence in the home and community environment.     Pt's spiritual, cultural and educational needs considered and pt agreeable to plan of care and goals.     Anticipated barriers to physical therapy: potential compliance with HEP       Goals:   Short Term Goals:                1) Decrease max pain to < 5/10 Progressing              2) Facilitate improved LE flexibility Progressing              3) Facilitate improved posture in sitting and standing Minimal progress              4) Increase lumbar ROM to allow for patient to perform LE dressing without significant increase in pain Progressing     Long Term Goals:                1) Patient will consistently perform sit <> stand transfers with minimal difficulty and minimal increase in symptoms Progressing              2) Patient will stand > 20 min without increased LB symptoms/fatigue to allow for cooking activities Progressing              3) Patient will increase walking tolerance to > 40 min to allow for completion of grocery shopping  without significant increase in symptoms Minimal progress               4) Patient will consistently perform car transfers without increased difficulty Minimal progress              5) Improve functional deficit to < 45% as indicated by FOTO Lumbar Spine Survey Progressing     6) Patient will be independent in complete HEP   Progressing    PLAN     Progress core strengthening and flexibility exercises as patient tolerates.      Stacie Elena, PT

## 2022-01-28 ENCOUNTER — LAB VISIT (OUTPATIENT)
Dept: FAMILY MEDICINE | Facility: CLINIC | Age: 87
End: 2022-01-28
Payer: MEDICARE

## 2022-01-28 DIAGNOSIS — Z20.822 CONTACT WITH AND (SUSPECTED) EXPOSURE TO COVID-19: ICD-10-CM

## 2022-01-28 DIAGNOSIS — R50.9 FEVER, UNSPECIFIED FEVER CAUSE: Primary | ICD-10-CM

## 2022-01-28 LAB
SARS-COV-2 RNA RESP QL NAA+PROBE: NOT DETECTED
SARS-COV-2- CYCLE NUMBER: NORMAL

## 2022-01-28 PROCEDURE — U0005 INFEC AGEN DETEC AMPLI PROBE: HCPCS | Performed by: FAMILY MEDICINE

## 2022-01-28 PROCEDURE — U0003 INFECTIOUS AGENT DETECTION BY NUCLEIC ACID (DNA OR RNA); SEVERE ACUTE RESPIRATORY SYNDROME CORONAVIRUS 2 (SARS-COV-2) (CORONAVIRUS DISEASE [COVID-19]), AMPLIFIED PROBE TECHNIQUE, MAKING USE OF HIGH THROUGHPUT TECHNOLOGIES AS DESCRIBED BY CMS-2020-01-R: HCPCS | Performed by: FAMILY MEDICINE

## 2022-01-28 NOTE — PROGRESS NOTES
Anthony Sheldon presented to clinic for COVID-19 swab.   Anthony Sheldon verified x2, name and .   Anthony Sheldon instructed on what will be completed, asked if ever had COVID-19 swab.   Explained procedure to Anthony Sheldon.   Specimen obtained.   No questions or concerns voiced further at this time.   Anthony Sheldon left in satisfactory condition.

## 2022-02-09 ENCOUNTER — CLINICAL SUPPORT (OUTPATIENT)
Dept: REHABILITATION | Facility: HOSPITAL | Age: 87
End: 2022-02-09
Attending: ANESTHESIOLOGY
Payer: MEDICARE

## 2022-02-09 DIAGNOSIS — M54.16 LUMBAR RADICULOPATHY: Primary | ICD-10-CM

## 2022-02-09 DIAGNOSIS — Z74.09 IMPAIRED FUNCTIONAL MOBILITY, BALANCE, GAIT, AND ENDURANCE: ICD-10-CM

## 2022-02-09 DIAGNOSIS — M47.896 OTHER SPONDYLOSIS, LUMBAR REGION: ICD-10-CM

## 2022-02-09 DIAGNOSIS — M51.36 DDD (DEGENERATIVE DISC DISEASE), LUMBAR: ICD-10-CM

## 2022-02-09 PROCEDURE — 97110 THERAPEUTIC EXERCISES: CPT | Mod: PN,CQ

## 2022-02-09 NOTE — PROGRESS NOTES
"[facility]   Physical Therapy Treatment Note     Name: Anthony Sheldon  Clinic Number: [id number]    Therapy Diagnosis:   Encounter Diagnoses   Name Primary?    Lumbar radiculopathy Yes    Impaired functional mobility, balance, gait, and endurance     Other spondylosis, lumbar region     DDD (degenerative disc disease), lumbar      Physician: Jade Layton MD    Visit Date: 2/9/2022    Physician Orders: PT Eval and Treat Back  Medical Diagnosis from Referral: DDD lumbar, lumbar radiculopathy, other spondylosis lumbar region  Evaluation Date: 1/10/2022  Authorization Period Expiration: 12/22/2022  Plan of Care Expiration: 2/22/2022  Progress Note Due: 2/7/2022  Visit # / Visits authorized: 6/ 12   FOTO: Next survey due week of 2/14/2022     Precautions: Fall and cancer        PTA Visit #: 1/5     Time In: 8:55 am  Time Out: 9:40 am  Total Billable Time: 40 minutes    SUBJECTIVE     Pt reports:being on self isolation for a week because he was exposed to Covid but he never tested positive.  He was compliant with home exercise program.  Response to previous treatment: Felt okay  Functional change: Decreased difficulty with sit <> stand transfers    Pain: 3-4/10  Location: bilateral back, knees    OBJECTIVE     Objective Measures updated at progress report unless specified.     Treatment     Anthony received the treatments listed below:      therapeutic exercises to develop strength, ROM, flexibility, posture and core stabilization for 40 minutes including (new exercises in bold print):   Nu-step recumbent stepper L4 x 10'   Standing calf stretch using wedge 3x30s ea   Mini squat x 10   Heel raises 2x10   Standing toe taps 6" step x 2'               Seated hamstring stretch 3x30s ea              Seated piriformis stretch (modified) 3x30s ea   Seated flexion 10 x 5s   Supine: 2x10 ea    Posterior pelvic tilt    Lower trunk rotation    Hip add ball squeeze    Hip abd clamshell with red " theraloop    Single knee to chest 3x30s ea    Bridging 2 x 10    Double KTC using physioball 2x10       Patient Education and Home Exercises     Home Exercises Provided and Patient Education Provided     Education provided:   - Added standing alternating toe taps     Written Home Exercises Provided: Patient instructed to cont prior HEP. Issued illustrated instructions for added exercises.  Exercises were reviewed and Anthony was able to demonstrate them prior to the end of the session.  Anthony demonstrated good  understanding of the education provided. See EMR under Patient Instructions for exercises provided during therapy sessions.      ASSESSMENT     Increased flexibility, core strength improving, activity tolerance improving    Anthony Is progressing slowly towards his goals.   Pt prognosis is Fair.     Pt will continue to benefit from skilled outpatient physical therapy to address the deficits listed in the problem list box on initial evaluation, provide pt/family education and to maximize pt's level of independence in the home and community environment.     Pt's spiritual, cultural and educational needs considered and pt agreeable to plan of care and goals.     Anticipated barriers to physical therapy: potential compliance with HEP       Goals:   Short Term Goals:                1) Decrease max pain to < 5/10 Progressing              2) Facilitate improved LE flexibility Progressing              3) Facilitate improved posture in sitting and standing Minimal progress              4) Increase lumbar ROM to allow for patient to perform LE dressing without significant increase in pain Progressing     Long Term Goals:                1) Patient will consistently perform sit <> stand transfers with minimal difficulty and minimal increase in symptoms Progressing              2) Patient will stand > 20 min without increased LB symptoms/fatigue to allow for cooking activities Progressing              3) Patient will  increase walking tolerance to > 40 min to allow for completion of grocery shopping without significant increase in symptoms Minimal progress               4) Patient will consistently perform car transfers without increased difficulty Minimal progress              5) Improve functional deficit to < 45% as indicated by FOTO Lumbar Spine Survey Progressing     6) Patient will be independent in complete HEP   Progressing    PLAN     Progress core strengthening and flexibility exercises as patient tolerates.      Kirby Arguello, PTA

## 2022-02-11 ENCOUNTER — CLINICAL SUPPORT (OUTPATIENT)
Dept: REHABILITATION | Facility: HOSPITAL | Age: 87
End: 2022-02-11
Attending: ANESTHESIOLOGY
Payer: MEDICARE

## 2022-02-11 DIAGNOSIS — M54.16 LUMBAR RADICULOPATHY: Primary | ICD-10-CM

## 2022-02-11 DIAGNOSIS — Z74.09 IMPAIRED FUNCTIONAL MOBILITY, BALANCE, GAIT, AND ENDURANCE: ICD-10-CM

## 2022-02-11 DIAGNOSIS — M47.896 OTHER SPONDYLOSIS, LUMBAR REGION: ICD-10-CM

## 2022-02-11 DIAGNOSIS — M51.36 DDD (DEGENERATIVE DISC DISEASE), LUMBAR: ICD-10-CM

## 2022-02-11 PROCEDURE — 97110 THERAPEUTIC EXERCISES: CPT | Mod: PN,CQ

## 2022-02-11 NOTE — PROGRESS NOTES
"[facility]   Physical Therapy Treatment Note     Name: Anthony Sheldon  Clinic Number: [id number]    Therapy Diagnosis:   Encounter Diagnoses   Name Primary?    Lumbar radiculopathy Yes    Impaired functional mobility, balance, gait, and endurance     Other spondylosis, lumbar region     DDD (degenerative disc disease), lumbar      Physician: Jade Layton MD    Visit Date: 2/11/2022    Physician Orders: PT Eval and Treat Back  Medical Diagnosis from Referral: DDD lumbar, lumbar radiculopathy, other spondylosis lumbar region  Evaluation Date: 1/10/2022  Authorization Period Expiration: 12/22/2022  Plan of Care Expiration: 2/22/2022  Progress Note Due: 2/7/2022  Visit # / Visits authorized: 6/ 12   FOTO: Next survey due week of 2/14/2022     Precautions: Fall and cancer        PTA Visit #: 1/5     Time In: 9:00 am  Time Out: 9:40 am  Total Billable Time: 40 minutes    SUBJECTIVE     Pt reports:doing ok today.He was compliant with home exercise program.  Response to previous treatment: Felt okay  Functional change: Decreased difficulty with sit <> stand transfers    Pain: 3-4/10   Location: bilateral back, knees    OBJECTIVE     Objective Measures updated at progress report unless specified.     Treatment     Anthony received the treatments listed below:      therapeutic exercises to develop strength, ROM, flexibility, posture and core stabilization for 40 minutes including (new exercises in bold print):   Nu-step recumbent stepper L4 x 10'   Standing calf stretch using wedge 3x30s ea   Mini squat x 10   Heel raises 2x10   Standing toe taps 6" step x 2'               Seated hamstring stretch 3x30s ea              Seated piriformis stretch (modified) 3x30s ea   Seated flexion 10 x 5s   Supine: 2x10 ea    Posterior pelvic tilt    Lower trunk rotation    Hip add ball squeeze    Hip abd clamshell with red theraloop    Single knee to chest 3x30s ea    Bridging 2 x 10    Double KTC using " physioball 2x10       Patient Education and Home Exercises     Home Exercises Provided and Patient Education Provided     Education provided:   - Added standing alternating toe taps     Written Home Exercises Provided: Patient instructed to cont prior HEP. Issued illustrated instructions for added exercises.  Exercises were reviewed and Anthony was able to demonstrate them prior to the end of the session.  Anthony demonstrated good  understanding of the education provided. See EMR under Patient Instructions for exercises provided during therapy sessions.      ASSESSMENT     Increased flexibility, core strength improving, activity tolerance improving    Anthony Is progressing slowly towards his goals.   Pt prognosis is Fair.     Pt will continue to benefit from skilled outpatient physical therapy to address the deficits listed in the problem list box on initial evaluation, provide pt/family education and to maximize pt's level of independence in the home and community environment.     Pt's spiritual, cultural and educational needs considered and pt agreeable to plan of care and goals.     Anticipated barriers to physical therapy: potential compliance with HEP       Goals:   Short Term Goals:                1) Decrease max pain to < 5/10 Progressing              2) Facilitate improved LE flexibility Progressing              3) Facilitate improved posture in sitting and standing Minimal progress              4) Increase lumbar ROM to allow for patient to perform LE dressing without significant increase in pain Progressing     Long Term Goals:                1) Patient will consistently perform sit <> stand transfers with minimal difficulty and minimal increase in symptoms Progressing              2) Patient will stand > 20 min without increased LB symptoms/fatigue to allow for cooking activities Progressing              3) Patient will increase walking tolerance to > 40 min to allow for completion of grocery shopping  without significant increase in symptoms Minimal progress               4) Patient will consistently perform car transfers without increased difficulty Minimal progress              5) Improve functional deficit to < 45% as indicated by FOTO Lumbar Spine Survey Progressing     6) Patient will be independent in complete HEP   Progressing    PLAN     Progress core strengthening and flexibility exercises as patient tolerates.      Kibry Arguello, PTA

## 2022-02-14 ENCOUNTER — PATIENT MESSAGE (OUTPATIENT)
Dept: PAIN MEDICINE | Facility: CLINIC | Age: 87
End: 2022-02-14
Payer: MEDICARE

## 2022-02-14 DIAGNOSIS — M54.16 LUMBAR RADICULOPATHY: ICD-10-CM

## 2022-02-14 DIAGNOSIS — M47.896 OTHER SPONDYLOSIS, LUMBAR REGION: ICD-10-CM

## 2022-02-14 DIAGNOSIS — M51.36 DDD (DEGENERATIVE DISC DISEASE), LUMBAR: ICD-10-CM

## 2022-02-15 ENCOUNTER — CLINICAL SUPPORT (OUTPATIENT)
Dept: REHABILITATION | Facility: HOSPITAL | Age: 87
End: 2022-02-15
Attending: ANESTHESIOLOGY
Payer: MEDICARE

## 2022-02-15 DIAGNOSIS — M51.36 DDD (DEGENERATIVE DISC DISEASE), LUMBAR: ICD-10-CM

## 2022-02-15 DIAGNOSIS — Z74.09 IMPAIRED FUNCTIONAL MOBILITY, BALANCE, GAIT, AND ENDURANCE: ICD-10-CM

## 2022-02-15 DIAGNOSIS — M54.16 LUMBAR RADICULOPATHY: Primary | ICD-10-CM

## 2022-02-15 DIAGNOSIS — M47.896 OTHER SPONDYLOSIS, LUMBAR REGION: ICD-10-CM

## 2022-02-15 PROCEDURE — 97110 THERAPEUTIC EXERCISES: CPT | Mod: PN,CQ

## 2022-02-15 RX ORDER — TRAMADOL HYDROCHLORIDE 50 MG/1
50 TABLET ORAL EVERY 6 HOURS PRN
Qty: 30 TABLET | Refills: 0 | Status: SHIPPED | OUTPATIENT
Start: 2022-02-15 | End: 2022-07-07

## 2022-02-15 NOTE — PROGRESS NOTES
"[facility]   Physical Therapy Treatment Note     Name: Anthony Sheldon  Clinic Number: [id number]    Therapy Diagnosis:   Encounter Diagnoses   Name Primary?    Lumbar radiculopathy Yes    Impaired functional mobility, balance, gait, and endurance     Other spondylosis, lumbar region     DDD (degenerative disc disease), lumbar      Physician: Jade Layton MD    Visit Date: 2/15/2022    Physician Orders: PT Eval and Treat Back  Medical Diagnosis from Referral: DDD lumbar, lumbar radiculopathy, other spondylosis lumbar region  Evaluation Date: 1/10/2022  Authorization Period Expiration: 12/22/2022  Plan of Care Expiration: 2/22/2022  Progress Note Due: 2/7/2022  Visit # / Visits authorized: 9/ 12   FOTO: Next survey due week of 2/14/2022     Precautions: Fall and cancer        PTA Visit #: 3/5     Time In: 9:00 am  Time Out: 9:45 am  Total Billable Time: 40 minutes    SUBJECTIVE     Pt reports:doing too much yesterday and is sore today..He was compliant with home exercise program.  Response to previous treatment: Felt okay  Functional change: Decreased difficulty with sit <> stand transfers    Pain: 5/10   Location: bilateral back, knees    OBJECTIVE     Objective Measures updated at progress report unless specified.     Treatment     Anthony received the treatments listed below:      therapeutic exercises to develop strength, ROM, flexibility, posture and core stabilization for 40 minutes including (new exercises in bold print):   Nu-step recumbent stepper L2 x 10'   Standing calf stretch using wedge 3x30s ea   Mini squat x 10   Heel raises 2x10    Standing toe taps 6" step x 1'               Seated hamstring stretch 3x30s ea              Seated piriformis stretch (modified) 3x30s ea   Seated flexion 10 x 5s   Supine: 2x10 ea    Posterior pelvic tilt    Lower trunk rotation    Hip add ball squeeze    Hip abd clamshell with red theraloop    Single knee to chest 3x30s ea "     Bridging 2 x 10    Double KTC using physioball 2x10       Patient Education and Home Exercises     Home Exercises Provided and Patient Education Provided     Education provided:   - Added standing alternating toe taps     Written Home Exercises Provided: Patient instructed to cont prior HEP. Issued illustrated instructions for added exercises.  Exercises were reviewed and Anthony was able to demonstrate them prior to the end of the session.  Anthony demonstrated good  understanding of the education provided. See EMR under Patient Instructions for exercises provided during therapy sessions.      ASSESSMENT     Increased flexibility, core strength improving, activity tolerance improving    Anthony Is progressing slowly towards his goals.   Pt prognosis is Fair.     Pt will continue to benefit from skilled outpatient physical therapy to address the deficits listed in the problem list box on initial evaluation, provide pt/family education and to maximize pt's level of independence in the home and community environment.     Pt's spiritual, cultural and educational needs considered and pt agreeable to plan of care and goals.     Anticipated barriers to physical therapy: potential compliance with HEP       Goals:   Short Term Goals:                1) Decrease max pain to < 5/10 Progressing              2) Facilitate improved LE flexibility Progressing              3) Facilitate improved posture in sitting and standing Minimal progress              4) Increase lumbar ROM to allow for patient to perform LE dressing without significant increase in pain Progressing     Long Term Goals:                1) Patient will consistently perform sit <> stand transfers with minimal difficulty and minimal increase in symptoms Progressing              2) Patient will stand > 20 min without increased LB symptoms/fatigue to allow for cooking activities Progressing              3) Patient will increase walking tolerance to > 40 min to allow  for completion of grocery shopping without significant increase in symptoms Minimal progress               4) Patient will consistently perform car transfers without increased difficulty Minimal progress              5) Improve functional deficit to < 45% as indicated by FOTO Lumbar Spine Survey Progressing     6) Patient will be independent in complete HEP   Progressing    PLAN     Progress core strengthening and flexibility exercises as patient tolerates.      Kirby Arguello, PTA

## 2022-02-17 ENCOUNTER — CLINICAL SUPPORT (OUTPATIENT)
Dept: REHABILITATION | Facility: HOSPITAL | Age: 87
End: 2022-02-17
Attending: ANESTHESIOLOGY
Payer: MEDICARE

## 2022-02-17 DIAGNOSIS — Z74.09 IMPAIRED FUNCTIONAL MOBILITY, BALANCE, GAIT, AND ENDURANCE: ICD-10-CM

## 2022-02-17 DIAGNOSIS — M47.896 OTHER SPONDYLOSIS, LUMBAR REGION: ICD-10-CM

## 2022-02-17 DIAGNOSIS — M51.36 DDD (DEGENERATIVE DISC DISEASE), LUMBAR: ICD-10-CM

## 2022-02-17 DIAGNOSIS — M54.16 LUMBAR RADICULOPATHY: Primary | ICD-10-CM

## 2022-02-17 PROCEDURE — 97110 THERAPEUTIC EXERCISES: CPT | Mod: PN,CQ

## 2022-02-17 NOTE — PROGRESS NOTES
"[facility]   Physical Therapy Treatment Note     Name: Anthony Sheldon  Clinic Number: [id number]    Therapy Diagnosis:   Encounter Diagnoses   Name Primary?    Lumbar radiculopathy Yes    Impaired functional mobility, balance, gait, and endurance     Other spondylosis, lumbar region     DDD (degenerative disc disease), lumbar      Physician: Jade Layton MD    Visit Date: 2/17/2022    Physician Orders: PT Eval and Treat Back  Medical Diagnosis from Referral: DDD lumbar, lumbar radiculopathy, other spondylosis lumbar region  Evaluation Date: 1/10/2022  Authorization Period Expiration: 12/22/2022  Plan of Care Expiration: 2/22/2022  Progress Note Due: 2/7/2022  Visit # / Visits authorized: 10/ 12   FOTO: Next survey due week of 2/14/2022     Precautions: Fall and cancer        PTA Visit #:4/5     Time In: 9:00 am  Time Out: 9:40 am  Total Billable Time: 40 minutes    SUBJECTIVE     Pt reports:doing good today.  He was compliant with home exercise program.  Response to previous treatment: Felt okay  Functional change: Decreased difficulty with sit <> stand transfers    Pain: 2/10   Location: bilateral back, knees    OBJECTIVE     Objective Measures updated at progress report unless specified.     Treatment     Anthony received the treatments listed below:      therapeutic exercises to develop strength, ROM, flexibility, posture and core stabilization for 40 minutes including (new exercises in bold print):   Nu-step recumbent stepper L2 x 10'   Standing calf stretch using wedge 3x30s ea   Mini squat x 10   Heel raises 2x10    Standing toe taps 6" step x 1'               Seated hamstring stretch 3x30s ea              Seated piriformis stretch (modified) 3x30s ea   Seated flexion 10 x 5s   Supine: 2x10 ea    Posterior pelvic tilt    Lower trunk rotation    Hip add ball squeeze    Hip abd clamshell with red theraloop    Single knee to chest 3x30s ea     Bridging 2 x 10    Double KTC " using Switchcam 2x10       Patient Education and Home Exercises     Home Exercises Provided and Patient Education Provided     Education provided:   - Added standing alternating toe taps     Written Home Exercises Provided: Patient instructed to cont prior HEP. Issued illustrated instructions for added exercises.  Exercises were reviewed and Anthony was able to demonstrate them prior to the end of the session.  Anthony demonstrated good  understanding of the education provided. See EMR under Patient Instructions for exercises provided during therapy sessions.      ASSESSMENT     Increased flexibility, core strength improving, activity tolerance improving    Anthony Is progressing slowly towards his goals.   Pt prognosis is Fair.     Pt will continue to benefit from skilled outpatient physical therapy to address the deficits listed in the problem list box on initial evaluation, provide pt/family education and to maximize pt's level of independence in the home and community environment.     Pt's spiritual, cultural and educational needs considered and pt agreeable to plan of care and goals.     Anticipated barriers to physical therapy: potential compliance with HEP       Goals:   Short Term Goals:                1) Decrease max pain to < 5/10 Progressing              2) Facilitate improved LE flexibility Progressing              3) Facilitate improved posture in sitting and standing Minimal progress              4) Increase lumbar ROM to allow for patient to perform LE dressing without significant increase in pain Progressing     Long Term Goals:                1) Patient will consistently perform sit <> stand transfers with minimal difficulty and minimal increase in symptoms Progressing              2) Patient will stand > 20 min without increased LB symptoms/fatigue to allow for cooking activities Progressing              3) Patient will increase walking tolerance to > 40 min to allow for completion of grocery  shopping without significant increase in symptoms Minimal progress               4) Patient will consistently perform car transfers without increased difficulty Minimal progress              5) Improve functional deficit to < 45% as indicated by FOTO Lumbar Spine Survey Progressing     6) Patient will be independent in complete HEP   Progressing    PLAN     Progress core strengthening and flexibility exercises as patient tolerates.      Kirby Arguello, PTA

## 2022-02-18 NOTE — ADDENDUM NOTE
Addendum  created 02/24/17 0944 by Jose Montano MD    Sign clinical note       Bed Search:    Romel- Only reviews COVID+ patients on a case by case basis, and only Mon-Fri in the morning    Carlitos- Left a voice mail asking them to call back    Miguelangel Vigil, [facility]  02/18/22

## 2022-02-21 ENCOUNTER — CLINICAL SUPPORT (OUTPATIENT)
Dept: REHABILITATION | Facility: HOSPITAL | Age: 87
End: 2022-02-21
Attending: ANESTHESIOLOGY
Payer: MEDICARE

## 2022-02-21 ENCOUNTER — PATIENT MESSAGE (OUTPATIENT)
Dept: HEMATOLOGY/ONCOLOGY | Facility: CLINIC | Age: 87
End: 2022-02-21
Payer: MEDICARE

## 2022-02-21 DIAGNOSIS — Z74.09 IMPAIRED FUNCTIONAL MOBILITY, BALANCE, GAIT, AND ENDURANCE: Primary | ICD-10-CM

## 2022-02-21 DIAGNOSIS — M51.36 DDD (DEGENERATIVE DISC DISEASE), LUMBAR: ICD-10-CM

## 2022-02-21 DIAGNOSIS — M54.16 LUMBAR RADICULOPATHY: ICD-10-CM

## 2022-02-21 DIAGNOSIS — M47.896 OTHER SPONDYLOSIS, LUMBAR REGION: ICD-10-CM

## 2022-02-21 PROCEDURE — 97110 THERAPEUTIC EXERCISES: CPT | Mod: PN

## 2022-02-21 NOTE — PROGRESS NOTES
"[facility]   Physical Therapy Treatment Note     Name: Anthony Sheldon  Clinic Number: [id number]    Therapy Diagnosis:   Encounter Diagnoses   Name Primary?    Impaired functional mobility, balance, gait, and endurance Yes    DDD (degenerative disc disease), lumbar     Lumbar radiculopathy     Other spondylosis, lumbar region      Physician: Jade Layton MD    Visit Date: 2/21/2022    Physician Orders: PT Eval and Treat Back  Medical Diagnosis from Referral: DDD lumbar, lumbar radiculopathy, other spondylosis lumbar region  Evaluation Date: 1/10/2022  Authorization Period Expiration: 12/22/2022  Plan of Care Expiration: 2/22/2022  Progress Note Due: 2/22/2022  Visit # / Visits authorized: 11/ 12   FOTO: Next survey due 2/25/2022     Precautions: Fall and cancer        PTA Visit #: 0/5     Time In: 0758 am  Time Out: 0845 am  Total Billable Time: 42 minutes    SUBJECTIVE     Pt reports:Knees are sore.  "It's better once it gets loosened up"  He was somewhat compliant with home exercise program.  Response to previous treatment: Tolerated well  Functional change:No change since last visit.     Pain: 3/10   Location: bilateral back, knees    OBJECTIVE     Objective Measures updated at progress report unless specified.     Treatment     Anthony received the treatments listed below:      therapeutic exercises to develop strength, ROM, flexibility, posture and core stabilization for 42 minutes including (new exercises in bold print):   Nu-step recumbent stepper L4 x 10'   Standing calf stretch using wedge 3x30s ea   Mini squat x 15   Heel raises 2x10    Standing toe taps 6" step x 1'   Standing resisted (red theraband) shoulder ext 2x10    Shoulder rows 2x10    Seated resisted trunk rotation 2x10 ea              Seated hamstring stretch 3x30s ea              Seated piriformis stretch (modified) 3x30s ea   Seated flexion 10 x 5s   Supine hip flexor stretch 3x30s ea   Supine: 2x10 " ea    Posterior pelvic tilt    Lower trunk rotation    Hip add ball squeeze    Hip abd clamshell with blue theraband    Bridging     Double KTC using physioball    Single knee to chest 3x30s ea (pt deferred)      Patient Education and Home Exercises     Home Exercises Provided and Patient Education Provided     Education provided:   - Added theraband resisted core stabilization ex and supine hip flexor stretch      Written Home Exercises Provided: Patient instructed to cont prior HEP.  Exercises were reviewed and Anthony was able to demonstrate them prior to the end of the session.  Anthony demonstrated good  understanding of the education provided. See EMR under Patient Instructions for exercises provided during therapy sessions.      ASSESSMENT     Symptoms improving. Exercise tolerance increasing.  Nearing discharge.     Anthony Is progressing slowly towards his goals.   Pt prognosis is Fair.     Pt will continue to benefit from skilled outpatient physical therapy to address the deficits listed in the problem list box on initial evaluation, provide pt/family education and to maximize pt's level of independence in the home and community environment.     Pt's spiritual, cultural and educational needs considered and pt agreeable to plan of care and goals.     Anticipated barriers to physical therapy: potential compliance with HEP       Goals:   Short Term Goals:                1) Decrease max pain to < 5/10 Met 2/21/2022              2) Facilitate improved LE flexibility Progressing              3) Facilitate improved posture in sitting and standing Progressing              4) Increase lumbar ROM to allow for patient to perform LE dressing without significant increase in pain Met 2/21/2022     Long Term Goals:                1) Patient will consistently perform sit <> stand transfers with minimal difficulty and minimal increase in symptoms Met 2/21/2022              2) Patient will stand > 20 min without increased LB  symptoms/fatigue to allow for cooking activities Progressing              3) Patient will increase walking tolerance to > 40 min to allow for completion of grocery shopping without significant increase in symptoms Minimal progress               4) Patient will consistently perform car transfers without increased difficulty Progressing              5) Improve functional deficit to < 45% as indicated by FOTO Lumbar Spine Survey Progressing     6) Patient will be independent in complete HEP   Progressing    PLAN     Finalize HEP next visit for discharge.      Stacie lEena, PT

## 2022-02-25 ENCOUNTER — CLINICAL SUPPORT (OUTPATIENT)
Dept: REHABILITATION | Facility: HOSPITAL | Age: 87
End: 2022-02-25
Attending: ANESTHESIOLOGY
Payer: MEDICARE

## 2022-02-25 DIAGNOSIS — M47.896 OTHER SPONDYLOSIS, LUMBAR REGION: ICD-10-CM

## 2022-02-25 DIAGNOSIS — M54.16 LUMBAR RADICULOPATHY: ICD-10-CM

## 2022-02-25 DIAGNOSIS — Z74.09 IMPAIRED FUNCTIONAL MOBILITY, BALANCE, GAIT, AND ENDURANCE: Primary | ICD-10-CM

## 2022-02-25 DIAGNOSIS — M51.36 DDD (DEGENERATIVE DISC DISEASE), LUMBAR: ICD-10-CM

## 2022-02-25 PROCEDURE — 97110 THERAPEUTIC EXERCISES: CPT | Mod: PN

## 2022-02-25 NOTE — PROGRESS NOTES
"[facility]   Physical Therapy Treatment Note/Discharge Summary    Name: Anthony Sheldon  Clinic Number: [id number]    Therapy Diagnosis:   Encounter Diagnoses   Name Primary?    Impaired functional mobility, balance, gait, and endurance Yes    DDD (degenerative disc disease), lumbar     Lumbar radiculopathy     Other spondylosis, lumbar region      Physician: Jade Layton MD    Visit Date: 2/25/2022    Physician Orders: PT Eval and Treat Back  Medical Diagnosis from Referral: DDD lumbar, lumbar radiculopathy, other spondylosis lumbar region  Evaluation Date: 1/10/2022  Authorization Period Expiration: 12/22/2022  Plan of Care Expiration: 2/22/2022  Progress Note Due: 2/22/2022  Visit # / Visits authorized: 12/ 12   FOTO: Next survey due 2/25/2022     Precautions: Fall and cancer        PTA Visit #: 0/5     Time In: 0800 am  Time Out: 0840 am  Total Billable Time: 30 minutes    SUBJECTIVE     Pt reports:"I ate some bad fish yesterday"  "Trying to do 30 min of exercise a day"  He was somewhat compliant with home exercise program.  Response to previous treatment: Tolerated well  Functional change:Working in the yard  Current level of function:  Decreased difficulty bending, getting in/out of car, sit <> stand transfers, decreased sleep disturbance.  Walking and standing tolerance remain limited by have improved by patient report.    Pain: 3/10 "More soreness"  Worst 4-5/10 (standing)  Location: bilateral back, knees    OBJECTIVE     Objective Measures updated at progress report unless specified.     Treatment     Anthony received the treatments listed below:      therapeutic exercises to develop strength, ROM, flexibility, posture and core stabilization for 30 minutes including:   Nu-step recumbent stepper L4 x 10'   Standing calf stretch using wedge 3x30s ea   Mini squat 2x10   Heel raises 2x10    Standing toe taps 6" step x 1'   Standing resisted (red theraband) shoulder ext " 2x10    Shoulder rows 2x10    Seated resisted trunk rotation 2x10 ea              Seated hamstring stretch 3x30s ea             Supine hip flexor stretch 3x30s ea   Supine: 2x10 ea    Posterior pelvic tilt    Lower trunk rotation    Hip add ball squeeze      Did not perform this date 2* to patient not feeling well.  Verbal review completed.     Single knee to chest 3x30s ea      Seated piriformis stretch (modified) 3x30s ea   Seated flexion 10 x 5s   Hip abd clamshell with blue theraband   Bridging    Double KTC using physioball (discussed options for home performance)    FOTO lumbar survey:  38%     Patient Education and Home Exercises     Home Exercises Provided and Patient Education Provided     Education provided:   - Finalized HEP    Written Home Exercises Provided: Patient instructed to cont prior HEP.  Provided illustrated instructions for newer exercises. Exercises were reviewed and Anthony was able to demonstrate them prior to the end of the session.  Anthony demonstrated good  understanding of the education provided. See EMR under Patient Instructions for exercises provided during therapy sessions.      ASSESSMENT     Patient able to perform all exercises with minimal cueing.  Activities limited today due to patient not feeling well.      Anthony Is progressing slowly towards his goals.   Pt prognosis is Fair.     Pt will continue to benefit from skilled outpatient physical therapy to address the deficits listed in the problem list box on initial evaluation, provide pt/family education and to maximize pt's level of independence in the home and community environment.     Pt's spiritual, cultural and educational needs considered and pt agreeable to plan of care and goals.     Anticipated barriers to physical therapy: potential compliance with HEP       Goals:   Short Term Goals:                1) Decrease max pain to < 5/10 Met 2/21/2022              2) Facilitate improved LE flexibility Met 2/25/2022               3) Facilitate improved posture in sitting and standing Partially met              4) Increase lumbar ROM to allow for patient to perform LE dressing without significant increase in pain Met 2/21/2022     Long Term Goals:                1) Patient will consistently perform sit <> stand transfers with minimal difficulty and minimal increase in symptoms Met 2/21/2022              2) Patient will stand > 20 min without increased LB symptoms/fatigue to allow for cooking activities Partially met              3) Patient will increase walking tolerance to > 40 min to allow for completion of grocery shopping without significant increase in symptoms Partially met               4) Patient will consistently perform car transfers without increased difficulty Nearly met              5) Improve functional deficit to < 45% as indicated by FOTO Lumbar Spine Survey Met 2/25/2022     6) Patient will be independent in complete HEP   Met 2/25/2022    PLAN     Discharge patient to independent      Stacie Elena PT

## 2022-03-04 ENCOUNTER — PATIENT MESSAGE (OUTPATIENT)
Dept: HEMATOLOGY/ONCOLOGY | Facility: CLINIC | Age: 87
End: 2022-03-04
Payer: MEDICARE

## 2022-03-04 DIAGNOSIS — R97.20 ELEVATED PSA MEASUREMENT: ICD-10-CM

## 2022-03-04 DIAGNOSIS — C61 PROSTATE CANCER: ICD-10-CM

## 2022-03-04 DIAGNOSIS — Z86.711 HISTORY OF PULMONARY EMBOLUS (PE): ICD-10-CM

## 2022-03-04 RX ORDER — BICALUTAMIDE 50 MG/1
50 TABLET, FILM COATED ORAL DAILY
Qty: 30 TABLET | Refills: 3 | Status: SHIPPED | OUTPATIENT
Start: 2022-03-04 | End: 2022-07-26 | Stop reason: SDUPTHER

## 2022-03-21 ENCOUNTER — OFFICE VISIT (OUTPATIENT)
Dept: PAIN MEDICINE | Facility: CLINIC | Age: 87
End: 2022-03-21
Payer: MEDICARE

## 2022-03-21 VITALS
WEIGHT: 242 LBS | SYSTOLIC BLOOD PRESSURE: 169 MMHG | HEART RATE: 73 BPM | DIASTOLIC BLOOD PRESSURE: 89 MMHG | BODY MASS INDEX: 35.84 KG/M2 | HEIGHT: 69 IN

## 2022-03-21 DIAGNOSIS — M25.561 CHRONIC PAIN OF RIGHT KNEE: ICD-10-CM

## 2022-03-21 DIAGNOSIS — G89.29 CHRONIC PAIN OF RIGHT KNEE: ICD-10-CM

## 2022-03-21 DIAGNOSIS — M47.896 OTHER SPONDYLOSIS, LUMBAR REGION: ICD-10-CM

## 2022-03-21 DIAGNOSIS — M54.16 LUMBAR RADICULOPATHY: Primary | ICD-10-CM

## 2022-03-21 DIAGNOSIS — M51.36 DDD (DEGENERATIVE DISC DISEASE), LUMBAR: ICD-10-CM

## 2022-03-21 PROCEDURE — 1101F PR PT FALLS ASSESS DOC 0-1 FALLS W/OUT INJ PAST YR: ICD-10-PCS | Mod: CPTII,S$GLB,, | Performed by: ANESTHESIOLOGY

## 2022-03-21 PROCEDURE — 99999 PR PBB SHADOW E&M-EST. PATIENT-LVL III: CPT | Mod: PBBFAC,,, | Performed by: ANESTHESIOLOGY

## 2022-03-21 PROCEDURE — 1125F AMNT PAIN NOTED PAIN PRSNT: CPT | Mod: CPTII,S$GLB,, | Performed by: ANESTHESIOLOGY

## 2022-03-21 PROCEDURE — 1157F PR ADVANCE CARE PLAN OR EQUIV PRESENT IN MEDICAL RECORD: ICD-10-PCS | Mod: CPTII,S$GLB,, | Performed by: ANESTHESIOLOGY

## 2022-03-21 PROCEDURE — 3288F FALL RISK ASSESSMENT DOCD: CPT | Mod: CPTII,S$GLB,, | Performed by: ANESTHESIOLOGY

## 2022-03-21 PROCEDURE — 1101F PT FALLS ASSESS-DOCD LE1/YR: CPT | Mod: CPTII,S$GLB,, | Performed by: ANESTHESIOLOGY

## 2022-03-21 PROCEDURE — 1157F ADVNC CARE PLAN IN RCRD: CPT | Mod: CPTII,S$GLB,, | Performed by: ANESTHESIOLOGY

## 2022-03-21 PROCEDURE — 3288F PR FALLS RISK ASSESSMENT DOCUMENTED: ICD-10-PCS | Mod: CPTII,S$GLB,, | Performed by: ANESTHESIOLOGY

## 2022-03-21 PROCEDURE — 1159F MED LIST DOCD IN RCRD: CPT | Mod: CPTII,S$GLB,, | Performed by: ANESTHESIOLOGY

## 2022-03-21 PROCEDURE — 99214 OFFICE O/P EST MOD 30 MIN: CPT | Mod: S$GLB,,, | Performed by: ANESTHESIOLOGY

## 2022-03-21 PROCEDURE — 1159F PR MEDICATION LIST DOCUMENTED IN MEDICAL RECORD: ICD-10-PCS | Mod: CPTII,S$GLB,, | Performed by: ANESTHESIOLOGY

## 2022-03-21 PROCEDURE — 99214 PR OFFICE/OUTPT VISIT, EST, LEVL IV, 30-39 MIN: ICD-10-PCS | Mod: S$GLB,,, | Performed by: ANESTHESIOLOGY

## 2022-03-21 PROCEDURE — 1125F PR PAIN SEVERITY QUANTIFIED, PAIN PRESENT: ICD-10-PCS | Mod: CPTII,S$GLB,, | Performed by: ANESTHESIOLOGY

## 2022-03-21 PROCEDURE — 99999 PR PBB SHADOW E&M-EST. PATIENT-LVL III: ICD-10-PCS | Mod: PBBFAC,,, | Performed by: ANESTHESIOLOGY

## 2022-03-21 RX ORDER — GABAPENTIN 300 MG/1
300 CAPSULE ORAL NIGHTLY
Qty: 90 CAPSULE | Refills: 0 | Status: SHIPPED | OUTPATIENT
Start: 2022-03-21 | End: 2022-03-22

## 2022-03-21 RX ORDER — TRAMADOL HYDROCHLORIDE 50 MG/1
50 TABLET ORAL EVERY 6 HOURS PRN
Qty: 45 TABLET | Refills: 0 | Status: SHIPPED | OUTPATIENT
Start: 2022-03-21 | End: 2022-04-12

## 2022-03-21 NOTE — PROGRESS NOTES
"FOLLOW UP NOTE:     CHIEF COMPLAINT: back and leg pain    INITIAL HISTORY OF PRESENT ILLNESS (via Dr. Valdovinos): Anthony Sheldon is a 85 y.o. male who presents today with left knee pain. He reports that the knee feels like a pressure sensation.  He is s/p left knee coolief in 5/2018 with resolution of his burning, stabbing pain, but this pressure pain never went away.   It is located on the lateral and posterior aspect of his knee.  This pain is described in detail below.     Of note, he received Euflexxa in the right knee with great benefit.     He also reports left-sided low back pain that is long-standing and worsening.  The pain is located in the left middle back and does not radiate.  The pain is worse with walking and with activities cause him to stand up straight.  It is better with rest.     Aggravating factors: Walking, the pain is worse in the morning     Mitigating factors: Coolief, motion     Previously seeing: Dr. Posadas, but patient lives here and would like to establish care here    INTERVAL HISTORY OF PRESENT ILLNESS: Anthony Sheldon is a 87 y.o. male with PMH significant for CKD, hx of left knee arthroplasty, hx of DVT on Eliquis, and prostate cancer presents for the continued management of low back pain. In the interim, the patient reports of some benefit with PT in regards to his stamina. In regards to his back pain, the patient continues to localize his pain to the center of the lower back. He reports of radiation down the LLE to his toes. He describes his LLE symptoms as a  "tingling sensation at night." The patient reports that his leg pain at night can wake him up from sleep at times. The patient reports that his pain is worsened with excessive physical activity and prolonged standing. The patient reports of benefit with Tylenol and Tramadol PRN for severe pain. The patient denies of any significant changes in his health since his last appointment. The patient also denies of any " "changes in the character of his pain since his last appointment. Overall, the patient reports of "more good days than bad days" in regards to his pain. The patient reports that his current pain is a 2/10. Patient denies of any urinary/fecal incontinence, saddle anesthesia, or weakness.     INTERVENTIONAL PAIN HISTORY:  8/18/2021: Right knee Monovisc injection - 80% benefit  8/2/2021: Radiofrequency ablation of the L3, L4, and L5 medial branch nerves on the bilateral-side   · 5/18/2018: Left knee coolief radiofrequency:  75% relief  · Euflexxa in right knee 3/2018: Good benefit  · 06/17/2019:  L5/S1 interlaminar SARITHA:  50% benefit, mostly of low back pain     CURRENT PAIN MEDICATIONS:   OTC Tylenol as needed  Tramadol PRN (does not take daily)  Eliquis      Previously tried:  Tramadol        ROS:  Review of Systems   Constitutional: Negative for chills and fever.   HENT: Negative for tinnitus.    Eyes: Negative for visual disturbance.   Respiratory: Negative for shortness of breath.    Cardiovascular: Negative for chest pain.   Gastrointestinal: Negative for nausea and vomiting.   Genitourinary: Negative for difficulty urinating.   Musculoskeletal: Positive for arthralgias and back pain.   Skin: Negative for rash.   Allergic/Immunologic: Negative for immunocompromised state.   Neurological: Positive for numbness. Negative for syncope.   Hematological: Does not bruise/bleed easily.   Psychiatric/Behavioral: Negative for suicidal ideas.        MEDICAL, SURGICAL, FAMILY, SOCIAL HX: reviewed    MEDICATIONS/ALLERGIES: reviewed    PHYSICAL EXAM:    VITALS: Vitals reviewed.   Vitals:    03/21/22 0758   BP: (!) 169/89   Pulse: 73   Weight: 109.8 kg (242 lb)   Height: 5' 9" (1.753 m)   PainSc:   2   PainLoc: Back       Physical Exam  Vitals and nursing note reviewed.   Constitutional:       Appearance: He is not diaphoretic.   HENT:      Head: Normocephalic and atraumatic.   Eyes:      General:         Right eye: No discharge. "         Left eye: No discharge.      Conjunctiva/sclera: Conjunctivae normal.   Cardiovascular:      Rate and Rhythm: Normal rate.   Pulmonary:      Effort: Pulmonary effort is normal. No respiratory distress.      Breath sounds: Normal breath sounds.   Abdominal:      Palpations: Abdomen is soft.   Skin:     General: Skin is warm and dry.      Findings: No rash.   Neurological:      Mental Status: He is alert and oriented to person, place, and time.   Psychiatric:         Mood and Affect: Mood and affect normal.         Cognition and Memory: Memory normal.         Judgment: Judgment normal.          UPPER EXTREMITIES: Normal alignment, normal range of motion, no atrophy, no skin changes,  hair growth and nail growth normal and equal bilaterally. No swelling, no tenderness.    LOWER EXTREMITIES:  Normal alignment, normal range of motion, no atrophy, no skin changes,  hair growth and nail growth normal and equal bilaterally. No swelling, no tenderness.     LUMBAR SPINE  Lumbar spine: ROM is full with flexion extension and oblique extension with increased pain with extension.     ((--)) Supine straight leg raise    ((+)) Facet loading   Internal and external rotation of the hip causes no increased pain on either side.  Myofascial exam: No tenderness to palpation across lumbar paraspinous muscles.    MOTOR: Tone and bulk: normal bilateral upper and lower Strength: normal   Delt      Bi         Tri        WE      WF                        R          5          5          5          5          5          5            L          5          5          5          5          5          5               IP         ADD     ABD     Quad   TA        Gas      HAM  R          5          5          5          5          5          5          5  L          5          5          5          5          5          5          5     SENSATION: Light touch and pinprick intact bilaterally  REFLEXES: normal, symmetric, nonbrisk.  Toes  "down, no clonus. Negative denny's sign bilaterally.  GAIT: normal rise, base, steps, and arm swing.       IMAGING: no new imaging to review    ASSESSMENT:  Anthony Sheldon is a 87 y.o. male with PMH significant for CKD, hx of left knee arthroplasty, hx of DVT on Eliquis, and prostate cancer presents for the continued management of low back pain. In the interim, the patient reports of some benefit with PT in regards to his stamina. In regards to his back pain, the patient continues to localize his pain to the center of the lower back with radiation down the LLE to his toes. The patient reports of benefit with Tylenol and Tramadol PRN for severe pain. Overall, the patient reports of "more good days than bad days" in regards to his pain. Treatment plan outlined below.     PLAN:  1. Schedule repeat right knee injection in one month per patient request  2. Refilled Tramadol 50 mg PO q 6 hr PRN for breakthrough pain; # 45 tablets; 0 refills.  reviewed without discrepancies.   3. I have stressed the importance of physical activity and a home exercise plan to help with chronic pain and improve health.  4. Prescribed Gabapentin 300 mg PO QHS for neuropathic pain  5. RTC in 1 month for right knee injection    Jade Layton MD  Pain Management            "

## 2022-03-28 ENCOUNTER — LAB VISIT (OUTPATIENT)
Dept: LAB | Facility: HOSPITAL | Age: 87
End: 2022-03-28
Attending: NURSE PRACTITIONER
Payer: MEDICARE

## 2022-03-28 DIAGNOSIS — N18.32 STAGE 3B CHRONIC KIDNEY DISEASE: ICD-10-CM

## 2022-03-28 DIAGNOSIS — Z86.711 HISTORY OF PULMONARY EMBOLUS (PE): ICD-10-CM

## 2022-03-28 DIAGNOSIS — R91.8 PULMONARY NODULES: ICD-10-CM

## 2022-03-28 DIAGNOSIS — E11.9 DIABETES MELLITUS WITH HEMOGLOBIN A1C GOAL OF 7.0%-8.0%: ICD-10-CM

## 2022-03-28 LAB
CHOLEST SERPL-MCNC: 208 MG/DL (ref 120–199)
CHOLEST/HDLC SERPL: 5.3 {RATIO} (ref 2–5)
ESTIMATED AVG GLUCOSE: 174 MG/DL (ref 68–131)
HBA1C MFR BLD: 7.7 % (ref 4–5.6)
HDLC SERPL-MCNC: 39 MG/DL (ref 40–75)
HDLC SERPL: 18.8 % (ref 20–50)
LDLC SERPL CALC-MCNC: 124.8 MG/DL (ref 63–159)
NONHDLC SERPL-MCNC: 169 MG/DL
TRIGL SERPL-MCNC: 221 MG/DL (ref 30–150)

## 2022-03-28 PROCEDURE — 80061 LIPID PANEL: CPT | Performed by: NURSE PRACTITIONER

## 2022-03-28 PROCEDURE — 36415 COLL VENOUS BLD VENIPUNCTURE: CPT | Performed by: NURSE PRACTITIONER

## 2022-03-28 PROCEDURE — 83036 HEMOGLOBIN GLYCOSYLATED A1C: CPT | Performed by: NURSE PRACTITIONER

## 2022-03-31 ENCOUNTER — TELEPHONE (OUTPATIENT)
Dept: HEMATOLOGY/ONCOLOGY | Facility: CLINIC | Age: 87
End: 2022-03-31

## 2022-03-31 ENCOUNTER — OFFICE VISIT (OUTPATIENT)
Dept: HEMATOLOGY/ONCOLOGY | Facility: CLINIC | Age: 87
End: 2022-03-31
Payer: MEDICARE

## 2022-03-31 VITALS
HEART RATE: 69 BPM | HEIGHT: 69 IN | BODY MASS INDEX: 36.73 KG/M2 | DIASTOLIC BLOOD PRESSURE: 81 MMHG | SYSTOLIC BLOOD PRESSURE: 161 MMHG | OXYGEN SATURATION: 96 % | WEIGHT: 248 LBS

## 2022-03-31 DIAGNOSIS — C61 PROSTATE CANCER: Primary | ICD-10-CM

## 2022-03-31 PROCEDURE — 1101F PT FALLS ASSESS-DOCD LE1/YR: CPT | Mod: CPTII,S$GLB,, | Performed by: INTERNAL MEDICINE

## 2022-03-31 PROCEDURE — 99214 PR OFFICE/OUTPT VISIT, EST, LEVL IV, 30-39 MIN: ICD-10-PCS | Mod: S$GLB,,, | Performed by: INTERNAL MEDICINE

## 2022-03-31 PROCEDURE — 1159F MED LIST DOCD IN RCRD: CPT | Mod: CPTII,S$GLB,, | Performed by: INTERNAL MEDICINE

## 2022-03-31 PROCEDURE — 99999 PR PBB SHADOW E&M-EST. PATIENT-LVL III: CPT | Mod: PBBFAC,,, | Performed by: INTERNAL MEDICINE

## 2022-03-31 PROCEDURE — 1101F PR PT FALLS ASSESS DOC 0-1 FALLS W/OUT INJ PAST YR: ICD-10-PCS | Mod: CPTII,S$GLB,, | Performed by: INTERNAL MEDICINE

## 2022-03-31 PROCEDURE — 1157F ADVNC CARE PLAN IN RCRD: CPT | Mod: CPTII,S$GLB,, | Performed by: INTERNAL MEDICINE

## 2022-03-31 PROCEDURE — 3288F FALL RISK ASSESSMENT DOCD: CPT | Mod: CPTII,S$GLB,, | Performed by: INTERNAL MEDICINE

## 2022-03-31 PROCEDURE — 1160F RVW MEDS BY RX/DR IN RCRD: CPT | Mod: CPTII,S$GLB,, | Performed by: INTERNAL MEDICINE

## 2022-03-31 PROCEDURE — 99214 OFFICE O/P EST MOD 30 MIN: CPT | Mod: S$GLB,,, | Performed by: INTERNAL MEDICINE

## 2022-03-31 PROCEDURE — 1126F AMNT PAIN NOTED NONE PRSNT: CPT | Mod: CPTII,S$GLB,, | Performed by: INTERNAL MEDICINE

## 2022-03-31 PROCEDURE — 1126F PR PAIN SEVERITY QUANTIFIED, NO PAIN PRESENT: ICD-10-PCS | Mod: CPTII,S$GLB,, | Performed by: INTERNAL MEDICINE

## 2022-03-31 PROCEDURE — 3288F PR FALLS RISK ASSESSMENT DOCUMENTED: ICD-10-PCS | Mod: CPTII,S$GLB,, | Performed by: INTERNAL MEDICINE

## 2022-03-31 PROCEDURE — 99999 PR PBB SHADOW E&M-EST. PATIENT-LVL III: ICD-10-PCS | Mod: PBBFAC,,, | Performed by: INTERNAL MEDICINE

## 2022-03-31 PROCEDURE — 1157F PR ADVANCE CARE PLAN OR EQUIV PRESENT IN MEDICAL RECORD: ICD-10-PCS | Mod: CPTII,S$GLB,, | Performed by: INTERNAL MEDICINE

## 2022-03-31 PROCEDURE — 1159F PR MEDICATION LIST DOCUMENTED IN MEDICAL RECORD: ICD-10-PCS | Mod: CPTII,S$GLB,, | Performed by: INTERNAL MEDICINE

## 2022-03-31 PROCEDURE — 1160F PR REVIEW ALL MEDS BY PRESCRIBER/CLIN PHARMACIST DOCUMENTED: ICD-10-PCS | Mod: CPTII,S$GLB,, | Performed by: INTERNAL MEDICINE

## 2022-03-31 NOTE — TELEPHONE ENCOUNTER
Scheduled patient for follow-up and lab appointment per Dr. Lombardi's orders below. Sent Sherrie, RN and Isaiah RN a staff message regarding this patient's schedule for his next Lupron. Linked labs to appointment. Patient denied AVS. The patient has no questions at this time.         ----- Message from Patel Lombardi MD sent at 3/31/2022  8:18 AM CDT -----   Repeat Lupron.    Re-evaluate 6 months from now with interval CBC, CMP, LDH, magnesium, and PSA prior to appointment.  Patient will be due for Lupron injection upon return to clinic.

## 2022-03-31 NOTE — Clinical Note
Repeat Lupron.   Re-evaluate 6 months from now with interval CBC, CMP, LDH, magnesium, and PSA prior to appointment.  Patient will be due for Lupron injection upon return to clinic.

## 2022-03-31 NOTE — PROGRESS NOTES
History of present illness:  The patient is an 87-year-old white gentleman with metastatic prostate carcinoma who is managed with Lupron and Casodex.  Patient returns to clinic for interval follow-up.  No worsening bone pain.  No urinary complaints.  Patient remains compliant with Casodex and is due for Lupron tomorrow.    Physical examination:  Well-developed, well-nourished, elderly, white gentleman, no acute distress, who has a weight of 248 lb  (increased by 5.5 lb)  VITAL SIGNS: Documented  and reviewed this visit.  HEENT: Normocephalic, atraumatic. Oral mucosa pink and moist. Lips without lesions. Tongue midline. Oropharynx clear. Nonicteric sclerae.   NECK: Supple, no adenopathy. No carotid bruits, thyromegaly or thyroid nodule.   HEART: Regular rate and rhythm without murmur, gallop or rub.   LUNGS: Clear to auscultation bilaterally. Normal respiratory effort.   ABDOMEN: Soft, nontender, nondistended with positive normoactive bowel sounds, no hepatosplenomegaly.   EXTREMITIES: No cyanosis, clubbing or edema. Distal pulses are intact.   AXILLAE AND GROIN: No palpable pathologic lymphadenopathy is appreciated.   SKIN: Intact/turgor normal   NEUROLOGIC: Cranial nerves II-XII grossly intact. Motor: Good muscle bulk and tone. Strength/sensory 5/5 throughout. Gait stable.     Laboratory:  White count 5.5, hemoglobin 12.3, hematocrit 37.3, platelets 214, absolute neutrophil count is 3100.    Sodium 141, potassium 4.4, chloride 105, CO2 24, BUN 25, creatinine 1.5, glucose 177, calcium 9.9, magnesium 1.7, liver function test within normal limits, LDH is 155, GFR is 41.    PSA less than 0.01.    Impression:  1. Metastatic prostate carcinoma without evidence of measurable metastatic disease on most recent imaging/PSA stable on current therapy.    Plan:  1. Continue Casodex 50 mg p.o. daily.  2. Return to clinic 3 months from now with interval CBC, CMP, LDH, magnesium, and PSA.  Patient will be due for next Lupron  injection at that visit.    This note was created using voice recognition software and may contain grammatical errors.

## 2022-04-01 ENCOUNTER — INFUSION (OUTPATIENT)
Dept: INFUSION THERAPY | Facility: HOSPITAL | Age: 87
End: 2022-04-01
Attending: ANESTHESIOLOGY
Payer: MEDICARE

## 2022-04-01 VITALS
DIASTOLIC BLOOD PRESSURE: 81 MMHG | BODY MASS INDEX: 36.73 KG/M2 | HEIGHT: 69 IN | OXYGEN SATURATION: 96 % | HEART RATE: 64 BPM | TEMPERATURE: 98 F | SYSTOLIC BLOOD PRESSURE: 164 MMHG | RESPIRATION RATE: 19 BRPM | WEIGHT: 248 LBS

## 2022-04-01 DIAGNOSIS — C61 PROSTATE CANCER: Primary | ICD-10-CM

## 2022-04-01 PROCEDURE — 96372 THER/PROPH/DIAG INJ SC/IM: CPT

## 2022-04-01 PROCEDURE — 63600175 PHARM REV CODE 636 W HCPCS: Mod: JG | Performed by: INTERNAL MEDICINE

## 2022-04-01 RX ADMIN — LEUPROLIDE ACETATE 45 MG: KIT at 08:04

## 2022-04-01 NOTE — PLAN OF CARE
"  Problem: Adult Inpatient Plan of Care  Goal: Plan of Care Review  2022 09 by Sherrie De La Rosa, RN  Outcome: Ongoing, Progressing  Flowsheets (Taken 2022)  Plan of Care Reviewed With: patient  BP (!) 164/81 (Patient Position: Sitting)   Pulse 64   Temp 98.1 °F (36.7 °C)   Resp 19   Ht 5' 9" (1.753 m)   Wt 112.5 kg (248 lb)   SpO2 96%   BMI 36.62 kg/m² Patient arrived in [facility]-A&O[facility] and pleasant with slow and steady gait, identified by name and  for Lupron Injection Q6 Mo.- Lupron administered to Left Dorsagluteal and band-aide applied-patient tolerated procedure well-VSS stable and all questions and concerns addressed with future injection scheduled out 6 months from today.          "

## 2022-04-05 ENCOUNTER — HOSPITAL ENCOUNTER (OUTPATIENT)
Dept: RADIOLOGY | Facility: HOSPITAL | Age: 87
Discharge: HOME | End: 2022-04-05
Attending: NURSE PRACTITIONER
Payer: MEDICARE

## 2022-04-05 DIAGNOSIS — R91.1 SOLITARY PULMONARY NODULE: ICD-10-CM

## 2022-04-05 PROCEDURE — 71250 CT THORAX DX C-: CPT | Mod: 26,,, | Performed by: RADIOLOGY

## 2022-04-05 PROCEDURE — 71250 CT THORAX DX C-: CPT | Mod: TC

## 2022-04-05 PROCEDURE — 71250 CT CHEST WITHOUT CONTRAST: ICD-10-PCS | Mod: 26,,, | Performed by: RADIOLOGY

## 2022-04-12 ENCOUNTER — PATIENT MESSAGE (OUTPATIENT)
Dept: HEMATOLOGY/ONCOLOGY | Facility: CLINIC | Age: 87
End: 2022-04-12
Payer: MEDICARE

## 2022-04-18 ENCOUNTER — IMMUNIZATION (OUTPATIENT)
Dept: FAMILY MEDICINE | Facility: CLINIC | Age: 87
End: 2022-04-18
Payer: MEDICARE

## 2022-04-18 DIAGNOSIS — Z23 NEED FOR VACCINATION: Primary | ICD-10-CM

## 2022-04-18 PROCEDURE — 91301 COVID-19, MRNA, LNP-S, PF, 100 MCG/0.5 ML DOSE VACCINE: CPT | Mod: PBBFAC | Performed by: FAMILY MEDICINE

## 2022-04-18 NOTE — PROGRESS NOTES
Anthony Sheldon arrive to clinic awake and alert.  Pt verified name and .   Allergies reviewed with patient.  Pt given Moderna Vaccine via Intramuscular Left deltoid per provider orders.    Well tolerated by patient.  denies further needs.    Patient instructed to wait 15 mins after injection.   Patient states no vaccines in the last 14 days.  Vaccine information sheet was given to patient. Patient voiced understanding.    Kraig Marcum LPN

## 2022-04-19 ENCOUNTER — OFFICE VISIT (OUTPATIENT)
Dept: PAIN MEDICINE | Facility: CLINIC | Age: 87
End: 2022-04-19
Payer: MEDICARE

## 2022-04-19 VITALS — HEIGHT: 69 IN | WEIGHT: 240 LBS | BODY MASS INDEX: 35.55 KG/M2

## 2022-04-19 DIAGNOSIS — M25.561 CHRONIC PAIN OF RIGHT KNEE: Primary | ICD-10-CM

## 2022-04-19 DIAGNOSIS — M17.11 PRIMARY OSTEOARTHRITIS OF RIGHT KNEE: ICD-10-CM

## 2022-04-19 DIAGNOSIS — G89.29 CHRONIC PAIN OF RIGHT KNEE: Primary | ICD-10-CM

## 2022-04-19 PROCEDURE — 99214 OFFICE O/P EST MOD 30 MIN: CPT | Mod: 25,S$GLB,, | Performed by: ANESTHESIOLOGY

## 2022-04-19 PROCEDURE — 1159F MED LIST DOCD IN RCRD: CPT | Mod: CPTII,S$GLB,, | Performed by: ANESTHESIOLOGY

## 2022-04-19 PROCEDURE — 20610 DRAIN/INJ JOINT/BURSA W/O US: CPT | Mod: RT,S$GLB,, | Performed by: ANESTHESIOLOGY

## 2022-04-19 PROCEDURE — 99999 PR PBB SHADOW E&M-EST. PATIENT-LVL III: ICD-10-PCS | Mod: PBBFAC,,, | Performed by: ANESTHESIOLOGY

## 2022-04-19 PROCEDURE — 20610 LARGE JOINT ASPIRATION/INJECTION: R KNEE: ICD-10-PCS | Mod: RT,S$GLB,, | Performed by: ANESTHESIOLOGY

## 2022-04-19 PROCEDURE — 1125F PR PAIN SEVERITY QUANTIFIED, PAIN PRESENT: ICD-10-PCS | Mod: CPTII,S$GLB,, | Performed by: ANESTHESIOLOGY

## 2022-04-19 PROCEDURE — 3288F PR FALLS RISK ASSESSMENT DOCUMENTED: ICD-10-PCS | Mod: CPTII,S$GLB,, | Performed by: ANESTHESIOLOGY

## 2022-04-19 PROCEDURE — 99214 PR OFFICE/OUTPT VISIT, EST, LEVL IV, 30-39 MIN: ICD-10-PCS | Mod: 25,S$GLB,, | Performed by: ANESTHESIOLOGY

## 2022-04-19 PROCEDURE — 1101F PR PT FALLS ASSESS DOC 0-1 FALLS W/OUT INJ PAST YR: ICD-10-PCS | Mod: CPTII,S$GLB,, | Performed by: ANESTHESIOLOGY

## 2022-04-19 PROCEDURE — 99999 PR PBB SHADOW E&M-EST. PATIENT-LVL III: CPT | Mod: PBBFAC,,, | Performed by: ANESTHESIOLOGY

## 2022-04-19 PROCEDURE — 3288F FALL RISK ASSESSMENT DOCD: CPT | Mod: CPTII,S$GLB,, | Performed by: ANESTHESIOLOGY

## 2022-04-19 PROCEDURE — 1101F PT FALLS ASSESS-DOCD LE1/YR: CPT | Mod: CPTII,S$GLB,, | Performed by: ANESTHESIOLOGY

## 2022-04-19 PROCEDURE — 1157F PR ADVANCE CARE PLAN OR EQUIV PRESENT IN MEDICAL RECORD: ICD-10-PCS | Mod: CPTII,S$GLB,, | Performed by: ANESTHESIOLOGY

## 2022-04-19 PROCEDURE — 1157F ADVNC CARE PLAN IN RCRD: CPT | Mod: CPTII,S$GLB,, | Performed by: ANESTHESIOLOGY

## 2022-04-19 PROCEDURE — 1159F PR MEDICATION LIST DOCUMENTED IN MEDICAL RECORD: ICD-10-PCS | Mod: CPTII,S$GLB,, | Performed by: ANESTHESIOLOGY

## 2022-04-19 PROCEDURE — 1125F AMNT PAIN NOTED PAIN PRSNT: CPT | Mod: CPTII,S$GLB,, | Performed by: ANESTHESIOLOGY

## 2022-04-19 NOTE — PROGRESS NOTES
FOLLOW UP NOTE:     CHIEF COMPLAINT: right knee pain     INITIAL HISTORY OF PRESENT ILLNESS (via Dr. Valdovinos): Anthony Sheldon is a 85 y.o. male who presents today with left knee pain. He reports that the knee feels like a pressure sensation.  He is s/p left knee coolief in 5/2018 with resolution of his burning, stabbing pain, but this pressure pain never went away.   It is located on the lateral and posterior aspect of his knee.  This pain is described in detail below.     Of note, he received Euflexxa in the right knee with great benefit.     He also reports left-sided low back pain that is long-standing and worsening.  The pain is located in the left middle back and does not radiate.  The pain is worse with walking and with activities cause him to stand up straight.  It is better with rest.     Aggravating factors: Walking, the pain is worse in the morning     Mitigating factors: Coolief, motion     Previously seeing: Dr. Posadas, but patient lives here and would like to establish care here    INTERVAL HISTORY OF PRESENT ILLNESS: Anthony Sheldon is a 87 y.o. male with PMH significant for CKD, hx of left knee arthroplasty, hx of DVT on Eliquis, and prostate cancer presents for the continued management of low back and knee pain. The patient presents today for a right knee Monovisc injection. The patient denies of any significant changes in his health since his last appointment. The patient also denies of any changes in the character of his pain since his last appointment. The patient reports that his current pain in his knee is a 2/10. Patient denies of any urinary/fecal incontinence, saddle anesthesia, or weakness.       INTERVENTIONAL PAIN HISTORY:  8/18/2021: Right knee Monovisc injection - 80% benefit  8/2/2021: Radiofrequency ablation of the L3, L4, and L5 medial branch nerves on the bilateral-side   · 5/18/2018: Left knee coolief radiofrequency:  75% relief  · Euflexxa in right knee 3/2018: Good  "benefit  · 06/17/2019:  L5/S1 interlaminar SARITHA:  50% benefit, mostly of low back pain     CURRENT PAIN MEDICATIONS:   OTC Tylenol as needed  Tramadol PRN (does not take daily)  Eliquis         ROS:  Review of Systems   Constitutional: Negative for chills and fever.   HENT: Negative for tinnitus.    Eyes: Negative for visual disturbance.   Respiratory: Negative for shortness of breath.    Cardiovascular: Negative for chest pain.   Gastrointestinal: Negative for nausea and vomiting.   Genitourinary: Negative for difficulty urinating.   Musculoskeletal: Positive for arthralgias and back pain.   Skin: Negative for rash.   Allergic/Immunologic: Negative for immunocompromised state.   Neurological: Negative for syncope.   Hematological: Does not bruise/bleed easily.   Psychiatric/Behavioral: Negative for suicidal ideas.        MEDICAL, SURGICAL, FAMILY, SOCIAL HX: reviewed    MEDICATIONS/ALLERGIES: reviewed    PHYSICAL EXAM:    VITALS: Vitals reviewed.   Vitals:    04/19/22 0802   Weight: 108.9 kg (240 lb)   Height: 5' 9" (1.753 m)   PainSc:   2   PainLoc: Knee       Physical Exam  Vitals and nursing note reviewed.   Constitutional:       Appearance: He is not diaphoretic.   HENT:      Head: Normocephalic and atraumatic.   Eyes:      General:         Right eye: No discharge.         Left eye: No discharge.      Conjunctiva/sclera: Conjunctivae normal.   Cardiovascular:      Rate and Rhythm: Normal rate.   Pulmonary:      Effort: Pulmonary effort is normal. No respiratory distress.      Breath sounds: Normal breath sounds.   Abdominal:      Palpations: Abdomen is soft.   Musculoskeletal:      Right knee: Bony tenderness present. No swelling or effusion. Tenderness present.   Skin:     General: Skin is warm and dry.      Findings: No rash.   Neurological:      Mental Status: He is alert and oriented to person, place, and time.   Psychiatric:         Mood and Affect: Mood and affect normal.         Cognition and Memory: " Memory normal.         Judgment: Judgment normal.        EXTREMITIES:    Gen: No cyanosis, edema, varicosities, or tenderness to palpation BLE   Skin: Warm, pink, dry, no rashes, no lesions BLE   Strength: 5/5 motor strength BLE   ROM: hips, knees and ankles without pain or instability.      Right knee exam:    Extension/flexion equal bilaterally.   Positive crepitus with motion both knees.    Negative effusion both knees.    Positive joint line tenderness      NEUROLOGICAL:    Gen: No clonus or spasticity.   Gait: Normal without antalgic lean   DTR's: 2+ in bilateral patellar, and ankle   BABINSKI: Absent bilaterally  Sensory: Intact to light touch and proprioception BLE    IMAGING: no new imaging to review    ASSESSMENT: Anthony Sheldon is a 87 y.o. male presents today for a right knee Monovisc injection. Treatment plan outlined below.      PLAN:  1. Performed right knee Monovisc injection in clinic today  2. Can offer interventions for low back pain in the future PRN  3. I have stressed the importance of physical activity and a home exercise plan to help with chronic pain and improve health.  4. RTC in 2-3 months for follow-up    Jade Layton MD  Pain Management

## 2022-04-19 NOTE — PROCEDURES
Large Joint Aspiration/Injection: R knee    Date/Time: 4/19/2022 8:00 AM  Performed by: Jade Layton MD  Authorized by: Jade Layton MD     Consent Done?:  Yes (Written)  Indications:  Arthritis and pain  Site marked: the procedure site was marked    Timeout: prior to procedure the correct patient, procedure, and site was verified    Prep: patient was prepped and draped in usual sterile fashion    Local anesthesia used?: No    Anesthesia method: cold spray used for topicalization.    Details:  Needle Size:  22 G  Ultrasonic Guidance for needle placement?: No    Approach:  Anterolateral  Location:  Knee  Site:  R knee  Medications:  4 mL hyaluronate sodium, stabilized 88 mg/4 mL  Patient tolerance:  Patient tolerated the procedure well with no immediate complications

## 2022-07-07 ENCOUNTER — OFFICE VISIT (OUTPATIENT)
Dept: PAIN MEDICINE | Facility: CLINIC | Age: 87
End: 2022-07-07
Payer: MEDICARE

## 2022-07-07 VITALS
SYSTOLIC BLOOD PRESSURE: 148 MMHG | DIASTOLIC BLOOD PRESSURE: 83 MMHG | BODY MASS INDEX: 35.55 KG/M2 | HEIGHT: 69 IN | RESPIRATION RATE: 18 BRPM | WEIGHT: 240 LBS | HEART RATE: 71 BPM

## 2022-07-07 DIAGNOSIS — M25.561 CHRONIC PAIN OF RIGHT KNEE: ICD-10-CM

## 2022-07-07 DIAGNOSIS — M51.36 DDD (DEGENERATIVE DISC DISEASE), LUMBAR: Primary | ICD-10-CM

## 2022-07-07 DIAGNOSIS — M47.896 OTHER SPONDYLOSIS, LUMBAR REGION: ICD-10-CM

## 2022-07-07 DIAGNOSIS — M17.11 PRIMARY OSTEOARTHRITIS OF RIGHT KNEE: ICD-10-CM

## 2022-07-07 DIAGNOSIS — G89.29 CHRONIC PAIN OF RIGHT KNEE: ICD-10-CM

## 2022-07-07 DIAGNOSIS — M54.16 LUMBAR RADICULOPATHY: ICD-10-CM

## 2022-07-07 PROCEDURE — 1125F PR PAIN SEVERITY QUANTIFIED, PAIN PRESENT: ICD-10-PCS | Mod: CPTII,S$GLB,, | Performed by: ANESTHESIOLOGY

## 2022-07-07 PROCEDURE — 1125F AMNT PAIN NOTED PAIN PRSNT: CPT | Mod: CPTII,S$GLB,, | Performed by: ANESTHESIOLOGY

## 2022-07-07 PROCEDURE — 99214 OFFICE O/P EST MOD 30 MIN: CPT | Mod: S$GLB,,, | Performed by: ANESTHESIOLOGY

## 2022-07-07 PROCEDURE — 1101F PR PT FALLS ASSESS DOC 0-1 FALLS W/OUT INJ PAST YR: ICD-10-PCS | Mod: CPTII,S$GLB,, | Performed by: ANESTHESIOLOGY

## 2022-07-07 PROCEDURE — 1157F ADVNC CARE PLAN IN RCRD: CPT | Mod: CPTII,S$GLB,, | Performed by: ANESTHESIOLOGY

## 2022-07-07 PROCEDURE — 99214 PR OFFICE/OUTPT VISIT, EST, LEVL IV, 30-39 MIN: ICD-10-PCS | Mod: S$GLB,,, | Performed by: ANESTHESIOLOGY

## 2022-07-07 PROCEDURE — 1101F PT FALLS ASSESS-DOCD LE1/YR: CPT | Mod: CPTII,S$GLB,, | Performed by: ANESTHESIOLOGY

## 2022-07-07 PROCEDURE — 3288F PR FALLS RISK ASSESSMENT DOCUMENTED: ICD-10-PCS | Mod: CPTII,S$GLB,, | Performed by: ANESTHESIOLOGY

## 2022-07-07 PROCEDURE — 1159F MED LIST DOCD IN RCRD: CPT | Mod: CPTII,S$GLB,, | Performed by: ANESTHESIOLOGY

## 2022-07-07 PROCEDURE — 1159F PR MEDICATION LIST DOCUMENTED IN MEDICAL RECORD: ICD-10-PCS | Mod: CPTII,S$GLB,, | Performed by: ANESTHESIOLOGY

## 2022-07-07 PROCEDURE — 1157F PR ADVANCE CARE PLAN OR EQUIV PRESENT IN MEDICAL RECORD: ICD-10-PCS | Mod: CPTII,S$GLB,, | Performed by: ANESTHESIOLOGY

## 2022-07-07 PROCEDURE — 99999 PR PBB SHADOW E&M-EST. PATIENT-LVL III: ICD-10-PCS | Mod: PBBFAC,,, | Performed by: ANESTHESIOLOGY

## 2022-07-07 PROCEDURE — 3288F FALL RISK ASSESSMENT DOCD: CPT | Mod: CPTII,S$GLB,, | Performed by: ANESTHESIOLOGY

## 2022-07-07 PROCEDURE — 99999 PR PBB SHADOW E&M-EST. PATIENT-LVL III: CPT | Mod: PBBFAC,,, | Performed by: ANESTHESIOLOGY

## 2022-07-07 RX ORDER — TRAMADOL HYDROCHLORIDE 50 MG/1
TABLET ORAL
Qty: 45 TABLET | Refills: 2 | Status: SHIPPED | OUTPATIENT
Start: 2022-07-07 | End: 2022-10-25 | Stop reason: SDUPTHER

## 2022-07-07 RX ORDER — GABAPENTIN 300 MG/1
300 CAPSULE ORAL NIGHTLY
Qty: 90 CAPSULE | Refills: 2 | Status: SHIPPED | OUTPATIENT
Start: 2022-07-07 | End: 2024-02-08

## 2022-07-07 NOTE — PROGRESS NOTES
FOLLOW UP NOTE:     CHIEF COMPLAINT: back and knee pain    INITIAL HISTORY OF PRESENT ILLNESS (via Dr. Valdovinos): Anthony Sheldon is a 85 y.o. male who presents today with left knee pain. He reports that the knee feels like a pressure sensation.  He is s/p left knee coolief in 5/2018 with resolution of his burning, stabbing pain, but this pressure pain never went away.   It is located on the lateral and posterior aspect of his knee.  This pain is described in detail below.     Of note, he received Euflexxa in the right knee with great benefit.     He also reports left-sided low back pain that is long-standing and worsening.  The pain is located in the left middle back and does not radiate.  The pain is worse with walking and with activities cause him to stand up straight.  It is better with rest.     Aggravating factors: Walking, the pain is worse in the morning     Mitigating factors: Coolief, motion     Previously seeing: Dr. Posadas, but patient lives here and would like to establish care here    INTERVAL HISTORY OF PRESENT ILLNESS: Anthony Sheldon is a 88 y.o. male with PMH significant for CKD, hx of left knee arthroplasty, hx of DVT on Eliquis, and prostate cancer presents for the continued management of low back and knee pain. The patient is s/p right knee Monovisc injection on 4/19/2022, and he reports of significant relief. The patient also reports of improved functional capacity. In regards to his back pain, the patient continues to localize his pain to the center of the lower back. The patient denies of radiating pain into his legs since starting gabapentin. He reports that his back pain is worse in the AM and with prolonged standing. He typically takes Tylenol with some benefit and reserves Tramadol for when his pain is severe. The patient denies of any significant changes in his health since his last appointment. The patient also denies of any changes in the character of his pain since his last  "appointment. The patient reports that his current pain is a 4/10. Patient denies of any urinary/fecal incontinence, saddle anesthesia, or weakness.     INTERVENTIONAL PAIN HISTORY:  4/19/2022: Right knee Monovisc injection - significant relief  8/18/2021: Right knee Monovisc injection - 80% benefit  8/2/2021: Radiofrequency ablation of the L3, L4, and L5 medial branch nerves on the bilateral-side   · 5/18/2018: Left knee coolief radiofrequency:  75% relief  · Euflexxa in right knee 3/2018: Good benefit  · 06/17/2019:  L5/S1 interlaminar SARITHA:  50% benefit, mostly of low back pain     CURRENT PAIN MEDICATIONS:   OTC Tylenol as needed  Tramadol PRN (does not take daily)  Eliquis   Gabapentin 300 mg PO QHS        ROS:  Review of Systems   Constitutional: Negative for chills and fever.   HENT: Negative for tinnitus.    Eyes: Negative for visual disturbance.   Respiratory: Negative for shortness of breath.    Cardiovascular: Negative for chest pain.   Gastrointestinal: Negative for nausea and vomiting.   Genitourinary: Negative for difficulty urinating.   Musculoskeletal: Positive for arthralgias and back pain.   Skin: Negative for rash.   Allergic/Immunologic: Negative for immunocompromised state.   Neurological: Negative for syncope.   Hematological: Does not bruise/bleed easily.   Psychiatric/Behavioral: Negative for suicidal ideas.        MEDICAL, SURGICAL, FAMILY, SOCIAL HX: reviewed    MEDICATIONS/ALLERGIES: reviewed    PHYSICAL EXAM:    VITALS: Vitals reviewed.   Vitals:    07/07/22 0801   BP: (!) 148/83   Pulse: 71   Resp: 18   Weight: 108.9 kg (240 lb)   Height: 5' 9" (1.753 m)   PainSc:   4       Physical Exam  Vitals and nursing note reviewed.   Constitutional:       Appearance: He is not diaphoretic.   HENT:      Head: Normocephalic and atraumatic.   Eyes:      General:         Right eye: No discharge.         Left eye: No discharge.      Conjunctiva/sclera: Conjunctivae normal.   Cardiovascular:      Rate and " Rhythm: Normal rate.   Pulmonary:      Effort: Pulmonary effort is normal. No respiratory distress.      Breath sounds: Normal breath sounds.   Abdominal:      Palpations: Abdomen is soft.   Skin:     General: Skin is warm and dry.      Findings: No rash.   Neurological:      Mental Status: He is alert and oriented to person, place, and time.   Psychiatric:         Mood and Affect: Mood and affect normal.         Cognition and Memory: Memory normal.         Judgment: Judgment normal.          UPPER EXTREMITIES: Normal alignment, normal range of motion, no atrophy, no skin changes,  hair growth and nail growth normal and equal bilaterally. No swelling, no tenderness.    LOWER EXTREMITIES:  Normal alignment, normal range of motion, no atrophy, no skin changes,  hair growth and nail growth normal and equal bilaterally. No swelling, no tenderness.     LUMBAR SPINE  Lumbar spine: ROM is full with flexion extension and oblique extension with increased pain with extension.     ((--)) Supine straight leg raise    ((+)) Facet loading   Internal and external rotation of the hip causes no increased pain on either side.  Myofascial exam: No tenderness to palpation across lumbar paraspinous muscles.     MOTOR: Tone and bulk: normal bilateral upper and lower Strength: normal   Delt      Bi         Tri        WE      WF                        R          5          5          5          5          5          5            L          5          5          5          5          5          5               IP         ADD     ABD     Quad   TA        Gas      HAM  R          5          5          5          5          5          5          5  L          5          5          5          5          5          5          5     SENSATION: Light touch and pinprick intact bilaterally  REFLEXES: normal, symmetric, nonbrisk.  Toes down, no clonus. Negative denny's sign bilaterally.  GAIT: normal rise, base, steps, and arm  swing.      IMAGING: no new imaging to review    ASSESSMENT: Anthony Sheldon is a 88 y.o. male with PMH significant for CKD, hx of left knee arthroplasty, hx of DVT on Eliquis, and prostate cancer presents for the continued management of low back and knee pain. The patient is s/p right knee Monovisc injection on 4/19/2022, and he reports of significant relief with improved functional capacity. In regards to his back pain, the patient continues to localize his pain to the center of the lower back but denies of radiating pain into his legs since starting gabapentin. He takes his pain regimen with benefit and without adverse side effects. Treatment plan outlined below.     PLAN:  1. Schedule for repeat right knee Monovisc injection ~ 10/2022 per patient request  2. Patient can decide as to whether he would like to repeat lumbar RFA in the future  3. Refilled gabapentin 300 mg PO QHS for neuropathic pain as the patient reports of benefit  4. Refill Tramadol 50 mg PO q day/BID PRN for breakthrough pain; # 45 tablets; 2 refills.  reviewed without discrepancies.   5. I have stressed the importance of physical activity and a home exercise plan to help with chronic pain and improve health.  6. RTC in 3 months for repeat right knee Monovisc injection    Jade Layton MD  Pain Management

## 2022-07-25 ENCOUNTER — PATIENT MESSAGE (OUTPATIENT)
Dept: HEMATOLOGY/ONCOLOGY | Facility: CLINIC | Age: 87
End: 2022-07-25
Payer: MEDICARE

## 2022-07-26 DIAGNOSIS — C61 PROSTATE CANCER: ICD-10-CM

## 2022-07-26 DIAGNOSIS — R97.20 ELEVATED PSA MEASUREMENT: ICD-10-CM

## 2022-07-26 RX ORDER — BICALUTAMIDE 50 MG/1
50 TABLET, FILM COATED ORAL DAILY
Qty: 90 TABLET | Refills: 3 | Status: SHIPPED | OUTPATIENT
Start: 2022-07-26 | End: 2023-02-24 | Stop reason: SDUPTHER

## 2022-08-24 NOTE — TELEPHONE ENCOUNTER
Spoke to pt to clarify apt dates and times scheduled with dr farooq. Pt confirmed apt date and times and verbalized understanding.   Eye Clamp Note Details: An eye clamp was used during the procedure.

## 2022-09-01 ENCOUNTER — PATIENT MESSAGE (OUTPATIENT)
Dept: HEMATOLOGY/ONCOLOGY | Facility: CLINIC | Age: 87
End: 2022-09-01
Payer: MEDICARE

## 2022-09-27 ENCOUNTER — LAB VISIT (OUTPATIENT)
Dept: LAB | Facility: HOSPITAL | Age: 87
End: 2022-09-27
Attending: NURSE PRACTITIONER
Payer: MEDICARE

## 2022-09-27 DIAGNOSIS — C61 PROSTATE CANCER: ICD-10-CM

## 2022-09-27 LAB
ALBUMIN SERPL BCP-MCNC: 4 G/DL (ref 3.5–5.2)
ALP SERPL-CCNC: 58 U/L (ref 55–135)
ALT SERPL W/O P-5'-P-CCNC: 14 U/L (ref 10–44)
ANION GAP SERPL CALC-SCNC: 15 MMOL/L (ref 8–16)
AST SERPL-CCNC: 13 U/L (ref 10–40)
BASOPHILS # BLD AUTO: 0.05 K/UL (ref 0–0.2)
BASOPHILS NFR BLD: 0.7 % (ref 0–1.9)
BILIRUB SERPL-MCNC: 0.7 MG/DL (ref 0.1–1)
BUN SERPL-MCNC: 28 MG/DL (ref 8–23)
CALCIUM SERPL-MCNC: 9.9 MG/DL (ref 8.7–10.5)
CHLORIDE SERPL-SCNC: 100 MMOL/L (ref 95–110)
CO2 SERPL-SCNC: 24 MMOL/L (ref 23–29)
COMPLEXED PSA SERPL-MCNC: <0.01 NG/ML (ref 0–4)
CREAT SERPL-MCNC: 1.8 MG/DL (ref 0.5–1.4)
DIFFERENTIAL METHOD: ABNORMAL
EOSINOPHIL # BLD AUTO: 0.1 K/UL (ref 0–0.5)
EOSINOPHIL NFR BLD: 2 % (ref 0–8)
ERYTHROCYTE [DISTWIDTH] IN BLOOD BY AUTOMATED COUNT: 12.5 % (ref 11.5–14.5)
EST. GFR  (NO RACE VARIABLE): 35.8 ML/MIN/1.73 M^2
GLUCOSE SERPL-MCNC: 177 MG/DL (ref 70–110)
HCT VFR BLD AUTO: 35.6 % (ref 40–54)
HGB BLD-MCNC: 12 G/DL (ref 14–18)
IMM GRANULOCYTES # BLD AUTO: 0.02 K/UL (ref 0–0.04)
IMM GRANULOCYTES NFR BLD AUTO: 0.3 % (ref 0–0.5)
LDH SERPL L TO P-CCNC: 151 U/L (ref 110–260)
LYMPHOCYTES # BLD AUTO: 2.2 K/UL (ref 1–4.8)
LYMPHOCYTES NFR BLD: 30 % (ref 18–48)
MAGNESIUM SERPL-MCNC: 1.8 MG/DL (ref 1.6–2.6)
MCH RBC QN AUTO: 32.4 PG (ref 27–31)
MCHC RBC AUTO-ENTMCNC: 33.7 G/DL (ref 32–36)
MCV RBC AUTO: 96 FL (ref 82–98)
MONOCYTES # BLD AUTO: 0.7 K/UL (ref 0.3–1)
MONOCYTES NFR BLD: 9.5 % (ref 4–15)
NEUTROPHILS # BLD AUTO: 4.1 K/UL (ref 1.8–7.7)
NEUTROPHILS NFR BLD: 57.5 % (ref 38–73)
NRBC BLD-RTO: 0 /100 WBC
PLATELET # BLD AUTO: 215 K/UL (ref 150–450)
PMV BLD AUTO: 9 FL (ref 9.2–12.9)
POTASSIUM SERPL-SCNC: 4.4 MMOL/L (ref 3.5–5.1)
PROT SERPL-MCNC: 7.7 G/DL (ref 6–8.4)
RBC # BLD AUTO: 3.7 M/UL (ref 4.6–6.2)
SODIUM SERPL-SCNC: 139 MMOL/L (ref 136–145)
WBC # BLD AUTO: 7.17 K/UL (ref 3.9–12.7)

## 2022-09-27 PROCEDURE — 85025 COMPLETE CBC W/AUTO DIFF WBC: CPT | Performed by: INTERNAL MEDICINE

## 2022-09-27 PROCEDURE — 83735 ASSAY OF MAGNESIUM: CPT | Performed by: INTERNAL MEDICINE

## 2022-09-27 PROCEDURE — 83615 LACTATE (LD) (LDH) ENZYME: CPT | Performed by: INTERNAL MEDICINE

## 2022-09-27 PROCEDURE — 80053 COMPREHEN METABOLIC PANEL: CPT | Performed by: INTERNAL MEDICINE

## 2022-09-27 PROCEDURE — 84153 ASSAY OF PSA TOTAL: CPT | Performed by: INTERNAL MEDICINE

## 2022-09-29 ENCOUNTER — OFFICE VISIT (OUTPATIENT)
Dept: HEMATOLOGY/ONCOLOGY | Facility: CLINIC | Age: 87
End: 2022-09-29
Payer: MEDICARE

## 2022-09-29 VITALS
HEIGHT: 69 IN | WEIGHT: 244 LBS | BODY MASS INDEX: 36.14 KG/M2 | DIASTOLIC BLOOD PRESSURE: 82 MMHG | HEART RATE: 71 BPM | OXYGEN SATURATION: 99 % | SYSTOLIC BLOOD PRESSURE: 152 MMHG

## 2022-09-29 DIAGNOSIS — C61 PROSTATE CANCER: Primary | ICD-10-CM

## 2022-09-29 DIAGNOSIS — D64.9 NORMOCYTIC ANEMIA: ICD-10-CM

## 2022-09-29 DIAGNOSIS — N18.30 STAGE 3 CHRONIC KIDNEY DISEASE, UNSPECIFIED WHETHER STAGE 3A OR 3B CKD: ICD-10-CM

## 2022-09-29 PROCEDURE — 99999 PR PBB SHADOW E&M-EST. PATIENT-LVL III: CPT | Mod: PBBFAC,,, | Performed by: INTERNAL MEDICINE

## 2022-09-29 PROCEDURE — 99999 PR PBB SHADOW E&M-EST. PATIENT-LVL III: ICD-10-PCS | Mod: PBBFAC,,, | Performed by: INTERNAL MEDICINE

## 2022-09-29 PROCEDURE — 1101F PR PT FALLS ASSESS DOC 0-1 FALLS W/OUT INJ PAST YR: ICD-10-PCS | Mod: CPTII,S$GLB,, | Performed by: INTERNAL MEDICINE

## 2022-09-29 PROCEDURE — 1157F ADVNC CARE PLAN IN RCRD: CPT | Mod: CPTII,S$GLB,, | Performed by: INTERNAL MEDICINE

## 2022-09-29 PROCEDURE — 99214 PR OFFICE/OUTPT VISIT, EST, LEVL IV, 30-39 MIN: ICD-10-PCS | Mod: S$GLB,,, | Performed by: INTERNAL MEDICINE

## 2022-09-29 PROCEDURE — 1159F PR MEDICATION LIST DOCUMENTED IN MEDICAL RECORD: ICD-10-PCS | Mod: CPTII,S$GLB,, | Performed by: INTERNAL MEDICINE

## 2022-09-29 PROCEDURE — 1160F RVW MEDS BY RX/DR IN RCRD: CPT | Mod: CPTII,S$GLB,, | Performed by: INTERNAL MEDICINE

## 2022-09-29 PROCEDURE — 3288F FALL RISK ASSESSMENT DOCD: CPT | Mod: CPTII,S$GLB,, | Performed by: INTERNAL MEDICINE

## 2022-09-29 PROCEDURE — 1157F PR ADVANCE CARE PLAN OR EQUIV PRESENT IN MEDICAL RECORD: ICD-10-PCS | Mod: CPTII,S$GLB,, | Performed by: INTERNAL MEDICINE

## 2022-09-29 PROCEDURE — 1159F MED LIST DOCD IN RCRD: CPT | Mod: CPTII,S$GLB,, | Performed by: INTERNAL MEDICINE

## 2022-09-29 PROCEDURE — 99214 OFFICE O/P EST MOD 30 MIN: CPT | Mod: S$GLB,,, | Performed by: INTERNAL MEDICINE

## 2022-09-29 PROCEDURE — 1101F PT FALLS ASSESS-DOCD LE1/YR: CPT | Mod: CPTII,S$GLB,, | Performed by: INTERNAL MEDICINE

## 2022-09-29 PROCEDURE — 1160F PR REVIEW ALL MEDS BY PRESCRIBER/CLIN PHARMACIST DOCUMENTED: ICD-10-PCS | Mod: CPTII,S$GLB,, | Performed by: INTERNAL MEDICINE

## 2022-09-29 PROCEDURE — 3288F PR FALLS RISK ASSESSMENT DOCUMENTED: ICD-10-PCS | Mod: CPTII,S$GLB,, | Performed by: INTERNAL MEDICINE

## 2022-09-29 NOTE — PROGRESS NOTES
History of present illness:  The patient is an 88-year-old white gentleman with metastatic prostate carcinoma who is managed with Lupron and Casodex.  Patient returns to clinic for interval follow-up.  No worsening bone pain.  No urinary complaints.  Patient remains compliant with Casodex and is due for Lupron.    Physical examination:  Well-developed, well-nourished, elderly, white gentleman, no acute distress, who has a weight of 244 lb  (decreased by 4 lb)  VITAL SIGNS: Documented  and reviewed this visit.  HEENT: Normocephalic, atraumatic. Oral mucosa pink and moist. Lips without lesions. Tongue midline. Oropharynx clear. Nonicteric sclerae.   NECK: Supple, no adenopathy. No carotid bruits, thyromegaly or thyroid nodule.   HEART: Regular rate and rhythm without murmur, gallop or rub.   LUNGS: Clear to auscultation bilaterally. Normal respiratory effort.   ABDOMEN: Soft, nontender, nondistended with positive normoactive bowel sounds, no hepatosplenomegaly.   EXTREMITIES: No cyanosis, clubbing or edema. Distal pulses are intact.   AXILLAE AND GROIN: No palpable pathologic lymphadenopathy is appreciated.   SKIN: Intact/turgor normal   NEUROLOGIC: Cranial nerves II-XII grossly intact. Motor: Good muscle bulk and tone. Strength/sensory 5/5 throughout. Gait stable.     Laboratory:  White count 7.2, hemoglobin 12, hematocrit 35.6, platelets 215, absolute neutrophil count 4100.    Sodium 139, potassium 4.4, chloride 100, CO2 24, BUN 28, creatinine 1.8, glucose 177, calcium 9.9, magnesium 1.8, liver function test within normal limits, , GFR 36.  PSA less than 0.01 (less than 0.01, 0.01, 0.02).      Impression:  1. Metastatic prostate carcinoma without evidence of measurable metastatic disease on most recent imaging/PSA stable on current therapy.  2. Mild anemia in the setting of stage 3 chronic kidney disease.    Plan:  1. Continue Casodex 50 mg p.o. daily.  2. Repeat Lupron.  3. Return to clinic 3 months from now  with interval CBC, CMP, LDH, magnesium, PSMA PET scan, and PSA.      This note was created using voice recognition software and may contain grammatical errors.

## 2022-10-03 ENCOUNTER — INFUSION (OUTPATIENT)
Dept: INFUSION THERAPY | Facility: HOSPITAL | Age: 87
End: 2022-10-03
Payer: MEDICARE

## 2022-10-03 VITALS — SYSTOLIC BLOOD PRESSURE: 158 MMHG | HEART RATE: 74 BPM | DIASTOLIC BLOOD PRESSURE: 70 MMHG

## 2022-10-03 DIAGNOSIS — C61 PROSTATE CANCER: Primary | ICD-10-CM

## 2022-10-03 PROCEDURE — 96372 THER/PROPH/DIAG INJ SC/IM: CPT

## 2022-10-03 PROCEDURE — 63600175 PHARM REV CODE 636 W HCPCS: Mod: JG | Performed by: INTERNAL MEDICINE

## 2022-10-03 RX ADMIN — LEUPROLIDE ACETATE 45 MG: KIT at 08:10

## 2022-10-03 NOTE — PLAN OF CARE
Pleasant alert and oriented patient ambulated to clinic for Lupron injection - pt tolerated well - discharged home with no concerns - pt to RTC in six months.

## 2022-10-06 ENCOUNTER — PATIENT MESSAGE (OUTPATIENT)
Dept: PAIN MEDICINE | Facility: CLINIC | Age: 87
End: 2022-10-06
Payer: MEDICARE

## 2022-10-11 ENCOUNTER — PATIENT MESSAGE (OUTPATIENT)
Dept: HEMATOLOGY/ONCOLOGY | Facility: CLINIC | Age: 87
End: 2022-10-11
Payer: MEDICARE

## 2022-10-23 ENCOUNTER — PATIENT MESSAGE (OUTPATIENT)
Dept: HEMATOLOGY/ONCOLOGY | Facility: CLINIC | Age: 87
End: 2022-10-23
Payer: MEDICARE

## 2022-10-25 ENCOUNTER — OFFICE VISIT (OUTPATIENT)
Dept: PAIN MEDICINE | Facility: CLINIC | Age: 87
End: 2022-10-25
Payer: MEDICARE

## 2022-10-25 VITALS
DIASTOLIC BLOOD PRESSURE: 74 MMHG | HEIGHT: 69 IN | WEIGHT: 242.06 LBS | HEART RATE: 75 BPM | SYSTOLIC BLOOD PRESSURE: 135 MMHG | RESPIRATION RATE: 17 BRPM | BODY MASS INDEX: 35.85 KG/M2

## 2022-10-25 DIAGNOSIS — M17.11 PRIMARY OSTEOARTHRITIS OF RIGHT KNEE: Primary | ICD-10-CM

## 2022-10-25 DIAGNOSIS — G89.29 CHRONIC PAIN OF RIGHT KNEE: ICD-10-CM

## 2022-10-25 DIAGNOSIS — M54.16 LUMBAR RADICULOPATHY: ICD-10-CM

## 2022-10-25 DIAGNOSIS — M47.896 OTHER SPONDYLOSIS, LUMBAR REGION: ICD-10-CM

## 2022-10-25 DIAGNOSIS — M25.561 CHRONIC PAIN OF RIGHT KNEE: ICD-10-CM

## 2022-10-25 DIAGNOSIS — M51.36 DDD (DEGENERATIVE DISC DISEASE), LUMBAR: ICD-10-CM

## 2022-10-25 PROCEDURE — 3288F PR FALLS RISK ASSESSMENT DOCUMENTED: ICD-10-PCS | Mod: CPTII,S$GLB,, | Performed by: ANESTHESIOLOGY

## 2022-10-25 PROCEDURE — 99999 PR PBB SHADOW E&M-EST. PATIENT-LVL III: ICD-10-PCS | Mod: PBBFAC,,, | Performed by: ANESTHESIOLOGY

## 2022-10-25 PROCEDURE — 99214 OFFICE O/P EST MOD 30 MIN: CPT | Mod: 25,S$GLB,, | Performed by: ANESTHESIOLOGY

## 2022-10-25 PROCEDURE — 1101F PT FALLS ASSESS-DOCD LE1/YR: CPT | Mod: CPTII,S$GLB,, | Performed by: ANESTHESIOLOGY

## 2022-10-25 PROCEDURE — 1126F AMNT PAIN NOTED NONE PRSNT: CPT | Mod: CPTII,S$GLB,, | Performed by: ANESTHESIOLOGY

## 2022-10-25 PROCEDURE — 20610 LARGE JOINT ASPIRATION/INJECTION: R KNEE: ICD-10-PCS | Mod: RT,S$GLB,, | Performed by: ANESTHESIOLOGY

## 2022-10-25 PROCEDURE — 99214 PR OFFICE/OUTPT VISIT, EST, LEVL IV, 30-39 MIN: ICD-10-PCS | Mod: 25,S$GLB,, | Performed by: ANESTHESIOLOGY

## 2022-10-25 PROCEDURE — 99999 PR PBB SHADOW E&M-EST. PATIENT-LVL III: CPT | Mod: PBBFAC,,, | Performed by: ANESTHESIOLOGY

## 2022-10-25 PROCEDURE — 3288F FALL RISK ASSESSMENT DOCD: CPT | Mod: CPTII,S$GLB,, | Performed by: ANESTHESIOLOGY

## 2022-10-25 PROCEDURE — 20610 DRAIN/INJ JOINT/BURSA W/O US: CPT | Mod: RT,S$GLB,, | Performed by: ANESTHESIOLOGY

## 2022-10-25 PROCEDURE — 1101F PR PT FALLS ASSESS DOC 0-1 FALLS W/OUT INJ PAST YR: ICD-10-PCS | Mod: CPTII,S$GLB,, | Performed by: ANESTHESIOLOGY

## 2022-10-25 PROCEDURE — 1157F PR ADVANCE CARE PLAN OR EQUIV PRESENT IN MEDICAL RECORD: ICD-10-PCS | Mod: CPTII,S$GLB,, | Performed by: ANESTHESIOLOGY

## 2022-10-25 PROCEDURE — 1157F ADVNC CARE PLAN IN RCRD: CPT | Mod: CPTII,S$GLB,, | Performed by: ANESTHESIOLOGY

## 2022-10-25 PROCEDURE — 1126F PR PAIN SEVERITY QUANTIFIED, NO PAIN PRESENT: ICD-10-PCS | Mod: CPTII,S$GLB,, | Performed by: ANESTHESIOLOGY

## 2022-10-25 RX ORDER — TRAMADOL HYDROCHLORIDE 50 MG/1
TABLET ORAL
Qty: 45 TABLET | Refills: 2 | Status: SHIPPED | OUTPATIENT
Start: 2022-10-25 | End: 2023-09-12 | Stop reason: SDUPTHER

## 2022-10-25 NOTE — PROGRESS NOTES
FOLLOW UP NOTE:     CHIEF COMPLAINT: right knee pain    INITIAL HISTORY OF PRESENT ILLNESS (via Dr. Valdovinos): Anthony Sheldon is a 85 y.o. male who presents today with left knee pain. He reports that the knee feels like a pressure sensation.  He is s/p left knee coolief in 5/2018 with resolution of his burning, stabbing pain, but this pressure pain never went away.   It is located on the lateral and posterior aspect of his knee.  This pain is described in detail below.     Of note, he received Euflexxa in the right knee with great benefit.     He also reports left-sided low back pain that is long-standing and worsening.  The pain is located in the left middle back and does not radiate.  The pain is worse with walking and with activities cause him to stand up straight.  It is better with rest.     Aggravating factors: Walking, the pain is worse in the morning     Mitigating factors: Coolief, motion     Previously seeing: Dr. Posadas, but patient lives here and would like to establish care here    INTERVAL HISTORY OF PRESENT ILLNESS: Anthony Sheldon is a 88 y.o. male with PMH significant for CKD, hx of left knee arthroplasty, hx of DVT on Eliquis, and prostate cancer presents for the continued management of low back and knee pain. The patient presents today for repeat right knee Monovisc injection which previously provided him with substantial relief in the past. In regards to his back pain, the patient continues to localize his pain to the center of the lower back.  He reports that his back pain is worsened with certain activities. He reports of benefit on his current pain regimen. The patient denies of any significant changes in his health since his last appointment. The patient also denies of any changes in the character of his pain since his last appointment. The patient reports that his current pain is a 0/10. Patient denies of any urinary/fecal incontinence, saddle anesthesia, or weakness.  "    INTERVENTIONAL PAIN HISTORY:  4/19/2022: Right knee Monovisc injection - significant relief  8/18/2021: Right knee Monovisc injection - 80% benefit  8/2/2021: Radiofrequency ablation of the L3, L4, and L5 medial branch nerves on the bilateral-side   5/18/2018: Left knee coolief radiofrequency:  75% relief  Euflexxa in right knee 3/2018: Good benefit  06/17/2019:  L5/S1 interlaminar SARITHA:  50% benefit, mostly of low back pain     CURRENT PAIN MEDICATIONS:   OTC Tylenol as needed  Tramadol PRN (does not take daily)  Eliquis   Gabapentin 300 mg PO QHS        ROS:  Review of Systems   Constitutional:  Negative for chills and fever.   HENT:  Negative for tinnitus.    Eyes:  Negative for visual disturbance.   Respiratory:  Negative for shortness of breath.    Cardiovascular:  Negative for chest pain.   Gastrointestinal:  Negative for nausea and vomiting.   Genitourinary:  Negative for difficulty urinating.   Musculoskeletal:  Positive for arthralgias and back pain.   Skin:  Negative for rash.   Allergic/Immunologic: Negative for immunocompromised state.   Neurological:  Negative for syncope.   Hematological:  Does not bruise/bleed easily.   Psychiatric/Behavioral:  Negative for suicidal ideas.       MEDICAL, SURGICAL, FAMILY, SOCIAL HX: reviewed    MEDICATIONS/ALLERGIES: reviewed    PHYSICAL EXAM:    VITALS: Vitals reviewed.   Vitals:    10/25/22 1449   BP: 135/74   Pulse: 75   Resp: 17   Weight: 109.8 kg (242 lb 1 oz)   Height: 5' 9" (1.753 m)   PainSc: 0-No pain       Physical Exam  Vitals and nursing note reviewed.   Constitutional:       Appearance: Normal appearance. He is not toxic-appearing or diaphoretic.   HENT:      Head: Normocephalic and atraumatic.   Eyes:      General:         Right eye: No discharge.         Left eye: No discharge.      Extraocular Movements: Extraocular movements intact.      Conjunctiva/sclera: Conjunctivae normal.   Cardiovascular:      Rate and Rhythm: Normal rate.   Pulmonary:      " Effort: Pulmonary effort is normal. No respiratory distress.      Breath sounds: Normal breath sounds.   Abdominal:      Palpations: Abdomen is soft.   Musculoskeletal:      Right knee: Bony tenderness present. No deformity or effusion. Tenderness present.   Skin:     General: Skin is warm and dry.      Findings: No rash.   Neurological:      Mental Status: He is alert and oriented to person, place, and time.   Psychiatric:         Mood and Affect: Mood and affect normal.         Cognition and Memory: Memory normal.         Judgment: Judgment normal.          UPPER EXTREMITIES: Normal alignment, normal range of motion, no atrophy, no skin changes,  hair growth and nail growth normal and equal bilaterally. No swelling, no tenderness.    LOWER EXTREMITIES:  Normal alignment, normal range of motion, no atrophy, no skin changes,  hair growth and nail growth normal and equal bilaterally. No swelling, no tenderness.     LUMBAR SPINE  Lumbar spine: ROM is full with flexion extension and oblique extension with increased pain with extension.     ((--)) Supine straight leg raise    ((+)) Facet loading   Internal and external rotation of the hip causes no increased pain on either side.  Myofascial exam: No tenderness to palpation across lumbar paraspinous muscles.     MOTOR: Tone and bulk: normal bilateral upper and lower Strength: normal   Delt      Bi         Tri        WE      WF                        R          5          5          5          5          5          5            L          5          5          5          5          5          5               IP         ADD     ABD     Quad   TA        Gas      HAM  R          5          5          5          5          5          5          5  L          5          5          5          5          5          5          5     SENSATION: Light touch and pinprick intact bilaterally  REFLEXES: normal, symmetric, nonbrisk.  Toes down, no clonus. Negative denny's sign  bilaterally.  GAIT: normal rise, base, steps, and arm swing.       IMAGING: no new imaging to review     ASSESSMENT: Anthony Sheldon is a 88 y.o. male with PMH significant for CKD, hx of left knee arthroplasty, hx of DVT on Eliquis, and prostate cancer presents for the continued management of low back and knee pain. The patient presents today for repeat right knee Monovisc injection which previously provided him with substantial relief in the past. In regards to his back pain, the patient continues to localize his pain to the center of the lower back.  He reports of benefit on his current pain regimen with adverse side effects. Treatment plan outlined below.     PLAN:  1. Performed repeat right knee Monovisc injection today in clinic  2. Patient can decide as to whether he would like to repeat lumbar RFA in the future  3. Continue gabapentin 300 mg PO QHS for neuropathic pain as the patient reports of benefit  4. Refilled Tramadol 50 mg PO q day/BID PRN for breakthrough pain; # 45 tablets; 2 refills.  reviewed without discrepancies.   5. I have stressed the importance of physical activity and a home exercise plan to help with chronic pain and improve health.  6. RTC in 4 months for follow-up    Jade Layton MD  Pain Management

## 2022-10-25 NOTE — PROCEDURES
Large Joint Aspiration/Injection: R knee    Date/Time: 10/25/2022 2:45 PM  Performed by: Jade Layton MD  Authorized by: Jade Layton MD     Consent Done?:  Yes (Written)  Indications:  Arthritis and pain  Site marked: the procedure site was marked    Timeout: prior to procedure the correct patient, procedure, and site was verified    Prep: patient was prepped and draped in usual sterile fashion    Local anesthesia used?: No    Anesthesia method: cold spray used for topicalization.    Details:  Needle Size:  22 G  Ultrasonic Guidance for needle placement?: No    Approach:  Anterolateral  Location:  Knee  Site:  R knee  Medications:  4 mL hyaluronate sodium, stabilized 88 mg/4 mL  Patient tolerance:  Patient tolerated the procedure well with no immediate complications

## 2022-10-27 DIAGNOSIS — C61 PROSTATE CANCER: Primary | ICD-10-CM

## 2022-10-27 DIAGNOSIS — C79.51 BONE METASTASIS: ICD-10-CM

## 2023-01-11 ENCOUNTER — LAB VISIT (OUTPATIENT)
Dept: LAB | Facility: HOSPITAL | Age: 88
End: 2023-01-11
Attending: INTERNAL MEDICINE
Payer: MEDICARE

## 2023-01-11 DIAGNOSIS — C61 PROSTATE CANCER: ICD-10-CM

## 2023-01-11 LAB
ALBUMIN SERPL BCP-MCNC: 3.9 G/DL (ref 3.5–5.2)
ALP SERPL-CCNC: 64 U/L (ref 55–135)
ALT SERPL W/O P-5'-P-CCNC: 13 U/L (ref 10–44)
ANION GAP SERPL CALC-SCNC: 12 MMOL/L (ref 8–16)
AST SERPL-CCNC: 12 U/L (ref 10–40)
BASOPHILS # BLD AUTO: 0.05 K/UL (ref 0–0.2)
BASOPHILS NFR BLD: 0.8 % (ref 0–1.9)
BILIRUB SERPL-MCNC: 0.5 MG/DL (ref 0.1–1)
BUN SERPL-MCNC: 23 MG/DL (ref 8–23)
CALCIUM SERPL-MCNC: 9.8 MG/DL (ref 8.7–10.5)
CHLORIDE SERPL-SCNC: 103 MMOL/L (ref 95–110)
CO2 SERPL-SCNC: 25 MMOL/L (ref 23–29)
COMPLEXED PSA SERPL-MCNC: <0.01 NG/ML (ref 0–4)
CREAT SERPL-MCNC: 1.9 MG/DL (ref 0.5–1.4)
DIFFERENTIAL METHOD: ABNORMAL
EOSINOPHIL # BLD AUTO: 0.1 K/UL (ref 0–0.5)
EOSINOPHIL NFR BLD: 2.1 % (ref 0–8)
ERYTHROCYTE [DISTWIDTH] IN BLOOD BY AUTOMATED COUNT: 12.4 % (ref 11.5–14.5)
EST. GFR  (NO RACE VARIABLE): 33.5 ML/MIN/1.73 M^2
GLUCOSE SERPL-MCNC: 201 MG/DL (ref 70–110)
HCT VFR BLD AUTO: 35.2 % (ref 40–54)
HGB BLD-MCNC: 11.9 G/DL (ref 14–18)
IMM GRANULOCYTES # BLD AUTO: 0.02 K/UL (ref 0–0.04)
IMM GRANULOCYTES NFR BLD AUTO: 0.3 % (ref 0–0.5)
LDH SERPL L TO P-CCNC: 136 U/L (ref 110–260)
LYMPHOCYTES # BLD AUTO: 1.7 K/UL (ref 1–4.8)
LYMPHOCYTES NFR BLD: 26.4 % (ref 18–48)
MAGNESIUM SERPL-MCNC: 1.9 MG/DL (ref 1.6–2.6)
MCH RBC QN AUTO: 32.2 PG (ref 27–31)
MCHC RBC AUTO-ENTMCNC: 33.8 G/DL (ref 32–36)
MCV RBC AUTO: 95 FL (ref 82–98)
MONOCYTES # BLD AUTO: 0.5 K/UL (ref 0.3–1)
MONOCYTES NFR BLD: 8.7 % (ref 4–15)
NEUTROPHILS # BLD AUTO: 3.9 K/UL (ref 1.8–7.7)
NEUTROPHILS NFR BLD: 61.7 % (ref 38–73)
NRBC BLD-RTO: 0 /100 WBC
PLATELET # BLD AUTO: 209 K/UL (ref 150–450)
PMV BLD AUTO: 8.9 FL (ref 9.2–12.9)
POTASSIUM SERPL-SCNC: 4.5 MMOL/L (ref 3.5–5.1)
PROT SERPL-MCNC: 7.4 G/DL (ref 6–8.4)
RBC # BLD AUTO: 3.7 M/UL (ref 4.6–6.2)
SODIUM SERPL-SCNC: 140 MMOL/L (ref 136–145)
WBC # BLD AUTO: 6.24 K/UL (ref 3.9–12.7)

## 2023-01-11 PROCEDURE — 80053 COMPREHEN METABOLIC PANEL: CPT | Performed by: INTERNAL MEDICINE

## 2023-01-11 PROCEDURE — 84153 ASSAY OF PSA TOTAL: CPT | Performed by: INTERNAL MEDICINE

## 2023-01-11 PROCEDURE — 85025 COMPLETE CBC W/AUTO DIFF WBC: CPT | Performed by: INTERNAL MEDICINE

## 2023-01-11 PROCEDURE — 36415 COLL VENOUS BLD VENIPUNCTURE: CPT | Performed by: INTERNAL MEDICINE

## 2023-01-11 PROCEDURE — 83735 ASSAY OF MAGNESIUM: CPT | Performed by: INTERNAL MEDICINE

## 2023-01-11 PROCEDURE — 83615 LACTATE (LD) (LDH) ENZYME: CPT | Performed by: INTERNAL MEDICINE

## 2023-01-13 ENCOUNTER — OFFICE VISIT (OUTPATIENT)
Dept: HEMATOLOGY/ONCOLOGY | Facility: CLINIC | Age: 88
End: 2023-01-13
Payer: MEDICARE

## 2023-01-13 VITALS
BODY MASS INDEX: 35.65 KG/M2 | HEART RATE: 84 BPM | DIASTOLIC BLOOD PRESSURE: 82 MMHG | WEIGHT: 249 LBS | SYSTOLIC BLOOD PRESSURE: 171 MMHG | HEIGHT: 70 IN | TEMPERATURE: 98 F

## 2023-01-13 DIAGNOSIS — C61 PROSTATE CANCER: Primary | ICD-10-CM

## 2023-01-13 PROCEDURE — 99213 OFFICE O/P EST LOW 20 MIN: CPT | Mod: S$GLB,,, | Performed by: NURSE PRACTITIONER

## 2023-01-13 PROCEDURE — 1160F PR REVIEW ALL MEDS BY PRESCRIBER/CLIN PHARMACIST DOCUMENTED: ICD-10-PCS | Mod: CPTII,S$GLB,, | Performed by: NURSE PRACTITIONER

## 2023-01-13 PROCEDURE — 99999 PR PBB SHADOW E&M-EST. PATIENT-LVL III: ICD-10-PCS | Mod: PBBFAC,,, | Performed by: NURSE PRACTITIONER

## 2023-01-13 PROCEDURE — 1157F PR ADVANCE CARE PLAN OR EQUIV PRESENT IN MEDICAL RECORD: ICD-10-PCS | Mod: CPTII,S$GLB,, | Performed by: NURSE PRACTITIONER

## 2023-01-13 PROCEDURE — 1157F ADVNC CARE PLAN IN RCRD: CPT | Mod: CPTII,S$GLB,, | Performed by: NURSE PRACTITIONER

## 2023-01-13 PROCEDURE — 99213 PR OFFICE/OUTPT VISIT, EST, LEVL III, 20-29 MIN: ICD-10-PCS | Mod: S$GLB,,, | Performed by: NURSE PRACTITIONER

## 2023-01-13 PROCEDURE — 1159F PR MEDICATION LIST DOCUMENTED IN MEDICAL RECORD: ICD-10-PCS | Mod: CPTII,S$GLB,, | Performed by: NURSE PRACTITIONER

## 2023-01-13 PROCEDURE — 99999 PR PBB SHADOW E&M-EST. PATIENT-LVL III: CPT | Mod: PBBFAC,,, | Performed by: NURSE PRACTITIONER

## 2023-01-13 PROCEDURE — 1125F AMNT PAIN NOTED PAIN PRSNT: CPT | Mod: CPTII,S$GLB,, | Performed by: NURSE PRACTITIONER

## 2023-01-13 PROCEDURE — 1125F PR PAIN SEVERITY QUANTIFIED, PAIN PRESENT: ICD-10-PCS | Mod: CPTII,S$GLB,, | Performed by: NURSE PRACTITIONER

## 2023-01-13 PROCEDURE — 1159F MED LIST DOCD IN RCRD: CPT | Mod: CPTII,S$GLB,, | Performed by: NURSE PRACTITIONER

## 2023-01-13 PROCEDURE — 1160F RVW MEDS BY RX/DR IN RCRD: CPT | Mod: CPTII,S$GLB,, | Performed by: NURSE PRACTITIONER

## 2023-01-13 RX ORDER — HYDROCORTISONE 25 MG/G
OINTMENT TOPICAL
COMMUNITY
Start: 2022-10-05

## 2023-01-13 RX ORDER — CHLORHEXIDINE GLUCONATE ORAL RINSE 1.2 MG/ML
SOLUTION DENTAL
COMMUNITY
Start: 2022-10-24

## 2023-01-13 NOTE — PROGRESS NOTES
History of present illness:  The patient is an 88-year-old white gentleman with metastatic prostate carcinoma who is managed with Lupron and Casodex.  Patient returns to clinic for interval follow-up.  No worsening bone pain.  No urinary complaints.  Patient remains compliant with Casodex and is due for Lupron.    1/13/2023:  He returns today follow-up and is without complaint.    Review of Systems   Constitutional: Negative.    HENT: Negative.     Eyes: Negative.    Respiratory: Negative.     Cardiovascular: Negative.    Gastrointestinal: Negative.    Genitourinary: Negative.    Musculoskeletal: Negative.    Skin: Negative.    Neurological: Negative.    Endo/Heme/Allergies: Negative.    Psychiatric/Behavioral: Negative.        Physical examination:  Well-developed, well-nourished, elderly, white gentleman  VITAL SIGNS: Documented  and reviewed this visit.  HEENT: Normocephalic, atraumatic. Oral mucosa pink and moist. Lips without lesions. Tongue midline. Oropharynx clear. Nonicteric sclerae.   NECK: Supple, no adenopathy. No carotid bruits, thyromegaly or thyroid nodule.   HEART: Regular rate and rhythm without murmur, gallop or rub.   LUNGS: Clear to auscultation bilaterally. Normal respiratory effort.   ABDOMEN: Soft, nontender, nondistended with positive normoactive bowel sounds, no hepatosplenomegaly.   EXTREMITIES: No cyanosis, clubbing or edema. Distal pulses are intact.   AXILLAE AND GROIN: No palpable pathologic lymphadenopathy is appreciated.   SKIN: Intact/turgor normal   NEUROLOGIC: Cranial nerves II-XII grossly intact. Motor: Good muscle bulk and tone. Strength/sensory 5/5 throughout. Gait stable.         Impression/plan:  1. Metastatic prostate carcinoma without evidence of measurable metastatic disease on most recent imaging/PSA stable on current therapy.  -continue Casodex 50 mg p.o. daily  -continue Lupron q.6 months  -see MD in 3 months with labs    2. Mild anemia in the setting of stage 3 chronic  kidney disease.  -stable    3. Stage 3 chronic kidney disease:   -follow-up with PCP

## 2023-02-24 ENCOUNTER — PATIENT MESSAGE (OUTPATIENT)
Dept: HEMATOLOGY/ONCOLOGY | Facility: CLINIC | Age: 88
End: 2023-02-24
Payer: MEDICARE

## 2023-02-24 DIAGNOSIS — R97.20 ELEVATED PSA MEASUREMENT: ICD-10-CM

## 2023-02-24 DIAGNOSIS — C61 PROSTATE CANCER: ICD-10-CM

## 2023-02-27 RX ORDER — BICALUTAMIDE 50 MG/1
50 TABLET, FILM COATED ORAL DAILY
Qty: 90 TABLET | Refills: 3 | Status: SHIPPED | OUTPATIENT
Start: 2023-02-27 | End: 2023-04-14 | Stop reason: SDUPTHER

## 2023-03-03 DIAGNOSIS — Z86.711 HISTORY OF PULMONARY EMBOLUS (PE): ICD-10-CM

## 2023-04-12 ENCOUNTER — LAB VISIT (OUTPATIENT)
Dept: LAB | Facility: HOSPITAL | Age: 88
End: 2023-04-12
Attending: NURSE PRACTITIONER
Payer: MEDICARE

## 2023-04-12 DIAGNOSIS — C61 PROSTATE CANCER: ICD-10-CM

## 2023-04-12 LAB — COMPLEXED PSA SERPL-MCNC: 0.01 NG/ML (ref 0–4)

## 2023-04-12 PROCEDURE — 84153 ASSAY OF PSA TOTAL: CPT | Performed by: NURSE PRACTITIONER

## 2023-04-12 PROCEDURE — 36415 COLL VENOUS BLD VENIPUNCTURE: CPT | Performed by: NURSE PRACTITIONER

## 2023-04-14 ENCOUNTER — INFUSION (OUTPATIENT)
Dept: INFUSION THERAPY | Facility: HOSPITAL | Age: 88
End: 2023-04-14
Attending: NURSE PRACTITIONER
Payer: MEDICARE

## 2023-04-14 ENCOUNTER — OFFICE VISIT (OUTPATIENT)
Dept: HEMATOLOGY/ONCOLOGY | Facility: CLINIC | Age: 88
End: 2023-04-14
Payer: MEDICARE

## 2023-04-14 VITALS
DIASTOLIC BLOOD PRESSURE: 88 MMHG | RESPIRATION RATE: 18 BRPM | SYSTOLIC BLOOD PRESSURE: 174 MMHG | WEIGHT: 245.81 LBS | HEART RATE: 82 BPM | TEMPERATURE: 98 F | BODY MASS INDEX: 35.27 KG/M2 | OXYGEN SATURATION: 98 %

## 2023-04-14 VITALS
HEART RATE: 82 BPM | WEIGHT: 245.81 LBS | BODY MASS INDEX: 35.27 KG/M2 | RESPIRATION RATE: 18 BRPM | DIASTOLIC BLOOD PRESSURE: 88 MMHG | TEMPERATURE: 98 F | OXYGEN SATURATION: 98 % | SYSTOLIC BLOOD PRESSURE: 174 MMHG

## 2023-04-14 DIAGNOSIS — D64.9 NORMOCYTIC ANEMIA: ICD-10-CM

## 2023-04-14 DIAGNOSIS — C61 PROSTATE CANCER: Primary | ICD-10-CM

## 2023-04-14 DIAGNOSIS — R97.20 ELEVATED PSA MEASUREMENT: ICD-10-CM

## 2023-04-14 PROCEDURE — 1159F PR MEDICATION LIST DOCUMENTED IN MEDICAL RECORD: ICD-10-PCS | Mod: CPTII,S$GLB,, | Performed by: NURSE PRACTITIONER

## 2023-04-14 PROCEDURE — 1160F PR REVIEW ALL MEDS BY PRESCRIBER/CLIN PHARMACIST DOCUMENTED: ICD-10-PCS | Mod: CPTII,S$GLB,, | Performed by: NURSE PRACTITIONER

## 2023-04-14 PROCEDURE — 3288F FALL RISK ASSESSMENT DOCD: CPT | Mod: CPTII,S$GLB,, | Performed by: NURSE PRACTITIONER

## 2023-04-14 PROCEDURE — 99999 PR PBB SHADOW E&M-EST. PATIENT-LVL III: ICD-10-PCS | Mod: PBBFAC,,, | Performed by: NURSE PRACTITIONER

## 2023-04-14 PROCEDURE — 99999 PR PBB SHADOW E&M-EST. PATIENT-LVL III: CPT | Mod: PBBFAC,,, | Performed by: NURSE PRACTITIONER

## 2023-04-14 PROCEDURE — 1157F PR ADVANCE CARE PLAN OR EQUIV PRESENT IN MEDICAL RECORD: ICD-10-PCS | Mod: CPTII,S$GLB,, | Performed by: NURSE PRACTITIONER

## 2023-04-14 PROCEDURE — 99214 PR OFFICE/OUTPT VISIT, EST, LEVL IV, 30-39 MIN: ICD-10-PCS | Mod: S$GLB,,, | Performed by: NURSE PRACTITIONER

## 2023-04-14 PROCEDURE — 3288F PR FALLS RISK ASSESSMENT DOCUMENTED: ICD-10-PCS | Mod: CPTII,S$GLB,, | Performed by: NURSE PRACTITIONER

## 2023-04-14 PROCEDURE — 1101F PT FALLS ASSESS-DOCD LE1/YR: CPT | Mod: CPTII,S$GLB,, | Performed by: NURSE PRACTITIONER

## 2023-04-14 PROCEDURE — 1160F RVW MEDS BY RX/DR IN RCRD: CPT | Mod: CPTII,S$GLB,, | Performed by: NURSE PRACTITIONER

## 2023-04-14 PROCEDURE — 1101F PR PT FALLS ASSESS DOC 0-1 FALLS W/OUT INJ PAST YR: ICD-10-PCS | Mod: CPTII,S$GLB,, | Performed by: NURSE PRACTITIONER

## 2023-04-14 PROCEDURE — 96402 CHEMO HORMON ANTINEOPL SQ/IM: CPT

## 2023-04-14 PROCEDURE — 1126F AMNT PAIN NOTED NONE PRSNT: CPT | Mod: CPTII,S$GLB,, | Performed by: NURSE PRACTITIONER

## 2023-04-14 PROCEDURE — 1126F PR PAIN SEVERITY QUANTIFIED, NO PAIN PRESENT: ICD-10-PCS | Mod: CPTII,S$GLB,, | Performed by: NURSE PRACTITIONER

## 2023-04-14 PROCEDURE — 1159F MED LIST DOCD IN RCRD: CPT | Mod: CPTII,S$GLB,, | Performed by: NURSE PRACTITIONER

## 2023-04-14 PROCEDURE — 1157F ADVNC CARE PLAN IN RCRD: CPT | Mod: CPTII,S$GLB,, | Performed by: NURSE PRACTITIONER

## 2023-04-14 PROCEDURE — 99214 OFFICE O/P EST MOD 30 MIN: CPT | Mod: S$GLB,,, | Performed by: NURSE PRACTITIONER

## 2023-04-14 PROCEDURE — 63600175 PHARM REV CODE 636 W HCPCS: Mod: JZ,JG | Performed by: NURSE PRACTITIONER

## 2023-04-14 RX ORDER — BICALUTAMIDE 50 MG/1
50 TABLET, FILM COATED ORAL DAILY
Qty: 90 TABLET | Refills: 3 | Status: SHIPPED | OUTPATIENT
Start: 2023-04-14 | End: 2024-04-13

## 2023-04-14 RX ADMIN — LEUPROLIDE ACETATE 45 MG: KIT at 02:04

## 2023-04-14 NOTE — PLAN OF CARE
Pleasant alert and oriented patient ambulated with cane to clinic for lupron injection - pt tolerated well - discharged home - scheduled to RTC in 6 months.

## 2023-04-14 NOTE — PROGRESS NOTES
History of present illness:  The patient is an 88-year-old white gentleman with metastatic prostate carcinoma who is managed with Lupron and Casodex.  Patient returns to clinic for interval follow-up.  No worsening bone pain.  No urinary complaints.  Patient remains compliant with Casodex and is due for Lupron.    1/13/2023:  He returns today follow-up and is without complaint.    4/14/2023:  He returns today follow-up and is without complaint.  He is scheduled for Lupron today    Review of Systems   Constitutional: Negative.    HENT: Negative.     Eyes: Negative.    Respiratory: Negative.     Cardiovascular: Negative.    Gastrointestinal: Negative.    Genitourinary: Negative.    Musculoskeletal: Negative.    Skin: Negative.    Neurological: Negative.    Endo/Heme/Allergies: Negative.    Psychiatric/Behavioral: Negative.        Physical examination:  Well-developed, well-nourished, elderly, white gentleman  VITAL SIGNS: Documented  and reviewed this visit.  HEENT: Normocephalic, atraumatic. Oral mucosa pink and moist. Lips without lesions. Tongue midline. Oropharynx clear. Nonicteric sclerae.   NECK: Supple, no adenopathy. No carotid bruits, thyromegaly or thyroid nodule.   HEART: Regular rate and rhythm without murmur, gallop or rub.   LUNGS: Clear to auscultation bilaterally. Normal respiratory effort.   ABDOMEN: Soft, nontender, nondistended with positive normoactive bowel sounds, no hepatosplenomegaly.   EXTREMITIES: No cyanosis, clubbing or edema. Distal pulses are intact.   AXILLAE AND GROIN: No palpable pathologic lymphadenopathy is appreciated.   SKIN: Intact/turgor normal   NEUROLOGIC: Cranial nerves II-XII grossly intact. Motor: Good muscle bulk and tone. Strength/sensory 5/5 throughout. Gait stable.         Impression/plan:  1. Metastatic prostate carcinoma without evidence of measurable metastatic disease on most recent imaging/PSA stable on current therapy.  -continue Casodex 50 mg p.o. daily  -continue  Lupron q.6 months-proceed with next dose today  -see MD in 3 months with labs    2. Mild anemia in the setting of stage 3 chronic kidney disease.  -hemoglobin is stable    3. Stage 3 chronic kidney disease:   -follow-up with PCP      4. Iron-deficiency anemia:   -check iron stores on follow-up    Hyperglycemia:   -follow-up with PCP

## 2023-04-20 ENCOUNTER — OFFICE VISIT (OUTPATIENT)
Dept: PODIATRY | Facility: CLINIC | Age: 88
End: 2023-04-20
Payer: MEDICARE

## 2023-04-20 VITALS
HEART RATE: 76 BPM | DIASTOLIC BLOOD PRESSURE: 78 MMHG | BODY MASS INDEX: 34.07 KG/M2 | WEIGHT: 238 LBS | RESPIRATION RATE: 16 BRPM | HEIGHT: 70 IN | SYSTOLIC BLOOD PRESSURE: 152 MMHG

## 2023-04-20 DIAGNOSIS — E11.65 TYPE 2 DIABETES MELLITUS WITH HYPERGLYCEMIA, UNSPECIFIED WHETHER LONG TERM INSULIN USE: ICD-10-CM

## 2023-04-20 DIAGNOSIS — L97.521 ULCER OF TOE, LEFT, LIMITED TO BREAKDOWN OF SKIN: Primary | ICD-10-CM

## 2023-04-20 DIAGNOSIS — R60.0 EDEMA OF BOTH LOWER EXTREMITIES: ICD-10-CM

## 2023-04-20 PROCEDURE — 1126F AMNT PAIN NOTED NONE PRSNT: CPT | Mod: CPTII,S$GLB,, | Performed by: PODIATRIST

## 2023-04-20 PROCEDURE — 99203 OFFICE O/P NEW LOW 30 MIN: CPT | Mod: S$GLB,,, | Performed by: PODIATRIST

## 2023-04-20 PROCEDURE — 1160F PR REVIEW ALL MEDS BY PRESCRIBER/CLIN PHARMACIST DOCUMENTED: ICD-10-PCS | Mod: CPTII,S$GLB,, | Performed by: PODIATRIST

## 2023-04-20 PROCEDURE — 3288F FALL RISK ASSESSMENT DOCD: CPT | Mod: CPTII,S$GLB,, | Performed by: PODIATRIST

## 2023-04-20 PROCEDURE — 3288F PR FALLS RISK ASSESSMENT DOCUMENTED: ICD-10-PCS | Mod: CPTII,S$GLB,, | Performed by: PODIATRIST

## 2023-04-20 PROCEDURE — 1157F ADVNC CARE PLAN IN RCRD: CPT | Mod: CPTII,S$GLB,, | Performed by: PODIATRIST

## 2023-04-20 PROCEDURE — 1157F PR ADVANCE CARE PLAN OR EQUIV PRESENT IN MEDICAL RECORD: ICD-10-PCS | Mod: CPTII,S$GLB,, | Performed by: PODIATRIST

## 2023-04-20 PROCEDURE — 1159F MED LIST DOCD IN RCRD: CPT | Mod: CPTII,S$GLB,, | Performed by: PODIATRIST

## 2023-04-20 PROCEDURE — 99203 PR OFFICE/OUTPT VISIT, NEW, LEVL III, 30-44 MIN: ICD-10-PCS | Mod: S$GLB,,, | Performed by: PODIATRIST

## 2023-04-20 PROCEDURE — 99999 PR PBB SHADOW E&M-EST. PATIENT-LVL IV: CPT | Mod: PBBFAC,,, | Performed by: PODIATRIST

## 2023-04-20 PROCEDURE — 1159F PR MEDICATION LIST DOCUMENTED IN MEDICAL RECORD: ICD-10-PCS | Mod: CPTII,S$GLB,, | Performed by: PODIATRIST

## 2023-04-20 PROCEDURE — 1160F RVW MEDS BY RX/DR IN RCRD: CPT | Mod: CPTII,S$GLB,, | Performed by: PODIATRIST

## 2023-04-20 PROCEDURE — 1126F PR PAIN SEVERITY QUANTIFIED, NO PAIN PRESENT: ICD-10-PCS | Mod: CPTII,S$GLB,, | Performed by: PODIATRIST

## 2023-04-20 PROCEDURE — 1101F PT FALLS ASSESS-DOCD LE1/YR: CPT | Mod: CPTII,S$GLB,, | Performed by: PODIATRIST

## 2023-04-20 PROCEDURE — 99999 PR PBB SHADOW E&M-EST. PATIENT-LVL IV: ICD-10-PCS | Mod: PBBFAC,,, | Performed by: PODIATRIST

## 2023-04-20 PROCEDURE — 1101F PR PT FALLS ASSESS DOC 0-1 FALLS W/OUT INJ PAST YR: ICD-10-PCS | Mod: CPTII,S$GLB,, | Performed by: PODIATRIST

## 2023-04-20 RX ORDER — ZOLPIDEM TARTRATE 5 MG/1
5 TABLET ORAL NIGHTLY PRN
COMMUNITY
Start: 2023-04-05 | End: 2023-06-01 | Stop reason: SDUPTHER

## 2023-04-20 RX ORDER — DOXYCYCLINE 100 MG/1
100 CAPSULE ORAL EVERY 12 HOURS
Qty: 14 CAPSULE | Refills: 0 | Status: SHIPPED | OUTPATIENT
Start: 2023-04-20 | End: 2023-04-27

## 2023-04-23 NOTE — PROGRESS NOTES
Subjective:       Patient ID: Anthony Sheldon is a 88 y.o. male.    Chief Complaint: Toe Pain, Foot Swelling, and Foot Ulcer  Patient presents with complaint of pain between 4th and 5th digits left foot common area which he thought was a corn or callus has been present on and off for quite a long time.  Recently this areas become more painful.  He is unable to visualize it himself.  Most painful at night, pain level 6 or 7/10.  Does report nerve damage to the left lower leg following his knee replacement.  Denies previous history of foot sores or infections.      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    Prostate cancer 05/2019    PSA elevation 10/7/2015    Wears glasses      Past Surgical History:   Procedure Laterality Date    BIOPSY WITH TRANSRECTAL ULTRASOUND (TRUS) GUIDANCE Bilateral 3/27/2019    Procedure: BIOPSY, WITH TRANSRECTAL US GUIDANCE;  Surgeon: Niall Blas MD;  Location: [facility] OR;  Service: Urology;  Laterality: Bilateral;    COLONOSCOPY N/A 1/13/2020    Procedure: COLONOSCOPY;  Surgeon: Charli Chowdhury MD;  Location: [facility] ENDO;  Service: General;  Laterality: N/A;    CYSTOSCOPY N/A 3/27/2019    Procedure: CYSTOSCOPY;  Surgeon: Niall Blas MD;  Location: [facility] OR;  Service: Urology;  Laterality: N/A;    EPIDURAL STEROID INJECTION N/A 6/17/2019    Procedure: Injection, Steroid, Epidural - L5/S1 EPIDURAL STEROID INJECTION;  Surgeon: Chyna Valdovinos MD;  Location: [facility] OR;  Service: Pain Management;  Laterality: N/A;    EPIDURAL STEROID INJECTION N/A  12/11/2019    Procedure: Injection, Steroid, Epidural - L5/S1 LUMBAR EPIDURAL STEROID INJECTION;  Surgeon: Jade Layton MD;  Location: [facility] OR;  Service: Pain Management;  Laterality: N/A;  *FIRST CASE*    ESOPHAGOGASTRODUODENOSCOPY N/A 1/13/2020    Procedure: EGD (ESOPHAGOGASTRODUODENOSCOPY);  Surgeon: Charli Chowdhury MD;  Location: [facility] ENDO;  Service: General;  Laterality: N/A;    INJECTION OF ANESTHETIC AGENT AROUND MEDIAL BRANCH NERVES INNERVATING LUMBAR FACET JOINT Bilateral 7/12/2021    Procedure: Block-nerve-medial branch-lumbar Bilateral L 3,4,5;  Surgeon: Jade Layton MD;  Location: [facility] OR;  Service: Pain Management;  Laterality: Bilateral;    INJECTION OF JOINT Left 1/21/2019    Procedure: Injection, FACET JOINT INJECTION LEFT L4-5 AND L5-S1;  Surgeon: Chyna Valdovinos MD;  Location: [facility] OR;  Service: Pain Management;  Laterality: Left;    JOINT REPLACEMENT      BILAT HIPS, RIGHT SHOULDER    KNEE ARTHROPLASTY Left     RADIOFREQUENCY THERMOCOAGULATION Bilateral 8/2/2021    Procedure: RADIOFREQUENCY THERMAL COAGULATION Bilateral L 3,4,5;  Surgeon: Jade Layton MD;  Location: [facility] OR;  Service: Pain Management;  Laterality: Bilateral;    TONSILLECTOMY  1950    TOTAL HIP ARTHROPLASTY Bilateral 2013 & 2014    TOTAL SHOULDER ARTHROPLASTY Right 2005     Family History   Problem Relation Age of Onset    Stroke Mother     Bladder Cancer Father      Social History     Socioeconomic History    Marital status:    Tobacco Use    Smoking status: Former     Packs/day: 0.00     Years: 0.00     Pack years: 0.00     Types: Cigarettes    Smokeless tobacco: Former     Quit date: 1/1/1990   Substance and Sexual Activity    Alcohol use: Yes     Alcohol/week: 12.0 standard drinks     Types: 6 Glasses of wine, 6 Standard drinks or equivalent per week     Comment: 1 drink daily    Drug use: No    Sexual activity: Not Currently     Partners: Female     Social Determinants of Health     Financial  Resource Strain: Low Risk     Difficulty of Paying Living Expenses: Not hard at all   Food Insecurity: No Food Insecurity    Worried About Running Out of Food in the Last Year: Never true    Ran Out of Food in the Last Year: Never true   Transportation Needs: No Transportation Needs    Lack of Transportation (Medical): No    Lack of Transportation (Non-Medical): No   Physical Activity: Insufficiently Active    Days of Exercise per Week: 2 days    Minutes of Exercise per Session: 10 min   Stress: No Stress Concern Present    Feeling of Stress : Only a little   Social Connections: Unknown    Frequency of Communication with Friends and Family: Three times a week    Frequency of Social Gatherings with Friends and Family: Once a week    Active Member of Clubs or Organizations: Yes    Attends Club or Organization Meetings: More than 4 times per year    Marital Status:    Housing Stability: Unknown    Unable to Pay for Housing in the Last Year: Patient refused    Number of Places Lived in the Last Year: 1    Unstable Housing in the Last Year: No       Current Outpatient Medications   Medication Sig Dispense Refill    apixaban (ELIQUIS) 5 mg Tab Take 1 tablet (5 mg total) by mouth 2 (two) times daily. 60 tablet 11    bicalutamide (CASODEX) 50 MG Tab Take 1 tablet (50 mg total) by mouth once daily. 90 tablet 3    chlorhexidine (PERIDEX) 0.12 % solution SWISH WITH 15 ML FOR 30 SECONDS TWICE DAILY      hydrocortisone 2.5 % ointment APPLY TO EAR TWICE DAILY X2 WEEKS      metFORMIN (GLUCOPHAGE) 500 MG tablet TAKE 1 TABLET(500 MG) BY MOUTH DAILY WITH BREAKFAST 90 tablet 3    multivitamin (THERAGRAN) per tablet Take 1 tablet by mouth once daily.      traMADoL (ULTRAM) 50 mg tablet Take one tablet by mouth once or twice a day daily as needed for pain. 45 tablet 2    zolpidem (AMBIEN) 5 MG Tab Take 5 mg by mouth nightly as needed.      doxycycline (VIBRAMYCIN) 100 MG Cap Take 1 capsule (100 mg total) by mouth every 12  "(twelve) hours. for 7 days 14 capsule 0    gabapentin (NEURONTIN) 300 MG capsule Take 1 capsule (300 mg total) by mouth every evening. 90 capsule 2     No current facility-administered medications for this visit.     Review of patient's allergies indicates:  No Known Allergies    Review of Systems   HENT:  Negative for congestion.    Respiratory:  Negative for cough.    Cardiovascular:  Positive for leg swelling.   Musculoskeletal:  Positive for gait problem.   All other systems reviewed and are negative.    Objective:      Vitals:    04/20/23 1154   BP: (!) 152/78   Pulse: 76   Resp: 16   Weight: 108 kg (238 lb)   Height: 5' 10" (1.778 m)     Physical Exam  Vitals and nursing note reviewed.   Constitutional:       General: He is not in acute distress.     Appearance: Normal appearance.   Cardiovascular:      Pulses:           Dorsalis pedis pulses are 1+ on the right side and 1+ on the left side.        Posterior tibial pulses are 1+ on the right side and 1+ on the left side.   Pulmonary:      Effort: Pulmonary effort is normal.   Musculoskeletal:      Right foot: Decreased range of motion.      Left foot: Decreased range of motion.   Feet:      Right foot:      Skin integrity: No skin breakdown.      Left foot:      Skin integrity: Ulcer (macerated ulcers, superficial 4th and 5th digits left. No tracking), skin breakdown and callus present.   Skin:     Capillary Refill: Capillary refill takes 2 to 3 seconds.   Neurological:      Mental Status: He is alert.   Psychiatric:         Mood and Affect: Mood normal.         Behavior: Behavior normal.         Thought Content: Thought content normal.         Judgment: Judgment normal.                               Contains abnormal data Hgb A1c w/ eAG Estimation           Component Ref Range & Units 1 mo ago  (2/27/23) 6 mo ago  (9/27/22) 1 yr ago  (3/28/22)   Hemoglobin A1C <5.7 % of total Hgb 7.9 High   7.4 High   7.7 High          EAG (MG/DL) mg/dL 180            "   Assessment:       1. Ulcer of toe, left, limited to breakdown of skin    2. Type 2 diabetes mellitus with hyperglycemia, unspecified whether long term insulin use    3. Edema of both lower extremities        Plan:           Reviewed with patient ulcer on the 5th digit and an early 1 starting on the 4th digit of the left foot due to pressure between the 2 toes.  Showed patient digital photo of this area and explained moisture of the skin puts him at high risk for complications especially infection  Started on doxycycline, discussed antibiotic, take twice daily as directed until gone  Reviewed care and maintenance of area explaining must be kept dry with a light soft comfortable cushion between the toes to avoid pressure  Wound care  Ulcers were debrided, no tracking, undermining at this time  Cleansed with Dakin's, iodine, gauze and light Coban applied  Samples dispensed  Showed patient how to apply daily dressing, must apply iodine 1-2 times daily.  Can utilize gauze and just a sock to hold it in place.  Utilize soft dressing to wrap the toe if helpful however do not wrap too tight  Instructed patient to keep this area dry, no soaking, no antibiotic ointment, avoid getting wet in the shower  Reviewed with patient why this area has occurred for so many years and we discussed treatment to try to prevent recurrence  We also reviewed pain he is having at night due to inflammation, pressure causing some nerve  Pain to the toes advised patient it would help significantly if he could reduce his activity over the next week, elevate and stay off his foot as much as possible  Elevate legs for swelling as well  Reviewed diabetic education and potential complications regarding digit ulceration  Reviewed signs of infection to monitor for, contact the office immediately with any changes  Patient was in understanding and agreement with treatment plan.  I counseled the patient on their conditions, implications and medical  management.  Instructed patient to contact the office with any changes, questions, concerns, worsening of symptoms.   Total face to face time 30 minutes, exam, assessment, treatment, discussion, additional time for review of chart prior to and following appointment and visit documentation, consultation and coordination of care.    Follow up 2 weeks      This note was created using [employer] voice recognition software that occasionally misinterpreted phrases or words.

## 2023-04-28 NOTE — LETTER
July 9, 2019      Perfecto Melgar III, MD  [address]  [facility] [place] [address]           [facility]  [address] [place] [address]  Phone: [phone]  Fax: [phone]          Patient: Anthony Sheldon   MR Number: [id number]   YOB: 1934   Date of Visit: 7/9/2019       Dear Dr. Perfecto Melgar III:    Thank you for referring Anthony Sheldon to me for evaluation. Attached you will find relevant portions of my assessment and plan of care.    If you have questions, please do not hesitate to call me. I look forward to following Anthony Sheldon along with you.    Sincerely,    Chyna Valdovinos MD    Enclosure  CC:  No Recipients    If you would like to receive this communication electronically, please contact [email or web address] or [phone] to request more information on [employer] access.    For providers and/or their staff who would like to refer a patient to [facility], please contact us through our one-stop-shop provider referral line, [facility], at [phone].    If you feel you have received this communication in error or would no longer like to receive these types of communications, please e-mail [email or web address]         
unable to determine

## 2023-05-04 ENCOUNTER — OFFICE VISIT (OUTPATIENT)
Dept: PODIATRY | Facility: CLINIC | Age: 88
End: 2023-05-04
Payer: MEDICARE

## 2023-05-04 VITALS
SYSTOLIC BLOOD PRESSURE: 161 MMHG | BODY MASS INDEX: 34.07 KG/M2 | RESPIRATION RATE: 16 BRPM | HEART RATE: 70 BPM | WEIGHT: 238 LBS | HEIGHT: 70 IN | DIASTOLIC BLOOD PRESSURE: 76 MMHG

## 2023-05-04 DIAGNOSIS — L84 PRE-ULCERATIVE CALLUSES: ICD-10-CM

## 2023-05-04 DIAGNOSIS — R60.0 EDEMA OF BOTH LOWER EXTREMITIES: ICD-10-CM

## 2023-05-04 DIAGNOSIS — E11.65 TYPE 2 DIABETES MELLITUS WITH HYPERGLYCEMIA, UNSPECIFIED WHETHER LONG TERM INSULIN USE: ICD-10-CM

## 2023-05-04 DIAGNOSIS — M20.60 ACQUIRED DEFORMITY OF LESSER TOE: Primary | ICD-10-CM

## 2023-05-04 PROCEDURE — 1126F PR PAIN SEVERITY QUANTIFIED, NO PAIN PRESENT: ICD-10-PCS | Mod: CPTII,S$GLB,, | Performed by: PODIATRIST

## 2023-05-04 PROCEDURE — 1157F ADVNC CARE PLAN IN RCRD: CPT | Mod: CPTII,S$GLB,, | Performed by: PODIATRIST

## 2023-05-04 PROCEDURE — 1159F PR MEDICATION LIST DOCUMENTED IN MEDICAL RECORD: ICD-10-PCS | Mod: CPTII,S$GLB,, | Performed by: PODIATRIST

## 2023-05-04 PROCEDURE — 1157F PR ADVANCE CARE PLAN OR EQUIV PRESENT IN MEDICAL RECORD: ICD-10-PCS | Mod: CPTII,S$GLB,, | Performed by: PODIATRIST

## 2023-05-04 PROCEDURE — 99999 PR PBB SHADOW E&M-EST. PATIENT-LVL III: ICD-10-PCS | Mod: PBBFAC,,, | Performed by: PODIATRIST

## 2023-05-04 PROCEDURE — 1126F AMNT PAIN NOTED NONE PRSNT: CPT | Mod: CPTII,S$GLB,, | Performed by: PODIATRIST

## 2023-05-04 PROCEDURE — 3288F FALL RISK ASSESSMENT DOCD: CPT | Mod: CPTII,S$GLB,, | Performed by: PODIATRIST

## 2023-05-04 PROCEDURE — 99213 PR OFFICE/OUTPT VISIT, EST, LEVL III, 20-29 MIN: ICD-10-PCS | Mod: S$GLB,,, | Performed by: PODIATRIST

## 2023-05-04 PROCEDURE — 1101F PT FALLS ASSESS-DOCD LE1/YR: CPT | Mod: CPTII,S$GLB,, | Performed by: PODIATRIST

## 2023-05-04 PROCEDURE — 1159F MED LIST DOCD IN RCRD: CPT | Mod: CPTII,S$GLB,, | Performed by: PODIATRIST

## 2023-05-04 PROCEDURE — 99999 PR PBB SHADOW E&M-EST. PATIENT-LVL III: CPT | Mod: PBBFAC,,, | Performed by: PODIATRIST

## 2023-05-04 PROCEDURE — 3288F PR FALLS RISK ASSESSMENT DOCUMENTED: ICD-10-PCS | Mod: CPTII,S$GLB,, | Performed by: PODIATRIST

## 2023-05-04 PROCEDURE — 1101F PR PT FALLS ASSESS DOC 0-1 FALLS W/OUT INJ PAST YR: ICD-10-PCS | Mod: CPTII,S$GLB,, | Performed by: PODIATRIST

## 2023-05-04 PROCEDURE — 99213 OFFICE O/P EST LOW 20 MIN: CPT | Mod: S$GLB,,, | Performed by: PODIATRIST

## 2023-05-05 NOTE — PROGRESS NOTES
Subjective:       Patient ID: Anthony Sheldon is a 89 y.o. male.    Chief Complaint: Follow-up, Foot Ulcer, and Diabetes Mellitus  Patient presents for follow up between 4th and 5th digits left foot.  Patient confirms friend has been applying iodine and gauze between the toes daily, this has decreased his pain significantly.  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 OR;  Service: Urology;  Laterality: Bilateral;    COLONOSCOPY N/A 1/13/2020    Procedure: COLONOSCOPY;  Surgeon: Charli Chowdhury MD;  Location: [facility] ENDO;  Service: General;  Laterality: N/A;    CYSTOSCOPY N/A 3/27/2019    Procedure: CYSTOSCOPY;  Surgeon: Niall Blas MD;  Location: [facility] OR;  Service: Urology;  Laterality: N/A;    EPIDURAL STEROID INJECTION N/A 6/17/2019    Procedure: Injection, Steroid, Epidural - L5/S1 EPIDURAL STEROID INJECTION;  Surgeon: Chyna Valdovinos MD;  Location: [facility] OR;  Service: Pain Management;  Laterality: N/A;    EPIDURAL STEROID INJECTION N/A 12/11/2019    Procedure: Injection, Steroid, Epidural - L5/S1 LUMBAR EPIDURAL STEROID INJECTION;  Surgeon: Jade Layton MD;  Location: [facility] OR;  Service: Pain Management;  Laterality: N/A;  *FIRST CASE*     ESOPHAGOGASTRODUODENOSCOPY N/A 1/13/2020    Procedure: EGD (ESOPHAGOGASTRODUODENOSCOPY);  Surgeon: Charli Chowdhury MD;  Location: [facility] ENDO;  Service: General;  Laterality: N/A;    INJECTION OF ANESTHETIC AGENT AROUND MEDIAL BRANCH NERVES INNERVATING LUMBAR FACET JOINT Bilateral 7/12/2021    Procedure: Block-nerve-medial branch-lumbar Bilateral L 3,4,5;  Surgeon: Jade Layton MD;  Location: [facility] OR;  Service: Pain Management;  Laterality: Bilateral;    INJECTION OF JOINT Left 1/21/2019    Procedure: Injection, FACET JOINT INJECTION LEFT L4-5 AND L5-S1;  Surgeon: Chyna Valdovinos MD;  Location: [facility] OR;  Service: Pain Management;  Laterality: Left;    JOINT REPLACEMENT      BILAT HIPS, RIGHT SHOULDER    KNEE ARTHROPLASTY Left     RADIOFREQUENCY THERMOCOAGULATION Bilateral 8/2/2021    Procedure: RADIOFREQUENCY THERMAL COAGULATION Bilateral L 3,4,5;  Surgeon: Jade Layton MD;  Location: [facility] OR;  Service: Pain Management;  Laterality: Bilateral;    TONSILLECTOMY  1950    TOTAL HIP ARTHROPLASTY Bilateral 2013 & 2014    TOTAL SHOULDER ARTHROPLASTY Right 2005     Family History   Problem Relation Age of Onset    Stroke Mother     Bladder Cancer Father      Social History     Socioeconomic History    Marital status:    Tobacco Use    Smoking status: Former     Packs/day: 0.00     Years: 0.00     Pack years: 0.00     Types: Cigarettes    Smokeless tobacco: Former     Quit date: 1/1/1990   Substance and Sexual Activity    Alcohol use: Yes     Alcohol/week: 12.0 standard drinks     Types: 6 Glasses of wine, 6 Standard drinks or equivalent per week     Comment: 1 drink daily    Drug use: No    Sexual activity: Not Currently     Partners: Female     Social Determinants of Health     Financial Resource Strain: Low Risk     Difficulty of Paying Living Expenses: Not hard at all   Food Insecurity: No Food Insecurity    Worried About Running Out of Food in the Last Year: Never true    Ran Out of Food in the  Last Year: Never true   Transportation Needs: No Transportation Needs    Lack of Transportation (Medical): No    Lack of Transportation (Non-Medical): No   Physical Activity: Insufficiently Active    Days of Exercise per Week: 2 days    Minutes of Exercise per Session: 10 min   Stress: No Stress Concern Present    Feeling of Stress : Only a little   Social Connections: Unknown    Frequency of Communication with Friends and Family: Three times a week    Frequency of Social Gatherings with Friends and Family: Once a week    Active Member of Clubs or Organizations: Yes    Attends Club or Organization Meetings: More than 4 times per year    Marital Status:    Housing Stability: Unknown    Unable to Pay for Housing in the Last Year: Patient refused    Number of Places Lived in the Last Year: 1    Unstable Housing in the Last Year: No       Current Outpatient Medications   Medication Sig Dispense Refill    apixaban (ELIQUIS) 5 mg Tab Take 1 tablet (5 mg total) by mouth 2 (two) times daily. 60 tablet 11    bicalutamide (CASODEX) 50 MG Tab Take 1 tablet (50 mg total) by mouth once daily. 90 tablet 3    chlorhexidine (PERIDEX) 0.12 % solution SWISH WITH 15 ML FOR 30 SECONDS TWICE DAILY      hydrocortisone 2.5 % ointment APPLY TO EAR TWICE DAILY X2 WEEKS      metFORMIN (GLUCOPHAGE) 500 MG tablet TAKE 1 TABLET(500 MG) BY MOUTH DAILY WITH BREAKFAST 90 tablet 3    multivitamin (THERAGRAN) per tablet Take 1 tablet by mouth once daily.      traMADoL (ULTRAM) 50 mg tablet Take one tablet by mouth once or twice a day daily as needed for pain. 45 tablet 2    zolpidem (AMBIEN) 5 MG Tab Take 5 mg by mouth nightly as needed.      gabapentin (NEURONTIN) 300 MG capsule Take 1 capsule (300 mg total) by mouth every evening. 90 capsule 2     No current facility-administered medications for this visit.     Review of patient's allergies indicates:  No Known Allergies    Review of Systems   HENT:  Negative for congestion.    Respiratory:  " Negative for cough.    Cardiovascular:  Positive for leg swelling.        Mild   Musculoskeletal:  Positive for gait problem.   All other systems reviewed and are negative.    Objective:      Vitals:    05/04/23 0934   BP: (!) 161/76   Pulse: 70   Resp: 16   Weight: 108 kg (238 lb)   Height: 5' 10" (1.778 m)     Physical Exam  Vitals and nursing note reviewed.   Constitutional:       General: He is not in acute distress.     Appearance: Normal appearance.   Cardiovascular:      Pulses:           Dorsalis pedis pulses are 1+ on the right side and 1+ on the left side.        Posterior tibial pulses are 1+ on the right side and 1+ on the left side.   Pulmonary:      Effort: Pulmonary effort is normal.   Musculoskeletal:      Right foot: Decreased range of motion.      Left foot: Decreased range of motion.   Feet:      Right foot:      Skin integrity: No skin breakdown.      Left foot:      Skin integrity: Callus present. No ulcer (Pre ulcerative callus medial 5th digit left, no skin opening, mildly tender.  Lateral aspect of the 4th digit is completely healed and clear.  No maceration between the digits), skin breakdown or erythema.   Skin:     Capillary Refill: Capillary refill takes 2 to 3 seconds.   Neurological:      Mental Status: He is alert.   Psychiatric:         Mood and Affect: Mood normal.         Behavior: Behavior normal.         Thought Content: Thought content normal.         Judgment: Judgment normal.          Assessment:       1. Acquired deformity of lesser toe - Left Foot    2. Pre-ulcerative calluses    3. Type 2 diabetes mellitus with hyperglycemia, unspecified whether long term insulin use    4. Edema of both lower extremities          Plan:           Advised patient ulcer on the inside 5th digit has improved significantly, is dry, no skin, and the area on the 4th digit is completely clear and healed  Advised patient this is now considered pre ulcerative callus, area was debrided, again no skin " opening, no complications at this time  Instructed patient to continue iodine between the toes for 1 more week and we discussed alternatives to keep the toes apart.  If he does not use something comfortable between the toes daily this will recur  He can use gauze or any soft comfortable padding.  Dispensed Silipos toe sock and advised patient if comfortable wear daily but applied to the 4th digit, not the toe with thick callus  Even after finishing daily application of iodine over the next week this area needs to be checked frequently to check for and treat any moisture.  Clean and dry well between the toes.  Can use powder to absorb moisture in decrease friction.  Contact office immediately with any changes   Reviewed diabetic education and potential complications pertaining to his feet  Patient understands the importance of daily foot checks  Patient was in understanding and agreement with treatment plan.  I counseled the patient on their conditions, implications and medical management.  Instructed patient to contact the office with any changes, questions, concerns, worsening of symptoms.   Total face to face time 20 minutes, exam, assessment, treatment, discussion, additional time for review of chart prior to and following appointment and visit documentation, consultation and coordination of care.   Follow up 3 months    This note was created using [employer] voice recognition software that occasionally misinterpreted phrases or words.

## 2023-05-16 ENCOUNTER — HOSPITAL ENCOUNTER (OUTPATIENT)
Dept: RADIOLOGY | Facility: HOSPITAL | Age: 88
Discharge: HOME | End: 2023-05-16
Attending: NURSE PRACTITIONER
Payer: MEDICARE

## 2023-05-16 DIAGNOSIS — R53.83 DECREASED STAMINA: ICD-10-CM

## 2023-05-16 DIAGNOSIS — R91.8 PULMONARY NODULES: ICD-10-CM

## 2023-05-16 DIAGNOSIS — R06.02 SOB (SHORTNESS OF BREATH): ICD-10-CM

## 2023-05-16 DIAGNOSIS — D63.1 ANEMIA DUE TO CHRONIC KIDNEY DISEASE, UNSPECIFIED CKD STAGE: ICD-10-CM

## 2023-05-16 DIAGNOSIS — N18.9 ANEMIA DUE TO CHRONIC KIDNEY DISEASE, UNSPECIFIED CKD STAGE: ICD-10-CM

## 2023-05-16 DIAGNOSIS — Z86.711 HISTORY OF PULMONARY EMBOLUS (PE): ICD-10-CM

## 2023-05-18 ENCOUNTER — HOSPITAL ENCOUNTER (OUTPATIENT)
Dept: PULMONOLOGY | Facility: HOSPITAL | Age: 88
Discharge: HOME | End: 2023-05-18
Attending: NURSE PRACTITIONER
Payer: MEDICARE

## 2023-05-18 DIAGNOSIS — Z86.711 HISTORY OF PULMONARY EMBOLUS (PE): ICD-10-CM

## 2023-05-18 DIAGNOSIS — R06.02 SOB (SHORTNESS OF BREATH): ICD-10-CM

## 2023-05-18 DIAGNOSIS — R53.83 DECREASED STAMINA: ICD-10-CM

## 2023-05-18 LAB
BRPFT: ABNORMAL
DLCO SINGLE BREATH LLN: 14.83
DLCO SINGLE BREATH PRE REF: 109.9 %
DLCO SINGLE BREATH REF: 21.75
DLCOC SBVA LLN: 2.06
DLCOC SBVA REF: 3.24
DLCOC SINGLE BREATH LLN: 14.83
DLCOC SINGLE BREATH REF: 21.75
DLCOVA LLN: 2.06
DLCOVA PRE REF: 100.4 %
DLCOVA PRE: 3.25 ML/(MIN*MMHG*L) (ref 2.06–4.41)
DLCOVA REF: 3.24
ERV LLN: -16449.24
ERV PRE REF: 89.5 %
ERV REF: 0.76
FEF 25 75 CHG: 32.7 %
FEF 25 75 LLN: 0.55
FEF 25 75 POST REF: 86.6 %
FEF 25 75 PRE REF: 65.3 %
FEF 25 75 REF: 1.66
FET100 CHG: 17.1 %
FEV1 CHG: 7.9 %
FEV1 FVC CHG: 1.1 %
FEV1 FVC LLN: 58
FEV1 FVC POST REF: 102.6 %
FEV1 FVC PRE REF: 101.5 %
FEV1 FVC REF: 74
FEV1 LLN: 1.64
FEV1 POST REF: 69.8 %
FEV1 PRE REF: 64.7 %
FEV1 REF: 2.46
FRCPLETH LLN: 2.77
FRCPLETH PREREF: 102 %
FRCPLETH REF: 3.75
FVC CHG: 6.7 %
FVC LLN: 2.37
FVC POST REF: 67.2 %
FVC PRE REF: 63 %
FVC REF: 3.37
IVC PRE: 1.85 L (ref 2.37–4.39)
IVC SINGLE BREATH LLN: 2.37
IVC SINGLE BREATH PRE REF: 54.9 %
IVC SINGLE BREATH REF: 3.37
MVV LLN: 80
MVV PRE REF: 52.1 %
MVV REF: 94
PEF CHG: -22.1 %
PEF LLN: 3.37
PEF POST REF: 59 %
PEF PRE REF: 75.8 %
PEF REF: 5.56
POST FEF 25 75: 1.44 L/S (ref 0.55–3.37)
POST FET 100: 8.28 SEC
POST FEV1 FVC: 75.91 % (ref 57.96–88.83)
POST FEV1: 1.72 L (ref 1.64–3.21)
POST FVC: 2.27 L (ref 2.37–4.39)
POST PEF: 3.28 L/S (ref 3.37–7.74)
PRE DLCO: 23.9 ML/(MIN*MMHG) (ref 14.83–28.68)
PRE ERV: 0.68 L (ref -16449.24–16450.76)
PRE FEF 25 75: 1.08 L/S (ref 0.55–3.37)
PRE FET 100: 7.08 SEC
PRE FEV1 FVC: 75.06 % (ref 57.96–88.83)
PRE FEV1: 1.59 L (ref 1.64–3.21)
PRE FRC PL: 3.83 L (ref 2.77–4.74)
PRE FVC: 2.12 L (ref 2.37–4.39)
PRE MVV: 49.11 L/MIN (ref 80.1–108.37)
PRE PEF: 4.21 L/S (ref 3.37–7.74)
PRE RV: 3.15 L (ref 2.32–3.67)
PRE TLC: 5.27 L (ref 5.57–7.87)
RAW LLN: 3.06
RAW PRE REF: 148.5 %
RAW PRE: 4.54 CMH2O*S/L (ref 3.06–3.06)
RAW REF: 3.06
RV LLN: 2.32
RV PRE REF: 105.2 %
RV REF: 2.99
RVTLC LLN: 40
RVTLC PRE REF: 122.6 %
RVTLC PRE: 59.69 % (ref 39.69–57.65)
RVTLC REF: 49
TLC LLN: 5.57
TLC PRE REF: 78.4 %
TLC REF: 6.72
VA PRE: 7.36 L (ref 6.57–6.57)
VA SINGLE BREATH LLN: 6.57
VA SINGLE BREATH PRE REF: 112 %
VA SINGLE BREATH REF: 6.57
VC LLN: 2.37
VC PRE REF: 63 %
VC PRE: 2.12 L (ref 2.37–4.39)
VC REF: 3.37

## 2023-05-18 PROCEDURE — 94726 PULM FUNCT TST PLETHYSMOGRAP: ICD-10-PCS | Mod: 26,,, | Performed by: INTERNAL MEDICINE

## 2023-05-18 PROCEDURE — 94060 EVALUATION OF WHEEZING: CPT

## 2023-05-18 PROCEDURE — 94729 DIFFUSING CAPACITY: CPT

## 2023-05-18 PROCEDURE — 94726 PLETHYSMOGRAPHY LUNG VOLUMES: CPT | Mod: 26,,, | Performed by: INTERNAL MEDICINE

## 2023-05-18 PROCEDURE — 94726 PLETHYSMOGRAPHY LUNG VOLUMES: CPT

## 2023-05-18 PROCEDURE — 94060 PR EVAL OF BRONCHOSPASM: ICD-10-PCS | Mod: 26,,, | Performed by: INTERNAL MEDICINE

## 2023-05-18 PROCEDURE — 94729 PR C02/MEMBANE DIFFUSE CAPACITY: ICD-10-PCS | Mod: 26,,, | Performed by: INTERNAL MEDICINE

## 2023-05-18 PROCEDURE — 94060 EVALUATION OF WHEEZING: CPT | Mod: 26,,, | Performed by: INTERNAL MEDICINE

## 2023-05-18 PROCEDURE — 94729 DIFFUSING CAPACITY: CPT | Mod: 26,,, | Performed by: INTERNAL MEDICINE

## 2023-05-18 RX ORDER — ALBUTEROL SULFATE 2.5 MG/.5ML
2.5 SOLUTION RESPIRATORY (INHALATION)
Status: COMPLETED | OUTPATIENT
Start: 2023-05-18 | End: 2023-05-18

## 2023-05-18 RX ADMIN — ALBUTEROL SULFATE 2.5 MG: 2.5 SOLUTION RESPIRATORY (INHALATION) at 10:05

## 2023-05-23 ENCOUNTER — PATIENT MESSAGE (OUTPATIENT)
Dept: RESEARCH | Facility: HOSPITAL | Age: 88
End: 2023-05-23
Payer: MEDICARE

## 2023-06-01 ENCOUNTER — PATIENT MESSAGE (OUTPATIENT)
Dept: CARDIOLOGY | Facility: CLINIC | Age: 88
End: 2023-06-01
Payer: MEDICARE

## 2023-07-10 ENCOUNTER — OFFICE VISIT (OUTPATIENT)
Dept: CARDIOLOGY | Facility: CLINIC | Age: 88
End: 2023-07-10
Payer: MEDICARE

## 2023-07-10 VITALS
HEART RATE: 72 BPM | SYSTOLIC BLOOD PRESSURE: 158 MMHG | BODY MASS INDEX: 34.93 KG/M2 | OXYGEN SATURATION: 98 % | DIASTOLIC BLOOD PRESSURE: 80 MMHG | HEIGHT: 70 IN | WEIGHT: 244 LBS

## 2023-07-10 DIAGNOSIS — Z76.89 ENCOUNTER TO ESTABLISH CARE: ICD-10-CM

## 2023-07-10 DIAGNOSIS — R93.1 DECREASED CARDIAC EJECTION FRACTION: Primary | ICD-10-CM

## 2023-07-10 DIAGNOSIS — I70.0 AORTIC ATHEROSCLEROSIS: ICD-10-CM

## 2023-07-10 DIAGNOSIS — E11.22 TYPE 2 DIABETES MELLITUS WITH STAGE 3B CHRONIC KIDNEY DISEASE, WITHOUT LONG-TERM CURRENT USE OF INSULIN: ICD-10-CM

## 2023-07-10 DIAGNOSIS — F11.20 UNCOMPLICATED OPIOID DEPENDENCE: ICD-10-CM

## 2023-07-10 DIAGNOSIS — E66.01 CLASS 2 SEVERE OBESITY DUE TO EXCESS CALORIES WITH SERIOUS COMORBIDITY AND BODY MASS INDEX (BMI) OF 35.0 TO 35.9 IN ADULT: ICD-10-CM

## 2023-07-10 DIAGNOSIS — D64.9 ANEMIA, UNSPECIFIED TYPE: ICD-10-CM

## 2023-07-10 DIAGNOSIS — E78.5 DYSLIPIDEMIA (HIGH LDL; LOW HDL): ICD-10-CM

## 2023-07-10 DIAGNOSIS — R94.31 ABNORMAL ECG: ICD-10-CM

## 2023-07-10 DIAGNOSIS — F13.20 BENZODIAZEPINE DEPENDENCE: ICD-10-CM

## 2023-07-10 DIAGNOSIS — Z86.711 HISTORY OF PULMONARY EMBOLUS (PE): ICD-10-CM

## 2023-07-10 DIAGNOSIS — N18.32 TYPE 2 DIABETES MELLITUS WITH STAGE 3B CHRONIC KIDNEY DISEASE, WITHOUT LONG-TERM CURRENT USE OF INSULIN: ICD-10-CM

## 2023-07-10 DIAGNOSIS — Z79.01 ANTICOAGULATED: ICD-10-CM

## 2023-07-10 DIAGNOSIS — N18.32 STAGE 3B CHRONIC KIDNEY DISEASE: ICD-10-CM

## 2023-07-10 PROBLEM — E66.812 CLASS 2 SEVERE OBESITY DUE TO EXCESS CALORIES WITH SERIOUS COMORBIDITY AND BODY MASS INDEX (BMI) OF 35.0 TO 35.9 IN ADULT: Status: ACTIVE | Noted: 2023-07-10

## 2023-07-10 PROCEDURE — 1101F PR PT FALLS ASSESS DOC 0-1 FALLS W/OUT INJ PAST YR: ICD-10-PCS | Mod: CPTII,S$GLB,, | Performed by: INTERNAL MEDICINE

## 2023-07-10 PROCEDURE — 1159F PR MEDICATION LIST DOCUMENTED IN MEDICAL RECORD: ICD-10-PCS | Mod: CPTII,S$GLB,, | Performed by: INTERNAL MEDICINE

## 2023-07-10 PROCEDURE — 3288F FALL RISK ASSESSMENT DOCD: CPT | Mod: CPTII,S$GLB,, | Performed by: INTERNAL MEDICINE

## 2023-07-10 PROCEDURE — 1101F PT FALLS ASSESS-DOCD LE1/YR: CPT | Mod: CPTII,S$GLB,, | Performed by: INTERNAL MEDICINE

## 2023-07-10 PROCEDURE — 3288F PR FALLS RISK ASSESSMENT DOCUMENTED: ICD-10-PCS | Mod: CPTII,S$GLB,, | Performed by: INTERNAL MEDICINE

## 2023-07-10 PROCEDURE — 1157F ADVNC CARE PLAN IN RCRD: CPT | Mod: CPTII,S$GLB,, | Performed by: INTERNAL MEDICINE

## 2023-07-10 PROCEDURE — 99999 PR PBB SHADOW E&M-EST. PATIENT-LVL V: CPT | Mod: PBBFAC,,, | Performed by: INTERNAL MEDICINE

## 2023-07-10 PROCEDURE — 93000 EKG 12-LEAD: ICD-10-PCS | Mod: S$GLB,,, | Performed by: INTERNAL MEDICINE

## 2023-07-10 PROCEDURE — 93000 ELECTROCARDIOGRAM COMPLETE: CPT | Mod: S$GLB,,, | Performed by: INTERNAL MEDICINE

## 2023-07-10 PROCEDURE — 1159F MED LIST DOCD IN RCRD: CPT | Mod: CPTII,S$GLB,, | Performed by: INTERNAL MEDICINE

## 2023-07-10 PROCEDURE — 99999 PR PBB SHADOW E&M-EST. PATIENT-LVL V: ICD-10-PCS | Mod: PBBFAC,,, | Performed by: INTERNAL MEDICINE

## 2023-07-10 PROCEDURE — 99205 PR OFFICE/OUTPT VISIT, NEW, LEVL V, 60-74 MIN: ICD-10-PCS | Mod: 25,S$GLB,, | Performed by: INTERNAL MEDICINE

## 2023-07-10 PROCEDURE — 1157F PR ADVANCE CARE PLAN OR EQUIV PRESENT IN MEDICAL RECORD: ICD-10-PCS | Mod: CPTII,S$GLB,, | Performed by: INTERNAL MEDICINE

## 2023-07-10 PROCEDURE — 99205 OFFICE O/P NEW HI 60 MIN: CPT | Mod: 25,S$GLB,, | Performed by: INTERNAL MEDICINE

## 2023-07-10 NOTE — PATIENT INSTRUCTIONS
Recommended Mediterranean dietEating Heart-Healthy Food: Using the DASH Plan  Eating for your heart doesnt have to be hard or boring. You just need to know how to make healthier choices. The DASH eating plan has been developed to help you do just that. DASH stands for Dietary Approaches to Stop Hypertension. It is a plan that has been proven to be healthier for your heart and to lower your risk for high blood pressure. It can also help lower your risk for cancer, heart disease, osteoporosis, and diabetes.  Choosing from Each Food Group  Choose foods from each of the food groups below each day. Try to get the recommended number of servings for each food group. The serving numbers are based on a diet of 2,000 calories a day. Talk to your doctor if youre unsure about your calorie needs.  Grains   Servings: 7-8 a day  A serving is:  1 slice bread  1 ounce dry cereal  half a cup cooked rice or pasta  Best choices: Whole grains and any grains high in fiber.  Vegetables   Servings: 4-5 a day  A serving is:  1 cup raw leafy vegetable  Half a cup cooked vegetable  Three-quarter cup vegetable juice  Best choices: Fresh or frozen vegetable prepared without too much added salt or fat.    Fruits   Servings: 4-5 a day  A serving is:  Three-quarter cup fruit juice  1 medium fruit  One-quarter cup dried fruit  One-half cup fresh, frozen, or canned fruit  Best choices: A variety of fresh fruits of different colors. Whole fruits are a much better choice than fruit juices.  Low-fat or Fat Free Dairy   Servings: 2-3 a day  A serving is:  8 ounces milk  1 cup yogurt  One and a half ounces cheese  Best choices: Skim or 1% milk, low-fat or fat free yogurt or buttermilk, and low-fat cheeses.       Meat, Poultry, Fish   Servings: 2 or fewer a day  A serving is:  3 ounces cooked meat, poultry, or fish  Best choices: Lean meats and fish. Trim away visible fat. Broil, roast, or boil instead of frying. Remove skin from poultry before eating.   Nuts, Seeds, Beans   Servings: 4-5 a week  A serving is:  One third cup nuts (or one and a half ounces)  2 tablespoons sunflower seeds  Half a cup cooked beans  Best choices: Dry roasted nuts with no salt added, lentils, kidney beans, garbanzo beans, and whole hirsch beans.    Fats and Oils   Servings: 2 a day  A serving is:  1 teaspoon vegetable oil  1 teaspoon soft margarine  1 tablespoon low-fat mayonnaise  1 teaspoon regular mayonnaise  2 tablespoons light salad dressing  1 tablespoon regular salad dressing  Best choices: Monounsaturated and polyunsaturated fats such as olive, canola, or safflower oil.  Sweets   Servings: 5 a week or fewer  A serving is:  1 tablespoon sugar, maple syrup, or honey  1 tablespoon jam or jelly  1 half-ounce jelly beans (about 15)  8 ounces lemonade  Best choices: Dried fruit can be a satisfying sweet. Choose low-fat sweets when possible. And watch your serving sizes!       Aerobic Exercise for a Healthy Heart  Exercise is a lot more than an energy booster and a stress reliever. It also strengthens your heart muscle, lowers your blood pressure and blood cholesterol, and burns calories.      Remember, some activity is better than none.     Choose an Aerobic Activity  Choose a nonstop activity that makes your heart and lungs work harder than they do when you rest or walk normally. This aerobic exercise can improve the way your heart and other muscles use oxygen. Make it fun by exercising with a friend and choosing an activity you enjoy. Here are some ideas:  Walking  Swimming  Bicycling  Stair climbing  Dancing  Jogging  Exercise Regularly  If you havent been exercising regularly,  get your doctors okay first. Then start slowly.  Here are some tips:  Begin exercising 3 times a week for 5-10 minutes at a time.  When you feel comfortable, add a few minutes each week.  Slowly build up to exercising 3-4 times each week for 20-40 minutes. Aim for a total of 150 or more minutes a  week.  Be sure to carry your nitroglycerin with you when you exercise.  If you get angina when youre exercising, stop what youre doing, take your nitroglycerin, and call your doctor.  © 7435-9110 [employer], [address] [place] [address], [place] [address]. All rights reserved. This information is not intended as a substitute for professional medical care. Always follow your healthcare professional's instructions.    Losing Weight (Cardiovascular)  Excess weight is a major risk factor for heart disease. Losing weight may help keep your arteries open so that your heart can get the oxygen-rich blood it needs. Weight loss can also help lower your blood pressure and reduce your risk for diabetes. All in all, losing weight makes you healthier.          Exercise with a friend. When activity is fun, you're more likely to stick with it.        Calories and Weight Loss  Calories are the fuel your body burns for energy. You get the calories you need from the food you eat. For healthy weight loss, women should eat at least 1,200 calories a day, men at least 1,500.    When you eat more calories than you need, your body stores the extra calories as fat. One pound of fat equals 3,500 calories.    To lose weight, try to burn 500 calories a day more than you eat. To do this, eat 250 calories less each day. Add activity to burn the other 250 calories. Walking 21/2 miles burns about 250 calories.    Eat a variety of healthy foods. Its the best way to make calories count.     Tips for losing weight:  Drink 8 to 10 glasses of water a day.    Dont skip meals. Instead, eat smaller portions.       Brisk Activity Is Best  Brisk activity gets your heart pumping faster. It makes your heart healthier. Its also the best way to burn calories. In fact, your body may keep burning calories for hours after you stop a brisk activity.    Begin by walking 10 minutes most days.    Add more time and speed to your walk. Build up as you feel  able.    Try to walk briskly at least 30 minutes most days. If needed, you can break this into 2 shorter sessions.     Check off the ideas below that you could try to make your day more active:    Take the stairs instead of the elevator.    Park your car farther away and walk.    Ride a bike to work or to the store.    Walk laps around the mall.

## 2023-07-10 NOTE — PROGRESS NOTES
Subjective:    Patient ID:  Anthony Sheldon is a 89 y.o. male who presents for evaluation of Establish Care  PCP and referred by Milly Hoyt NP for newly noted LV dysfunction, not controlled T2DM on metformin and never started Jardiance.  No prior cardiologist  Lives alone, no pet  Daughter, Luc, in [place], have POA  Retired  for [employer]    Patient is a new patient to me.     Social Determinate of Health: Do you have any problem with the following: none  Health Literacy (understanding): high  Vaccination: Covid vaccine, vaccines up to date, had infection in 2021, but no residual problems  Activities: swims 5 days weekly for 30 minutes, no problem, not limited  Nicotine: former smoker, quit 50 years ago in 24 hours 4-5 cigarettes and a cigar now and then   Alcohol: 80 proof liquor couple drinks a week, max 1 shot in any 24 hours.  Illicit Drugs: none, on Rx tramadol using daily since 2021, on Ambien 2-3 nights weekly for 8 years.  Cardiac Symptoms: none  Home BP: do no check  Diet: Regular, little salt  Caffeine: 2 Coffee  Labs:   Lab Results   Component Value Date    TSH 1.992 05/16/2023        Lab Results   Component Value Date    LABA1C 5.5 06/19/2018    HGBA1C 8.3 (H) 05/16/2023       Lab Results   Component Value Date    WBC 6.89 05/16/2023    HGB 11.8 (L) 05/16/2023    HCT 35.2 (L) 05/16/2023    MCV 95 05/16/2023     05/16/2023       CMP  Sodium   Date Value Ref Range Status   05/16/2023 137 136 - 145 mmol/L Final     Potassium   Date Value Ref Range Status   05/16/2023 4.4 3.5 - 5.1 mmol/L Final     Chloride   Date Value Ref Range Status   05/16/2023 101 95 - 110 mmol/L Final     CO2   Date Value Ref Range Status   05/16/2023 24 23 - 29 mmol/L Final     Glucose   Date Value Ref Range Status   05/16/2023 209 (H) 70 - 110 mg/dL Final     BUN   Date Value Ref Range Status   05/16/2023 25 (H) 8 - 23 mg/dL Final     Creatinine   Date Value Ref Range Status    05/16/2023 1.9 (H) 0.5 - 1.4 mg/dL Final     Calcium   Date Value Ref Range Status   05/16/2023 10.3 8.7 - 10.5 mg/dL Final     Total Protein   Date Value Ref Range Status   05/16/2023 7.6 6.0 - 8.4 g/dL Final     Albumin   Date Value Ref Range Status   05/16/2023 3.9 3.5 - 5.2 g/dL Final     Total Bilirubin   Date Value Ref Range Status   05/16/2023 0.4 0.1 - 1.0 mg/dL Final     Comment:     For infants and newborns, interpretation of results should be based  on gestational age, weight and in agreement with clinical  observations.    Premature Infant recommended reference ranges:  Up to 24 hours.............<8.0 mg/dL  Up to 48 hours............<12.0 mg/dL  3-5 days..................<15.0 mg/dL  6-29 days.................<15.0 mg/dL       Alkaline Phosphatase   Date Value Ref Range Status   05/16/2023 58 55 - 135 U/L Final     AST   Date Value Ref Range Status   05/16/2023 15 10 - 40 U/L Final     ALT   Date Value Ref Range Status   05/16/2023 17 10 - 44 U/L Final     Anion Gap   Date Value Ref Range Status   05/16/2023 12 8 - 16 mmol/L Final     eGFR if    Date Value Ref Range Status   03/28/2022 47.7 (A) >60 mL/min/1.73 m^2 Final     eGFR if non    Date Value Ref Range Status   03/28/2022 41.3 (A) >60 mL/min/1.73 m^2 Final     Comment:     Calculation used to obtain the estimated glomerular filtration  rate (eGFR) is the CKD-EPI equation.        @labrcntip(troponini)@    BNP   Date Value Ref Range Status   05/16/2023 20 0 - 99 pg/mL Final     Comment:     Values of less than 100 pg/ml are consistent with non-CHF populations.   }   Lab Results   Component Value Date    CHOL 201 (H) 05/16/2023    CHOL 203 (H) 02/27/2023    CHOL 195 09/27/2022     Lab Results   Component Value Date    HDL 36 (L) 05/16/2023    HDL 39 (L) 02/27/2023    HDL 37 (L) 09/27/2022     Lab Results   Component Value Date    LDLCALC 117.6 05/16/2023    LDLCALC 131 (H) 02/27/2023    LDLCALC 116.8 09/27/2022  "    Lab Results   Component Value Date    TRIG 237 (H) 05/16/2023    TRIG 193 (H) 02/27/2023    TRIG 206 (H) 09/27/2022     Lab Results   Component Value Date    CHOLHDL 17.9 (L) 05/16/2023    CHOLHDL 5.2 (H) 02/27/2023    CHOLHDL 19.0 (L) 09/27/2022     Lab Results   Component Value Date    IRON 80 05/16/2023    TRANSFERRIN 206 05/16/2023    TIBC 305 05/16/2023    LABIRON 24 11/04/2020    FESATURATED 26 05/16/2023       Lab Results   Component Value Date    FERRITIN 392 (H) 05/16/2023         Last Echo: 5/2023, Dr. RADHA Melgar III's office  Last stress test: nuclear 11/2020  Cardiovascular angiogram: none  ECG: NSR, rate 73, 1st degree AVB, left axis, possible prior IMI  Fundoscopic exam: within the past year, negative for retinopathy    WM referred for recent noted LV dysfunction, new LVEF of 35%. Abnormal ECG suggesting prior IMI. Have a number of significant CV risk factors including age, sex, dyslipidemia and T2DM. Report DELEON with long walk or exercise over the past year. Planning to starting Jardiance.    Milly Hoyt NP noted "6/1/23 H/o ckd with anemia, uncontrolled DM, dvt with pe (on eliquis), metastatic prostate cancer managed by oncology with lupron and casodex  --------In office echo 05/2023  E/a reversal, ef measured at 35%    Decreased cardiac ejection fraction  -     Ambulatory referral/consult to Cardiology    Add jardiance    Refer to Dr. Rowan for evaluation and recommendations  - EF in 2021 56%, now measured at 35%, bnp wnl"    CXR - Pulmonary hypoinflation crowding of the bronchopulmonary vessels.  No focal consolidation.  Minimal dependent atelectasis at the left lung base.  Heart size is top-normal.  Calcified aortic plaque.  Trachea midline.     Echo 2/2021 - The left ventricle is normal in size with concentric remodeling and normal systolic function. The estimated ejection fraction is 56%  Indeterminate left ventricular diastolic function.  Moderate right ventricular enlargement with mildly " to moderately reduced right ventricular systolic function.  Mild right atrial enlargement.  Mild tricuspid regurgitation.  Small pericardial effusion.  Suggest progression of RV dilatation and new RV dysfunction from Echo data on 11/2020.     Difficult windows, Lumason contrast used for wall motion analysis.    Review of Systems   Constitutional: Positive for malaise/fatigue. Negative for diaphoresis, fever, night sweats and weight gain.   HENT:  Positive for ear discharge. Negative for hearing loss, nosebleeds and tinnitus.    Eyes:  Positive for discharge. Negative for visual disturbance.   Cardiovascular:  Positive for dyspnea on exertion. Negative for chest pain, claudication, cyanosis, irregular heartbeat, leg swelling, near-syncope, orthopnea, palpitations and paroxysmal nocturnal dyspnea.   Respiratory:  Negative for cough, shortness of breath, sleep disturbances due to breathing, snoring and wheezing.         [place] score 5, awaken mostly refreshed.   Endocrine: Negative for polydipsia and polyuria.   Hematologic/Lymphatic: Bruises/bleeds easily.   Skin:  Negative for color change, flushing, nail changes, poor wound healing and suspicious lesions.   Musculoskeletal:  Positive for back pain. Negative for arthritis, falls, gout, joint pain, joint swelling, muscle cramps, muscle weakness, myalgias and neck pain.   Gastrointestinal:  Positive for diarrhea. Negative for constipation, heartburn, hematemesis, hematochezia, melena, nausea and vomiting.   Genitourinary:  Negative for hematuria and urgency.   Neurological:  Positive for paresthesias and weakness. Negative for disturbances in coordination, excessive daytime sleepiness, dizziness, focal weakness, headaches, light-headedness, loss of balance, numbness and vertigo.   Psychiatric/Behavioral:  Negative for depression and substance abuse. The patient has insomnia. The patient is not nervous/anxious.       Answers submitted by the patient for this  "visit:  Review of Systems Questionnaire (Submitted on 7/7/2023)  activity change: No  unexpected weight change: No  neck pain: No  hearing loss: No  rhinorrhea: No  trouble swallowing: No  eye discharge: No  visual disturbance: No  chest tightness: No  wheezing: No  chest pain: No  palpitations: No  blood in stool: No  constipation: No  vomiting: No  diarrhea: No  polydipsia: No  polyuria: No  difficulty urinating: No  urgency: No  hematuria: No  joint swelling: No  arthralgias: Yes  headaches: No  weakness: Yes  confusion: No  dysphoric mood: No    Objective:    Physical Exam  Constitutional:       Appearance: He is well-developed.      Comments: RA O2 sat 98%   HENT:      Head: Normocephalic.   Eyes:      Conjunctiva/sclera: Conjunctivae normal.      Pupils: Pupils are equal, round, and reactive to light.   Neck:      Thyroid: No thyromegaly.      Vascular: No JVD.   Cardiovascular:      Rate and Rhythm: Normal rate and regular rhythm.      Pulses: Intact distal pulses.           Carotid pulses are 1+ on the right side and 1+ on the left side.       Radial pulses are 1+ on the right side and 1+ on the left side.        Dorsalis pedis pulses are 1+ on the right side and 1+ on the left side.        Posterior tibial pulses are 1+ on the right side and 1+ on the left side.      Heart sounds: Heart sounds are distant. No murmur heard.    No friction rub. No gallop.   Pulmonary:      Effort: Pulmonary effort is normal.      Breath sounds: No rales.      Comments: Diminished breath sounds and prolong expiration.  Chest:      Chest wall: No tenderness.   Abdominal:      General: Bowel sounds are normal.      Palpations: Abdomen is soft.      Tenderness: There is no abdominal tenderness.      Comments: Waist 51"   Musculoskeletal:         General: Normal range of motion.      Cervical back: Normal range of motion and neck supple.      Right lower leg: Edema (trace pitting around the ankle) present.      Left lower leg: " Edema (trace pitting around the ankle) present.   Lymphadenopathy:      Cervical: No cervical adenopathy.   Skin:     General: Skin is warm and dry.      Findings: No rash.   Neurological:      Mental Status: He is alert and oriented to person, place, and time.         Assessment:       1. Decreased cardiac ejection fraction    2. Encounter to establish care    3. Aortic atherosclerosis    4. History of pulmonary embolus (PE)    5. Anticoagulated    6. Uncomplicated opioid dependence, Tramadol daily since 2021    7. Benzodiazepine dependence, 2-3 nights weekly since 2015    8. Anemia, unspecified type    9. Stage 3b chronic kidney disease    10. Dyslipidemia (high LDL; low HDL)    11. Abnormal ECG    12. Class 2 severe obesity due to excess calories with serious comorbidity and body mass index (BMI) of 35.0 to 35.9 in adult    13. Type 2 diabetes mellitus with stage 3b chronic kidney disease, without long-term current use of insulin         Plan:       Anthony was seen today for establish care.    Diagnoses and all orders for this visit:    Decreased cardiac ejection fraction  -     IN OFFICE EKG 12-LEAD (to [place])  -     Ambulatory referral/consult to Cardiology  -     Nuclear Stress - Cardiology Interpreted; Future  -     empagliflozin (JARDIANCE) 10 mg tablet; Take 1 tablet (10 mg total) by mouth once daily.    Encounter to establish care    Aortic atherosclerosis  -     Nuclear Stress - Cardiology Interpreted; Future  -     empagliflozin (JARDIANCE) 10 mg tablet; Take 1 tablet (10 mg total) by mouth once daily.    History of pulmonary embolus (PE)    Anticoagulated    Uncomplicated opioid dependence, Tramadol daily since 2021    Benzodiazepine dependence, 2-3 nights weekly since 2015    Anemia, unspecified type    Stage 3b chronic kidney disease  -     Nuclear Stress - Cardiology Interpreted; Future  -     empagliflozin (JARDIANCE) 10 mg tablet; Take 1 tablet (10 mg total) by mouth once daily.  -      Microalbumin/Creatinine Ratio, Urine; Future    Dyslipidemia (high LDL; low HDL)    Abnormal ECG  -     Nuclear Stress - Cardiology Interpreted; Future    Class 2 severe obesity due to excess calories with serious comorbidity and body mass index (BMI) of 35.0 to 35.9 in adult    Type 2 diabetes mellitus with stage 3b chronic kidney disease, without long-term current use of insulin  -     empagliflozin (JARDIANCE) 10 mg tablet; Take 1 tablet (10 mg total) by mouth once daily.  -     Microalbumin/Creatinine Ratio, Urine; Future     - All medical issues reviewed, agree with use of Jardiance, adverse side effects discussed including possible genital fungal infection, and DKA, info given.   - Warning signs of MI and stroke given, if symptoms last more than 5 minutes, stop immediately and call 911, then chew 2-4 low-dose ASA (81 mg).   - CV status and all medications reviewed, patient acknowledge good understanding.  - Recommend healthy living: avoid alcohol, healthy diet and regular exercise aiming for fitness, restorative sleep and weight control  - Need good exercise program, 4 key elements: 1. Aerobic (walking, swimming, dancing, jogging, biking, etc, 2. Muscle strengthening / resistance exercise, need to do 2-3 times weekly, 3. Stretching daily, good stretch takes a whole  total minute. 4. Balance exercise daily.   - Instruction for Mediterranean diet and heart healthy exercise given.  - Check home blood pressure, 2 days weekly, do 2 readings within 5 minutes in AM and PM, keep log for review. Target resting BP is less than 130/80.   - Weigh twice weekly, try to lose 1-2 lbs per week. Target weight loss of 5%-10%.  - Highly recommend 30-60 minutes of exercise / activities daily, can have Sunday off, with 2-3 sessions of muscle strengthening weekly. A  would be very helpful.  - Will follow up in 4 weeks to check efficacy, medication titration for HFrEF  - Phone review / encourage use of [facility]        Total time spend including review of record prior to face-to-face visit is 60 minutes. Greater than 50% of the time was spent in counseling and coordination of care. The above assessment and plan have been discussed at length. Referring provider's note reviewed. Labs and procedure over the last 6 months reviewed. Problem List updated. Asked to bring in all active medications / pills bottles with next visit. Will send note to referring / PCP.

## 2023-07-11 ENCOUNTER — LAB VISIT (OUTPATIENT)
Dept: LAB | Facility: HOSPITAL | Age: 88
End: 2023-07-11
Attending: INTERNAL MEDICINE
Payer: MEDICARE

## 2023-07-11 ENCOUNTER — TELEPHONE (OUTPATIENT)
Dept: CARDIOLOGY | Facility: CLINIC | Age: 88
End: 2023-07-11
Payer: MEDICARE

## 2023-07-11 DIAGNOSIS — C61 PROSTATE CANCER: ICD-10-CM

## 2023-07-11 DIAGNOSIS — D64.9 NORMOCYTIC ANEMIA: ICD-10-CM

## 2023-07-11 LAB
ALBUMIN SERPL BCP-MCNC: 3.6 G/DL (ref 3.5–5.2)
ALP SERPL-CCNC: 53 U/L (ref 55–135)
ALT SERPL W/O P-5'-P-CCNC: 11 U/L (ref 10–44)
ANION GAP SERPL CALC-SCNC: 12 MMOL/L (ref 8–16)
AST SERPL-CCNC: 12 U/L (ref 10–40)
BASOPHILS NFR BLD: 1 % (ref 0–1.9)
BILIRUB SERPL-MCNC: 0.5 MG/DL (ref 0.1–1)
BUN SERPL-MCNC: 21 MG/DL (ref 8–23)
CALCIUM SERPL-MCNC: 9.6 MG/DL (ref 8.7–10.5)
CHLORIDE SERPL-SCNC: 104 MMOL/L (ref 95–110)
CO2 SERPL-SCNC: 23 MMOL/L (ref 23–29)
COMPLEXED PSA SERPL-MCNC: 0.02 NG/ML (ref 0–4)
CREAT SERPL-MCNC: 1.7 MG/DL (ref 0.5–1.4)
DIFFERENTIAL METHOD: ABNORMAL
EOSINOPHIL NFR BLD: 0 % (ref 0–8)
ERYTHROCYTE [DISTWIDTH] IN BLOOD BY AUTOMATED COUNT: 12.9 % (ref 11.5–14.5)
EST. GFR  (NO RACE VARIABLE): 38.1 ML/MIN/1.73 M^2
FERRITIN SERPL-MCNC: 406 NG/ML (ref 20–300)
GLUCOSE SERPL-MCNC: 244 MG/DL (ref 70–110)
HCT VFR BLD AUTO: 35.3 % (ref 40–54)
HGB BLD-MCNC: 11 G/DL (ref 14–18)
IMM GRANULOCYTES # BLD AUTO: ABNORMAL 10*3/UL
IMM GRANULOCYTES NFR BLD AUTO: ABNORMAL %
IRON SERPL-MCNC: 92 UG/DL (ref 45–160)
LDH SERPL L TO P-CCNC: 150 U/L (ref 110–260)
LYMPHOCYTES NFR BLD: 21 % (ref 18–48)
MCH RBC QN AUTO: 29.6 PG (ref 27–31)
MCHC RBC AUTO-ENTMCNC: 31.2 G/DL (ref 32–36)
MCV RBC AUTO: 95 FL (ref 82–98)
MONOCYTES NFR BLD: 7 % (ref 4–15)
NEUTROPHILS NFR BLD: 70 % (ref 38–73)
NEUTS BAND NFR BLD MANUAL: 1 %
NRBC BLD-RTO: ABNORMAL /100 WBC
PLATELET # BLD AUTO: 256 K/UL (ref 150–450)
PMV BLD AUTO: 8.4 FL (ref 9.2–12.9)
POTASSIUM SERPL-SCNC: 4.8 MMOL/L (ref 3.5–5.1)
PROT SERPL-MCNC: 7.4 G/DL (ref 6–8.4)
RBC # BLD AUTO: 3.72 M/UL (ref 4.6–6.2)
SATURATED IRON: 32 % (ref 20–50)
SODIUM SERPL-SCNC: 139 MMOL/L (ref 136–145)
TOTAL IRON BINDING CAPACITY: 284 UG/DL (ref 250–450)
TRANSFERRIN SERPL-MCNC: 192 MG/DL (ref 200–375)
WBC # BLD AUTO: 5.8 K/UL (ref 3.9–12.7)

## 2023-07-11 PROCEDURE — 82728 ASSAY OF FERRITIN: CPT | Performed by: NURSE PRACTITIONER

## 2023-07-11 PROCEDURE — 84153 ASSAY OF PSA TOTAL: CPT | Performed by: NURSE PRACTITIONER

## 2023-07-11 PROCEDURE — 83615 LACTATE (LD) (LDH) ENZYME: CPT | Performed by: NURSE PRACTITIONER

## 2023-07-11 PROCEDURE — 85025 COMPLETE CBC W/AUTO DIFF WBC: CPT | Performed by: NURSE PRACTITIONER

## 2023-07-11 PROCEDURE — 84466 ASSAY OF TRANSFERRIN: CPT | Performed by: NURSE PRACTITIONER

## 2023-07-11 PROCEDURE — 36415 COLL VENOUS BLD VENIPUNCTURE: CPT | Performed by: NURSE PRACTITIONER

## 2023-07-11 PROCEDURE — 80053 COMPREHEN METABOLIC PANEL: CPT | Performed by: NURSE PRACTITIONER

## 2023-07-11 NOTE — TELEPHONE ENCOUNTER
Spoke with pt about the assistance program for jardiance. Also let him know we have a coupon. He will pick that up tomorrow.

## 2023-07-12 ENCOUNTER — TELEPHONE (OUTPATIENT)
Dept: CARDIOLOGY | Facility: CLINIC | Age: 88
End: 2023-07-12
Payer: MEDICARE

## 2023-07-12 ENCOUNTER — HOSPITAL ENCOUNTER (EMERGENCY)
Facility: HOSPITAL | Age: 88
Discharge: HOME | End: 2023-07-12
Attending: EMERGENCY MEDICINE
Payer: MEDICARE

## 2023-07-12 VITALS
SYSTOLIC BLOOD PRESSURE: 130 MMHG | BODY MASS INDEX: 33.93 KG/M2 | HEIGHT: 70 IN | RESPIRATION RATE: 17 BRPM | HEART RATE: 93 BPM | WEIGHT: 237 LBS | DIASTOLIC BLOOD PRESSURE: 69 MMHG | TEMPERATURE: 99 F | OXYGEN SATURATION: 93 %

## 2023-07-12 DIAGNOSIS — G89.29 CHRONIC LOW BACK PAIN WITHOUT SCIATICA, UNSPECIFIED BACK PAIN LATERALITY: Primary | ICD-10-CM

## 2023-07-12 DIAGNOSIS — R06.02 SHORTNESS OF BREATH: ICD-10-CM

## 2023-07-12 DIAGNOSIS — M54.50 CHRONIC LOW BACK PAIN WITHOUT SCIATICA, UNSPECIFIED BACK PAIN LATERALITY: Primary | ICD-10-CM

## 2023-07-12 PROBLEM — R80.9 MICROALBUMINURIA: Status: ACTIVE | Noted: 2023-07-12

## 2023-07-12 LAB
ALBUMIN SERPL BCP-MCNC: 4 G/DL (ref 3.5–5.2)
ALP SERPL-CCNC: 58 U/L (ref 55–135)
ALT SERPL W/O P-5'-P-CCNC: 12 U/L (ref 10–44)
ANION GAP SERPL CALC-SCNC: 15 MMOL/L (ref 8–16)
AST SERPL-CCNC: 23 U/L (ref 10–40)
BASOPHILS # BLD AUTO: 0.02 K/UL (ref 0–0.2)
BASOPHILS NFR BLD: 0.2 % (ref 0–1.9)
BILIRUB SERPL-MCNC: 0.4 MG/DL (ref 0.1–1)
BNP SERPL-MCNC: 45 PG/ML (ref 0–99)
BUN SERPL-MCNC: 23 MG/DL (ref 8–23)
CALCIUM SERPL-MCNC: 9.7 MG/DL (ref 8.7–10.5)
CHLORIDE SERPL-SCNC: 103 MMOL/L (ref 95–110)
CO2 SERPL-SCNC: 20 MMOL/L (ref 23–29)
CREAT SERPL-MCNC: 1.9 MG/DL (ref 0.5–1.4)
DIFFERENTIAL METHOD: ABNORMAL
EOSINOPHIL # BLD AUTO: 0 K/UL (ref 0–0.5)
EOSINOPHIL NFR BLD: 0.3 % (ref 0–8)
ERYTHROCYTE [DISTWIDTH] IN BLOOD BY AUTOMATED COUNT: 12.7 % (ref 11.5–14.5)
EST. GFR  (NO RACE VARIABLE): 33.3 ML/MIN/1.73 M^2
GLUCOSE SERPL-MCNC: 160 MG/DL (ref 70–110)
HCT VFR BLD AUTO: 35.2 % (ref 40–54)
HGB BLD-MCNC: 11.8 G/DL (ref 14–18)
IMM GRANULOCYTES # BLD AUTO: 0.04 K/UL (ref 0–0.04)
IMM GRANULOCYTES NFR BLD AUTO: 0.5 % (ref 0–0.5)
LYMPHOCYTES # BLD AUTO: 0.4 K/UL (ref 1–4.8)
LYMPHOCYTES NFR BLD: 5 % (ref 18–48)
MCH RBC QN AUTO: 32.3 PG (ref 27–31)
MCHC RBC AUTO-ENTMCNC: 33.5 G/DL (ref 32–36)
MCV RBC AUTO: 96 FL (ref 82–98)
MONOCYTES # BLD AUTO: 0.3 K/UL (ref 0.3–1)
MONOCYTES NFR BLD: 3.1 % (ref 4–15)
NEUTROPHILS # BLD AUTO: 7.9 K/UL (ref 1.8–7.7)
NEUTROPHILS NFR BLD: 90.9 % (ref 38–73)
NRBC BLD-RTO: 0 /100 WBC
PLATELET # BLD AUTO: 211 K/UL (ref 150–450)
PMV BLD AUTO: 9.3 FL (ref 9.2–12.9)
POCT GLUCOSE: 183 MG/DL (ref 70–110)
POTASSIUM SERPL-SCNC: 4.8 MMOL/L (ref 3.5–5.1)
PROT SERPL-MCNC: 8 G/DL (ref 6–8.4)
RBC # BLD AUTO: 3.65 M/UL (ref 4.6–6.2)
SODIUM SERPL-SCNC: 138 MMOL/L (ref 136–145)
TROPONIN I SERPL DL<=0.01 NG/ML-MCNC: <0.006 NG/ML (ref 0–0.03)
TROPONIN I SERPL DL<=0.01 NG/ML-MCNC: <0.006 NG/ML (ref 0–0.03)
WBC # BLD AUTO: 8.66 K/UL (ref 3.9–12.7)

## 2023-07-12 PROCEDURE — 93010 EKG 12-LEAD: ICD-10-PCS | Mod: ,,, | Performed by: INTERNAL MEDICINE

## 2023-07-12 PROCEDURE — 84484 ASSAY OF TROPONIN QUANT: CPT | Performed by: EMERGENCY MEDICINE

## 2023-07-12 PROCEDURE — 82962 GLUCOSE BLOOD TEST: CPT

## 2023-07-12 PROCEDURE — 80053 COMPREHEN METABOLIC PANEL: CPT | Performed by: EMERGENCY MEDICINE

## 2023-07-12 PROCEDURE — 25000003 PHARM REV CODE 250: Performed by: EMERGENCY MEDICINE

## 2023-07-12 PROCEDURE — 83880 ASSAY OF NATRIURETIC PEPTIDE: CPT | Performed by: EMERGENCY MEDICINE

## 2023-07-12 PROCEDURE — 93010 ELECTROCARDIOGRAM REPORT: CPT | Mod: ,,, | Performed by: INTERNAL MEDICINE

## 2023-07-12 PROCEDURE — 99285 EMERGENCY DEPT VISIT HI MDM: CPT | Mod: 25

## 2023-07-12 PROCEDURE — 71045 X-RAY EXAM CHEST 1 VIEW: CPT | Mod: 26,,, | Performed by: RADIOLOGY

## 2023-07-12 PROCEDURE — 86803 HEPATITIS C AB TEST: CPT | Performed by: EMERGENCY MEDICINE

## 2023-07-12 PROCEDURE — 85025 COMPLETE CBC W/AUTO DIFF WBC: CPT | Performed by: EMERGENCY MEDICINE

## 2023-07-12 PROCEDURE — 71045 X-RAY EXAM CHEST 1 VIEW: CPT | Mod: TC

## 2023-07-12 PROCEDURE — 36415 COLL VENOUS BLD VENIPUNCTURE: CPT | Performed by: EMERGENCY MEDICINE

## 2023-07-12 PROCEDURE — 87389 HIV-1 AG W/HIV-1&-2 AB AG IA: CPT | Performed by: EMERGENCY MEDICINE

## 2023-07-12 PROCEDURE — 71045 XR CHEST AP PORTABLE: ICD-10-PCS | Mod: 26,,, | Performed by: RADIOLOGY

## 2023-07-12 PROCEDURE — 93005 ELECTROCARDIOGRAM TRACING: CPT

## 2023-07-12 PROCEDURE — 84484 ASSAY OF TROPONIN QUANT: CPT | Mod: 91 | Performed by: EMERGENCY MEDICINE

## 2023-07-12 RX ORDER — ONDANSETRON 4 MG/1
4 TABLET, ORALLY DISINTEGRATING ORAL
Status: COMPLETED | OUTPATIENT
Start: 2023-07-12 | End: 2023-07-12

## 2023-07-12 RX ORDER — ALBUTEROL SULFATE 90 UG/1
2 AEROSOL, METERED RESPIRATORY (INHALATION) EVERY 4 HOURS PRN
Qty: 8 G | Refills: 0 | Status: SHIPPED | OUTPATIENT
Start: 2023-07-12

## 2023-07-12 RX ORDER — HYDROCODONE BITARTRATE AND ACETAMINOPHEN 10; 325 MG/1; MG/1
1 TABLET ORAL
Status: COMPLETED | OUTPATIENT
Start: 2023-07-12 | End: 2023-07-12

## 2023-07-12 RX ADMIN — ONDANSETRON 4 MG: 4 TABLET, ORALLY DISINTEGRATING ORAL at 06:07

## 2023-07-12 RX ADMIN — HYDROCODONE BITARTRATE AND ACETAMINOPHEN 1 TABLET: 10; 325 TABLET ORAL at 06:07

## 2023-07-12 NOTE — TELEPHONE ENCOUNTER
Went over the information with the pt. He stated he picked up his first months rx and will work on taking this medication.   ----- Message from Brady Rowan MD sent at 7/12/2023  8:41 AM CDT -----  Highly recommend using Jardiance which will help to protect the kidney in addition to the heart. Please review with patient, thanks,    Dr. Rowan    ----- Message -----  From: Rico, [facility] Lab Interface  Sent: 7/11/2023   6:38 PM CDT  To: Brady Rowan MD

## 2023-07-12 NOTE — ED PROVIDER NOTES
Encounter Date: 7/12/2023       History     Chief Complaint   Patient presents with    Shortness of Breath     Shortness of breath onset about 3pm. Denies asthma, COPD or heart failure. Has had one past heart attack. Type II diabetic.    Hypertension     History of uncontrolled hypertension.     Pleasant well-developed 89-year-old male comes emergency complaints of relatively sudden onset of shortness of breath.  The patient was at home.  Approximately 45 minutes ago the patient became short of breath.  He denies any history of asthma COPD heart attacks chest pressure or any other type of similar symptoms in the past.  He is past medical history of anemia arthritis cataracts CKD polyps degenerative joint disease DVT prostate cancer total knee replacement on the left hiatal hernia.  With compliant with his medications.  Lost his wife 3 years ago.  For approximately a year after the loss of his wife he became very depressed and anorexia did not feed himself properly.  Of recent in the last 2 years he is eating better.  Has been more active and interactive.      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    Prostate cancer 05/2019    PSA elevation 10/7/2015    Wears glasses      Past Surgical History:   Procedure Laterality Date    BIOPSY WITH TRANSRECTAL ULTRASOUND (TRUS) GUIDANCE Bilateral 3/27/2019    Procedure: BIOPSY, WITH TRANSRECTAL US GUIDANCE;  Surgeon: Niall Blas MD;  Location: [facility];  Service: Urology;  Laterality:  Bilateral;    COLONOSCOPY N/A 1/13/2020    Procedure: COLONOSCOPY;  Surgeon: Charli Chowdhury MD;  Location: [facility] ENDO;  Service: General;  Laterality: N/A;    CYSTOSCOPY N/A 3/27/2019    Procedure: CYSTOSCOPY;  Surgeon: Niall Blas MD;  Location: [facility] OR;  Service: Urology;  Laterality: N/A;    EPIDURAL STEROID INJECTION N/A 6/17/2019    Procedure: Injection, Steroid, Epidural - L5/S1 EPIDURAL STEROID INJECTION;  Surgeon: Chyna Valdovinos MD;  Location: [facility] OR;  Service: Pain Management;  Laterality: N/A;    EPIDURAL STEROID INJECTION N/A 12/11/2019    Procedure: Injection, Steroid, Epidural - L5/S1 LUMBAR EPIDURAL STEROID INJECTION;  Surgeon: Jade Layton MD;  Location: [facility] OR;  Service: Pain Management;  Laterality: N/A;  *FIRST CASE*    ESOPHAGOGASTRODUODENOSCOPY N/A 1/13/2020    Procedure: EGD (ESOPHAGOGASTRODUODENOSCOPY);  Surgeon: Charli Chowdhury MD;  Location: [facility];  Service: General;  Laterality: N/A;    INJECTION OF ANESTHETIC AGENT AROUND MEDIAL BRANCH NERVES INNERVATING LUMBAR FACET JOINT Bilateral 7/12/2021    Procedure: Block-nerve-medial branch-lumbar Bilateral L 3,4,5;  Surgeon: Jade Layton MD;  Location: [facility] OR;  Service: Pain Management;  Laterality: Bilateral;    INJECTION OF JOINT Left 1/21/2019    Procedure: Injection, FACET JOINT INJECTION LEFT L4-5 AND L5-S1;  Surgeon: Chyna Valdovinos MD;  Location: [facility] OR;  Service: Pain Management;  Laterality: Left;    JOINT REPLACEMENT      BILAT HIPS, RIGHT SHOULDER    KNEE ARTHROPLASTY Left     RADIOFREQUENCY THERMOCOAGULATION Bilateral 8/2/2021    Procedure: RADIOFREQUENCY THERMAL COAGULATION Bilateral L 3,4,5;  Surgeon: Jade Layton MD;  Location: [facility] OR;  Service: Pain Management;  Laterality: Bilateral;    TONSILLECTOMY  1950    TOTAL HIP ARTHROPLASTY Bilateral 2013 & 2014    TOTAL SHOULDER ARTHROPLASTY Right 2005     Family History   Problem Relation Age of Onset    Stroke Mother     Bladder Cancer Father       Social History     Tobacco Use    Smoking status: Former     Packs/day: 0.00     Years: 0.00     Pack years: 0.00     Types: Cigarettes    Smokeless tobacco: Former     Quit date: 1/1/1990   Substance Use Topics    Alcohol use: Yes     Alcohol/week: 12.0 standard drinks     Types: 6 Glasses of wine, 6 Standard drinks or equivalent per week     Comment: 1 drink daily    Drug use: No     Review of Systems   Constitutional:  Negative for fever.   HENT:  Negative for sore throat.    Respiratory:  Positive for shortness of breath (PTA).    Cardiovascular:  Negative for chest pain.   Gastrointestinal:  Negative for nausea.   Genitourinary:  Negative for dysuria.   Musculoskeletal:  Negative for back pain.   Skin:  Negative for rash.   Neurological:  Negative for weakness.   Hematological:  Does not bruise/bleed easily.   All other systems reviewed and are negative.    Physical Exam     Initial Vitals [07/12/23 1613]   BP Pulse Resp Temp SpO2   (!) 153/98 106 (!) 24 99 °F (37.2 °C) 97 %      MAP       --         Physical Exam    Nursing note and vitals reviewed.  Constitutional: He appears well-developed and well-nourished.   HENT:   Head: Normocephalic and atraumatic.   Eyes: EOM are normal. Pupils are equal, round, and reactive to light. No scleral icterus.   Neck: Neck supple.   Normal range of motion.  Cardiovascular:  Normal rate, regular rhythm and normal heart sounds.           No murmur heard.  Pulmonary/Chest: Breath sounds normal. No respiratory distress. He has no wheezes. He has no rales. He exhibits no tenderness.   Mild tachypnea.   Abdominal: Abdomen is soft. Bowel sounds are normal.   Musculoskeletal:         General: Normal range of motion.      Cervical back: Normal range of motion and neck supple.     Neurological: He is alert and oriented to person, place, and time. He has normal strength. GCS score is 15. GCS eye subscore is 4. GCS verbal subscore is 5. GCS motor subscore is 6.   Skin: Skin is warm  and dry.   Psychiatric: He has a normal mood and affect. His behavior is normal. Judgment and thought content normal.       ED Course   Procedures  Labs Reviewed   CBC W/ AUTO DIFFERENTIAL - Abnormal; Notable for the following components:       Result Value    RBC 3.65 (*)     Hemoglobin 11.8 (*)     Hematocrit 35.2 (*)     MCH 32.3 (*)     Gran # (ANC) 7.9 (*)     Lymph # 0.4 (*)     Gran % 90.9 (*)     Lymph % 5.0 (*)     Mono % 3.1 (*)     All other components within normal limits   COMPREHENSIVE METABOLIC PANEL - Abnormal; Notable for the following components:    CO2 20 (*)     Glucose 160 (*)     Creatinine 1.9 (*)     eGFR 33.3 (*)     All other components within normal limits   POCT GLUCOSE - Abnormal; Notable for the following components:    POCT Glucose 183 (*)     All other components within normal limits   TROPONIN I   B-TYPE NATRIURETIC PEPTIDE   TROPONIN I   HIV 1 / 2 ANTIBODY   HEPATITIS C ANTIBODY     EKG Readings: (Independently Interpreted)   Rate 106, sinus tach with first-degree AV block.  IA interval of 296 millisecond.  There is a indeterminate axis.  Possible old inferior infarct based on Q-waves in 2 3 AVF.  Also anterior infarct based on Q-waves in 3 and 4.  Abnormal EKG.     Imaging Results              X-Ray Chest AP Portable (Final result)  Result time 07/12/23 17:00:47      Final result by Brenda Lopez MD (07/12/23 17:00:47)                   Impression:      Chronic atelectasis and pleural thickening versus trace pleural effusion on the left.  No evidence of pulmonary edema.      Electronically signed by: Brenda Lopez  Date:    07/12/2023  Time:    17:00               Narrative:    EXAMINATION:  XR CHEST AP PORTABLE    CLINICAL HISTORY:  CHF;    TECHNIQUE:  Single frontal view of the chest was performed.    COMPARISON:  05/16/2023    FINDINGS:  There is chronic elevation left hemidiaphragm and blunting of left lateral costophrenic sulcus with minimal compressive atelectasis  at the left lung base.  The right lung is clear.  Chronic widening of the right superior paratracheal stripe is noted, likely due to tortuous vessels.  The heart size is normal.  The aorta is ectatic and calcified.  The patient is status post right shoulder arthroplasty.                                    X-Rays:   Independently Interpreted Readings:   Other Readings:  Mild left-sided pleural effusion.  Mild cardiomegaly.  Abnormal chest x-ray.  Medications   HYDROcodone-acetaminophen  mg per tablet 1 tablet (1 tablet Oral Given 7/12/23 1806)   ondansetron disintegrating tablet 4 mg (4 mg Oral Given 7/12/23 1806)     Medical Decision Making:   Differential Diagnosis:   Pneumonia, pulmonary bruising, asthma exacerbation, bronchitis, MI, pneumothorax, foreign body aspiration, CHF, pulmonary edema, COVID    ED Management:  The patient is stable this time.  He has been placed on 3 L of oxygen.  He is generating and O2 saturation 96% on this.  He feels somewhat better at this time.  Decreased shortness of breath.    While the patient is feeling better.  Laboratory values are pending.  I will do a 2nd set of cardiac troponins on this patient.  The patient's care will be handed off to Dr. Conway.    Neg CXR, EKG, BNP and troponin x2. Offered admit for further cardiac eval and pt declined. He will f/u with his doctor in the next week. Return precautions discussed.            ED Course as of 07/12/23 1913 Wed Jul 12, 2023 1742 Signed out to me by Dr Martinez. Pt here with sob with some concern for ACS. Plan was to f/u labs and recheck 2hr troponin and dispo.  [DC]   1802 Pt requesting to leave. He says he is tired and wants to go home to sleep and his back hurts. Back pain not new. Pt given norco. He will stay for repeat troponin [DC]      ED Course User Index  [DC] Sean Conway Jr., MD                 Clinical Impression:   Final diagnoses:  [R06.02] Shortness of breath  [M54.50, G89.29] Chronic low back pain  without sciatica, unspecified back pain laterality (Primary)        ED Disposition Condition    Discharge Stable          ED Prescriptions    None       Follow-up Information       Follow up With Specialties Details Why Contact Info    Perfecto Melgar III, MD Internal Medicine, Cardiology Schedule an appointment as soon as possible for a visit   [address]  [facility] [place] [address]  [phone]               Sean Conway Jr., MD  07/12/23 2116

## 2023-07-13 LAB
HCV AB SERPL QL IA: NORMAL
HIV 1+2 AB+HIV1 P24 AG SERPL QL IA: NORMAL

## 2023-08-09 ENCOUNTER — OFFICE VISIT (OUTPATIENT)
Dept: PODIATRY | Facility: CLINIC | Age: 88
End: 2023-08-09
Payer: MEDICARE

## 2023-08-09 VITALS
WEIGHT: 231.5 LBS | RESPIRATION RATE: 18 BRPM | HEART RATE: 67 BPM | DIASTOLIC BLOOD PRESSURE: 76 MMHG | HEIGHT: 70 IN | SYSTOLIC BLOOD PRESSURE: 137 MMHG | BODY MASS INDEX: 33.14 KG/M2

## 2023-08-09 DIAGNOSIS — L84 PRE-ULCERATIVE CALLUSES: ICD-10-CM

## 2023-08-09 DIAGNOSIS — R60.0 EDEMA OF BOTH LOWER EXTREMITIES: ICD-10-CM

## 2023-08-09 DIAGNOSIS — E11.65 TYPE 2 DIABETES MELLITUS WITH HYPERGLYCEMIA, UNSPECIFIED WHETHER LONG TERM INSULIN USE: ICD-10-CM

## 2023-08-09 DIAGNOSIS — M20.5X2 ADDUCTOVARUS ROTATION OF TOE, ACQUIRED, LEFT: Primary | ICD-10-CM

## 2023-08-09 PROCEDURE — 1160F RVW MEDS BY RX/DR IN RCRD: CPT | Mod: CPTII,S$GLB,, | Performed by: PODIATRIST

## 2023-08-09 PROCEDURE — 1126F AMNT PAIN NOTED NONE PRSNT: CPT | Mod: CPTII,S$GLB,, | Performed by: PODIATRIST

## 2023-08-09 PROCEDURE — 1160F PR REVIEW ALL MEDS BY PRESCRIBER/CLIN PHARMACIST DOCUMENTED: ICD-10-PCS | Mod: CPTII,S$GLB,, | Performed by: PODIATRIST

## 2023-08-09 PROCEDURE — 99999 PR PBB SHADOW E&M-EST. PATIENT-LVL IV: ICD-10-PCS | Mod: PBBFAC,,, | Performed by: PODIATRIST

## 2023-08-09 PROCEDURE — 1101F PT FALLS ASSESS-DOCD LE1/YR: CPT | Mod: CPTII,S$GLB,, | Performed by: PODIATRIST

## 2023-08-09 PROCEDURE — 99999 PR PBB SHADOW E&M-EST. PATIENT-LVL IV: CPT | Mod: PBBFAC,,, | Performed by: PODIATRIST

## 2023-08-09 PROCEDURE — 3288F PR FALLS RISK ASSESSMENT DOCUMENTED: ICD-10-PCS | Mod: CPTII,S$GLB,, | Performed by: PODIATRIST

## 2023-08-09 PROCEDURE — 1159F MED LIST DOCD IN RCRD: CPT | Mod: CPTII,S$GLB,, | Performed by: PODIATRIST

## 2023-08-09 PROCEDURE — 99213 OFFICE O/P EST LOW 20 MIN: CPT | Mod: S$GLB,,, | Performed by: PODIATRIST

## 2023-08-09 PROCEDURE — 99213 PR OFFICE/OUTPT VISIT, EST, LEVL III, 20-29 MIN: ICD-10-PCS | Mod: S$GLB,,, | Performed by: PODIATRIST

## 2023-08-09 PROCEDURE — 1159F PR MEDICATION LIST DOCUMENTED IN MEDICAL RECORD: ICD-10-PCS | Mod: CPTII,S$GLB,, | Performed by: PODIATRIST

## 2023-08-09 PROCEDURE — 1157F PR ADVANCE CARE PLAN OR EQUIV PRESENT IN MEDICAL RECORD: ICD-10-PCS | Mod: CPTII,S$GLB,, | Performed by: PODIATRIST

## 2023-08-09 PROCEDURE — 1101F PR PT FALLS ASSESS DOC 0-1 FALLS W/OUT INJ PAST YR: ICD-10-PCS | Mod: CPTII,S$GLB,, | Performed by: PODIATRIST

## 2023-08-09 PROCEDURE — 1126F PR PAIN SEVERITY QUANTIFIED, NO PAIN PRESENT: ICD-10-PCS | Mod: CPTII,S$GLB,, | Performed by: PODIATRIST

## 2023-08-09 PROCEDURE — 1157F ADVNC CARE PLAN IN RCRD: CPT | Mod: CPTII,S$GLB,, | Performed by: PODIATRIST

## 2023-08-09 PROCEDURE — 3288F FALL RISK ASSESSMENT DOCD: CPT | Mod: CPTII,S$GLB,, | Performed by: PODIATRIST

## 2023-08-09 NOTE — PROGRESS NOTES
Subjective:       Patient ID: Anthony Sheldon is a 89 y.o. male.    Chief Complaint: Follow-up and Diabetes Mellitus  Patient presents for follow up pre ulcerative callus between 4th and 5th digits left foot.  Patient relates friend has been applying mature comb to this area few times a week, has improved significantly, still a small callus present, no pain.  Relates feels diabetes has been well controlled, recent A1c 7.1.  He tries to stay active, has a pool and does exercise in it weekly.  Denies burning or tingling in feet.  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    Prostate cancer 05/2019    PSA elevation 10/7/2015    Wears glasses      Past Surgical History:   Procedure Laterality Date    BIOPSY WITH TRANSRECTAL ULTRASOUND (TRUS) GUIDANCE Bilateral 3/27/2019    Procedure: BIOPSY, WITH TRANSRECTAL US GUIDANCE;  Surgeon: Niall Blas MD;  Location: [facility] OR;  Service: Urology;  Laterality: Bilateral;    COLONOSCOPY N/A 1/13/2020    Procedure: COLONOSCOPY;  Surgeon: Charli Chowdhury MD;  Location: [facility];  Service: General;  Laterality: N/A;    CYSTOSCOPY N/A 3/27/2019    Procedure: CYSTOSCOPY;  Surgeon: Niall Blas MD;  Location: [facility] OR;  Service: Urology;  Laterality: N/A;    EPIDURAL STEROID INJECTION N/A 6/17/2019    Procedure: Injection, Steroid, Epidural - L5/S1 EPIDURAL STEROID INJECTION;  Surgeon: Chyna Valdovinos MD;  Location: [facility] OR;  Service: Pain Management;  Laterality: N/A;    EPIDURAL STEROID  INJECTION N/A 12/11/2019    Procedure: Injection, Steroid, Epidural - L5/S1 LUMBAR EPIDURAL STEROID INJECTION;  Surgeon: Jade Layton MD;  Location: [facility] OR;  Service: Pain Management;  Laterality: N/A;  *FIRST CASE*    ESOPHAGOGASTRODUODENOSCOPY N/A 1/13/2020    Procedure: EGD (ESOPHAGOGASTRODUODENOSCOPY);  Surgeon: Charli Chowdhury MD;  Location: [facility] END[facility];  Service: General;  Laterality: N/A;    INJECTION OF ANESTHETIC AGENT AROUND MEDIAL BRANCH NERVES INNERVATING LUMBAR FACET JOINT Bilateral 7/12/2021    Procedure: Block-nerve-medial branch-lumbar Bilateral L 3,4,5;  Surgeon: Jade Layton MD;  Location: [facility] OR;  Service: Pain Management;  Laterality: Bilateral;    INJECTION OF JOINT Left 1/21/2019    Procedure: Injection, FACET JOINT INJECTION LEFT L4-5 AND L5-S1;  Surgeon: Chyna Valdovinos MD;  Location: [facility] OR;  Service: Pain Management;  Laterality: Left;    JOINT REPLACEMENT      BILAT HIPS, RIGHT SHOULDER    KNEE ARTHROPLASTY Left     RADIOFREQUENCY THERMOCOAGULATION Bilateral 8/2/2021    Procedure: RADIOFREQUENCY THERMAL COAGULATION Bilateral L 3,4,5;  Surgeon: Jade Layton MD;  Location: [facility] OR;  Service: Pain Management;  Laterality: Bilateral;    TONSILLECTOMY  1950    TOTAL HIP ARTHROPLASTY Bilateral 2013 & 2014    TOTAL SHOULDER ARTHROPLASTY Right 2005     Family History   Problem Relation Age of Onset    Stroke Mother     Bladder Cancer Father      Social History     Socioeconomic History    Marital status:    Tobacco Use    Smoking status: Former     Current packs/day: 0.00     Types: Cigarettes    Smokeless tobacco: Former     Quit date: 1/1/1990   Substance and Sexual Activity    Alcohol use: Yes     Alcohol/week: 12.0 standard drinks of alcohol     Types: 6 Glasses of wine, 6 Standard drinks or equivalent per week     Comment: 1 drink daily    Drug use: No    Sexual activity: Not Currently     Partners: Female     Social Determinants of Health     Financial Resource  Strain: Low Risk  (7/7/2023)    Overall Financial Resource Strain (CARDIA)     Difficulty of Paying Living Expenses: Not hard at all   Food Insecurity: No Food Insecurity (7/7/2023)    Hunger Vital Sign     Worried About Running Out of Food in the Last Year: Never true     Ran Out of Food in the Last Year: Never true   Transportation Needs: No Transportation Needs (7/7/2023)    PRAPARE - Transportation     Lack of Transportation (Medical): No     Lack of Transportation (Non-Medical): No   Physical Activity: Insufficiently Active (7/7/2023)    Exercise Vital Sign     Days of Exercise per Week: 3 days     Minutes of Exercise per Session: 30 min   Stress: No Stress Concern Present (7/7/2023)    [employer] [place] [employer] - Occupational Stress Questionnaire     Feeling of Stress : Only a little   Recent Concern: Stress - Stress Concern Present (5/15/2023)    [employer] [place] [employer] - Occupational Stress Questionnaire     Feeling of Stress : To some extent   Social Connections: Unknown (7/7/2023)    Social Connection and Isolation Panel [NHANES]     Frequency of Communication with Friends and Family: More than three times a week     Frequency of Social Gatherings with Friends and Family: Three times a week     Active Member of Clubs or Organizations: Yes     Attends Club or Organization Meetings: More than 4 times per year     Marital Status:    Housing Stability: Low Risk  (7/7/2023)    Housing Stability Vital Sign     Unable to Pay for Housing in the Last Year: No     Number of Places Lived in the Last Year: 1     Unstable Housing in the Last Year: No       Current Outpatient Medications   Medication Sig Dispense Refill    albuterol (PROVENTIL/VENTOLIN HFA) 90 mcg/actuation inhaler Inhale 2 puffs into the lungs every 4 (four) hours as needed for Wheezing or Shortness of Breath. Rescue 8 g 0    apixaban (ELIQUIS) 5 mg Tab Take 1 tablet (5 mg total) by mouth 2 (two) times daily. 60  "tablet 11    bicalutamide (CASODEX) 50 MG Tab Take 1 tablet (50 mg total) by mouth once daily. 90 tablet 3    chlorhexidine (PERIDEX) 0.12 % solution SWISH WITH 15 ML FOR 30 SECONDS TWICE DAILY      empagliflozin (JARDIANCE) 10 mg tablet Take 1 tablet (10 mg total) by mouth once daily. 30 tablet 11    fluticasone-umeclidin-vilanter (TRELEGY ELLIPTA) 100-62.5-25 mcg DsDv Inhale 1 puff into the lungs once daily. 60 each 5    hydrocortisone 2.5 % ointment APPLY TO EAR TWICE DAILY X2 WEEKS      metFORMIN (GLUCOPHAGE) 500 MG tablet Take 1 tablet (500 mg total) by mouth 2 (two) times daily with meals. 180 tablet 3    multivitamin (THERAGRAN) per tablet Take 1 tablet by mouth once daily.      traMADoL (ULTRAM) 50 mg tablet Take one tablet by mouth once or twice a day daily as needed for pain. 45 tablet 2    zolpidem (AMBIEN) 5 MG Tab Take 1 tablet (5 mg total) by mouth nightly as needed (sleep). 30 tablet 2    gabapentin (NEURONTIN) 300 MG capsule Take 1 capsule (300 mg total) by mouth every evening. 90 capsule 2     No current facility-administered medications for this visit.     Review of patient's allergies indicates:  No Known Allergies    Review of Systems   Cardiovascular:  Positive for leg swelling.        Mild   Musculoskeletal:  Positive for gait problem.   All other systems reviewed and are negative.      Objective:      Vitals:    08/09/23 0926   BP: 137/76   Pulse: 67   Resp: 18   Weight: 105 kg (231 lb 8 oz)   Height: 5' 10" (1.778 m)     Physical Exam  Vitals and nursing note reviewed.   Constitutional:       General: He is not in acute distress.     Appearance: Normal appearance.   Cardiovascular:      Pulses:           Dorsalis pedis pulses are 1+ on the right side and 1+ on the left side.        Posterior tibial pulses are 1+ on the right side and 1+ on the left side.   Pulmonary:      Effort: Pulmonary effort is normal.   Musculoskeletal:      Right foot: Decreased range of motion.      Left foot: " Decreased range of motion. Deformity (Adductovarus rotation 5th digit left.  Pes planus bilateral) present.   Feet:      Right foot:      Skin integrity: Skin breakdown (Healing dry abrasion 2nd digit left, 3rd right dorsally with no complications) present.      Left foot:      Skin integrity: Skin breakdown and callus (Small stable preulcerative callus medial PIPJ left, superficial discoloration, much improved, very small, no pain) present.   Skin:     Capillary Refill: Capillary refill takes 2 to 3 seconds.   Neurological:      Mental Status: He is alert.   Psychiatric:         Thought Content: Thought content normal.                 Contains abnormal data Hgb A1c w/ eAG Estimation            Component Ref Range & Units 3 wk ago  (7/14/23) 2 mo ago  (5/16/23) 5 mo ago  (2/27/23) 10 mo ago  (9/27/22)   Hemoglobin A1C <5.7 % of total Hgb 7.1 High   8.3 High  7.9 High  7.4 High         EAG (MG/DL) mg/dL 157   180             Assessment:       1. Adductovarus rotation of toe, acquired, left    2. Pre-ulcerative calluses    3. Type 2 diabetes mellitus with hyperglycemia, unspecified whether long term insulin use    4. Edema of both lower extremities            Plan:           Advised patient ulcer on the inside 5th digit has healed well, we did discuss development of callus, discussed discoloration within the center and explained why this area is considered preulcerative  Reviewed rotation of the toe, chronic pressure in this area  Reviewed potential complications  Advised patient best treatments are to continue to keep this area dry with care home or orange Betadine/iodine applied a few times weekly, light padding between the toes which should be soft and breathable, wide shoes  Preulcerative callus medial 5th digit left foot was debrided, no complications at this time but patient remains at risk  Reviewed swelling in legs, potential complications, hydration of skin was discussed and explained important to keep skin  in good condition to avoid complications  We discussed abrasions on the tops of the toes monitoring and checking feet in a regular basis for any changes  Appropriate shoes to protect his feet  Reviewed care and maintenance of skin and nails, nails debrided  Reviewed diabetic education and potential complications pertaining to his feet  Patient understands the importance of daily foot checks  Patient was in understanding and agreement with treatment plan.  I counseled the patient on their conditions, implications and medical management.  Instructed patient to contact the office with any changes, questions, concerns, worsening of symptoms.   Total face to face time 20 minutes, exam, assessment, treatment, discussion, additional time for review of chart prior to and following appointment and visit documentation, consultation and coordination of care.   Follow up 3 months    This note was created using [employer] voice recognition software that occasionally misinterpreted phrases or words.

## 2023-08-21 ENCOUNTER — HOSPITAL ENCOUNTER (OUTPATIENT)
Dept: CARDIOLOGY | Facility: HOSPITAL | Age: 88
Discharge: HOME | End: 2023-08-21
Attending: INTERNAL MEDICINE
Payer: MEDICARE

## 2023-08-21 ENCOUNTER — HOSPITAL ENCOUNTER (OUTPATIENT)
Dept: RADIOLOGY | Facility: HOSPITAL | Age: 88
Discharge: HOME | End: 2023-08-21
Attending: INTERNAL MEDICINE
Payer: MEDICARE

## 2023-08-21 VITALS — HEART RATE: 75 BPM | SYSTOLIC BLOOD PRESSURE: 127 MMHG | DIASTOLIC BLOOD PRESSURE: 53 MMHG

## 2023-08-21 DIAGNOSIS — R93.1 DECREASED CARDIAC EJECTION FRACTION: ICD-10-CM

## 2023-08-21 DIAGNOSIS — N18.32 STAGE 3B CHRONIC KIDNEY DISEASE: ICD-10-CM

## 2023-08-21 DIAGNOSIS — I70.0 AORTIC ATHEROSCLEROSIS: ICD-10-CM

## 2023-08-21 DIAGNOSIS — R94.31 ABNORMAL ECG: ICD-10-CM

## 2023-08-21 LAB
CV STRESS BASE HR: 62 BPM
DIASTOLIC BLOOD PRESSURE: 69 MMHG
EJECTION FRACTION- HIGH: 65 %
END DIASTOLIC INDEX-HIGH: 153 ML/M2
END DIASTOLIC INDEX-LOW: 93 ML/M2
END SYSTOLIC INDEX-HIGH: 71 ML/M2
END SYSTOLIC INDEX-LOW: 31 ML/M2
NUC REST DIASTOLIC VOLUME INDEX: 81
NUC REST EJECTION FRACTION: 62
NUC REST SYSTOLIC VOLUME INDEX: 31
NUC STRESS DIASTOLIC VOLUME INDEX: 84
NUC STRESS EJECTION FRACTION: 58 %
NUC STRESS SYSTOLIC VOLUME INDEX: 35
OHS CV CPX 85 PERCENT MAX PREDICTED HEART RATE MALE: 111
OHS CV CPX MAX PREDICTED HEART RATE: 131
OHS CV CPX PATIENT IS FEMALE: 0
OHS CV CPX PATIENT IS MALE: 1
OHS CV CPX PEAK DIASTOLIC BLOOD PRESSURE: 69 MMHG
OHS CV CPX PEAK HEAR RATE: 76 BPM
OHS CV CPX PEAK RATE PRESSURE PRODUCT: NORMAL
OHS CV CPX PEAK SYSTOLIC BLOOD PRESSURE: 133 MMHG
OHS CV CPX PERCENT MAX PREDICTED HEART RATE ACHIEVED: 58
OHS CV CPX RATE PRESSURE PRODUCT PRESENTING: 8246
RETIRED EF AND QEF - SEE NOTES: 53 %
SYSTOLIC BLOOD PRESSURE: 133 MMHG

## 2023-08-21 PROCEDURE — 78452 HT MUSCLE IMAGE SPECT MULT: CPT

## 2023-08-21 PROCEDURE — 78452 NUCLEAR STRESS - CARDIOLOGY INTERPRETED (CUPID ONLY): ICD-10-PCS | Mod: 26,,, | Performed by: INTERNAL MEDICINE

## 2023-08-21 PROCEDURE — 93016 NUCLEAR STRESS - CARDIOLOGY INTERPRETED (CUPID ONLY): ICD-10-PCS | Mod: ,,, | Performed by: INTERNAL MEDICINE

## 2023-08-21 PROCEDURE — A9500 TC99M SESTAMIBI: HCPCS

## 2023-08-21 PROCEDURE — 93017 CV STRESS TEST TRACING ONLY: CPT

## 2023-08-21 PROCEDURE — 93018 NUCLEAR STRESS - CARDIOLOGY INTERPRETED (CUPID ONLY): ICD-10-PCS | Mod: ,,, | Performed by: INTERNAL MEDICINE

## 2023-08-21 PROCEDURE — 63600175 PHARM REV CODE 636 W HCPCS: Performed by: INTERNAL MEDICINE

## 2023-08-21 PROCEDURE — 78452 HT MUSCLE IMAGE SPECT MULT: CPT | Mod: 26,,, | Performed by: INTERNAL MEDICINE

## 2023-08-21 PROCEDURE — 93018 CV STRESS TEST I&R ONLY: CPT | Mod: ,,, | Performed by: INTERNAL MEDICINE

## 2023-08-21 PROCEDURE — 93016 CV STRESS TEST SUPVJ ONLY: CPT | Mod: ,,, | Performed by: INTERNAL MEDICINE

## 2023-08-21 RX ORDER — REGADENOSON 0.08 MG/ML
0.4 INJECTION, SOLUTION INTRAVENOUS ONCE
Status: COMPLETED | OUTPATIENT
Start: 2023-08-21 | End: 2023-08-21

## 2023-08-21 RX ADMIN — REGADENOSON 0.4 MG: 0.08 INJECTION, SOLUTION INTRAVENOUS at 09:08

## 2023-09-01 ENCOUNTER — OFFICE VISIT (OUTPATIENT)
Dept: CARDIOLOGY | Facility: CLINIC | Age: 88
End: 2023-09-01
Payer: MEDICARE

## 2023-09-01 VITALS
HEART RATE: 69 BPM | DIASTOLIC BLOOD PRESSURE: 67 MMHG | BODY MASS INDEX: 33.91 KG/M2 | WEIGHT: 236.88 LBS | HEIGHT: 70 IN | OXYGEN SATURATION: 97 % | SYSTOLIC BLOOD PRESSURE: 136 MMHG

## 2023-09-01 DIAGNOSIS — R06.09 DOE (DYSPNEA ON EXERTION): ICD-10-CM

## 2023-09-01 DIAGNOSIS — E78.5 DYSLIPIDEMIA (HIGH LDL; LOW HDL): ICD-10-CM

## 2023-09-01 DIAGNOSIS — F11.20 UNCOMPLICATED OPIOID DEPENDENCE: ICD-10-CM

## 2023-09-01 DIAGNOSIS — E11.22 TYPE 2 DIABETES MELLITUS WITH STAGE 3B CHRONIC KIDNEY DISEASE, WITHOUT LONG-TERM CURRENT USE OF INSULIN: ICD-10-CM

## 2023-09-01 DIAGNOSIS — N18.32 STAGE 3B CHRONIC KIDNEY DISEASE: ICD-10-CM

## 2023-09-01 DIAGNOSIS — N18.32 TYPE 2 DIABETES MELLITUS WITH STAGE 3B CHRONIC KIDNEY DISEASE, WITHOUT LONG-TERM CURRENT USE OF INSULIN: ICD-10-CM

## 2023-09-01 DIAGNOSIS — R94.39 ABNORMAL NUCLEAR STRESS TEST: Primary | ICD-10-CM

## 2023-09-01 DIAGNOSIS — R80.9 MICROALBUMINURIA: ICD-10-CM

## 2023-09-01 PROCEDURE — 3288F PR FALLS RISK ASSESSMENT DOCUMENTED: ICD-10-PCS | Mod: CPTII,S$GLB,, | Performed by: INTERNAL MEDICINE

## 2023-09-01 PROCEDURE — 99214 OFFICE O/P EST MOD 30 MIN: CPT | Mod: 25,S$GLB,, | Performed by: INTERNAL MEDICINE

## 2023-09-01 PROCEDURE — 1157F ADVNC CARE PLAN IN RCRD: CPT | Mod: CPTII,S$GLB,, | Performed by: INTERNAL MEDICINE

## 2023-09-01 PROCEDURE — 99214 PR OFFICE/OUTPT VISIT, EST, LEVL IV, 30-39 MIN: ICD-10-PCS | Mod: 25,S$GLB,, | Performed by: INTERNAL MEDICINE

## 2023-09-01 PROCEDURE — 99999 PR PBB SHADOW E&M-EST. PATIENT-LVL III: ICD-10-PCS | Mod: PBBFAC,,, | Performed by: INTERNAL MEDICINE

## 2023-09-01 PROCEDURE — 99999 PR PBB SHADOW E&M-EST. PATIENT-LVL III: CPT | Mod: PBBFAC,,, | Performed by: INTERNAL MEDICINE

## 2023-09-01 PROCEDURE — 1159F MED LIST DOCD IN RCRD: CPT | Mod: CPTII,S$GLB,, | Performed by: INTERNAL MEDICINE

## 2023-09-01 PROCEDURE — 3288F FALL RISK ASSESSMENT DOCD: CPT | Mod: CPTII,S$GLB,, | Performed by: INTERNAL MEDICINE

## 2023-09-01 PROCEDURE — 93000 ELECTROCARDIOGRAM COMPLETE: CPT | Mod: S$GLB,,, | Performed by: INTERNAL MEDICINE

## 2023-09-01 PROCEDURE — 93000 EKG 12-LEAD: ICD-10-PCS | Mod: S$GLB,,, | Performed by: INTERNAL MEDICINE

## 2023-09-01 PROCEDURE — 1101F PT FALLS ASSESS-DOCD LE1/YR: CPT | Mod: CPTII,S$GLB,, | Performed by: INTERNAL MEDICINE

## 2023-09-01 PROCEDURE — 1157F PR ADVANCE CARE PLAN OR EQUIV PRESENT IN MEDICAL RECORD: ICD-10-PCS | Mod: CPTII,S$GLB,, | Performed by: INTERNAL MEDICINE

## 2023-09-01 PROCEDURE — 1101F PR PT FALLS ASSESS DOC 0-1 FALLS W/OUT INJ PAST YR: ICD-10-PCS | Mod: CPTII,S$GLB,, | Performed by: INTERNAL MEDICINE

## 2023-09-01 PROCEDURE — 1159F PR MEDICATION LIST DOCUMENTED IN MEDICAL RECORD: ICD-10-PCS | Mod: CPTII,S$GLB,, | Performed by: INTERNAL MEDICINE

## 2023-09-01 NOTE — PROGRESS NOTES
Subjective:    Patient ID:  Anthony Sheldon is a 89 y.o. male who presents for evaluation of Follow-up (4 weeks)  PCP and referred by Milly Hoyt NP for newly noted LV dysfunction, not controlled T2DM on metformin and started Jardiance.  No prior cardiologist  Lives alone, no pet  Daughter, Luc, in [place], have POA  Retired  for [employer], [employer]    Social Determinate of Health: Do you have any problem with the following: none  Health Literacy (understanding): high  Vaccination: Covid vaccine, vaccines up to date, had infection in 2021, but no residual problems  Activities: swims 5 days weekly for 30 minutes, no problem, not limited using a cane for walking for balance  Nicotine: former smoker, quit 50 years ago in 24 hours 4-5 cigarettes and a cigar now and then, none for 7 years  Alcohol: 80 proof liquor couple drinks a week, max 1 shot in any 24 hours.  Illicit Drugs: none, on Rx tramadol using daily since 2021, on Ambien nightly for over 8 years.  Cardiac Symptoms: none  Home BP: do no check  Diet: Regular, little salt  Caffeine: 2 Coffee  Labs:   Lab Results   Component Value Date    TSH 1.992 05/16/2023        Lab Results   Component Value Date    LABA1C 5.5 06/19/2018    HGBA1C 7.1 (H) 07/14/2023       Lab Results   Component Value Date    WBC 8.66 07/12/2023    HGB 11.8 (L) 07/12/2023    HCT 35.2 (L) 07/12/2023    MCV 96 07/12/2023     07/12/2023       CMP  Sodium   Date Value Ref Range Status   07/12/2023 138 136 - 145 mmol/L Final     Potassium   Date Value Ref Range Status   07/12/2023 4.8 3.5 - 5.1 mmol/L Final     Chloride   Date Value Ref Range Status   07/12/2023 103 95 - 110 mmol/L Final     CO2   Date Value Ref Range Status   07/12/2023 20 (L) 23 - 29 mmol/L Final     Glucose   Date Value Ref Range Status   07/12/2023 160 (H) 70 - 110 mg/dL Final     BUN   Date Value Ref Range Status   07/12/2023 23 8 - 23 mg/dL Final     Creatinine   Date Value Ref Range  Status   07/12/2023 1.9 (H) 0.5 - 1.4 mg/dL Final     Calcium   Date Value Ref Range Status   07/12/2023 9.7 8.7 - 10.5 mg/dL Final     Total Protein   Date Value Ref Range Status   07/12/2023 8.0 6.0 - 8.4 g/dL Final     Albumin   Date Value Ref Range Status   07/12/2023 4.0 3.5 - 5.2 g/dL Final     Total Bilirubin   Date Value Ref Range Status   07/12/2023 0.4 0.1 - 1.0 mg/dL Final     Comment:     For infants and newborns, interpretation of results should be based  on gestational age, weight and in agreement with clinical  observations.    Premature Infant recommended reference ranges:  Up to 24 hours.............<8.0 mg/dL  Up to 48 hours............<12.0 mg/dL  3-5 days..................<15.0 mg/dL  6-29 days.................<15.0 mg/dL       Alkaline Phosphatase   Date Value Ref Range Status   07/12/2023 58 55 - 135 U/L Final     AST   Date Value Ref Range Status   07/12/2023 23 10 - 40 U/L Final     ALT   Date Value Ref Range Status   07/12/2023 12 10 - 44 U/L Final     Anion Gap   Date Value Ref Range Status   07/12/2023 15 8 - 16 mmol/L Final     eGFR if    Date Value Ref Range Status   03/28/2022 47.7 (A) >60 mL/min/1.73 m^2 Final     eGFR if non    Date Value Ref Range Status   03/28/2022 41.3 (A) >60 mL/min/1.73 m^2 Final     Comment:     Calculation used to obtain the estimated glomerular filtration  rate (eGFR) is the CKD-EPI equation.        @labrcntip(troponini)@    BNP   Date Value Ref Range Status   07/12/2023 45 0 - 99 pg/mL Final     Comment:     Values of less than 100 pg/ml are consistent with non-CHF populations.   }   Lab Results   Component Value Date    CHOL 201 (H) 05/16/2023    CHOL 203 (H) 02/27/2023    CHOL 195 09/27/2022     Lab Results   Component Value Date    HDL 36 (L) 05/16/2023    HDL 39 (L) 02/27/2023    HDL 37 (L) 09/27/2022     Lab Results   Component Value Date    LDLCALC 117.6 05/16/2023    LDLCALC 131 (H) 02/27/2023    LDLCALC 116.8  "09/27/2022     Lab Results   Component Value Date    TRIG 237 (H) 05/16/2023    TRIG 193 (H) 02/27/2023    TRIG 206 (H) 09/27/2022     Lab Results   Component Value Date    CHOLHDL 17.9 (L) 05/16/2023    CHOLHDL 5.2 (H) 02/27/2023    CHOLHDL 19.0 (L) 09/27/2022     Lab Results   Component Value Date    IRON 92 07/11/2023    TRANSFERRIN 192 (L) 07/11/2023    TIBC 284 07/11/2023    LABIRON 24 11/04/2020    FESATURATED 32 07/11/2023       Lab Results   Component Value Date    FERRITIN 406 (H) 07/11/2023       Last Echo: 5/2023, Dr. RADHA Melgar III's office  Last stress test: Lexiscan 8/2023  Cardiovascular angiogram: none  ECG: NSR, rate 73, 1st degree AVB, left axis, possible prior IMI  Fundoscopic exam: within the past year, negative for retinopathy    In 7/2023:  WM referred for recent noted LV dysfunction, new LVEF of 35%. Abnormal ECG suggesting prior IMI. Have a number of significant CV risk factors including age, sex, dyslipidemia and T2DM. Report DELEON with long walk or exercise over the past year. Planning to starting Jardiance.    Milly Hoyt NP noted "6/1/23 H/o ckd with anemia, uncontrolled DM, dvt with pe (on eliquis), metastatic prostate cancer managed by oncology with lupron and casodex  --------In office echo 05/2023  E/a reversal, ef measured at 35%    Decreased cardiac ejection fraction  -     Ambulatory referral/consult to Cardiology    Add jardiance    Refer to Dr. Rowan for evaluation and recommendations  - EF in 2021 56%, now measured at 35%, bnp wnl"    CXR - Pulmonary hypoinflation crowding of the bronchopulmonary vessels.  No focal consolidation.  Minimal dependent atelectasis at the left lung base.  Heart size is top-normal.  Calcified aortic plaque.  Trachea midline.     Echo 2/2021 - The left ventricle is normal in size with concentric remodeling and normal systolic function. The estimated ejection fraction is 56%  Indeterminate left ventricular diastolic function.  Moderate right " ventricular enlargement with mildly to moderately reduced right ventricular systolic function.  Mild right atrial enlargement.  Mild tricuspid regurgitation.  Small pericardial effusion.  Suggest progression of RV dilatation and new RV dysfunction from Echo data on 11/2020.     Difficult windows, Lumason contrast used for wall motion analysis.      HPI comments: in 9/2023, return for review. Denies any CV symptoms, remaining active without much problem. Nuclear scan shows better LV function and now on Jardiance but costing $150 per month.    Lexiscan - Abnormal myocardial perfusion scan.    There is a moderate intensity, small to moderate sized, mostly reversible perfusion abnormality with some fixed areas in the distal to apical apical wall(s) in the typical distribution of the LAD territory.    There are no other significant perfusion abnormalities.    The gated perfusion images showed an ejection fraction of 62% at rest. The gated perfusion images showed an ejection fraction of 58% post stress. Normal ejection fraction is greater than 53%.    There is normal wall motion at rest., Mild lateral apical hypokinesia post stress.    The ECG portion of the study is negative for ischemia.    The patient reported no chest pain during the stress test.    There were no arrhythmias during stress.    Review of Systems   Constitutional: Positive for weight loss (down 7 lbs from 7/2023, diet). Negative for diaphoresis, fever, malaise/fatigue, night sweats and weight gain.   HENT:  Negative for hearing loss, nosebleeds and tinnitus.    Eyes:  Negative for visual disturbance.   Cardiovascular:  Positive for dyspnea on exertion. Negative for chest pain, claudication, cyanosis, irregular heartbeat, leg swelling, near-syncope, orthopnea, palpitations and paroxysmal nocturnal dyspnea.   Respiratory:  Negative for cough, shortness of breath, sleep disturbances due to breathing, snoring and wheezing.         [place] score 5, awaken  mostly refreshed.   Endocrine: Negative for polydipsia and polyuria.   Hematologic/Lymphatic: Bruises/bleeds easily.   Skin:  Negative for color change, flushing, nail changes, poor wound healing and suspicious lesions.   Musculoskeletal:  Positive for back pain. Negative for arthritis, falls, gout, joint pain, joint swelling, muscle cramps, muscle weakness, myalgias and neck pain.   Gastrointestinal:  Negative for constipation, heartburn, hematemesis, hematochezia, melena, nausea and vomiting.   Genitourinary:  Negative for hematuria and urgency.   Neurological:  Positive for paresthesias. Negative for disturbances in coordination, excessive daytime sleepiness, dizziness, focal weakness, headaches, light-headedness, loss of balance, numbness, vertigo and weakness.   Psychiatric/Behavioral:  Negative for depression and substance abuse. The patient has insomnia. The patient is not nervous/anxious.         Answers submitted by the patient for this visit:  Review of Systems Questionnaire (Submitted on 8/29/2023)  activity change: No  unexpected weight change: No  rhinorrhea: No  trouble swallowing: No  chest tightness: No  blood in stool: No  difficulty urinating: No  arthralgias: No  confusion: No  dysphoric mood: No      Objective:    Physical Exam  Constitutional:       Appearance: He is well-developed.      Comments: RA O2 sat 97%   HENT:      Head: Normocephalic.   Eyes:      Conjunctiva/sclera: Conjunctivae normal.      Pupils: Pupils are equal, round, and reactive to light.   Neck:      Thyroid: No thyromegaly.      Vascular: No JVD.   Cardiovascular:      Rate and Rhythm: Normal rate and regular rhythm.      Pulses: Intact distal pulses.           Carotid pulses are 1+ on the right side and 1+ on the left side.       Radial pulses are 1+ on the right side and 1+ on the left side.        Dorsalis pedis pulses are 1+ on the right side and 1+ on the left side.        Posterior tibial pulses are 1+ on the right  "side and 1+ on the left side.      Heart sounds: Heart sounds are distant. No murmur heard.     No friction rub. No gallop.   Pulmonary:      Effort: Pulmonary effort is normal.      Breath sounds: No rales.      Comments: Diminished breath sounds and prolong expiration.  Chest:      Chest wall: No tenderness.   Abdominal:      General: Bowel sounds are normal.      Palpations: Abdomen is soft.      Tenderness: There is no abdominal tenderness.      Comments: Waist 51"   Musculoskeletal:         General: Normal range of motion.      Cervical back: Normal range of motion and neck supple.      Right lower leg: No edema.      Left lower leg: No edema.   Lymphadenopathy:      Cervical: No cervical adenopathy.   Skin:     General: Skin is warm and dry.      Findings: No rash.   Neurological:      Mental Status: He is alert and oriented to person, place, and time.           Assessment:       1. Abnormal nuclear stress test    2. Dyslipidemia (high LDL; low HDL)    3. Microalbuminuria, 127 in 2023    4. Stage 3b chronic kidney disease    5. DELEON (dyspnea on exertion)    6. Uncomplicated opioid dependence, Tramadol daily since 2021    7. Type 2 diabetes mellitus with stage 3b chronic kidney disease, without long-term current use of insulin         Plan:       Anthony was seen today for follow-up.    Diagnoses and all orders for this visit:    Abnormal nuclear stress test  -     IN OFFICE EKG 12-LEAD (to Muse)    Dyslipidemia (high LDL; low HDL)    Microalbuminuria, 127 in 2023    Stage 3b chronic kidney disease    DELEON (dyspnea on exertion)  -     IN OFFICE EKG 12-LEAD (to Muse)    Uncomplicated opioid dependence, Tramadol daily since 2021    Type 2 diabetes mellitus with stage 3b chronic kidney disease, without long-term current use of insulin     - All medical issues reviewed, continue current Rx, prefer no statin, Nuclear stress suggest normal LVEF.   - Warning signs of MI and stroke given, if symptoms last more than 5 " minutes, stop immediately and call 911, then chew 2-4 low-dose ASA (81 mg).   - CV status and all medications reviewed, patient acknowledge good understanding.  - Recommend healthy living: avoid alcohol, healthy diet and regular exercise aiming for fitness, restorative sleep and weight control  - Need good exercise program, 4 key elements: 1. Aerobic (walking, swimming, dancing, jogging, biking, etc, 2. Muscle strengthening / resistance exercise, need to do 2-3 times weekly, 3. Stretching daily, good stretch takes a whole  total minute. 4. Balance exercise daily.   - Instruction for Mediterranean diet and heart healthy exercise given.  - Check home blood pressure, 2 days weekly, do 2 readings within 5 minutes in AM and PM, keep log for review. Target resting BP is less than 130/80.   - Weigh twice weekly, try to lose 1-2 lbs per week. Target weight loss of 5%-10%.  - Highly recommend 30-60 minutes of exercise / activities daily, can have Sunday off, with 2-3 sessions of muscle strengthening weekly. A  would be very helpful.  - Recommend at least annual cardiovascular evaluation in view of patient's significant risk factors.       Total time spend including review of record prior to face-to-face visit is 30 minutes. Greater than 50% of the time was spent in counseling and coordination of care. The above assessment and plan have been discussed at length. Referring provider's note reviewed. Labs and procedure over the last 6 months reviewed. Problem List updated. Asked to bring in all active medications / pills bottles with next visit. Will send note to referring / PCP.

## 2023-10-17 PROBLEM — E66.811 CLASS 1 OBESITY DUE TO EXCESS CALORIES WITH SERIOUS COMORBIDITY AND BODY MASS INDEX (BMI) OF 33.0 TO 33.9 IN ADULT: Status: ACTIVE | Noted: 2023-07-10

## 2023-10-17 PROBLEM — E66.09 CLASS 1 OBESITY DUE TO EXCESS CALORIES WITH SERIOUS COMORBIDITY AND BODY MASS INDEX (BMI) OF 33.0 TO 33.9 IN ADULT: Status: ACTIVE | Noted: 2023-07-10

## 2023-10-18 ENCOUNTER — INFUSION (OUTPATIENT)
Dept: INFUSION THERAPY | Facility: HOSPITAL | Age: 88
End: 2023-10-18
Attending: NURSE PRACTITIONER
Payer: MEDICARE

## 2023-10-18 VITALS
DIASTOLIC BLOOD PRESSURE: 60 MMHG | SYSTOLIC BLOOD PRESSURE: 130 MMHG | RESPIRATION RATE: 18 BRPM | OXYGEN SATURATION: 98 % | WEIGHT: 233 LBS | TEMPERATURE: 97 F | HEIGHT: 70 IN | BODY MASS INDEX: 33.36 KG/M2 | HEART RATE: 65 BPM

## 2023-10-18 DIAGNOSIS — C61 PROSTATE CANCER: Primary | ICD-10-CM

## 2023-10-18 PROCEDURE — 63600175 PHARM REV CODE 636 W HCPCS: Mod: JZ,JG | Performed by: NURSE PRACTITIONER

## 2023-10-18 PROCEDURE — 96402 CHEMO HORMON ANTINEOPL SQ/IM: CPT

## 2023-10-18 RX ADMIN — LEUPROLIDE ACETATE 45 MG: KIT at 08:10

## 2023-10-18 NOTE — PLAN OF CARE
Problem: Fatigue  Goal: Improved Activity Tolerance  Outcome: Ongoing, Progressing  Intervention: Promote Improved Energy  Flowsheets (Taken 10/18/2023 3206)  Fatigue Management:   activity schedule adjusted   activity assistance provided   fatigue-related activity identified   frequent rest breaks encouraged   paced activity encouraged  Sleep/Rest Enhancement:   awakenings minimized   consistent schedule promoted   family presence promoted   natural light exposure provided   noise level reduced   reading promoted   regular sleep/rest pattern promoted   relaxation techniques promoted  Activity Management:   Ambulated -L4   Up in chair - L3

## 2023-11-09 ENCOUNTER — OFFICE VISIT (OUTPATIENT)
Dept: PODIATRY | Facility: CLINIC | Age: 88
End: 2023-11-09
Payer: MEDICARE

## 2023-11-09 VITALS
HEART RATE: 60 BPM | DIASTOLIC BLOOD PRESSURE: 70 MMHG | WEIGHT: 230 LBS | SYSTOLIC BLOOD PRESSURE: 160 MMHG | RESPIRATION RATE: 18 BRPM | HEIGHT: 70 IN | BODY MASS INDEX: 32.93 KG/M2

## 2023-11-09 DIAGNOSIS — L97.521 ULCER OF TOE, LEFT, LIMITED TO BREAKDOWN OF SKIN: Primary | ICD-10-CM

## 2023-11-09 DIAGNOSIS — E11.65 TYPE 2 DIABETES MELLITUS WITH HYPERGLYCEMIA, UNSPECIFIED WHETHER LONG TERM INSULIN USE: ICD-10-CM

## 2023-11-09 DIAGNOSIS — R60.0 EDEMA OF BOTH LOWER EXTREMITIES: ICD-10-CM

## 2023-11-09 DIAGNOSIS — M20.5X2 ADDUCTOVARUS ROTATION OF TOE, ACQUIRED, LEFT: ICD-10-CM

## 2023-11-09 DIAGNOSIS — L84 PRE-ULCERATIVE CALLUSES: ICD-10-CM

## 2023-11-09 PROCEDURE — 1159F PR MEDICATION LIST DOCUMENTED IN MEDICAL RECORD: ICD-10-PCS | Mod: CPTII,S$GLB,, | Performed by: PODIATRIST

## 2023-11-09 PROCEDURE — 1160F RVW MEDS BY RX/DR IN RCRD: CPT | Mod: CPTII,S$GLB,, | Performed by: PODIATRIST

## 2023-11-09 PROCEDURE — 1125F PR PAIN SEVERITY QUANTIFIED, PAIN PRESENT: ICD-10-PCS | Mod: CPTII,S$GLB,, | Performed by: PODIATRIST

## 2023-11-09 PROCEDURE — 99999 PR PBB SHADOW E&M-EST. PATIENT-LVL IV: ICD-10-PCS | Mod: PBBFAC,,, | Performed by: PODIATRIST

## 2023-11-09 PROCEDURE — 1159F MED LIST DOCD IN RCRD: CPT | Mod: CPTII,S$GLB,, | Performed by: PODIATRIST

## 2023-11-09 PROCEDURE — 3288F FALL RISK ASSESSMENT DOCD: CPT | Mod: CPTII,S$GLB,, | Performed by: PODIATRIST

## 2023-11-09 PROCEDURE — 99999 PR PBB SHADOW E&M-EST. PATIENT-LVL IV: CPT | Mod: PBBFAC,,, | Performed by: PODIATRIST

## 2023-11-09 PROCEDURE — 1160F PR REVIEW ALL MEDS BY PRESCRIBER/CLIN PHARMACIST DOCUMENTED: ICD-10-PCS | Mod: CPTII,S$GLB,, | Performed by: PODIATRIST

## 2023-11-09 PROCEDURE — 99214 PR OFFICE/OUTPT VISIT, EST, LEVL IV, 30-39 MIN: ICD-10-PCS | Mod: S$GLB,,, | Performed by: PODIATRIST

## 2023-11-09 PROCEDURE — 1101F PR PT FALLS ASSESS DOC 0-1 FALLS W/OUT INJ PAST YR: ICD-10-PCS | Mod: CPTII,S$GLB,, | Performed by: PODIATRIST

## 2023-11-09 PROCEDURE — 1157F PR ADVANCE CARE PLAN OR EQUIV PRESENT IN MEDICAL RECORD: ICD-10-PCS | Mod: CPTII,S$GLB,, | Performed by: PODIATRIST

## 2023-11-09 PROCEDURE — 1101F PT FALLS ASSESS-DOCD LE1/YR: CPT | Mod: CPTII,S$GLB,, | Performed by: PODIATRIST

## 2023-11-09 PROCEDURE — 1125F AMNT PAIN NOTED PAIN PRSNT: CPT | Mod: CPTII,S$GLB,, | Performed by: PODIATRIST

## 2023-11-09 PROCEDURE — 99214 OFFICE O/P EST MOD 30 MIN: CPT | Mod: S$GLB,,, | Performed by: PODIATRIST

## 2023-11-09 PROCEDURE — 3288F PR FALLS RISK ASSESSMENT DOCUMENTED: ICD-10-PCS | Mod: CPTII,S$GLB,, | Performed by: PODIATRIST

## 2023-11-09 PROCEDURE — 1157F ADVNC CARE PLAN IN RCRD: CPT | Mod: CPTII,S$GLB,, | Performed by: PODIATRIST

## 2023-11-09 RX ORDER — HYDROCORTISONE 25 MG/G
CREAM TOPICAL
COMMUNITY
Start: 2023-10-10

## 2023-11-11 NOTE — PROGRESS NOTES
Subjective:       Patient ID: Anthony Sheldon is a 89 y.o. male.    Chief Complaint: Follow-up, Foot Swelling, Toe Pain, Wound Check, and Diabetes Mellitus  Patient presents for follow up pain due to pre ulcerative callus between 4th and 5th digits left foot.  Patient relates friend has continued to apply the cure chrome/Betadine/iodine between these toes a few times a week which has prevented the pain from escalating but it is toe recurring, present, low-level but getting worse.  Reports pain level is 3/10  Admits he only apply something to remove pressure between the toes when it starts hurting  Has a long history of significant back issues, limited mobility, unable to reach this area to treat  Relates diabetes remains well controlled with A1c low 7.0 range         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    Prostate cancer 05/2019    PSA elevation 10/7/2015    Wears glasses      Past Surgical History:   Procedure Laterality Date    BIOPSY WITH TRANSRECTAL ULTRASOUND (TRUS) GUIDANCE Bilateral 3/27/2019    Procedure: BIOPSY, WITH TRANSRECTAL US GUIDANCE;  Surgeon: Niall Blas MD;  Location: [facility] OR;  Service: Urology;  Laterality: Bilateral;    COLONOSCOPY N/A 1/13/2020    Procedure: COLONOSCOPY;  Surgeon: Charli Chowdhury MD;  Location: [facility] ENDO;  Service: General;  Laterality: N/A;    CYSTOSCOPY N/A 3/27/2019    Procedure: CYSTOSCOPY;  Surgeon: Niall Blas MD;  Location: [facility] OR;  Service: Urology;  Laterality: N/A;    EPIDURAL STEROID INJECTION N/A 6/17/2019     Procedure: Injection, Steroid, Epidural - L5/S1 EPIDURAL STEROID INJECTION;  Surgeon: Chyna Valdovinos MD;  Location: [facility] OR;  Service: Pain Management;  Laterality: N/A;    EPIDURAL STEROID INJECTION N/A 12/11/2019    Procedure: Injection, Steroid, Epidural - L5/S1 LUMBAR EPIDURAL STEROID INJECTION;  Surgeon: Jade Layton MD;  Location: [facility] OR;  Service: Pain Management;  Laterality: N/A;  *FIRST CASE*    ESOPHAGOGASTRODUODENOSCOPY N/A 1/13/2020    Procedure: EGD (ESOPHAGOGASTRODUODENOSCOPY);  Surgeon: Charli Chowdhury MD;  Location: [facility] ENDO;  Service: General;  Laterality: N/A;    INJECTION OF ANESTHETIC AGENT AROUND MEDIAL BRANCH NERVES INNERVATING LUMBAR FACET JOINT Bilateral 7/12/2021    Procedure: Block-nerve-medial branch-lumbar Bilateral L 3,4,5;  Surgeon: Jade Layton MD;  Location: [facility] OR;  Service: Pain Management;  Laterality: Bilateral;    INJECTION OF JOINT Left 1/21/2019    Procedure: Injection, FACET JOINT INJECTION LEFT L4-5 AND L5-S1;  Surgeon: Chyna Valdovinos MD;  Location: [facility] OR;  Service: Pain Management;  Laterality: Left;    JOINT REPLACEMENT      BILAT HIPS, RIGHT SHOULDER    KNEE ARTHROPLASTY Left     RADIOFREQUENCY THERMOCOAGULATION Bilateral 8/2/2021    Procedure: RADIOFREQUENCY THERMAL COAGULATION Bilateral L 3,4,5;  Surgeon: Jade Layton MD;  Location: [facility] OR;  Service: Pain Management;  Laterality: Bilateral;    TONSILLECTOMY  1950    TOTAL HIP ARTHROPLASTY Bilateral 2013 & 2014    TOTAL SHOULDER ARTHROPLASTY Right 2005     Family History   Problem Relation Age of Onset    Stroke Mother     Bladder Cancer Father      Social History     Socioeconomic History    Marital status:    Tobacco Use    Smoking status: Former     Current packs/day: 0.00     Types: Cigarettes    Smokeless tobacco: Former     Quit date: 1/1/1990   Substance and Sexual Activity    Alcohol use: Yes     Alcohol/week: 12.0 standard drinks of alcohol     Types: 6 Glasses of wine, 6  Standard drinks or equivalent per week     Comment: 1 drink daily    Drug use: No    Sexual activity: Not Currently     Partners: Female     Social Determinants of Health     Financial Resource Strain: Low Risk  (9/9/2023)    Overall Financial Resource Strain (CARDIA)     Difficulty of Paying Living Expenses: Not hard at all   Food Insecurity: No Food Insecurity (9/9/2023)    Hunger Vital Sign     Worried About Running Out of Food in the Last Year: Never true     Ran Out of Food in the Last Year: Never true   Transportation Needs: No Transportation Needs (9/9/2023)    PRAPARE - Transportation     Lack of Transportation (Medical): No     Lack of Transportation (Non-Medical): No   Physical Activity: Insufficiently Active (9/9/2023)    Exercise Vital Sign     Days of Exercise per Week: 4 days     Minutes of Exercise per Session: 20 min   Stress: No Stress Concern Present (9/9/2023)    [employer] - Occupational Stress Questionnaire     Feeling of Stress : Only a little   Social Connections: Unknown (9/9/2023)    Social Connection and Isolation Panel [NHANES]     Frequency of Communication with Friends and Family: Three times a week     Frequency of Social Gatherings with Friends and Family: Three times a week     Active Member of Clubs or Organizations: Yes     Attends Club or Organization Meetings: More than 4 times per year     Marital Status:    Housing Stability: Low Risk  (9/9/2023)    Housing Stability Vital Sign     Unable to Pay for Housing in the Last Year: No     Number of Places Lived in the Last Year: 1     Unstable Housing in the Last Year: No       Current Outpatient Medications   Medication Sig Dispense Refill    albuterol (PROVENTIL/VENTOLIN HFA) 90 mcg/actuation inhaler Inhale 2 puffs into the lungs every 4 (four) hours as needed for Wheezing or Shortness of Breath. Rescue 8 g 0    apixaban (ELIQUIS) 5 mg Tab Take 1 tablet (5 mg total) by mouth 2 (two) times daily.  "60 tablet 11    bicalutamide (CASODEX) 50 MG Tab Take 1 tablet (50 mg total) by mouth once daily. 90 tablet 3    chlorhexidine (PERIDEX) 0.12 % solution SWISH WITH 15 ML FOR 30 SECONDS TWICE DAILY      empagliflozin (JARDIANCE) 10 mg tablet Take 1 tablet (10 mg total) by mouth once daily. 30 tablet 11    fluticasone-umeclidin-vilanter (TRELEGY ELLIPTA) 100-62.5-25 mcg DsDv Inhale 1 puff into the lungs once daily. 60 each 5    hydrocortisone 2.5 % cream APPLY TO AFFECTED SITES TWICE DAILY X1-2 WEEKS      hydrocortisone 2.5 % ointment APPLY TO EAR TWICE DAILY X2 WEEKS      metFORMIN (GLUCOPHAGE) 500 MG tablet Take 1 tablet (500 mg total) by mouth 2 (two) times daily with meals. 180 tablet 3    multivitamin (THERAGRAN) per tablet Take 1 tablet by mouth once daily.      traMADoL (ULTRAM) 50 mg tablet Take one tablet by mouth once or twice a day daily as needed for pain. 45 tablet 2    zolpidem (AMBIEN) 5 MG Tab Take 1 tablet (5 mg total) by mouth nightly as needed (sleep). 30 tablet 2    gabapentin (NEURONTIN) 300 MG capsule Take 1 capsule (300 mg total) by mouth every evening. 90 capsule 2     No current facility-administered medications for this visit.     Review of patient's allergies indicates:  No Known Allergies    Review of Systems   Cardiovascular:  Positive for leg swelling.        Mild   Musculoskeletal:  Positive for gait problem.   All other systems reviewed and are negative.      Objective:      Vitals:    11/09/23 0915   BP: (!) 160/70   Pulse: 60   Resp: 18   Weight: 104.3 kg (230 lb)   Height: 5' 10" (1.778 m)     Physical Exam  Vitals and nursing note reviewed.   Constitutional:       General: He is not in acute distress.     Appearance: Normal appearance.   Cardiovascular:      Pulses:           Dorsalis pedis pulses are 1+ on the right side and 1+ on the left side.        Posterior tibial pulses are 1+ on the right side and 1+ on the left side.   Pulmonary:      Effort: Pulmonary effort is normal. "   Musculoskeletal:      Right foot: Decreased range of motion.      Left foot: Decreased range of motion. Deformity (Adductovarus rotation 5th digit left.  Pes planus bilateral) present.      Comments: Limited mobility   Feet:      Right foot:      Skin integrity: Skin breakdown (Small dry healing abrasions 2nd digit left, 3rd digit right) and dry skin present.      Left foot:      Skin integrity: Ulcer (A small well-defined tender callus medial 5th digit left, upon debridement center of discoloration indicating bruising, deep pressure with no skin opening or infection at this time), skin breakdown, callus and dry skin present. No erythema.   Skin:     Capillary Refill: Capillary refill takes 2 to 3 seconds.   Neurological:      Mental Status: He is alert.   Psychiatric:         Behavior: Behavior normal.         Thought Content: Thought content normal.                                    Assessment:       1. Ulcer of toe, left, limited to breakdown of skin    2. Adductovarus rotation of toe, acquired, left    3. Pre-ulcerative calluses    4. Type 2 diabetes mellitus with hyperglycemia, unspecified whether long term insulin use    5. Edema of both lower extremities              Plan:           Patient looks to have iodine/Betadine on this area 5th digit left today  Following debridement digital photo was taken and shown to patient the small area of bruising in the center and explained this area has developed into an ulcer, there is no opening at this time but it needs to be more aggressively treated or it will cause an open sore  Advised Betadine/iodine a few times a week has helped significantly and it has kept it dry and most likely kept it from progressing, at this point it needs to be done every day  We would a lengthy discussion regarding soft comfortable padding between the toes, advised patient this also needs to be done every day as long as it is comfortable and does not cause any additional  moisture  Reviewed signs of infection to monitor for, if he is unable to see this area but experiences increased pain or any redness or swelling of the toe he needs to be seen for follow-up  Preulcerative callus was debrided, iodine applied, dispensed samples Silipos toe sock, Silipos pad was removed and showed patient how to use the sleeve on the 4th digit only to help cushion and keep these toes apart to alleviate pressure and help keep this area dry.  Apply each morning, remove early evening each day as long as comfortable contact office immediately with any change  Reviewed diabetic education, care and maintenance of skin and nails, care of dry skin  Reviewed swelling of feet especially on the right  Reviewed arthritic and degenerative changes right greater than left foot, this has affected his foot type with collapse of the arch  Had a lengthy discussion regarding appropriate shoes  Reviewed diabetic education and potential complications pertaining to his feet  Patient understands the importance of daily foot checks  Patient was in understanding and agreement with treatment plan.  I counseled the patient on their conditions, implications and medical management.  Instructed patient to contact the office with any changes, questions, concerns, worsening of symptoms.   Total face to face time 30 minutes, exam, assessment, treatment, discussion, additional time for review of chart prior to and following appointment and visit documentation, consultation and coordination of care.   Follow up 3 months    This note was created using M*Modal voice recognition software that occasionally misinterpreted phrases or words.

## 2023-12-05 ENCOUNTER — HOSPITAL ENCOUNTER (OUTPATIENT)
Dept: CARDIOLOGY | Facility: HOSPITAL | Age: 88
Discharge: HOME | End: 2023-12-05
Attending: NURSE PRACTITIONER
Payer: MEDICARE

## 2023-12-05 DIAGNOSIS — R94.31 ABNORMAL ELECTROCARDIOGRAM: ICD-10-CM

## 2023-12-05 PROCEDURE — 93227 XTRNL ECG REC<48 HR R&I: CPT | Mod: ,,, | Performed by: INTERNAL MEDICINE

## 2023-12-05 PROCEDURE — 93225 XTRNL ECG REC<48 HRS REC: CPT

## 2023-12-05 PROCEDURE — 93227 HOLTER MONITOR - 48 HOUR (CUPID ONLY): ICD-10-PCS | Mod: ,,, | Performed by: INTERNAL MEDICINE

## 2023-12-11 LAB
OHS CV EVENT MONITOR DAY: 0
OHS CV HOLTER LENGTH DECIMAL HOURS: 47.98
OHS CV HOLTER LENGTH HOURS: 47
OHS CV HOLTER LENGTH MINUTES: 59
OHS CV HOLTER SINUS AVERAGE HR: 70
OHS CV HOLTER SINUS MAX HR: 112
OHS CV HOLTER SINUS MIN HR: 54

## 2023-12-20 NOTE — H&P
PCP: Perfecto Melgar III, MD    History & Physical    Chief Complaint: s/p left TKA    History of Present Illness:  Patient is a 82 y.o. male admitted to Hospitalist Service from [facility] s/p left total knee arthroplasty performed by Dr. Jose. Patient reportedly has past medical history significant for OA and history of RLE DVT and prior history of smoking. Patient denied chest pain, shortness of breath, abdominal pain, nausea, vomiting, headache, vision changes, focal neuro-deficits, cough or fever.    Past Medical History   Diagnosis Date    Arthritis     Dental bridge present      LOWER PARTIAL    DVT (deep venous thrombosis) 2014     right le after thr    Wears glasses      Past Surgical History   Procedure Laterality Date    Tonsillectomy  1950    Total hip arthroplasty Bilateral 2013 & 2014    Total shoulder arthroplasty Right 2005    Joint replacement       BILAT HIPS, RIGHT SHOULDER     Family History   Problem Relation Age of Onset    Stroke Mother     Bladder Cancer Father      Social History   Substance Use Topics    Smoking status: Former Smoker     Types: Cigarettes    Smokeless tobacco: Former User     Quit date: 1/1/1990    Alcohol use 0.0 oz/week     0 Standard drinks or equivalent per week      Review of patient's allergies indicates:  No Known Allergies  PTA Medications   Medication Sig    zolpidem (AMBIEN) 5 MG Tab Take 5 mg by mouth nightly as needed.    cetirizine (ZYRTEC) 10 MG tablet Take 10 mg by mouth once daily.    multivitamin (ONE DAILY MULTIVITAMIN) per tablet Take 1 tablet by mouth once daily.     Review of Systems:  Constitutional: no fever or chills  Eyes: no visual changes  Ears, nose, mouth, throat, and face: no nasal congestion or sore throat  Respiratory: no cough or shorness of breath  Cardiovascular: no chest pain or palpitations  Gastrointestinal: no nausea or vomiting, no abdominal pain or change in bowel habits  Genitourinary: no hematuria or  dysuria  Integument/breast: no rash or pruritis  Hematologic/lymphatic: no easy bruising or lymphadenopathy  Musculoskeletal: see subjective  Neurological: no seizures or tremors.  Behavioral/Psych: no auditory or visual hallucinations  Endocrine: no heat or cold intolerance     OBJECTIVE:     Vital Signs (Most Recent)  Temp: 97.8 °F (36.6 °C) (02/22/17 1055)  Pulse: 60 (02/22/17 1055)  Resp: 18 (02/22/17 1055)  BP: 134/64 (02/22/17 1055)  SpO2: 99 % (02/22/17 1055)    Physical Exam:  General appearance: well developed, appears stated age  Head: normocephalic, atraumatic  Eyes:  conjunctivae/corneas clear. PERRL.  Nose: Nares normal. Septum midline.  Throat: lips, mucosa, and tongue normal; teeth and gums normal, no throat erythema.  Neck: supple, symmetrical, trachea midline, no JVD and thyroid not enlarged, symmetric, no tenderness/mass/nodules  Lungs:  clear to auscultation bilaterally and normal respiratory effort  Chest wall: no tenderness  Heart: regular rate and rhythm, S1, S2 normal, no murmur, click, rub or gallop  Abdomen: soft, non-tender non-distented; bowel sounds normal; no masses,  no organomegaly  Extremities: no cyanosis, clubbing or edema. Right knee dressing C/D/I.  Pulses: 2+ and symmetric  Skin: Skin color, texture, turgor normal. No rashes or lesions.  Lymph nodes: Cervical, supraclavicular, and axillary nodes normal.  Neurologic: Normal strength and tone. No focal numbness or weakness. CNII-XII intact.      Laboratory:   CBC: No results for input(s): WBC, RBC, HGB, HCT, PLT, MCV, MCH, MCHC in the last 168 hours.  CMP: No results for input(s): GLU, CALCIUM, ALBUMIN, PROT, NA, K, CO2, CL, BUN, CREATININE, ALKPHOS, ALT, AST, BILITOT in the last 168 hours.    Diagnostic Results:  Left knee X-Ray: Left knee joint replacement.    Assessment/Plan:     Active Hospital Problems    Diagnosis  POA    *Status post total left knee replacement [Z96.652]  Not Applicable    Osteoarthritis of left knee  INTERVAL HPI/OVERNIGHT EVENTS:  Pt seen and examined at bedside.     Allergies/Intolerance: No Known Allergies      MEDICATIONS  (STANDING):  albuterol/ipratropium for Nebulization 3 milliLiter(s) Nebulizer every 6 hours  apixaban 10 milliGRAM(s) Oral <User Schedule>  bisacodyl Suppository 10 milliGRAM(s) Rectal daily  dexamethasone/neomycin/polymyxin Suspension 1 Drop(s) Left EYE three times a day  hydroxychloroquine 400 milliGRAM(s) Oral two times a day  lidocaine   4% Patch 1 Patch Transdermal daily  piperacillin/tazobactam IVPB.. 3.375 Gram(s) IV Intermittent every 8 hours  polyethylene glycol 3350 17 Gram(s) Oral every 12 hours  senna 2 Tablet(s) Oral at bedtime  sodium chloride 3%  Inhalation 4 milliLiter(s) Inhalation every 12 hours    MEDICATIONS  (PRN):  acetaminophen     Tablet .. 650 milliGRAM(s) Oral every 6 hours PRN Temp greater or equal to 38C (100.4F), Mild Pain (1 - 3)  guaifenesin/dextromethorphan Oral Liquid 10 milliLiter(s) Oral every 4 hours PRN Cough  melatonin 3 milliGRAM(s) Oral at bedtime PRN Insomnia        ROS: all systems reviewed and wnl      PHYSICAL EXAMINATION:  Vital Signs Last 24 Hrs  T(C): 37.3 (20 Dec 2023 06:51), Max: 39.4 (19 Dec 2023 16:38)  T(F): 99.1 (20 Dec 2023 06:51), Max: 102.9 (19 Dec 2023 16:38)  HR: 88 (20 Dec 2023 04:58) (74 - 97)  BP: 146/81 (20 Dec 2023 04:58) (131/90 - 158/102)  BP(mean): --  RR: 18 (20 Dec 2023 04:58) (18 - 19)  SpO2: 95% (20 Dec 2023 04:58) (93% - 100%)    Parameters below as of 20 Dec 2023 04:58  Patient On (Oxygen Delivery Method): room air      CAPILLARY BLOOD GLUCOSE          12-19 @ 07:01  -  12-20 @ 07:00  --------------------------------------------------------  IN: 1015 mL / OUT: 1300 mL / NET: -285 mL        GENERAL: ambulates on floor off oxygen, still intermittent fevers.   NECK: supple, No JVD  CHEST/LUNG: clear to auscultation bilaterally; no rales, rhonchi, or wheezing b/l  HEART: normal S1, S2  ABDOMEN: BS+, soft, ND, NT   EXTREMITIES:  pulses palpable; no clubbing, cyanosis, or edema b/l LEs    LABS:                        9.4    12.96 )-----------( 231      ( 20 Dec 2023 06:53 )             27.8     12-20    131<L>  |  102  |  13  ----------------------------<  96  4.1   |  21<L>  |  0.96    Ca    8.5      20 Dec 2023 06:53      PTT - ( 19 Dec 2023 07:50 )  PTT:54.7 sec  Urinalysis Basic - ( 20 Dec 2023 06:53 )    Color: x / Appearance: x / SG: x / pH: x  Gluc: 96 mg/dL / Ketone: x  / Bili: x / Urobili: x   Blood: x / Protein: x / Nitrite: x   Leuk Esterase: x / RBC: x / WBC x   Sq Epi: x / Non Sq Epi: x / Bacteria: x             [M17.12]  Yes    CKD (chronic kidney disease) stage 3, GFR 30-59 ml/min [N18.3]  Yes    DVT (deep venous thrombosis) [I82.409]  Yes     right le after thr        Resolved Hospital Problems    Diagnosis Date Resolved POA   No resolved problems to display.     Continue to follow Orthopedic recommendations.  Needs aggressive incentive spirometry.  Follow hemoglobin and hematocrit closely.  Pain control with IV narcotics and antiemetics as needed.  Physical therapy as per Orthopedics protocol with fall precautions.  Gastric ulcer prophylaxis with gastric acid suppressant.   Deep venous thrombosis prophylaxis - Aspirin 325 mg po bid as per Dr. Jose.  See Orders.    VTE Risk Mitigation         Ordered     Place sequential compression device  Until discontinued      02/22/17 1039     Medium Risk of VTE  Once      02/22/17 1038        Elissa Castañeda MD  Department of Hospital Medicine   [facility]

## 2024-01-23 ENCOUNTER — OFFICE VISIT (OUTPATIENT)
Dept: PODIATRY | Facility: CLINIC | Age: 89
End: 2024-01-23
Payer: MEDICARE

## 2024-01-23 DIAGNOSIS — M20.5X2 ADDUCTOVARUS ROTATION OF TOE, ACQUIRED, LEFT: ICD-10-CM

## 2024-01-23 DIAGNOSIS — E11.9 COMPREHENSIVE DIABETIC FOOT EXAMINATION, TYPE 2 DM, ENCOUNTER FOR: Primary | ICD-10-CM

## 2024-01-23 DIAGNOSIS — L85.3 DRY SKIN: ICD-10-CM

## 2024-01-23 DIAGNOSIS — R60.0 EDEMA OF BOTH LOWER EXTREMITIES: ICD-10-CM

## 2024-01-23 DIAGNOSIS — E11.65 TYPE 2 DIABETES MELLITUS WITH HYPERGLYCEMIA, UNSPECIFIED WHETHER LONG TERM INSULIN USE: ICD-10-CM

## 2024-01-23 PROCEDURE — 99214 OFFICE O/P EST MOD 30 MIN: CPT | Mod: S$GLB,,, | Performed by: PODIATRIST

## 2024-01-23 PROCEDURE — 1160F RVW MEDS BY RX/DR IN RCRD: CPT | Mod: CPTII,S$GLB,, | Performed by: PODIATRIST

## 2024-01-23 PROCEDURE — 99999 PR PBB SHADOW E&M-EST. PATIENT-LVL II: CPT | Mod: PBBFAC,,, | Performed by: PODIATRIST

## 2024-01-23 PROCEDURE — 1159F MED LIST DOCD IN RCRD: CPT | Mod: CPTII,S$GLB,, | Performed by: PODIATRIST

## 2024-01-23 NOTE — PROGRESS NOTES
Subjective:       Patient ID: Anthony Sheldon is a 89 y.o. male.    Chief Complaint: Diabetic Foot Exam  Patient presents for annual diabetic foot exam, follow-up history of ulcer/preulcerative callus between 4th and 5th digits left foot.  Patient relates friend/neighbor still continues to apply the applies Betadine/iodine between these toes with toe spacer every other day.  Has not had any pain in this location at all.  He has a pedicure at home monthly.  Denies any problems with his feet other than some numbness and tingling of the left knee down to the foot, this occurred after knee replacement many years.  He did see another orthopedist for 2nd opinion and was confirmed to have nerve damage in this area.  Denies burning or tingling in feet  Relates a long history of diabetes due to weight, after losing weight he states it has been well-controlled.  Does not need to check glucose.         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    Prostate cancer 05/2019    PSA elevation 10/7/2015    Wears glasses      Past Surgical History:   Procedure Laterality Date    BIOPSY WITH TRANSRECTAL ULTRASOUND (TRUS) GUIDANCE Bilateral 3/27/2019    Procedure: BIOPSY, WITH TRANSRECTAL US GUIDANCE;  Surgeon: Niall Blas MD;  Location: [facility] OR;  Service: Urology;  Laterality: Bilateral;    COLONOSCOPY N/A 1/13/2020    Procedure: COLONOSCOPY;  Surgeon: Charli Chowdhury MD;  Location: [facility] ENDO;  Service: General;  Laterality: N/A;    CYSTOSCOPY N/A 3/27/2019    Procedure: CYSTOSCOPY;  Surgeon:  Niall Blas MD;  Location: [facility] OR;  Service: Urology;  Laterality: N/A;    EPIDURAL STEROID INJECTION N/A 6/17/2019    Procedure: Injection, Steroid, Epidural - L5/S1 EPIDURAL STEROID INJECTION;  Surgeon: Chyna Valdovinos MD;  Location: [facility] OR;  Service: Pain Management;  Laterality: N/A;    EPIDURAL STEROID INJECTION N/A 12/11/2019    Procedure: Injection, Steroid, Epidural - L5/S1 LUMBAR EPIDURAL STEROID INJECTION;  Surgeon: Jade Layton MD;  Location: [facility] OR;  Service: Pain Management;  Laterality: N/A;  *FIRST CASE*    ESOPHAGOGASTRODUODENOSCOPY N/A 1/13/2020    Procedure: EGD (ESOPHAGOGASTRODUODENOSCOPY);  Surgeon: Charli Chowdhury MD;  Location: [facility];  Service: General;  Laterality: N/A;    INJECTION OF ANESTHETIC AGENT AROUND MEDIAL BRANCH NERVES INNERVATING LUMBAR FACET JOINT Bilateral 7/12/2021    Procedure: Block-nerve-medial branch-lumbar Bilateral L 3,4,5;  Surgeon: Jade Layton MD;  Location: [facility] OR;  Service: Pain Management;  Laterality: Bilateral;    INJECTION OF JOINT Left 1/21/2019    Procedure: Injection, FACET JOINT INJECTION LEFT L4-5 AND L5-S1;  Surgeon: Chyna Valdovinos MD;  Location: [facility] OR;  Service: Pain Management;  Laterality: Left;    JOINT REPLACEMENT      BILAT HIPS, RIGHT SHOULDER    KNEE ARTHROPLASTY Left     RADIOFREQUENCY THERMOCOAGULATION Bilateral 8/2/2021    Procedure: RADIOFREQUENCY THERMAL COAGULATION Bilateral L 3,4,5;  Surgeon: Jade Layton MD;  Location: [facility] OR;  Service: Pain Management;  Laterality: Bilateral;    TONSILLECTOMY  1950    TOTAL HIP ARTHROPLASTY Bilateral 2013 & 2014    TOTAL SHOULDER ARTHROPLASTY Right 2005     Family History   Problem Relation Age of Onset    Stroke Mother     Bladder Cancer Father      Social History     Socioeconomic History    Marital status:    Tobacco Use    Smoking status: Former     Current packs/day: 0.00     Types: Cigarettes    Smokeless tobacco: Former     Quit date: 1/1/1990   Substance  and Sexual Activity    Alcohol use: Yes     Alcohol/week: 12.0 standard drinks of alcohol     Types: 6 Glasses of wine, 6 Standard drinks or equivalent per week     Comment: 1 drink daily    Drug use: No    Sexual activity: Not Currently     Partners: Female     Social Determinants of Health     Financial Resource Strain: Low Risk  (11/27/2023)    Overall Financial Resource Strain (CARDIA)     Difficulty of Paying Living Expenses: Not hard at all   Food Insecurity: No Food Insecurity (11/27/2023)    Hunger Vital Sign     Worried About Running Out of Food in the Last Year: Never true     Ran Out of Food in the Last Year: Never true   Transportation Needs: No Transportation Needs (11/27/2023)    PRAPARE - Transportation     Lack of Transportation (Medical): No     Lack of Transportation (Non-Medical): No   Physical Activity: Insufficiently Active (11/27/2023)    Exercise Vital Sign     Days of Exercise per Week: 3 days     Minutes of Exercise per Session: 10 min   Stress: No Stress Concern Present (11/27/2023)    [place] [employer] - Occupational Stress Questionnaire     Feeling of Stress : Only a little   Social Connections: Unknown (11/27/2023)    Social Connection and Isolation Panel [NHANES]     Frequency of Communication with Friends and Family: More than three times a week     Frequency of Social Gatherings with Friends and Family: Twice a week     Active Member of Clubs or Organizations: Yes     Attends Club or Organization Meetings: More than 4 times per year     Marital Status:    Housing Stability: Low Risk  (11/27/2023)    Housing Stability Vital Sign     Unable to Pay for Housing in the Last Year: No     Number of Places Lived in the Last Year: 1     Unstable Housing in the Last Year: No       Current Outpatient Medications   Medication Sig Dispense Refill    albuterol (PROVENTIL/VENTOLIN HFA) 90 mcg/actuation inhaler Inhale 2 puffs into the lungs every 4 (four) hours as needed  for Wheezing or Shortness of Breath. Rescue 8 g 0    apixaban (ELIQUIS) 5 mg Tab Take 1 tablet (5 mg total) by mouth 2 (two) times daily. 60 tablet 11    bicalutamide (CASODEX) 50 MG Tab Take 1 tablet (50 mg total) by mouth once daily. 90 tablet 3    chlorhexidine (PERIDEX) 0.12 % solution SWISH WITH 15 ML FOR 30 SECONDS TWICE DAILY      empagliflozin (JARDIANCE) 10 mg tablet Take 1 tablet (10 mg total) by mouth once daily. 30 tablet 11    fluticasone-umeclidin-vilanter (TRELEGY ELLIPTA) 200-62.5-25 mcg inhaler Inhale 1 puff into the lungs once daily. 60 each 11    gabapentin (NEURONTIN) 300 MG capsule Take 1 capsule (300 mg total) by mouth every evening. 90 capsule 2    hydrocortisone 2.5 % cream APPLY TO AFFECTED SITES TWICE DAILY X1-2 WEEKS      hydrocortisone 2.5 % ointment APPLY TO EAR TWICE DAILY X2 WEEKS      metFORMIN (GLUCOPHAGE) 500 MG tablet Take 1 tablet (500 mg total) by mouth 2 (two) times daily with meals. 180 tablet 3    multivitamin (THERAGRAN) per tablet Take 1 tablet by mouth once daily.      traMADoL (ULTRAM) 50 mg tablet Take one tablet by mouth once or twice a day daily as needed for pain. 45 tablet 2    zolpidem (AMBIEN) 5 MG Tab Take 1 tablet (5 mg total) by mouth nightly as needed (sleep). 30 tablet 2     No current facility-administered medications for this visit.     Review of patient's allergies indicates:  No Known Allergies    Review of Systems   Cardiovascular:  Positive for leg swelling.        Mild   Musculoskeletal:  Positive for gait problem.   All other systems reviewed and are negative.      Objective:      There were no vitals filed for this visit.    Physical Exam  Vitals and nursing note reviewed.   Constitutional:       General: He is not in acute distress.     Appearance: Normal appearance.   Cardiovascular:      Pulses:           Dorsalis pedis pulses are 1+ on the right side and 1+ on the left side.        Posterior tibial pulses are 1+ on the right side and 1+ on the left  side.   Pulmonary:      Effort: Pulmonary effort is normal.   Musculoskeletal:      Right foot: Decreased range of motion.      Left foot: Decreased range of motion. Deformity (Adductovarus rotation 5th digit left.  Pes planus bilateral) present.      Comments: Limited mobility   Feet:      Right foot:      Protective Sensation: 6 sites tested.  6 sites sensed.      Skin integrity: Dry skin present. No skin breakdown (Abrasion dorsal 3rd toe right, stable).      Left foot:      Protective Sensation: 6 sites tested.  5 sites sensed.      Skin integrity: Dry skin present. No ulcer (Ulcer is completely healed medial 5th digit left, small flat discolored circular area present where previous skin opening was, closed, well healed with no pain at this time) or callus.   Skin:     General: Skin is dry.      Capillary Refill: Capillary refill takes 2 to 3 seconds.   Neurological:      Mental Status: He is alert.      Comments: Sensation intact all areas bilateral feet with exception of the distal left hallux, no pain/paresthesias feet   Psychiatric:         Behavior: Behavior normal.         Thought Content: Thought content normal.                               Hgb A1c w/ eAG Estimation              Component Ref Range & Units 8 d ago  (1/15/24) 3 mo ago  (10/9/23) 6 mo ago  (7/14/23) 8 mo ago  (5/16/23) 11 mo ago  (2/27/23) 1 yr ago  (9/27/22)   Hemoglobin A1C <5.7 % of total Hgb 6.6 High  6.3 High   7.1 High  8.3 High   7.9 High  7.4 High         EAG (MG/DL) mg/dL 143 134 157  180            Assessment:       1. Comprehensive diabetic foot examination, type 2 DM, encounter for    2. Type 2 diabetes mellitus with hyperglycemia, unspecified whether long term insulin use    3. Edema of both lower extremities    4. Adductovarus rotation of toe, acquired, left    5. Dry skin                Plan:           Comprehensive diabetic pedal exam performed.    Reviewed results monofilament testing with patient, intact in all areas with  exception of the tip of the left great toe which is consistent with nerve damage to the left knee  Explained to patient he has really not demonstrating any signs or symptoms of diabetic neuropathy and we discussed what to monitor for  Reviewed A1c   Reviewed benefit of controled glucose/diabetes regarding potential foot complications.    Advised patient ulcer on the inside of the left 5th digit has completely healed.  Explained due to rotation of the toe and rubbing on the 4th digit would highly recommend he have his neighbor continue to apply iodine/Betadine and spacer between the toe every other day to prevent recurrence.  Explained he is at high risk for recurrence.  Advised iodine we will keep the area dry, clean, any moisture which accumulates in this area will also contribute to recurrence  Reviewed swelling of feet, potential complications, monitor for any bruising or discoloration  Reviewed arthritic and degenerative changes right greater than left foot, this has affected his foot type with collapse of the arch  Reviewed appropriate shoes especially worn indoors to protect feet.  Should have a house shoe on at all times, no slippers, flat shoes or walking in sock or bare feet  Reviewed care and maintenance of dry skin and explained to patient it is really important to hydrate the skin better especially around the front of his feet/big toes to prevent cracks and bleeding  He can continue home pedicures with good feeling in his feet, suggested he have her help him with treatment of the dry skin  Reviewed importance of regular foot checks, daily just for him to check the top of his feet and front of his legs.  Every other day when his neighbor applies medication between the toes left to help him check the bottom of his feet  At low risk due to intact sensation, but limited mobility does put patient at risk  Patient was in understanding and agreement with treatment plan.  I counseled the patient on their  conditions, implications and medical management.  Instructed patient to contact the office with any changes, questions, concerns, worsening of symptoms.   Total face to face time 30 minutes, exam, assessment, treatment, discussion, additional time for review of chart prior to and following appointment and visit documentation, consultation and coordination of care.   Follow up 3 months    This note was created using MX[employer] voice recognition software that occasionally misinterpreted phrases or words.

## 2024-01-30 ENCOUNTER — ANESTHESIA (OUTPATIENT)
Dept: ENDOSCOPY | Facility: HOSPITAL | Age: 89
End: 2024-01-30
Payer: MEDICARE

## 2024-01-30 ENCOUNTER — HOSPITAL ENCOUNTER (OUTPATIENT)
Facility: HOSPITAL | Age: 89
Discharge: HOME HEALTH | End: 2024-01-31
Attending: INTERNAL MEDICINE | Admitting: INTERNAL MEDICINE
Payer: MEDICARE

## 2024-01-30 ENCOUNTER — ANESTHESIA EVENT (OUTPATIENT)
Dept: ENDOSCOPY | Facility: HOSPITAL | Age: 89
End: 2024-01-30
Payer: MEDICARE

## 2024-01-30 ENCOUNTER — HOSPITAL ENCOUNTER (EMERGENCY)
Facility: HOSPITAL | Age: 89
Discharge: ANOTHER HOSPITAL | End: 2024-01-30
Attending: EMERGENCY MEDICINE
Payer: MEDICARE

## 2024-01-30 VITALS
DIASTOLIC BLOOD PRESSURE: 74 MMHG | HEART RATE: 68 BPM | OXYGEN SATURATION: 96 % | TEMPERATURE: 99 F | HEIGHT: 69 IN | WEIGHT: 224 LBS | BODY MASS INDEX: 33.18 KG/M2 | SYSTOLIC BLOOD PRESSURE: 138 MMHG | RESPIRATION RATE: 18 BRPM

## 2024-01-30 DIAGNOSIS — K92.2 GI BLEEDING: ICD-10-CM

## 2024-01-30 DIAGNOSIS — M54.16 LUMBAR RADICULOPATHY: ICD-10-CM

## 2024-01-30 DIAGNOSIS — M51.36 DDD (DEGENERATIVE DISC DISEASE), LUMBAR: ICD-10-CM

## 2024-01-30 DIAGNOSIS — M47.896 OTHER SPONDYLOSIS, LUMBAR REGION: ICD-10-CM

## 2024-01-30 DIAGNOSIS — G89.29 CHRONIC PAIN OF RIGHT KNEE: ICD-10-CM

## 2024-01-30 DIAGNOSIS — K64.8 INTERNAL HEMORRHOIDS: ICD-10-CM

## 2024-01-30 DIAGNOSIS — K57.90 DIVERTICULOSIS: ICD-10-CM

## 2024-01-30 DIAGNOSIS — M25.561 CHRONIC PAIN OF RIGHT KNEE: ICD-10-CM

## 2024-01-30 DIAGNOSIS — K92.2 LOWER GI BLEED: Primary | ICD-10-CM

## 2024-01-30 DIAGNOSIS — M17.11 PRIMARY OSTEOARTHRITIS OF RIGHT KNEE: ICD-10-CM

## 2024-01-30 DIAGNOSIS — Z79.01 ANTICOAGULANT LONG-TERM USE: ICD-10-CM

## 2024-01-30 DIAGNOSIS — K92.2 GIB (GASTROINTESTINAL BLEEDING): ICD-10-CM

## 2024-01-30 DIAGNOSIS — K92.1 HEMATOCHEZIA: Primary | ICD-10-CM

## 2024-01-30 PROBLEM — J44.9 COPD (CHRONIC OBSTRUCTIVE PULMONARY DISEASE): Status: ACTIVE | Noted: 2024-01-30

## 2024-01-30 PROBLEM — N17.9 ACUTE KIDNEY INJURY: Status: RESOLVED | Noted: 2021-05-26 | Resolved: 2024-01-30

## 2024-01-30 LAB
ABO + RH BLD: NORMAL
ABO + RH BLD: NORMAL
ALBUMIN SERPL BCP-MCNC: 3.8 G/DL (ref 3.5–5.2)
ALP SERPL-CCNC: 49 U/L (ref 55–135)
ALT SERPL W/O P-5'-P-CCNC: 11 U/L (ref 10–44)
ANION GAP SERPL CALC-SCNC: 12 MMOL/L (ref 8–16)
AST SERPL-CCNC: 14 U/L (ref 10–40)
BASOPHILS # BLD AUTO: 0.03 K/UL (ref 0–0.2)
BASOPHILS # BLD AUTO: 0.04 K/UL (ref 0–0.2)
BASOPHILS # BLD AUTO: 0.04 K/UL (ref 0–0.2)
BASOPHILS NFR BLD: 0.5 % (ref 0–1.9)
BASOPHILS NFR BLD: 0.6 % (ref 0–1.9)
BASOPHILS NFR BLD: 0.7 % (ref 0–1.9)
BILIRUB SERPL-MCNC: 0.5 MG/DL (ref 0.1–1)
BLD GP AB SCN CELLS X3 SERPL QL: NORMAL
BLD GP AB SCN CELLS X3 SERPL QL: NORMAL
BUN SERPL-MCNC: 29 MG/DL (ref 8–23)
CALCIUM SERPL-MCNC: 9.3 MG/DL (ref 8.7–10.5)
CHLORIDE SERPL-SCNC: 105 MMOL/L (ref 95–110)
CO2 SERPL-SCNC: 22 MMOL/L (ref 23–29)
CREAT SERPL-MCNC: 1.8 MG/DL (ref 0.5–1.4)
DIFFERENTIAL METHOD BLD: ABNORMAL
EOSINOPHIL # BLD AUTO: 0.1 K/UL (ref 0–0.5)
EOSINOPHIL NFR BLD: 1 % (ref 0–8)
EOSINOPHIL NFR BLD: 1.3 % (ref 0–8)
EOSINOPHIL NFR BLD: 1.4 % (ref 0–8)
ERYTHROCYTE [DISTWIDTH] IN BLOOD BY AUTOMATED COUNT: 12.8 % (ref 11.5–14.5)
ERYTHROCYTE [DISTWIDTH] IN BLOOD BY AUTOMATED COUNT: 12.8 % (ref 11.5–14.5)
ERYTHROCYTE [DISTWIDTH] IN BLOOD BY AUTOMATED COUNT: 12.9 % (ref 11.5–14.5)
EST. GFR  (NO RACE VARIABLE): 35.5 ML/MIN/1.73 M^2
GLUCOSE SERPL-MCNC: 129 MG/DL (ref 70–110)
HCT VFR BLD AUTO: 30.3 % (ref 40–54)
HCT VFR BLD AUTO: 32.5 % (ref 40–54)
HCT VFR BLD AUTO: 35 % (ref 40–54)
HGB BLD-MCNC: 10.4 G/DL (ref 14–18)
HGB BLD-MCNC: 11.6 G/DL (ref 14–18)
HGB BLD-MCNC: 9.8 G/DL (ref 14–18)
IMM GRANULOCYTES # BLD AUTO: 0.01 K/UL (ref 0–0.04)
IMM GRANULOCYTES # BLD AUTO: 0.01 K/UL (ref 0–0.04)
IMM GRANULOCYTES # BLD AUTO: 0.02 K/UL (ref 0–0.04)
IMM GRANULOCYTES NFR BLD AUTO: 0.2 % (ref 0–0.5)
IMM GRANULOCYTES NFR BLD AUTO: 0.2 % (ref 0–0.5)
IMM GRANULOCYTES NFR BLD AUTO: 0.3 % (ref 0–0.5)
LYMPHOCYTES # BLD AUTO: 1.2 K/UL (ref 1–4.8)
LYMPHOCYTES NFR BLD: 17.9 % (ref 18–48)
LYMPHOCYTES NFR BLD: 20.5 % (ref 18–48)
LYMPHOCYTES NFR BLD: 20.8 % (ref 18–48)
MCH RBC QN AUTO: 31.4 PG (ref 27–31)
MCH RBC QN AUTO: 31.5 PG (ref 27–31)
MCH RBC QN AUTO: 31.7 PG (ref 27–31)
MCHC RBC AUTO-ENTMCNC: 32 G/DL (ref 32–36)
MCHC RBC AUTO-ENTMCNC: 32.3 G/DL (ref 32–36)
MCHC RBC AUTO-ENTMCNC: 33.1 G/DL (ref 32–36)
MCV RBC AUTO: 95 FL (ref 82–98)
MCV RBC AUTO: 98 FL (ref 82–98)
MCV RBC AUTO: 99 FL (ref 82–98)
MONOCYTES # BLD AUTO: 0.4 K/UL (ref 0.3–1)
MONOCYTES # BLD AUTO: 0.5 K/UL (ref 0.3–1)
MONOCYTES # BLD AUTO: 0.5 K/UL (ref 0.3–1)
MONOCYTES NFR BLD: 6.6 % (ref 4–15)
MONOCYTES NFR BLD: 7.6 % (ref 4–15)
MONOCYTES NFR BLD: 8.1 % (ref 4–15)
NEUTROPHILS # BLD AUTO: 4.1 K/UL (ref 1.8–7.7)
NEUTROPHILS # BLD AUTO: 4.1 K/UL (ref 1.8–7.7)
NEUTROPHILS # BLD AUTO: 5 K/UL (ref 1.8–7.7)
NEUTROPHILS NFR BLD: 68.8 % (ref 38–73)
NEUTROPHILS NFR BLD: 70.2 % (ref 38–73)
NEUTROPHILS NFR BLD: 73.3 % (ref 38–73)
NRBC BLD-RTO: 0 /100 WBC
PLATELET # BLD AUTO: 199 K/UL (ref 150–450)
PLATELET # BLD AUTO: 200 K/UL (ref 150–450)
PLATELET # BLD AUTO: 218 K/UL (ref 150–450)
PMV BLD AUTO: 8.9 FL (ref 9.2–12.9)
PMV BLD AUTO: 9.1 FL (ref 9.2–12.9)
PMV BLD AUTO: 9.1 FL (ref 9.2–12.9)
POCT GLUCOSE: 117 MG/DL (ref 70–110)
POTASSIUM SERPL-SCNC: 4.4 MMOL/L (ref 3.5–5.1)
PROT SERPL-MCNC: 7.5 G/DL (ref 6–8.4)
RBC # BLD AUTO: 3.09 M/UL (ref 4.6–6.2)
RBC # BLD AUTO: 3.3 M/UL (ref 4.6–6.2)
RBC # BLD AUTO: 3.69 M/UL (ref 4.6–6.2)
SODIUM SERPL-SCNC: 139 MMOL/L (ref 136–145)
SPECIMEN OUTDATE: NORMAL
SPECIMEN OUTDATE: NORMAL
WBC # BLD AUTO: 5.81 K/UL (ref 3.9–12.7)
WBC # BLD AUTO: 5.91 K/UL (ref 3.9–12.7)
WBC # BLD AUTO: 6.87 K/UL (ref 3.9–12.7)

## 2024-01-30 PROCEDURE — 37000008 HC ANESTHESIA 1ST 15 MINUTES: Performed by: INTERNAL MEDICINE

## 2024-01-30 PROCEDURE — 36415 COLL VENOUS BLD VENIPUNCTURE: CPT | Performed by: NURSE PRACTITIONER

## 2024-01-30 PROCEDURE — 99204 OFFICE O/P NEW MOD 45 MIN: CPT | Mod: ,,, | Performed by: INTERNAL MEDICINE

## 2024-01-30 PROCEDURE — 85025 COMPLETE CBC W/AUTO DIFF WBC: CPT | Mod: 91 | Performed by: EMERGENCY MEDICINE

## 2024-01-30 PROCEDURE — G0379 DIRECT REFER HOSPITAL OBSERV: HCPCS

## 2024-01-30 PROCEDURE — 99900035 HC TECH TIME PER 15 MIN (STAT)

## 2024-01-30 PROCEDURE — 25000003 PHARM REV CODE 250: Performed by: INTERNAL MEDICINE

## 2024-01-30 PROCEDURE — C9113 INJ PANTOPRAZOLE SODIUM, VIA: HCPCS | Performed by: NURSE PRACTITIONER

## 2024-01-30 PROCEDURE — 99285 EMERGENCY DEPT VISIT HI MDM: CPT | Mod: 25

## 2024-01-30 PROCEDURE — 43235 EGD DIAGNOSTIC BRUSH WASH: CPT | Mod: ,,, | Performed by: INTERNAL MEDICINE

## 2024-01-30 PROCEDURE — 96360 HYDRATION IV INFUSION INIT: CPT

## 2024-01-30 PROCEDURE — 94799 UNLISTED PULMONARY SVC/PX: CPT | Mod: XB

## 2024-01-30 PROCEDURE — 93010 ELECTROCARDIOGRAM REPORT: CPT | Mod: ,,, | Performed by: INTERNAL MEDICINE

## 2024-01-30 PROCEDURE — 25000242 PHARM REV CODE 250 ALT 637 W/ HCPCS: Performed by: INTERNAL MEDICINE

## 2024-01-30 PROCEDURE — G0378 HOSPITAL OBSERVATION PER HR: HCPCS

## 2024-01-30 PROCEDURE — 37000009 HC ANESTHESIA EA ADD 15 MINS: Performed by: INTERNAL MEDICINE

## 2024-01-30 PROCEDURE — 86901 BLOOD TYPING SEROLOGIC RH(D): CPT | Mod: 91 | Performed by: NURSE PRACTITIONER

## 2024-01-30 PROCEDURE — 43235 EGD DIAGNOSTIC BRUSH WASH: CPT | Performed by: INTERNAL MEDICINE

## 2024-01-30 PROCEDURE — 25000003 PHARM REV CODE 250: Performed by: EMERGENCY MEDICINE

## 2024-01-30 PROCEDURE — 63600175 PHARM REV CODE 636 W HCPCS: Performed by: NURSE PRACTITIONER

## 2024-01-30 PROCEDURE — 36415 COLL VENOUS BLD VENIPUNCTURE: CPT | Performed by: EMERGENCY MEDICINE

## 2024-01-30 PROCEDURE — 94761 N-INVAS EAR/PLS OXIMETRY MLT: CPT | Mod: XB

## 2024-01-30 PROCEDURE — 25000003 PHARM REV CODE 250: Performed by: NURSE PRACTITIONER

## 2024-01-30 PROCEDURE — 80053 COMPREHEN METABOLIC PANEL: CPT | Performed by: EMERGENCY MEDICINE

## 2024-01-30 PROCEDURE — 25000003 PHARM REV CODE 250: Performed by: NURSE ANESTHETIST, CERTIFIED REGISTERED

## 2024-01-30 PROCEDURE — 96374 THER/PROPH/DIAG INJ IV PUSH: CPT | Mod: 59

## 2024-01-30 PROCEDURE — 86901 BLOOD TYPING SEROLOGIC RH(D): CPT | Performed by: EMERGENCY MEDICINE

## 2024-01-30 PROCEDURE — 85025 COMPLETE CBC W/AUTO DIFF WBC: CPT | Performed by: NURSE PRACTITIONER

## 2024-01-30 PROCEDURE — 94640 AIRWAY INHALATION TREATMENT: CPT | Mod: XB

## 2024-01-30 PROCEDURE — 93005 ELECTROCARDIOGRAM TRACING: CPT

## 2024-01-30 PROCEDURE — 99900031 HC PATIENT EDUCATION (STAT)

## 2024-01-30 RX ORDER — SODIUM,POTASSIUM PHOSPHATES 280-250MG
2 POWDER IN PACKET (EA) ORAL
Status: DISCONTINUED | OUTPATIENT
Start: 2024-01-30 | End: 2024-01-31 | Stop reason: HOSPADM

## 2024-01-30 RX ORDER — FLUTICASONE FUROATE AND VILANTEROL 200; 25 UG/1; UG/1
1 POWDER RESPIRATORY (INHALATION) DAILY
Status: DISCONTINUED | OUTPATIENT
Start: 2024-01-30 | End: 2024-01-30

## 2024-01-30 RX ORDER — HYDROCODONE BITARTRATE AND ACETAMINOPHEN 7.5; 325 MG/1; MG/1
1 TABLET ORAL EVERY 6 HOURS PRN
COMMUNITY
Start: 2024-01-16 | End: 2024-02-08

## 2024-01-30 RX ORDER — GLUCAGON 1 MG
1 KIT INJECTION
Status: DISCONTINUED | OUTPATIENT
Start: 2024-01-30 | End: 2024-01-31 | Stop reason: HOSPADM

## 2024-01-30 RX ORDER — BICALUTAMIDE 50 MG/1
50 TABLET, FILM COATED ORAL DAILY
Status: DISCONTINUED | OUTPATIENT
Start: 2024-01-30 | End: 2024-01-31 | Stop reason: HOSPADM

## 2024-01-30 RX ORDER — ACETAMINOPHEN 325 MG/1
650 TABLET ORAL EVERY 6 HOURS PRN
Status: DISCONTINUED | OUTPATIENT
Start: 2024-01-30 | End: 2024-01-31 | Stop reason: HOSPADM

## 2024-01-30 RX ORDER — GABAPENTIN 300 MG/1
300 CAPSULE ORAL NIGHTLY
Status: DISCONTINUED | OUTPATIENT
Start: 2024-01-30 | End: 2024-01-31 | Stop reason: HOSPADM

## 2024-01-30 RX ORDER — ZOLPIDEM TARTRATE 5 MG/1
5 TABLET ORAL NIGHTLY PRN
Status: DISCONTINUED | OUTPATIENT
Start: 2024-01-30 | End: 2024-01-31 | Stop reason: HOSPADM

## 2024-01-30 RX ORDER — POLYETHYLENE GLYCOL 3350, SODIUM CHLORIDE, SODIUM BICARBONATE, POTASSIUM CHLORIDE 420; 11.2; 5.72; 1.48 G/4L; G/4L; G/4L; G/4L
4000 POWDER, FOR SOLUTION ORAL ONCE
Status: COMPLETED | OUTPATIENT
Start: 2024-01-30 | End: 2024-01-30

## 2024-01-30 RX ORDER — BUDESONIDE 0.25 MG/2ML
1 INHALANT ORAL EVERY 12 HOURS
Status: DISCONTINUED | OUTPATIENT
Start: 2024-01-30 | End: 2024-01-31 | Stop reason: HOSPADM

## 2024-01-30 RX ORDER — IPRATROPIUM BROMIDE AND ALBUTEROL SULFATE 2.5; .5 MG/3ML; MG/3ML
3 SOLUTION RESPIRATORY (INHALATION) EVERY 6 HOURS PRN
Status: DISCONTINUED | OUTPATIENT
Start: 2024-01-30 | End: 2024-01-31 | Stop reason: HOSPADM

## 2024-01-30 RX ORDER — HYDROCODONE BITARTRATE AND ACETAMINOPHEN 7.5; 325 MG/1; MG/1
1 TABLET ORAL EVERY 6 HOURS PRN
Status: DISCONTINUED | OUTPATIENT
Start: 2024-01-30 | End: 2024-01-31 | Stop reason: HOSPADM

## 2024-01-30 RX ORDER — ARFORMOTEROL TARTRATE 15 UG/2ML
15 SOLUTION RESPIRATORY (INHALATION) 2 TIMES DAILY
Status: DISCONTINUED | OUTPATIENT
Start: 2024-01-30 | End: 2024-01-31 | Stop reason: HOSPADM

## 2024-01-30 RX ORDER — LANOLIN ALCOHOL/MO/W.PET/CERES
800 CREAM (GRAM) TOPICAL
Status: DISCONTINUED | OUTPATIENT
Start: 2024-01-30 | End: 2024-01-31 | Stop reason: HOSPADM

## 2024-01-30 RX ORDER — MULTIVITAMIN
1 TABLET ORAL DAILY
COMMUNITY

## 2024-01-30 RX ORDER — PANTOPRAZOLE SODIUM 40 MG/10ML
40 INJECTION, POWDER, LYOPHILIZED, FOR SOLUTION INTRAVENOUS 2 TIMES DAILY
Status: DISCONTINUED | OUTPATIENT
Start: 2024-01-30 | End: 2024-01-31 | Stop reason: HOSPADM

## 2024-01-30 RX ORDER — INSULIN ASPART 100 [IU]/ML
0-10 INJECTION, SOLUTION INTRAVENOUS; SUBCUTANEOUS EVERY 6 HOURS PRN
Status: DISCONTINUED | OUTPATIENT
Start: 2024-01-30 | End: 2024-01-31 | Stop reason: HOSPADM

## 2024-01-30 RX ORDER — SODIUM CHLORIDE 9 MG/ML
1000 INJECTION, SOLUTION INTRAVENOUS
Status: COMPLETED | OUTPATIENT
Start: 2024-01-30 | End: 2024-01-30

## 2024-01-30 RX ORDER — LIDOCAINE HYDROCHLORIDE 20 MG/ML
INJECTION INTRAVENOUS
Status: DISCONTINUED | OUTPATIENT
Start: 2024-01-30 | End: 2024-01-30

## 2024-01-30 RX ORDER — PROPOFOL 10 MG/ML
VIAL (ML) INTRAVENOUS
Status: DISCONTINUED | OUTPATIENT
Start: 2024-01-30 | End: 2024-01-30

## 2024-01-30 RX ADMIN — GABAPENTIN 300 MG: 300 CAPSULE ORAL at 09:01

## 2024-01-30 RX ADMIN — Medication 100 MG: at 02:01

## 2024-01-30 RX ADMIN — Medication 50 MG: at 02:01

## 2024-01-30 RX ADMIN — POLYETHYLENE GLYCOL 3350, SODIUM CHLORIDE, SODIUM BICARBONATE AND POTASSIUM CHLORIDE WITH LEMON FLAVOR 4000 ML: 420; 11.2; 5.72; 1.48 POWDER, FOR SOLUTION ORAL at 06:01

## 2024-01-30 RX ADMIN — LIDOCAINE HYDROCHLORIDE 100 MG: 20 INJECTION, SOLUTION INTRAVENOUS at 02:01

## 2024-01-30 RX ADMIN — PANTOPRAZOLE SODIUM 40 MG: 40 INJECTION, POWDER, FOR SOLUTION INTRAVENOUS at 09:01

## 2024-01-30 RX ADMIN — BUDESONIDE 1 MG: 0.25 INHALANT RESPIRATORY (INHALATION) at 07:01

## 2024-01-30 RX ADMIN — ARFORMOTEROL TARTRATE 15 MCG: 15 SOLUTION RESPIRATORY (INHALATION) at 07:01

## 2024-01-30 RX ADMIN — SODIUM CHLORIDE 1000 ML: 9 INJECTION, SOLUTION INTRAVENOUS at 09:01

## 2024-01-30 RX ADMIN — SODIUM CHLORIDE 500 ML: 9 INJECTION, SOLUTION INTRAVENOUS at 09:01

## 2024-01-30 RX ADMIN — BICALUTAMIDE 50 MG: 50 TABLET, FILM COATED ORAL at 04:01

## 2024-01-30 NOTE — MED STUDENT SUBJECTIVE & OBJECTIVE
Medical Student Subjective & Objective     Principal Problem: Blood in the stool    Chief Complaint: Rectal Bleeding    HPI: Anthony Sheldon 88 y/o male with a PMHx of  COPD, Diverticulosis, CKD stage IIIb (GFR 30-59), DVT (on Eliquis) , Hiatal hernia, Colon polyps, and Prostate Cancer which he sees an oncologist for. Patient presented moderate painless rectal bleeding to the ED last night after seeing blood in his stool at 3am and around 6am. Patient states this has happen 5 years ago and they did a colonoscopy for him and is unsure what they found. Patients last bowel movement was at 12:00 pm and states there was dark red blood seen. He last took his eliquis around 8pm last night but hasn't taking another one since. Patient denies nausea, vomiting, fever, chest pain or SOB. At [place] hemodynamically stable, vital signs unremarkable. labs show mild macrocytic anemia. At this facility, vital signs stable with mild hypertension. Hemodynamically stable with a hemoglobin of 10.4 and hematocrit of 32.5 and a baseline GFR of 35.5. Patient will be admitted to the hospital for a possible GI bleed and further monitoring.      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    Prostate cancer 05/2019    PSA elevation 10/7/2015    Wears glasses        Past Surgical History:   Procedure Laterality Date    BIOPSY WITH TRANSRECTAL ULTRASOUND (TRUS) GUIDANCE Bilateral 3/27/2019    Procedure: BIOPSY, WITH TRANSRECTAL US GUIDANCE;  Surgeon: Niall ZARAGOZA  MD Wan;  Location: [facility] OR;  Service: Urology;  Laterality: Bilateral;    COLONOSCOPY N/A 1/13/2020    Procedure: COLONOSCOPY;  Surgeon: Charli Chowdhury MD;  Location: [facility] ENDO;  Service: General;  Laterality: N/A;    CYSTOSCOPY N/A 3/27/2019    Procedure: CYSTOSCOPY;  Surgeon: Niall Blas MD;  Location: [facility] OR;  Service: Urology;  Laterality: N/A;    EPIDURAL STEROID INJECTION N/A 6/17/2019    Procedure: Injection, Steroid, Epidural - L5/S1 EPIDURAL STEROID INJECTION;  Surgeon: Chyna Valdovinos MD;  Location: [facility] OR;  Service: Pain Management;  Laterality: N/A;    EPIDURAL STEROID INJECTION N/A 12/11/2019    Procedure: Injection, Steroid, Epidural - L5/S1 LUMBAR EPIDURAL STEROID INJECTION;  Surgeon: Jade Layton MD;  Location: [facility];  Service: Pain Management;  Laterality: N/A;  *FIRST CASE*    ESOPHAGOGASTRODUODENOSCOPY N/A 1/13/2020    Procedure: EGD (ESOPHAGOGASTRODUODENOSCOPY);  Surgeon: Charli Chowdhury MD;  Location: [facility];  Service: General;  Laterality: N/A;    INJECTION OF ANESTHETIC AGENT AROUND MEDIAL BRANCH NERVES INNERVATING LUMBAR FACET JOINT Bilateral 7/12/2021    Procedure: Block-nerve-medial branch-lumbar Bilateral L 3,4,5;  Surgeon: Jade Layton MD;  Location: [facility];  Service: Pain Management;  Laterality: Bilateral;    INJECTION OF JOINT Left 1/21/2019    Procedure: Injection, FACET JOINT INJECTION LEFT L4-5 AND L5-S1;  Surgeon: Chyna Valdovinos MD;  Location: [facility];  Service: Pain Management;  Laterality: Left;    JOINT REPLACEMENT      BILAT HIPS, RIGHT SHOULDER    KNEE ARTHROPLASTY Left     RADIOFREQUENCY THERMOCOAGULATION Bilateral 8/2/2021    Procedure: RADIOFREQUENCY THERMAL COAGULATION Bilateral L 3,4,5;  Surgeon: Jade Layton MD;  Location: [facility];  Service: Pain Management;  Laterality: Bilateral;    TONSILLECTOMY  1950    TOTAL HIP ARTHROPLASTY Bilateral 2013 & 2014    TOTAL SHOULDER ARTHROPLASTY Right 2005       Review of patient's  allergies indicates:  No Known Allergies    Current Facility-Administered Medications on File Prior to Encounter   Medication    [COMPLETED] 0.9%  NaCl infusion    [COMPLETED] sodium chloride 0.9% bolus 500 mL 500 mL     Current Outpatient Medications on File Prior to Encounter   Medication Sig    HYDROcodone-acetaminophen (NORCO) 7.5-325 mg per tablet Take 1 tablet by mouth every 6 (six) hours as needed for Pain.    albuterol (PROVENTIL/VENTOLIN HFA) 90 mcg/actuation inhaler Inhale 2 puffs into the lungs every 4 (four) hours as needed for Wheezing or Shortness of Breath. Rescue    apixaban (ELIQUIS) 5 mg Tab Take 1 tablet (5 mg total) by mouth 2 (two) times daily.    bicalutamide (CASODEX) 50 MG Tab Take 1 tablet (50 mg total) by mouth once daily.    chlorhexidine (PERIDEX) 0.12 % solution SWISH WITH 15 ML FOR 30 SECONDS TWICE DAILY    empagliflozin (JARDIANCE) 10 mg tablet Take 1 tablet (10 mg total) by mouth once daily.    fluticasone-umeclidin-vilanter (TRELEGY ELLIPTA) 200-62.5-25 mcg inhaler Inhale 1 puff into the lungs once daily.    gabapentin (NEURONTIN) 300 MG capsule Take 1 capsule (300 mg total) by mouth every evening.    hydrocortisone 2.5 % cream APPLY TO AFFECTED SITES TWICE DAILY X1-2 WEEKS    hydrocortisone 2.5 % ointment APPLY TO EAR TWICE DAILY X2 WEEKS    metFORMIN (GLUCOPHAGE) 500 MG tablet Take 1 tablet (500 mg total) by mouth 2 (two) times daily with meals.    multivitamin (THERAGRAN) per tablet Take 1 tablet by mouth once daily.    multivitamin with folic acid 400 mcg Tab Take 1 tablet by mouth once daily.    traMADoL (ULTRAM) 50 mg tablet Take one tablet by mouth once or twice a day daily as needed for pain.    zolpidem (AMBIEN) 5 MG Tab Take 1 tablet (5 mg total) by mouth nightly as needed (sleep).     Family History       Problem Relation (Age of Onset)    Bladder Cancer Father    Stroke Mother          Tobacco Use    Smoking status: Former     Current packs/day: 0.00     Types: Cigarettes     Smokeless tobacco: Former     Quit date: 1/1/1990   Substance and Sexual Activity    Alcohol use: Yes     Alcohol/week: 12.0 standard drinks of alcohol     Types: 6 Glasses of wine, 6 Standard drinks or equivalent per week     Comment: 1 drink daily    Drug use: No    Sexual activity: Not Currently     Partners: Female     Review of Systems   Constitutional:  Negative for chills, diaphoresis and fever.   HENT: Negative.     Eyes: Negative.    Respiratory:  Negative for cough, chest tightness, shortness of breath and wheezing.    Cardiovascular:  Negative for chest pain and leg swelling.   Gastrointestinal:  Positive for abdominal distention, blood in stool and constipation. Negative for abdominal pain, nausea, rectal pain and vomiting.        Abdomen mildly distended   Endocrine: Negative.    Musculoskeletal: Negative.    Skin:  Negative for color change.   Neurological:  Positive for light-headedness.   Hematological: Negative.    Psychiatric/Behavioral: Negative.     All other systems reviewed and are negative.    Objective:     Vital Signs (Most Recent):  Temp: 98.5 °F (36.9 °C) (01/30/24 1317)  Pulse: 73 (01/30/24 1336)  Resp: 20 (01/30/24 1336)  BP: 135/65 (01/30/24 1317)  SpO2: 98 % (01/30/24 1336) Vital Signs (24h Range):  Temp:  [98.5 °F (36.9 °C)-98.9 °F (37.2 °C)] 98.5 °F (36.9 °C)  Pulse:  [68-77] 73  Resp:  [16-20] 20  SpO2:  [94 %-98 %] 98 %  BP: (117-142)/() 135/65        There is no height or weight on file to calculate BMI.  No intake or output data in the 24 hours ending 01/30/24 1346   Physical Exam  Vitals and nursing note reviewed.   Constitutional:       General: He is not in acute distress.     Appearance: Normal appearance. He is not ill-appearing, toxic-appearing or diaphoretic.   HENT:      Right Ear: External ear normal.      Left Ear: External ear normal.      Nose: Nose normal.   Eyes:      Extraocular Movements: Extraocular movements intact.      Pupils: Pupils are equal, round,  and reactive to light.   Cardiovascular:      Rate and Rhythm: Normal rate.      Pulses: Normal pulses.      Heart sounds: Normal heart sounds.   Pulmonary:      Effort: Pulmonary effort is normal.      Breath sounds: Normal breath sounds.   Abdominal:      General: There is distension.      Tenderness: There is abdominal tenderness.      Comments: TTP of lower left quadrant   Abdomen mildly distended    Musculoskeletal:         General: Normal range of motion.      Cervical back: Normal range of motion.   Skin:     General: Skin is warm.      Capillary Refill: Capillary refill takes less than 2 seconds.   Neurological:      Mental Status: He is alert and oriented to person, place, and time.   Psychiatric:         Mood and Affect: Mood normal.         Significant Labs: All pertinent labs within the past 24 hours have been reviewed.  CBC:   Recent Labs   Lab 01/30/24  0812 01/30/24  1338   WBC 6.87 5.81   HGB 11.6* 10.4*   HCT 35.0* 32.5*    199     CMP:   Recent Labs   Lab 01/30/24  0812      K 4.4      CO2 22*   *   BUN 29*   CREATININE 1.8*   CALCIUM 9.3   PROT 7.5   ALBUMIN 3.8   BILITOT 0.5   ALKPHOS 49*   AST 14   ALT 11   ANIONGAP 12       Significant Imaging: I have reviewed all pertinent imaging results/findings within the past 24 hours.    Medical Student Subjective & Objective     A/P     GI bleed  Admitted to Medical floor  GI consult - EGD  NPO for now   Trend H & H q6  Pantoprazole IV  T&S here    NIDD2   Acu check q6 hours with a moderate insuline dosing scale      CKD (chronic kidney disease) stage 3, GFR 30-59 ml/min  BMP reviewed    Based on current GFR, CKD stage is stage 3 - GFR 30-59.  Monitor UOP and serial BMP and adjust therapy as needed. Renally dose meds. Avoid nephrotoxic medications and procedure  At baseline     COPD  Chronic. Controlled  Continued regimen p.r.n    DVT  Discontinue eliquis until GI bleeding stops      - Swapna FLORES

## 2024-01-30 NOTE — HPI
Mr. Sheldon is an 89 year old male with a history of HTN, prostate ca (on hormone therapy), DVT/PE on eliquis, COPD, NIDDM2, CKDIIIB who presented today from [place] ED with complaints of rectal bleeding that began last night around 3am and again around 6am. It is moderate. It is associated with lightheadedness and constipation. He denies fever, chills, N/V/D, chest pain, SOB, or LOC. Last dose of eliquis 1/29/24 at 2000. VSS stable at [place]. H&H 11.6/35 with mild macrocytic indices which is his baseline. Pt was transferred for GI services. On arrival to floor, repeat H&H 10.4/34.5. Pt was transferred to endoscopy for EGD upon arrival to the floor.

## 2024-01-30 NOTE — ANESTHESIA PREPROCEDURE EVALUATION
01/30/2024  Anthony Sheldon is a 89 y.o., male.      Pre-op Assessment    I have reviewed the Patient Summary Reports.     I have reviewed the Nursing Notes. I have reviewed the NPO Status.   I have reviewed the Medications.     Review of Systems  Anesthesia Hx:             Denies Family Hx of Anesthesia complications.    Denies Personal Hx of Anesthesia complications.                    Hematology/Oncology:       -- Anemia:               Hematology Comments: PE/DVT, Eliquis 1/29                    Cardiovascular:                 DELEON  ECG has been reviewed. Recent cath without intervention                         Pulmonary:      Shortness of breath                  Renal/:  Chronic Renal Disease, CKD                Hepatic/GI:    Hiatal Hernia,              Musculoskeletal:         Spine Disorders: lumbar Degenerative disease           Endocrine:  Diabetes, type 2         Obesity / BMI > 30      Physical Exam  General: Cooperative, Alert and Oriented    Airway:  Mallampati: III / II  Mouth Opening: Normal  TM Distance: Normal  Tongue: Normal    Dental:  Dentures        Anesthesia Plan  Type of Anesthesia, risks & benefits discussed:    Anesthesia Type: Gen Natural Airway  Intra-op Monitoring Plan: Standard ASA Monitors  Induction:  IV  Informed Consent: Informed consent signed with the Patient and all parties understand the risks and agree with anesthesia plan.  All questions answered.   ASA Score: 3    Ready For Surgery From Anesthesia Perspective.     .

## 2024-01-30 NOTE — MEDICAL/APP STUDENT
Medical Student Subjective & Objective     Principal Problem: Blood in the stool    Chief Complaint: Rectal Bleeding    HPI: Anthony Sheldon 89 y/o male with a PMHx of  COPD, Diverticulosis, CKD stage IIIb (GFR 35.5), DVT (on Eliquis) , Hiatal hernia, Colon polyps, and Prostate Cancer which he sees an oncologist for. Patient presented with moderate painless rectal bleeding to the ED last night after seeing blood in his stool at 3am and around 6am. Patient states this has happen 5 years ago and they did a colonoscopy for him and is unsure what they found. Patients last bowel movement was at 12:00 pm and states it was hard yury with dark red blood seen. He last took his eliquis around 8pm last night but hasn't taken another one since. Patient denies nausea, vomiting, fever, chest pain or SOB. At [facility] hemodynamically stable with an Hemoglobin of 11.6 and Hematocrit of 35.0, vital signs unremarkable. labs showed mild macrocytic anemia. At this facility, vital signs stable with mild hypertension. Hemodynamically stable with a hemoglobin of 10.4 and hematocrit of 32.5 and a baseline GFR of 35.5. Patient will be admitted to the hospital for a GI bleed and further monitoring.      Hospital Course: No notes on file    Interval History: Vital signs and labs reviewed from the past 24 hours. Patient is hemodynamically stable with a midley elveated blood pressure.     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    Prostate cancer 05/2019    PSA elevation 10/7/2015    Wears glasses        Past  Surgical History:   Procedure Laterality Date    BIOPSY WITH TRANSRECTAL ULTRASOUND (TRUS) GUIDANCE Bilateral 3/27/2019    Procedure: BIOPSY, WITH TRANSRECTAL US GUIDANCE;  Surgeon: Niall Blas MD;  Location: [facility];  Service: Urology;  Laterality: Bilateral;    COLONOSCOPY N/A 1/13/2020    Procedure: COLONOSCOPY;  Surgeon: Charli Chowdhury MD;  Location: [facility] ENDO;  Service: General;  Laterality: N/A;    CYSTOSCOPY N/A 3/27/2019    Procedure: CYSTOSCOPY;  Surgeon: Niall Blas MD;  Location: [facility] OR;  Service: Urology;  Laterality: N/A;    EPIDURAL STEROID INJECTION N/A 6/17/2019    Procedure: Injection, Steroid, Epidural - L5/S1 EPIDURAL STEROID INJECTION;  Surgeon: Chyna Valdovinos MD;  Location: [facility];  Service: Pain Management;  Laterality: N/A;    EPIDURAL STEROID INJECTION N/A 12/11/2019    Procedure: Injection, Steroid, Epidural - L5/S1 LUMBAR EPIDURAL STEROID INJECTION;  Surgeon: Jade Layton MD;  Location: [facility];  Service: Pain Management;  Laterality: N/A;  *FIRST CASE*    ESOPHAGOGASTRODUODENOSCOPY N/A 1/13/2020    Procedure: EGD (ESOPHAGOGASTRODUODENOSCOPY);  Surgeon: Charli Chowdhury MD;  Location: [facility];  Service: General;  Laterality: N/A;    INJECTION OF ANESTHETIC AGENT AROUND MEDIAL BRANCH NERVES INNERVATING LUMBAR FACET JOINT Bilateral 7/12/2021    Procedure: Block-nerve-medial branch-lumbar Bilateral L 3,4,5;  Surgeon: Jade Layton MD;  Location: [facility];  Service: Pain Management;  Laterality: Bilateral;    INJECTION OF JOINT Left 1/21/2019    Procedure: Injection, FACET JOINT INJECTION LEFT L4-5 AND L5-S1;  Surgeon: Chyna Valdovinos MD;  Location: [facility];  Service: Pain Management;  Laterality: Left;    JOINT REPLACEMENT      BILAT HIPS, RIGHT SHOULDER    KNEE ARTHROPLASTY Left     RADIOFREQUENCY THERMOCOAGULATION Bilateral 8/2/2021    Procedure: RADIOFREQUENCY THERMAL COAGULATION Bilateral L 3,4,5;  Surgeon: Jade Layton MD;  Location: [facility];   Service: Pain Management;  Laterality: Bilateral;    TONSILLECTOMY  1950    TOTAL HIP ARTHROPLASTY Bilateral 2013 & 2014    TOTAL SHOULDER ARTHROPLASTY Right 2005       Review of patient's allergies indicates:  No Known Allergies    Current Facility-Administered Medications on File Prior to Encounter   Medication    [COMPLETED] 0.9%  NaCl infusion    [COMPLETED] sodium chloride 0.9% bolus 500 mL 500 mL     Current Outpatient Medications on File Prior to Encounter   Medication Sig    HYDROcodone-acetaminophen (NORCO) 7.5-325 mg per tablet Take 1 tablet by mouth every 6 (six) hours as needed for Pain.    albuterol (PROVENTIL/VENTOLIN HFA) 90 mcg/actuation inhaler Inhale 2 puffs into the lungs every 4 (four) hours as needed for Wheezing or Shortness of Breath. Rescue    apixaban (ELIQUIS) 5 mg Tab Take 1 tablet (5 mg total) by mouth 2 (two) times daily.    bicalutamide (CASODEX) 50 MG Tab Take 1 tablet (50 mg total) by mouth once daily.    chlorhexidine (PERIDEX) 0.12 % solution SWISH WITH 15 ML FOR 30 SECONDS TWICE DAILY    empagliflozin (JARDIANCE) 10 mg tablet Take 1 tablet (10 mg total) by mouth once daily.    fluticasone-umeclidin-vilanter (TRELEGY ELLIPTA) 200-62.5-25 mcg inhaler Inhale 1 puff into the lungs once daily.    gabapentin (NEURONTIN) 300 MG capsule Take 1 capsule (300 mg total) by mouth every evening.    hydrocortisone 2.5 % cream APPLY TO AFFECTED SITES TWICE DAILY X1-2 WEEKS    hydrocortisone 2.5 % ointment APPLY TO EAR TWICE DAILY X2 WEEKS    metFORMIN (GLUCOPHAGE) 500 MG tablet Take 1 tablet (500 mg total) by mouth 2 (two) times daily with meals.    multivitamin (THERAGRAN) per tablet Take 1 tablet by mouth once daily.    multivitamin with folic acid 400 mcg Tab Take 1 tablet by mouth once daily.    traMADoL (ULTRAM) 50 mg tablet Take one tablet by mouth once or twice a day daily as needed for pain.    zolpidem (AMBIEN) 5 MG Tab Take 1 tablet (5 mg total) by mouth nightly as needed (sleep).      Family History       Problem Relation (Age of Onset)    Bladder Cancer Father    Stroke Mother          Tobacco Use    Smoking status: Former     Current packs/day: 0.00     Types: Cigarettes    Smokeless tobacco: Former     Quit date: 1/1/1990   Substance and Sexual Activity    Alcohol use: Yes     Alcohol/week: 12.0 standard drinks of alcohol     Types: 6 Glasses of wine, 6 Standard drinks or equivalent per week     Comment: 1 drink daily    Drug use: No    Sexual activity: Not Currently     Partners: Female     Review of Systems   Constitutional:  Negative for chills, diaphoresis and fever.   HENT: Negative.     Eyes: Negative.    Respiratory:  Negative for cough, chest tightness, shortness of breath and wheezing.    Cardiovascular:  Negative for chest pain and leg swelling.   Gastrointestinal:  Positive for abdominal distention, blood in stool and constipation. Negative for abdominal pain, nausea, rectal pain and vomiting.        Abdomen mildly distended   Endocrine: Negative.    Musculoskeletal: Negative.    Skin:  Negative for color change.   Neurological:  Positive for light-headedness.   Hematological: Negative.    Psychiatric/Behavioral: Negative.     All other systems reviewed and are negative.    Objective:     Vital Signs (Most Recent):  Temp: 98.5 °F (36.9 °C) (01/30/24 1317)  Pulse: 73 (01/30/24 1336)  Resp: 20 (01/30/24 1336)  BP: 135/65 (01/30/24 1317)  SpO2: 98 % (01/30/24 1336) Vital Signs (24h Range):  Temp:  [98.5 °F (36.9 °C)-98.9 °F (37.2 °C)] 98.5 °F (36.9 °C)  Pulse:  [68-77] 73  Resp:  [16-20] 20  SpO2:  [94 %-98 %] 98 %  BP: (117-142)/() 135/65        There is no height or weight on file to calculate BMI.  No intake or output data in the 24 hours ending 01/30/24 1346   Physical Exam  Vitals and nursing note reviewed.   Constitutional:       General: He is not in acute distress.     Appearance: Normal appearance. He is not ill-appearing, toxic-appearing or diaphoretic.   HENT:       Right Ear: External ear normal.      Left Ear: External ear normal.      Nose: Nose normal.   Eyes:      Extraocular Movements: Extraocular movements intact.      Pupils: Pupils are equal, round, and reactive to light.   Cardiovascular:      Rate and Rhythm: Normal rate.      Pulses: Normal pulses.      Heart sounds: Normal heart sounds.   Pulmonary:      Effort: Pulmonary effort is normal.      Breath sounds: Normal breath sounds.   Abdominal:      General: There is distension.      Tenderness: There is abdominal tenderness.      Comments: TTP of lower left quadrant   Abdomen mildly distended    Musculoskeletal:         General: Normal range of motion.      Cervical back: Normal range of motion.   Skin:     General: Skin is warm.      Capillary Refill: Capillary refill takes less than 2 seconds.   Neurological:      Mental Status: He is alert and oriented to person, place, and time.   Psychiatric:         Mood and Affect: Mood normal.         Significant Labs: All pertinent labs within the past 24 hours have been reviewed.  CBC:   Recent Labs   Lab 01/30/24  0812 01/30/24  1338   WBC 6.87 5.81   HGB 11.6* 10.4*   HCT 35.0* 32.5*    199     CMP:   Recent Labs   Lab 01/30/24  0812      K 4.4      CO2 22*   *   BUN 29*   CREATININE 1.8*   CALCIUM 9.3   PROT 7.5   ALBUMIN 3.8   BILITOT 0.5   ALKPHOS 49*   AST 14   ALT 11   ANIONGAP 12       Significant Imaging: I have reviewed all pertinent imaging results/findings within the past 24 hours.    Medical Student Subjective & Objective     A/P     GI bleed  Admitted to Medical floor  GI consult - EGD  NPO for now   Trend H & H q6  Pantoprazole IV  T&S here  Blood transfusion is H&H <7/20 or showing symptomatic signs of blood loss    NIDD2   Acu check q6 hours with a moderate insuline dosing scale      CKD (chronic kidney disease) stage 3, GFR 30-59 ml/min  BMP reviewed    Based on current GFR, CKD stage is stage 3b - GFR 35.5.  Monitor UOP and  serial BMP and adjust therapy as needed. Renally dose meds. Avoid nephrotoxic medications and procedure  At baseline     COPD  Chronic. Controlled  Continued regimen p.r.n    DVT  Discontinue eliquis until GI bleed is resolved       - Swapna FLORES

## 2024-01-30 NOTE — ASSESSMENT & PLAN NOTE
Admit to med/tele  Consult GI - EGD now, appreciate further recs  Pantoprazole IV BID  Trend H&H q8h  T&S here  Transfuse for H&H <7/21 or for significant symptoms of anemia with active bleeding

## 2024-01-30 NOTE — ASSESSMENT & PLAN NOTE
Creatine stable for now. BMP reviewed- noted Estimated Creatinine Clearance: 32.7 mL/min (A) (based on SCr of 1.8 mg/dL (H)). according to latest data. Based on current GFR, CKD stage is stage 3 - GFR 30-59.  Monitor UOP and serial BMP and adjust therapy as needed. Renally dose meds. Avoid nephrotoxic medications and procedures.    At baseline

## 2024-01-30 NOTE — CARE UPDATE
01/30/24 1336   Patient Assessment/Suction   Level of Consciousness (AVPU) alert   Respiratory Effort Normal;Unlabored   Expansion/Accessory Muscles/Retractions no use of accessory muscles;no retractions;expansion symmetric   All Lung Fields Breath Sounds clear;diminished   Rhythm/Pattern, Respiratory unlabored;pattern regular;depth regular;no shortness of breath reported   Cough Frequency no cough   PRE-TX-O2   Device (Oxygen Therapy) room air   SpO2 98 %   Pulse Oximetry Type Intermittent   $ Pulse Oximetry - Multiple Charge Pulse Oximetry - Multiple   Pulse 73   Resp 20   Positioning Left side   Aerosol Therapy   $ Aerosol Therapy Charges PRN treatment not required   Education   $ Education Bronchodilator;30 min   Tobacco Cessation Intervention   Do you use any type of tobacco product? No   Respiratory Evaluation   $ Care Plan Tech Time 15 min   Evaluation For New Orders   Admitting Diagnosis GI bleed   Home Oxygen   Has Home Oxygen? No   Home Aerosol, MDI, DPI, and Other Treatments/Therapies   Home Respiratory Therapy Per Patient/Review of Chart Yes   Oxygen Care Plan   Oxygen Care Plan Per Protocol   SPO2 Goal (%) 92% non-cardiac   Rationale Low Hemoglobin with Symptoms   Bronchodilator Care Plan   Bronchodilator Care Plan Aerosol   Aerosol Meds w/ frequency Pulmicort(Budenoside) 0.5mg BID   Rationale Wheezing;Shortness of breath (increased WOB)   Atelectasis Care Plan   Atelectasis Care Plan Incentive Spiromentry   Frequency TID   I.S. Goal (ml) 1960 ml   I.S. Minimum (ml) 980 ml   Rationale Diminished;Atelectasis on Chest X-ray  (CXR(7/23) chronic LLL atelectasis)   Airway Clearance Care Plan   Rationale No rationale found

## 2024-01-30 NOTE — H&P
[facility] Medicine  History & Physical    Patient Name: Anthony Sheldon  MRN: [id number]  Patient Class: OP- Observation  Admission Date: 1/30/2024  Attending Physician:  Dr. Castañeda  Primary Care Provider: Perfecto Melgar III, MD         Patient information was obtained from patient, relative(s), and ER records.     Subjective:     Principal Problem:GIB (gastrointestinal bleeding)    Chief Complaint:   Chief Complaint   Patient presents with    Rectal Bleeding        HPI: Mr. Sheldon is an 89 year old male with a history of HTN, prostate ca (on hormone therapy), DVT/PE on eliquis, COPD, NIDDM2, CKDIIIB who presented today from [facility] with complaints of rectal bleeding that began last night around 3am and again around 6am. It is moderate. It is associated with lightheadedness and constipation. He denies fever, chills, N/V/D, chest pain, SOB, or LOC. Last dose of eliquis 1/29/24 at 2000. VSS stable at [facility]. H&H 11.6/35 with mild macrocytic indices which is his baseline. Pt was transferred for GI services. On arrival to floor, repeat H&H 10.4/34.5. Pt was transferred to endoscopy for EGD upon arrival to the floor.     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    Prostate cancer 05/2019    PSA elevation 10/7/2015    Wears glasses        Past Surgical History:   Procedure Laterality Date    BIOPSY WITH TRANSRECTAL ULTRASOUND (TRUS) GUIDANCE Bilateral 3/27/2019    Procedure: BIOPSY, WITH TRANSRECTAL US GUIDANCE;  Surgeon: Niall Blas MD;  Location: [facility]  OR;  Service: Urology;  Laterality: Bilateral;    COLONOSCOPY N/A 1/13/2020    Procedure: COLONOSCOPY;  Surgeon: Charli Chowdhury MD;  Location: [facility] ENDO;  Service: General;  Laterality: N/A;    CYSTOSCOPY N/A 3/27/2019    Procedure: CYSTOSCOPY;  Surgeon: Niall Blas MD;  Location: [facility] OR;  Service: Urology;  Laterality: N/A;    EPIDURAL STEROID INJECTION N/A 6/17/2019    Procedure: Injection, Steroid, Epidural - L5/S1 EPIDURAL STEROID INJECTION;  Surgeon: Chyna Valdovinos MD;  Location: [facility] OR;  Service: Pain Management;  Laterality: N/A;    EPIDURAL STEROID INJECTION N/A 12/11/2019    Procedure: Injection, Steroid, Epidural - L5/S1 LUMBAR EPIDURAL STEROID INJECTION;  Surgeon: Jade Layton MD;  Location: [facility] OR;  Service: Pain Management;  Laterality: N/A;  *FIRST CASE*    ESOPHAGOGASTRODUODENOSCOPY N/A 1/13/2020    Procedure: EGD (ESOPHAGOGASTRODUODENOSCOPY);  Surgeon: Charli Chowdhury MD;  Location: [facility];  Service: General;  Laterality: N/A;    INJECTION OF ANESTHETIC AGENT AROUND MEDIAL BRANCH NERVES INNERVATING LUMBAR FACET JOINT Bilateral 7/12/2021    Procedure: Block-nerve-medial branch-lumbar Bilateral L 3,4,5;  Surgeon: Jade Layton MD;  Location: [facility] OR;  Service: Pain Management;  Laterality: Bilateral;    INJECTION OF JOINT Left 1/21/2019    Procedure: Injection, FACET JOINT INJECTION LEFT L4-5 AND L5-S1;  Surgeon: Chyna Valdovinos MD;  Location: [facility] OR;  Service: Pain Management;  Laterality: Left;    JOINT REPLACEMENT      BILAT HIPS, RIGHT SHOULDER    KNEE ARTHROPLASTY Left     RADIOFREQUENCY THERMOCOAGULATION Bilateral 8/2/2021    Procedure: RADIOFREQUENCY THERMAL COAGULATION Bilateral L 3,4,5;  Surgeon: Jade Layton MD;  Location: [facility] OR;  Service: Pain Management;  Laterality: Bilateral;    TONSILLECTOMY  1950    TOTAL HIP ARTHROPLASTY Bilateral 2013 & 2014    TOTAL SHOULDER ARTHROPLASTY Right 2005       Review of patient's allergies indicates:  No Known  Allergies    Current Facility-Administered Medications on File Prior to Encounter   Medication    [COMPLETED] 0.9%  NaCl infusion    [COMPLETED] sodium chloride 0.9% bolus 500 mL 500 mL     Current Outpatient Medications on File Prior to Encounter   Medication Sig    HYDROcodone-acetaminophen (NORCO) 7.5-325 mg per tablet Take 1 tablet by mouth every 6 (six) hours as needed for Pain.    albuterol (PROVENTIL/VENTOLIN HFA) 90 mcg/actuation inhaler Inhale 2 puffs into the lungs every 4 (four) hours as needed for Wheezing or Shortness of Breath. Rescue    apixaban (ELIQUIS) 5 mg Tab Take 1 tablet (5 mg total) by mouth 2 (two) times daily.    bicalutamide (CASODEX) 50 MG Tab Take 1 tablet (50 mg total) by mouth once daily.    chlorhexidine (PERIDEX) 0.12 % solution SWISH WITH 15 ML FOR 30 SECONDS TWICE DAILY    empagliflozin (JARDIANCE) 10 mg tablet Take 1 tablet (10 mg total) by mouth once daily.    fluticasone-umeclidin-vilanter (TRELEGY ELLIPTA) 200-62.5-25 mcg inhaler Inhale 1 puff into the lungs once daily.    gabapentin (NEURONTIN) 300 MG capsule Take 1 capsule (300 mg total) by mouth every evening.    hydrocortisone 2.5 % cream APPLY TO AFFECTED SITES TWICE DAILY X1-2 WEEKS    hydrocortisone 2.5 % ointment APPLY TO EAR TWICE DAILY X2 WEEKS    metFORMIN (GLUCOPHAGE) 500 MG tablet Take 1 tablet (500 mg total) by mouth 2 (two) times daily with meals.    multivitamin (THERAGRAN) per tablet Take 1 tablet by mouth once daily.    multivitamin with folic acid 400 mcg Tab Take 1 tablet by mouth once daily.    traMADoL (ULTRAM) 50 mg tablet Take one tablet by mouth once or twice a day daily as needed for pain.    zolpidem (AMBIEN) 5 MG Tab Take 1 tablet (5 mg total) by mouth nightly as needed (sleep).     Family History       Problem Relation (Age of Onset)    Bladder Cancer Father    Stroke Mother          Tobacco Use    Smoking status: Former     Current packs/day: 0.00     Types: Cigarettes    Smokeless tobacco: Former      Quit date: 1/1/1990   Substance and Sexual Activity    Alcohol use: Yes     Alcohol/week: 12.0 standard drinks of alcohol     Types: 6 Glasses of wine, 6 Standard drinks or equivalent per week     Comment: 1 drink daily    Drug use: No    Sexual activity: Not Currently     Partners: Female     Review of Systems   Constitutional:  Negative for chills, diaphoresis, fatigue and fever.   HENT:  Negative for congestion, ear pain, sore throat and trouble swallowing.    Eyes:  Negative for pain, discharge and visual disturbance.   Respiratory:  Negative for cough, chest tightness, shortness of breath and wheezing.    Cardiovascular:  Negative for chest pain, palpitations and leg swelling.   Gastrointestinal:  Positive for blood in stool and constipation. Negative for abdominal distention, abdominal pain, diarrhea, nausea and vomiting.   Endocrine: Negative for polydipsia, polyphagia and polyuria.   Genitourinary:  Negative for dysuria, flank pain, frequency and urgency.   Musculoskeletal:  Negative for back pain, joint swelling, neck pain and neck stiffness.   Skin:  Negative for rash and wound.   Allergic/Immunologic: Negative for immunocompromised state.   Neurological:  Positive for dizziness, weakness and light-headedness. Negative for syncope, speech difficulty, numbness and headaches.   Hematological:  Negative for adenopathy.   Psychiatric/Behavioral:  Negative for confusion and suicidal ideas. The patient is not nervous/anxious.    All other systems reviewed and are negative.    Objective:     Vital Signs (Most Recent):  Temp: 97.9 °F (36.6 °C) (01/30/24 1404)  Pulse: 73 (01/30/24 1500)  Resp: 19 (01/30/24 1500)  BP: 124/65 (01/30/24 1500)  SpO2: 99 % (01/30/24 1500) Vital Signs (24h Range):  Temp:  [97.9 °F (36.6 °C)-98.9 °F (37.2 °C)] 97.9 °F (36.6 °C)  Pulse:  [68-77] 73  Resp:  [16-21] 19  SpO2:  [94 %-100 %] 99 %  BP: (117-171)/() 124/65     Weight: 101.6 kg (224 lb)  Body mass index is 33.08  kg/m².     Physical Exam  Vitals and nursing note reviewed.   Constitutional:       Appearance: He is well-developed.   HENT:      Head: Normocephalic and atraumatic.   Eyes:      Conjunctiva/sclera: Conjunctivae normal.      Pupils: Pupils are equal, round, and reactive to light.   Cardiovascular:      Rate and Rhythm: Normal rate and regular rhythm.   Pulmonary:      Effort: Pulmonary effort is normal.      Breath sounds: Normal breath sounds.   Abdominal:      Comments: Mild abd distention with tenderness in the LLQ with palpation, hyperactive BS   Musculoskeletal:         General: Normal range of motion.      Cervical back: Normal range of motion and neck supple.   Skin:     General: Skin is warm and dry.      Capillary Refill: Capillary refill takes less than 2 seconds.   Neurological:      Mental Status: He is alert and oriented to person, place, and time.   Psychiatric:         Behavior: Behavior normal.         Thought Content: Thought content normal.         Judgment: Judgment normal.              CRANIAL NERVES     CN III, IV, VI   Pupils are equal, round, and reactive to light.       Significant Labs: All pertinent labs within the past 24 hours have been reviewed.  CBC:   Recent Labs   Lab 01/30/24  0812 01/30/24  1338   WBC 6.87 5.81   HGB 11.6* 10.4*   HCT 35.0* 32.5*    199     CMP:   Recent Labs   Lab 01/30/24  0812      K 4.4      CO2 22*   *   BUN 29*   CREATININE 1.8*   CALCIUM 9.3   PROT 7.5   ALBUMIN 3.8   BILITOT 0.5   ALKPHOS 49*   AST 14   ALT 11   ANIONGAP 12       Assessment/Plan:     * GIB (gastrointestinal bleeding)  Admit to med/tele  Consult GI - EGD now, appreciate further recs  Pantoprazole IV BID  Trend H&H q8h  T&S here  Transfuse for H&H <7/21 or for significant symptoms of anemia with active bleeding         COPD (chronic obstructive pulmonary disease)  Patient's COPD is controlled currently.  Patient is currently off COPD Pathway. Continue scheduled  "inhalers  and monitor respiratory status closely.     Type 2 diabetes mellitus with chronic kidney disease, without long-term current use of insulin  Patient's FSGs are controlled on current medication regimen.  Last A1c reviewed-   Lab Results   Component Value Date    LABA1C 5.5 06/19/2018    HGBA1C 6.6 (H) 01/15/2024     Most recent fingerstick glucose reviewed- No results for input(s): "POCTGLUCOSE" in the last 24 hours.  Current correctional scale  Medium  Maintain anti-hyperglycemic dose as follows-   Antihyperglycemics (From admission, onward)      Start     Stop Route Frequency Ordered    01/30/24 1537  insulin aspart U-100 pen 0-10 Units         -- SubQ Every 6 hours PRN 01/30/24 1437          Hold Oral hypoglycemics while patient is in the hospital.      History of pulmonary embolus (PE)  Eliquis on hold GIB  Resume when clinically indicated      CKD (chronic kidney disease) stage 3, GFR 30-59 ml/min  Creatine stable for now. BMP reviewed- noted Estimated Creatinine Clearance: 32.7 mL/min (A) (based on SCr of 1.8 mg/dL (H)). according to latest data. Based on current GFR, CKD stage is stage 3 - GFR 30-59.  Monitor UOP and serial BMP and adjust therapy as needed. Renally dose meds. Avoid nephrotoxic medications and procedures.    At baseline      VTE Risk Mitigation (From admission, onward)           Ordered     IP VTE HIGH RISK PATIENT  Once         01/30/24 1251                         On 01/30/2024, patient should be placed in hospital observation services under my care in collaboration with Dr. Castañeda.      Automatic Inhaler to Nebulizer Interchange    fluticasone/vilanterol (Breo Ellipta) 200 mcg/25 mcg changed to budesonide 1 mg twice daily AND arformoterol 15 mcg twice daily per [facility] Automatic Therapeutic Substitutions Protocol.    Please contact pharmacy at extension [phone] with any questions.     Thank you,   Uriah Lan NP  Department of Hospital Medicine  [facility] - Med/Surg

## 2024-01-30 NOTE — PLAN OF CARE
Pt awake, alert.  Vital signs stable, denies nausea, states pain is , abd soft.   No adverse effects of anesthesia noted. Transferred to room per wheelchair,  Report given to CALISTA Joshi.  Family at bedside.  Bed in low and locked position, call light in reach.

## 2024-01-30 NOTE — PLAN OF CARE
01/30/24 1600   DICKENS Message   Medicare Outpatient and Observation Notification regarding financial responsibility Explained to patient/caregiver;Signed/date by patient/caregiver   Date DICKENS was signed 01/30/24   Time DICKENS was signed 1600

## 2024-01-30 NOTE — [employer]OVATION PATIENT INSTRUCTIONS
Discharge Summary/Instructions after an Endoscopic Procedure  Patient Name: Anthony Sheldon  Patient MRN: [id number]  Patient YOB: 1934  Tuesday, January 30, 2024  Robert Suarez MD  Dear patient,  As a result of recent federal legislation (The Federal Cures Act), you may   receive lab or pathology results from your procedure in your [facility]   account before your physician is able to contact you. Your physician or   their representative will relay the results to you with their   recommendations at their soonest availability.  Thank you,  RESTRICTIONS:  During your procedure today, you received medications for sedation.  These   medications may affect your judgment, balance and coordination.  Therefore,   for 24 hours, you have the following restrictions:   - DO NOT drive a car, operate machinery, make legal/financial decisions,   sign important papers or drink alcohol.    ACTIVITY:  Today: no heavy lifting, straining or running due to procedural   sedation/anesthesia.  The following day: return to full activity including work.  DIET:  Eat and drink normally unless instructed otherwise.     TREATMENT FOR COMMON SIDE EFFECTS:  - Mild abdominal pain, nausea, belching, bloating or excessive gas:  rest,   eat lightly and use a heating pad.  - Sore Throat: treat with throat lozenges and/or gargle with warm salt   water.  - Because air was used during the procedure, expelling large amounts of air   from your rectum or belching is normal.  - If a bowel prep was taken, you may not have a bowel movement for 1-3 days.    This is normal.  SYMPTOMS TO WATCH FOR AND REPORT TO YOUR PHYSICIAN:  1. Abdominal pain or bloating, other than gas cramps.  2. Chest pain.  3. Back pain.  4. Signs of infection such as: chills or fever occurring within 24 hours   after the procedure.  5. Rectal bleeding, which would show as bright red, maroon, or black stools.   (A tablespoon of blood from the rectum is not serious, especially  if   hemorrhoids are present.)  6. Vomiting.  7. Weakness or dizziness.  GO DIRECTLY TO THE NEAREST EMERGENCY ROOM IF YOU HAVE ANY OF THE FOLLOWING:      Difficulty breathing              Chills and/or fever over 101 F   Persistent vomiting and/or vomiting blood   Severe abdominal pain   Severe chest pain   Black, tarry stools   Bleeding- more than one tablespoon   Any other symptom or condition that you feel may need urgent attention  Your doctor recommends these additional instructions:  If any biopsies were taken, your doctors clinic will contact you in 1 to 2   weeks with any results.  - [facility] to [facility] for [facility].   - Clear liquid diet.   - Continue present medications.   - No aspirin, ibuprofen, naproxen, or other non-steroidal anti-inflammatory   drugs.   - Perform a colonoscopy tomorrow.  For questions, problems or results please call your physician - Robert Suarez MD at Work:  [phone].  [facility], EMERGENCY ROOM PHONE NUMBER: [phone]  IF A COMPLICATION OR EMERGENCY SITUATION ARISES AND YOU ARE UNABLE TO REACH   YOUR PHYSICIAN - GO DIRECTLY TO THE EMERGENCY ROOM.  Robert Suarez MD  1/30/2024 3:02:06 PM  This report has been verified and signed electronically.  Dear patient,  As a result of recent federal legislation (The Federal Cures Act), you may   receive lab or pathology results from your procedure in your [facility]   account before your physician is able to contact you. Your physician or   their representative will relay the results to you with their   recommendations at their soonest availability.  Thank you,  [employer]ATION

## 2024-01-30 NOTE — PLAN OF CARE
Outside Transfer Acceptance Note / Regional Referral Center    Referring facility: [facility]   Referring provider: CONRAD CHEUNG  Accepting facility: [facility]  Accepting provider: Quinton GARCIA  Admitting provider: IVELISSE  Reason for transfer:  Needs GI evaluation  Transfer diagnosis: GIB  Transfer specialty requested: Gastroenterology  Transfer specialty notified: Yes  Transfer level: NUMBER 1-5: 2  Bed type requested: Med-tele  Isolation status: No active isolations   Admission class or status: Emergency      Narrative     89M history of DVT, diverticulosis, CKDIII currently at [facility] for evaluation of GIB. Patient presented with rectal bleeding since about 0300 1/30/2024. He states the stool is dark, put around the margins of the stool is some bright red blood.  Denies any abdominal pain.  He takes Eliquis secondary to history of DVT and pulmonary embolus.  He has had an episode of GI bleeding in the past.  He underwent colonoscopy at that time.  He is not sure what was found.  He states that he feels slightly fatigued and when he stands, he feels slightly lightheaded. Patient is AF, HDS. HgB 11.6 (reported to be near baseline), Cr 1.8 around baseline, as well as BUN 29 around baseline. Patient has type and screen, written for IVF, no indication for pRBC transfusion. GI at [place] request keeping the patient NPO and agrees with transfer for eval, primary admission to .    Objective     Vitals: Temp: 98.9 °F (37.2 °C) (01/30/24 0742)  Pulse: 76 (01/30/24 0742)  Resp: 20 (01/30/24 0742)  BP: 117/67 (01/30/24 0742)  SpO2: (!) 94 % (01/30/24 0742)  Recent Labs: All pertinent labs within the past 24 hours have been reviewed.  Recent imaging: See above   Airway:     Vent settings:         IV access:        Peripheral IV - Single Lumen 01/30/24 0801 20 G Anterior;Left Forearm (Active)   Site Assessment Clean;Dry;Intact;No redness;No swelling 01/30/24 0801   Extremity Assessment Distal  to IV No abnormal discoloration;No redness;No warmth;No swelling 01/30/24 0801   Line Status Blood return noted;Flushed;Saline locked 01/30/24 0801   Dressing Status Clean;Dry 01/30/24 0801   Dressing Intervention First dressing 01/30/24 0801            Peripheral IV - Single Lumen 01/30/24 0820 18 G Right Antecubital (Active)     Infusions: See above  Allergies: Review of patient's allergies indicates:  No Known Allergies   NPO: No    Anticoagulation:   Anticoagulants       None             Instructions      [facility]  Admit to Hospital Medicine  Upon patient arrival to floor, please contact Hospital Medicine on call.

## 2024-01-30 NOTE — PROGRESS NOTES
Automatic Inhaler to Nebulizer Interchange    fluticasone/vilanterol (Breo Ellipta) 200 mcg/25 mcg changed to budesonide 1 mg twice daily AND arformoterol 15 mcg twice daily per [facility] Automatic Therapeutic Substitutions Protocol.    Please contact pharmacy at extension [phone] with any questions.     Thank you,   Uriah Faulkner

## 2024-01-30 NOTE — SUBJECTIVE & OBJECTIVE
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    Prostate cancer 05/2019    PSA elevation 10/7/2015    Wears glasses        Past Surgical History:   Procedure Laterality Date    BIOPSY WITH TRANSRECTAL ULTRASOUND (TRUS) GUIDANCE Bilateral 3/27/2019    Procedure: BIOPSY, WITH TRANSRECTAL US GUIDANCE;  Surgeon: Niall Blas MD;  Location: [facility] OR;  Service: Urology;  Laterality: Bilateral;    COLONOSCOPY N/A 1/13/2020    Procedure: COLONOSCOPY;  Surgeon: Charli Chowdhury MD;  Location: [facility] ENDO;  Service: General;  Laterality: N/A;    CYSTOSCOPY N/A 3/27/2019    Procedure: CYSTOSCOPY;  Surgeon: Niall Blas MD;  Location: [facility] OR;  Service: Urology;  Laterality: N/A;    EPIDURAL STEROID INJECTION N/A 6/17/2019    Procedure: Injection, Steroid, Epidural - L5/S1 EPIDURAL STEROID INJECTION;  Surgeon: Chyna Valdovinos MD;  Location: [facility] OR;  Service: Pain Management;  Laterality: N/A;    EPIDURAL STEROID INJECTION N/A 12/11/2019    Procedure: Injection, Steroid, Epidural - L5/S1 LUMBAR EPIDURAL STEROID INJECTION;  Surgeon: Jade Layton MD;  Location: [facility] OR;  Service: Pain Management;  Laterality: N/A;  *FIRST CASE*    ESOPHAGOGASTRODUODENOSCOPY N/A 1/13/2020    Procedure: EGD (ESOPHAGOGASTRODUODENOSCOPY);  Surgeon: Charli Chowdhury MD;  Location: [facility] ENDO;  Service: General;  Laterality: N/A;    INJECTION OF ANESTHETIC AGENT AROUND MEDIAL BRANCH NERVES INNERVATING LUMBAR FACET JOINT Bilateral 7/12/2021    Procedure: Block-nerve-medial branch-lumbar Bilateral L 3,4,5;   Surgeon: Jade Layton MD;  Location: [facility] OR;  Service: Pain Management;  Laterality: Bilateral;    INJECTION OF JOINT Left 1/21/2019    Procedure: Injection, FACET JOINT INJECTION LEFT L4-5 AND L5-S1;  Surgeon: Chyna Valdovinos MD;  Location: [facility] OR;  Service: Pain Management;  Laterality: Left;    JOINT REPLACEMENT      BILAT HIPS, RIGHT SHOULDER    KNEE ARTHROPLASTY Left     RADIOFREQUENCY THERMOCOAGULATION Bilateral 8/2/2021    Procedure: RADIOFREQUENCY THERMAL COAGULATION Bilateral L 3,4,5;  Surgeon: Jade Layton MD;  Location: [facility] OR;  Service: Pain Management;  Laterality: Bilateral;    TONSILLECTOMY  1950    TOTAL HIP ARTHROPLASTY Bilateral 2013 & 2014    TOTAL SHOULDER ARTHROPLASTY Right 2005       Review of patient's allergies indicates:  No Known Allergies    Current Facility-Administered Medications on File Prior to Encounter   Medication    [COMPLETED] 0.9%  NaCl infusion    [COMPLETED] sodium chloride 0.9% bolus 500 mL 500 mL     Current Outpatient Medications on File Prior to Encounter   Medication Sig    HYDROcodone-acetaminophen (NORCO) 7.5-325 mg per tablet Take 1 tablet by mouth every 6 (six) hours as needed for Pain.    albuterol (PROVENTIL/VENTOLIN HFA) 90 mcg/actuation inhaler Inhale 2 puffs into the lungs every 4 (four) hours as needed for Wheezing or Shortness of Breath. Rescue    apixaban (ELIQUIS) 5 mg Tab Take 1 tablet (5 mg total) by mouth 2 (two) times daily.    bicalutamide (CASODEX) 50 MG Tab Take 1 tablet (50 mg total) by mouth once daily.    chlorhexidine (PERIDEX) 0.12 % solution SWISH WITH 15 ML FOR 30 SECONDS TWICE DAILY    empagliflozin (JARDIANCE) 10 mg tablet Take 1 tablet (10 mg total) by mouth once daily.    fluticasone-umeclidin-vilanter (TRELEGY ELLIPTA) 200-62.5-25 mcg inhaler Inhale 1 puff into the lungs once daily.    gabapentin (NEURONTIN) 300 MG capsule Take 1 capsule (300 mg total) by mouth every evening.    hydrocortisone 2.5 % cream APPLY TO AFFECTED SITES  TWICE DAILY X1-2 WEEKS    hydrocortisone 2.5 % ointment APPLY TO EAR TWICE DAILY X2 WEEKS    metFORMIN (GLUCOPHAGE) 500 MG tablet Take 1 tablet (500 mg total) by mouth 2 (two) times daily with meals.    multivitamin (THERAGRAN) per tablet Take 1 tablet by mouth once daily.    multivitamin with folic acid 400 mcg Tab Take 1 tablet by mouth once daily.    traMADoL (ULTRAM) 50 mg tablet Take one tablet by mouth once or twice a day daily as needed for pain.    zolpidem (AMBIEN) 5 MG Tab Take 1 tablet (5 mg total) by mouth nightly as needed (sleep).     Family History       Problem Relation (Age of Onset)    Bladder Cancer Father    Stroke Mother          Tobacco Use    Smoking status: Former     Current packs/day: 0.00     Types: Cigarettes    Smokeless tobacco: Former     Quit date: 1/1/1990   Substance and Sexual Activity    Alcohol use: Yes     Alcohol/week: 12.0 standard drinks of alcohol     Types: 6 Glasses of wine, 6 Standard drinks or equivalent per week     Comment: 1 drink daily    Drug use: No    Sexual activity: Not Currently     Partners: Female     Review of Systems   Constitutional:  Negative for chills, diaphoresis, fatigue and fever.   HENT:  Negative for congestion, ear pain, sore throat and trouble swallowing.    Eyes:  Negative for pain, discharge and visual disturbance.   Respiratory:  Negative for cough, chest tightness, shortness of breath and wheezing.    Cardiovascular:  Negative for chest pain, palpitations and leg swelling.   Gastrointestinal:  Positive for blood in stool and constipation. Negative for abdominal distention, abdominal pain, diarrhea, nausea and vomiting.   Endocrine: Negative for polydipsia, polyphagia and polyuria.   Genitourinary:  Negative for dysuria, flank pain, frequency and urgency.   Musculoskeletal:  Negative for back pain, joint swelling, neck pain and neck stiffness.   Skin:  Negative for rash and wound.   Allergic/Immunologic: Negative for immunocompromised state.    Neurological:  Positive for dizziness, weakness and light-headedness. Negative for syncope, speech difficulty, numbness and headaches.   Hematological:  Negative for adenopathy.   Psychiatric/Behavioral:  Negative for confusion and suicidal ideas. The patient is not nervous/anxious.    All other systems reviewed and are negative.    Objective:     Vital Signs (Most Recent):  Temp: 97.9 °F (36.6 °C) (01/30/24 1404)  Pulse: 73 (01/30/24 1500)  Resp: 19 (01/30/24 1500)  BP: 124/65 (01/30/24 1500)  SpO2: 99 % (01/30/24 1500) Vital Signs (24h Range):  Temp:  [97.9 °F (36.6 °C)-98.9 °F (37.2 °C)] 97.9 °F (36.6 °C)  Pulse:  [68-77] 73  Resp:  [16-21] 19  SpO2:  [94 %-100 %] 99 %  BP: (117-171)/() 124/65     Weight: 101.6 kg (224 lb)  Body mass index is 33.08 kg/m².     Physical Exam  Vitals and nursing note reviewed.   Constitutional:       Appearance: He is well-developed.   HENT:      Head: Normocephalic and atraumatic.   Eyes:      Conjunctiva/sclera: Conjunctivae normal.      Pupils: Pupils are equal, round, and reactive to light.   Cardiovascular:      Rate and Rhythm: Normal rate and regular rhythm.   Pulmonary:      Effort: Pulmonary effort is normal.      Breath sounds: Normal breath sounds.   Abdominal:      Comments: Mild abd distention with tenderness in the LLQ with palpation, hyperactive BS   Musculoskeletal:         General: Normal range of motion.      Cervical back: Normal range of motion and neck supple.   Skin:     General: Skin is warm and dry.      Capillary Refill: Capillary refill takes less than 2 seconds.   Neurological:      Mental Status: He is alert and oriented to person, place, and time.   Psychiatric:         Behavior: Behavior normal.         Thought Content: Thought content normal.         Judgment: Judgment normal.              CRANIAL NERVES     CN III, IV, VI   Pupils are equal, round, and reactive to light.       Significant Labs: All pertinent labs within the past 24 hours have  been reviewed.  CBC:   Recent Labs   Lab 01/30/24  0812 01/30/24  1338   WBC 6.87 5.81   HGB 11.6* 10.4*   HCT 35.0* 32.5*    199     CMP:   Recent Labs   Lab 01/30/24  0812      K 4.4      CO2 22*   *   BUN 29*   CREATININE 1.8*   CALCIUM 9.3   PROT 7.5   ALBUMIN 3.8   BILITOT 0.5   ALKPHOS 49*   AST 14   ALT 11   ANIONGAP 12

## 2024-01-30 NOTE — CONSULTS
[facility]      CC: Hematochezia     HPI 89 y.o. male here from home for evaluation and treatment of GI bleeding.  Patient states that since about 3:00 a.m. he has had episodic bloody stools.  He states the stool is dark, put around the margins of the stool is some bright red blood.  Denies any abdominal pain.  He takes Eliquis secondary to history of DVT and pulmonary embolus.  He has had an episode of GI bleeding in the past.  He underwent colonoscopy at that time.  He is not sure what was found.  He states that he feels slightly fatigued and when he stands, he feels slightly lightheaded.      FURTHER HISTORY:  Above obtained from independent review of records from admitting provider as well as from direct discussion with transfer center who states patient coming from [place].  In addition, on my interview, I note the following:  Patient admitted with hematochezia, onset 0300, multiple episodes, bright and dark red with clots, painless, with no alleviating/exacerbating factors.  Admits similar episode about 5 years ago and states that he had colonoscopy at that time.  No history of EGD.  Takes eliquis with last dose yesterday.             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    Prostate cancer 05/2019    PSA elevation 10/7/2015    Wears glasses                 Past Surgical History:   Procedure Laterality Date    BIOPSY WITH TRANSRECTAL ULTRASOUND (TRUS) GUIDANCE Bilateral 3/27/2019     Procedure: BIOPSY, WITH TRANSRECTAL US GUIDANCE;  Surgeon: Niall ZARAGOZA  MD Wan;  Location: [facility] OR;  Service: Urology;  Laterality: Bilateral;    COLONOSCOPY N/A 1/13/2020     Procedure: COLONOSCOPY;  Surgeon: Charli Chowdhury MD;  Location: [facility] ENDO;  Service: General;  Laterality: N/A;    CYSTOSCOPY N/A 3/27/2019     Procedure: CYSTOSCOPY;  Surgeon: Niall Blas MD;  Location: [facility] OR;  Service: Urology;  Laterality: N/A;    EPIDURAL STEROID INJECTION N/A 6/17/2019     Procedure: Injection, Steroid, Epidural - L5/S1 EPIDURAL STEROID INJECTION;  Surgeon: Chyna Valdovinos MD;  Location: [facility] OR;  Service: Pain Management;  Laterality: N/A;    EPIDURAL STEROID INJECTION N/A 12/11/2019     Procedure: Injection, Steroid, Epidural - L5/S1 LUMBAR EPIDURAL STEROID INJECTION;  Surgeon: Jade Layton MD;  Location: [facility];  Service: Pain Management;  Laterality: N/A;  *FIRST CASE*    ESOPHAGOGASTRODUODENOSCOPY N/A 1/13/2020     Procedure: EGD (ESOPHAGOGASTRODUODENOSCOPY);  Surgeon: Charli Chowdhury MD;  Location: [facility];  Service: General;  Laterality: N/A;    INJECTION OF ANESTHETIC AGENT AROUND MEDIAL BRANCH NERVES INNERVATING LUMBAR FACET JOINT Bilateral 7/12/2021     Procedure: Block-nerve-medial branch-lumbar Bilateral L 3,4,5;  Surgeon: Jade Layton MD;  Location: [facility];  Service: Pain Management;  Laterality: Bilateral;    INJECTION OF JOINT Left 1/21/2019     Procedure: Injection, FACET JOINT INJECTION LEFT L4-5 AND L5-S1;  Surgeon: Chyna Valdovinos MD;  Location: [facility];  Service: Pain Management;  Laterality: Left;    JOINT REPLACEMENT         BILAT HIPS, RIGHT SHOULDER    KNEE ARTHROPLASTY Left      RADIOFREQUENCY THERMOCOAGULATION Bilateral 8/2/2021     Procedure: RADIOFREQUENCY THERMAL COAGULATION Bilateral L 3,4,5;  Surgeon: Jade Layton MD;  Location: [facility];  Service: Pain Management;  Laterality: Bilateral;    TONSILLECTOMY   1950    TOTAL HIP ARTHROPLASTY Bilateral 2013 & 2014    TOTAL SHOULDER ARTHROPLASTY Right 2005         Social  "History            Tobacco Use    Smoking status: Former       Current packs/day: 0.00       Types: Cigarettes    Smokeless tobacco: Former       Quit date: 1/1/1990   Substance Use Topics    Alcohol use: Yes       Alcohol/week: 12.0 standard drinks of alcohol       Types: 6 Glasses of wine, 6 Standard drinks or equivalent per week       Comment: 1 drink daily    Drug use: No               Family History   Problem Relation Age of Onset    Stroke Mother      Bladder Cancer Father           Review of Systems  General ROS: negative for - chills, fever or weight loss  Psychological ROS: negative for - hallucination, depression or suicidal ideation  Ophthalmic ROS: negative for - blurry vision, photophobia or eye pain  ENT ROS: negative for - epistaxis, sore throat or rhinorrhea  Respiratory ROS: no cough, shortness of breath, or wheezing  Cardiovascular ROS: no chest pain or dyspnea on exertion  Gastrointestinal ROS: as above  Genito-Urinary ROS: no dysuria, trouble voiding, or hematuria  Musculoskeletal ROS: negative for - arthralgia, myalgia, weakness  Neurological ROS: no syncope or seizures; no ataxia  Dermatological ROS: negative for pruritis, rash and jaundice     Physical Examination  BP (!) 145/67 (BP Location: Left arm, Patient Position: Lying)   Pulse 70   Temp 97.9 °F (36.6 °C) (Skin)   Resp 18   Ht 5' 9" (1.753 m)   Wt 101.6 kg (224 lb)   SpO2 100%   BMI 33.08 kg/m²   General appearance: alert, cooperative, no distress  HENT: Normocephalic, atraumatic, neck symmetrical, no nasal discharge   Eyes: conjunctivae/corneas clear, PERRL, EOM's intact, sclera anicteric  Lungs: clear to auscultation bilaterally, no dullness to percussion bilaterally, symmetric expansion, breathing unlabored  Heart: regular rate and rhythm without rub; no displacement of the PMI   Abdomen: obese, NT  Extremities: extremities symmetric; no clubbing, cyanosis, or edema  Integument: Skin color, texture, turgor normal; no rashes; " hair distrubution normal, no jaundice  Neurologic: Alert and oriented X 3, no focal sensory or motor neurologic deficits  Psychiatric: no pressured speech; normal affect; no evidence of impaired cognition, no anxiety/depression      Labs:        Lab Results   Component Value Date     WBC 5.81 01/30/2024     HGB 10.4 (L) 01/30/2024     HCT 32.5 (L) 01/30/2024     MCV 99 (H) 01/30/2024      01/30/2024            CMP        Sodium   Date Value Ref Range Status   01/30/2024 139 136 - 145 mmol/L Final            Potassium   Date Value Ref Range Status   01/30/2024 4.4 3.5 - 5.1 mmol/L Final            Chloride   Date Value Ref Range Status   01/30/2024 105 95 - 110 mmol/L Final            CO2   Date Value Ref Range Status   01/30/2024 22 (L) 23 - 29 mmol/L Final            Glucose   Date Value Ref Range Status   01/30/2024 129 (H) 70 - 110 mg/dL Final            BUN   Date Value Ref Range Status   01/30/2024 29 (H) 8 - 23 mg/dL Final            Creatinine   Date Value Ref Range Status   01/30/2024 1.8 (H) 0.5 - 1.4 mg/dL Final            Calcium   Date Value Ref Range Status   01/30/2024 9.3 8.7 - 10.5 mg/dL Final            Total Protein   Date Value Ref Range Status   01/30/2024 7.5 6.0 - 8.4 g/dL Final            Albumin   Date Value Ref Range Status   01/30/2024 3.8 3.5 - 5.2 g/dL Final              Total Bilirubin   Date Value Ref Range Status   01/30/2024 0.5 0.1 - 1.0 mg/dL Final       Comment:       For infants and newborns, interpretation of results should be based  on gestational age, weight and in agreement with clinical  observations.     Premature Infant recommended reference ranges:  Up to 24 hours.............<8.0 mg/dL  Up to 48 hours............<12.0 mg/dL  3-5 days..................<15.0 mg/dL  6-29 days.................<15.0 mg/dL               Alkaline Phosphatase   Date Value Ref Range Status   01/30/2024 49 (L) 55 - 135 U/L Final            AST   Date Value Ref Range Status   01/30/2024 14 10  - 40 U/L Final            ALT   Date Value Ref Range Status   01/30/2024 11 10 - 44 U/L Final            Anion Gap   Date Value Ref Range Status   01/30/2024 12 8 - 16 mmol/L Final            eGFR   Date Value Ref Range Status   01/30/2024 35.5 (A) >60 mL/min/1.73 m^2 Final   01/15/2024 37 (L) > OR = 60 mL/min/1.73m2 Final            Imaging:  Past CT was independently visualized and reviewed by me and showed diverticular disease.     I have personally reviewed these images     Case discussed as above     Assessment:   Hematochezia  Anemia  Anticoagulation     Plan:  Transfuse PRN  PPI  EGD to rule out upper source.  If negative, then colonoscopy tomorrow  Further recommendations to follow after above.  Communication will be sent to the referring provider regarding my assessment and plan on this patient via EPIC.        Robert Suarez MD  [facility]  [address], [address]  [place] [address]  Office: [phone]  Fax: [phone]           Revision History

## 2024-01-30 NOTE — ANESTHESIA POSTPROCEDURE EVALUATION
Anesthesia Post Evaluation    Patient: Anthony Sheldon    Procedure(s) Performed: Procedure(s) (LRB):  EGD (ESOPHAGOGASTRODUODENOSCOPY) (N/A)    Final Anesthesia Type: general      Patient location during evaluation: PACU  Patient participation: Yes- Able to Participate  Level of consciousness: awake and alert  Post-procedure vital signs: reviewed and stable  Pain management: adequate  Airway patency: patent    PONV status at discharge: No PONV  Anesthetic complications: no      Cardiovascular status: blood pressure returned to baseline  Respiratory status: unassisted  Hydration status: euvolemic  Follow-up not needed.              Vitals Value Taken Time   /70 01/30/24 1538   Temp 36.8 °C (98.2 °F) 01/30/24 1538   Pulse 68 01/30/24 1538   Resp 18 01/30/24 1538   SpO2 98 % 01/30/24 1538         Event Time   Out of Recovery 15:15:00         Pain/David Score: David Score: 10 (1/30/2024  3:05 PM)

## 2024-01-30 NOTE — TRANSFER OF CARE
"Anesthesia Transfer of Care Note    Patient: Anthony Sheldon    Procedure(s) Performed: Procedure(s) (LRB):  EGD (ESOPHAGOGASTRODUODENOSCOPY) (N/A)    Patient location: PACU    Anesthesia Type: general    Transport from OR: Transported from OR on 2-3 L/min O2 by NC with adequate spontaneous ventilation    Post pain: adequate analgesia    Post assessment: no apparent anesthetic complications and tolerated procedure well    Post vital signs: stable    Level of consciousness: sedated and responds to stimulation    Nausea/Vomiting: no nausea/vomiting    Complications: none    Transfer of care protocol was followed      Last vitals: Visit Vitals  BP (!) 145/67 (BP Location: Left arm, Patient Position: Lying)   Pulse 70   Temp 36.6 °C (97.9 °F) (Skin)   Resp 18   Ht 5' 9" (1.753 m)   Wt 101.6 kg (224 lb)   SpO2 100%   BMI 33.08 kg/m²     "

## 2024-01-30 NOTE — PLAN OF CARE
[facility] - Med/Surg  Initial Discharge Assessment       Primary Care Provider: Perfecto Melgar III, MD    Admission Diagnosis: GIB (gastrointestinal bleeding) [K92.2]    Admission Date: 1/30/2024  Expected Discharge Date:     Transition of Care Barriers: None    Payor: HU[employer] MANAGED MEDICARE / Plan: [employer] MEDICARE HMO / Product Type: Capitation /     Extended Emergency Contact Information  Primary Emergency Contact: Luc Sheldon  [facility]  Mobile Phone: [phone]  Relation: Daughter  Preferred language: English   needed? No  Secondary Emergency Contact: Vannessa Short  Mobile Phone: [phone]  Relation: Grandchild  Preferred language: English   needed? No    Discharge Plan A: Home, Home with family  Discharge Plan B: [id number] [address]  [address]  [facility] [address]  Phone: [phone] Fax: [phone]    [facility] [address]  [address] [facility] [address]  Phone: [phone] Fax: [phone]     met with patient and patient's daughter Luc Sheldon at bedside to complete discharge planning assessment.  Patient alert and oriented xs 4.  Patient verified all demographic information on facesheet is correct.  Patient verified PCP is Dr. Melgar.  Patient verified primary health insurance is [employer].  Patient with NO home health but has listed DME.  Patient with MPOA (daughter Ariella) and Living Will.  Patient not on dialysis or medication coumadin.  Patient states he is on Eliquis.  Patient with no 30 day admission.  Patient with no financial issues at this time.  Patient family will provide transportation upon discharge from facility.  Patient independent with ADLs, live alone, drives self.        Initial Assessment (most recent)       Adult Discharge  Assessment - 01/30/24 1611          Discharge Assessment    Assessment Type Discharge Planning Assessment     Confirmed/corrected address, phone number and insurance Yes     Confirmed Demographics Correct on Facesheet     Source of Information patient;family     Does patient/caregiver understand observation status Yes     Communicated REYNA with patient/caregiver Yes     People in Home alone     Facility Arrived From: home     Do you expect to return to your current living situation? Yes     Do you have help at home or someone to help you manage your care at home? Yes     Who are your caregiver(s) and their phone number(s)? self     Prior to hospitilization cognitive status: Alert/Oriented     Current cognitive status: Alert/Oriented     Walking or Climbing Stairs Difficulty yes     Walking or Climbing Stairs ambulation difficulty, requires equipment;stair climbing difficulty, requires equipment     Dressing/Bathing Difficulty yes     Dressing/Bathing bathing difficulty, requires equipment     Equipment Currently Used at Home cane, straight;rollator     Readmission within 30 days? No     Patient currently being followed by outpatient case management? No     Do you currently have service(s) that help you manage your care at home? No     Do you take prescription medications? Yes     Do you have prescription coverage? Yes     Do you have any problems affording any of your prescribed medications? No     Is the patient taking medications as prescribed? yes     Who is going to help you get home at discharge? daughter     How do you get to doctors appointments? car, drives self     Are you on dialysis? No     Do you take coumadin? No     Discharge Plan A Home;Home with family     Discharge Plan B Home Health     DME Needed Upon Discharge  none     Discharge Plan discussed with: Patient;Adult children     Transition of Care Barriers None

## 2024-01-30 NOTE — TELEMEDICINE CONSULT
THIS IS LABELED AS A TELEMEDICINE CONSULT BUT WAS ACTUALLY AN IN-PERSON BEDSIDE CONSULT    [facility]     CC: Hematochezia    HPI 89 y.o. male here from home for evaluation and treatment of GI bleeding.  Patient states that since about 3:00 a.m. he has had episodic bloody stools.  He states the stool is dark, put around the margins of the stool is some bright red blood.  Denies any abdominal pain.  He takes Eliquis secondary to history of DVT and pulmonary embolus.  He has had an episode of GI bleeding in the past.  He underwent colonoscopy at that time.  He is not sure what was found.  He states that he feels slightly fatigued and when he stands, he feels slightly lightheaded.     FURTHER HISTORY:  Above obtained from independent review of records from admitting provider as well as from direct discussion with transfer center who states patient coming from [place].  In addition, on my interview, I note the following:  Patient admitted with hematochezia, onset 0300, multiple episodes, bright and dark red with clots, painless, with no alleviating/exacerbating factors.  Admits similar episode about 5 years ago and states that he had colonoscopy at that time.  No history of EGD.  Takes eliquis with last dose yesterday.      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    Prostate cancer 05/2019    PSA elevation 10/7/2015    Wears glasses        Past Surgical History:   Procedure Laterality Date    BIOPSY WITH TRANSRECTAL ULTRASOUND (TRUS) GUIDANCE Bilateral 3/27/2019    Procedure:  BIOPSY, WITH TRANSRECTAL US GUIDANCE;  Surgeon: Niall Blas MD;  Location: [facility] OR;  Service: Urology;  Laterality: Bilateral;    COLONOSCOPY N/A 1/13/2020    Procedure: COLONOSCOPY;  Surgeon: Charli Chowdhury MD;  Location: [facility] ENDO;  Service: General;  Laterality: N/A;    CYSTOSCOPY N/A 3/27/2019    Procedure: CYSTOSCOPY;  Surgeon: Niall Blas MD;  Location: [facility] OR;  Service: Urology;  Laterality: N/A;    EPIDURAL STEROID INJECTION N/A 6/17/2019    Procedure: Injection, Steroid, Epidural - L5/S1 EPIDURAL STEROID INJECTION;  Surgeon: Chyna Valdovinos MD;  Location: [facility] OR;  Service: Pain Management;  Laterality: N/A;    EPIDURAL STEROID INJECTION N/A 12/11/2019    Procedure: Injection, Steroid, Epidural - L5/S1 LUMBAR EPIDURAL STEROID INJECTION;  Surgeon: Jade Layton MD;  Location: [facility] OR;  Service: Pain Management;  Laterality: N/A;  *FIRST CASE*    ESOPHAGOGASTRODUODENOSCOPY N/A 1/13/2020    Procedure: EGD (ESOPHAGOGASTRODUODENOSCOPY);  Surgeon: Charli Chowdhury MD;  Location: [facility] ENDO;  Service: General;  Laterality: N/A;    INJECTION OF ANESTHETIC AGENT AROUND MEDIAL BRANCH NERVES INNERVATING LUMBAR FACET JOINT Bilateral 7/12/2021    Procedure: Block-nerve-medial branch-lumbar Bilateral L 3,4,5;  Surgeon: Jade Layton MD;  Location: [facility] OR;  Service: Pain Management;  Laterality: Bilateral;    INJECTION OF JOINT Left 1/21/2019    Procedure: Injection, FACET JOINT INJECTION LEFT L4-5 AND L5-S1;  Surgeon: Chyna Valdovinos MD;  Location: [facility] OR;  Service: Pain Management;  Laterality: Left;    JOINT REPLACEMENT      BILAT HIPS, RIGHT SHOULDER    KNEE ARTHROPLASTY Left     RADIOFREQUENCY THERMOCOAGULATION Bilateral 8/2/2021    Procedure: RADIOFREQUENCY THERMAL COAGULATION Bilateral L 3,4,5;  Surgeon: Jade Layton MD;  Location: [facility] OR;  Service: Pain Management;  Laterality: Bilateral;    TONSILLECTOMY  1950    TOTAL HIP ARTHROPLASTY Bilateral 2013 & 2014    TOTAL  "SHOULDER ARTHROPLASTY Right 2005       Social History     Tobacco Use    Smoking status: Former     Current packs/day: 0.00     Types: Cigarettes    Smokeless tobacco: Former     Quit date: 1/1/1990   Substance Use Topics    Alcohol use: Yes     Alcohol/week: 12.0 standard drinks of alcohol     Types: 6 Glasses of wine, 6 Standard drinks or equivalent per week     Comment: 1 drink daily    Drug use: No       Family History   Problem Relation Age of Onset    Stroke Mother     Bladder Cancer Father        Review of Systems  General ROS: negative for - chills, fever or weight loss  Psychological ROS: negative for - hallucination, depression or suicidal ideation  Ophthalmic ROS: negative for - blurry vision, photophobia or eye pain  ENT ROS: negative for - epistaxis, sore throat or rhinorrhea  Respiratory ROS: no cough, shortness of breath, or wheezing  Cardiovascular ROS: no chest pain or dyspnea on exertion  Gastrointestinal ROS: as above  Genito-Urinary ROS: no dysuria, trouble voiding, or hematuria  Musculoskeletal ROS: negative for - arthralgia, myalgia, weakness  Neurological ROS: no syncope or seizures; no ataxia  Dermatological ROS: negative for pruritis, rash and jaundice    Physical Examination  BP (!) 145/67 (BP Location: Left arm, Patient Position: Lying)   Pulse 70   Temp 97.9 °F (36.6 °C) (Skin)   Resp 18   Ht 5' 9" (1.753 m)   Wt 101.6 kg (224 lb)   SpO2 100%   BMI 33.08 kg/m²   General appearance: alert, cooperative, no distress  HENT: Normocephalic, atraumatic, neck symmetrical, no nasal discharge   Eyes: conjunctivae/corneas clear, PERRL, EOM's intact, sclera anicteric  Lungs: clear to auscultation bilaterally, no dullness to percussion bilaterally, symmetric expansion, breathing unlabored  Heart: regular rate and rhythm without rub; no displacement of the PMI   Abdomen: obese, NT  Extremities: extremities symmetric; no clubbing, cyanosis, or edema  Integument: Skin color, texture, turgor " normal; no rashes; hair distrubution normal, no jaundice  Neurologic: Alert and oriented X 3, no focal sensory or motor neurologic deficits  Psychiatric: no pressured speech; normal affect; no evidence of impaired cognition, no anxiety/depression     Labs:  Lab Results   Component Value Date    WBC 5.81 01/30/2024    HGB 10.4 (L) 01/30/2024    HCT 32.5 (L) 01/30/2024    MCV 99 (H) 01/30/2024     01/30/2024         CMP  Sodium   Date Value Ref Range Status   01/30/2024 139 136 - 145 mmol/L Final     Potassium   Date Value Ref Range Status   01/30/2024 4.4 3.5 - 5.1 mmol/L Final     Chloride   Date Value Ref Range Status   01/30/2024 105 95 - 110 mmol/L Final     CO2   Date Value Ref Range Status   01/30/2024 22 (L) 23 - 29 mmol/L Final     Glucose   Date Value Ref Range Status   01/30/2024 129 (H) 70 - 110 mg/dL Final     BUN   Date Value Ref Range Status   01/30/2024 29 (H) 8 - 23 mg/dL Final     Creatinine   Date Value Ref Range Status   01/30/2024 1.8 (H) 0.5 - 1.4 mg/dL Final     Calcium   Date Value Ref Range Status   01/30/2024 9.3 8.7 - 10.5 mg/dL Final     Total Protein   Date Value Ref Range Status   01/30/2024 7.5 6.0 - 8.4 g/dL Final     Albumin   Date Value Ref Range Status   01/30/2024 3.8 3.5 - 5.2 g/dL Final     Total Bilirubin   Date Value Ref Range Status   01/30/2024 0.5 0.1 - 1.0 mg/dL Final     Comment:     For infants and newborns, interpretation of results should be based  on gestational age, weight and in agreement with clinical  observations.    Premature Infant recommended reference ranges:  Up to 24 hours.............<8.0 mg/dL  Up to 48 hours............<12.0 mg/dL  3-5 days..................<15.0 mg/dL  6-29 days.................<15.0 mg/dL       Alkaline Phosphatase   Date Value Ref Range Status   01/30/2024 49 (L) 55 - 135 U/L Final     AST   Date Value Ref Range Status   01/30/2024 14 10 - 40 U/L Final     ALT   Date Value Ref Range Status   01/30/2024 11 10 - 44 U/L Final      Anion Gap   Date Value Ref Range Status   01/30/2024 12 8 - 16 mmol/L Final     eGFR   Date Value Ref Range Status   01/30/2024 35.5 (A) >60 mL/min/1.73 m^2 Final   01/15/2024 37 (L) > OR = 60 mL/min/1.73m2 Final         Imaging:  Past CT was independently visualized and reviewed by me and showed diverticular disease.    I have personally reviewed these images    Case discussed as above    Assessment:   Hematochezia  Anemia  Anticoagulation    Plan:  Transfuse PRN  PPI  EGD to rule out upper source.  If negative, then colonoscopy tomorrow  Further recommendations to follow after above.  Communication will be sent to the referring provider regarding my assessment and plan on this patient via EPIC.      Robert Suarez MD  [facility]  [address], [address]  [place] [address]  Office: [phone]  Fax: [phone]

## 2024-01-30 NOTE — ASSESSMENT & PLAN NOTE
"Patient's FSGs are controlled on current medication regimen.  Last A1c reviewed-   Lab Results   Component Value Date    LABA1C 5.5 06/19/2018    HGBA1C 6.6 (H) 01/15/2024     Most recent fingerstick glucose reviewed- No results for input(s): "POCTGLUCOSE" in the last 24 hours.  Current correctional scale  Medium  Maintain anti-hyperglycemic dose as follows-   Antihyperglycemics (From admission, onward)      Start     Stop Route Frequency Ordered    01/30/24 1537  insulin aspart U-100 pen 0-10 Units         -- SubQ Every 6 hours PRN 01/30/24 1437          Hold Oral hypoglycemics while patient is in the hospital.    "

## 2024-01-30 NOTE — ED PROVIDER NOTES
Encounter Date: 1/30/2024       History     Chief Complaint   Patient presents with    Rectal Bleeding     Pt stated he has had darkm red blood in his stool since around 0330 this morning. Pt also stated he has been feeling weak this morning.    Weakness     89-year-old male here from home for evaluation and treatment of GI bleeding.  Patient states that since about 3:00 a.m. he has had episodic bloody stools.  He states the stool is dark, put around the margins of the stool is some bright red blood.  Denies any abdominal pain.  He takes Eliquis secondary to history of DVT and pulmonary embolus.  He has had an episode of GI bleeding in the past.  He underwent colonoscopy at that time.  He is not sure what was found.  He states that he feels slightly fatigued and when he stands, he feels slightly lightheaded.      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    Prostate cancer 05/2019    PSA elevation 10/7/2015    Wears glasses      Past Surgical History:   Procedure Laterality Date    BIOPSY WITH TRANSRECTAL ULTRASOUND (TRUS) GUIDANCE Bilateral 3/27/2019    Procedure: BIOPSY, WITH TRANSRECTAL US GUIDANCE;  Surgeon: Niall Blas MD;  Location: [facility] OR;  Service: Urology;  Laterality: Bilateral;    COLONOSCOPY N/A 1/13/2020    Procedure: COLONOSCOPY;  Surgeon: Charli Chowdhury MD;  Location: [facility] ENDO;  Service: General;  Laterality: N/A;    CYSTOSCOPY N/A 3/27/2019    Procedure: CYSTOSCOPY;  Surgeon: Niall Blas MD;   Location: [facility] OR;  Service: Urology;  Laterality: N/A;    EPIDURAL STEROID INJECTION N/A 6/17/2019    Procedure: Injection, Steroid, Epidural - L5/S1 EPIDURAL STEROID INJECTION;  Surgeon: Chyna Valdovinos MD;  Location: [facility] OR;  Service: Pain Management;  Laterality: N/A;    EPIDURAL STEROID INJECTION N/A 12/11/2019    Procedure: Injection, Steroid, Epidural - L5/S1 LUMBAR EPIDURAL STEROID INJECTION;  Surgeon: Jade Layton MD;  Location: [facility] OR;  Service: Pain Management;  Laterality: N/A;  *FIRST CASE*    ESOPHAGOGASTRODUODENOSCOPY N/A 1/13/2020    Procedure: EGD (ESOPHAGOGASTRODUODENOSCOPY);  Surgeon: Charli Chowdhury MD;  Location: [facility];  Service: General;  Laterality: N/A;    INJECTION OF ANESTHETIC AGENT AROUND MEDIAL BRANCH NERVES INNERVATING LUMBAR FACET JOINT Bilateral 7/12/2021    Procedure: Block-nerve-medial branch-lumbar Bilateral L 3,4,5;  Surgeon: Jade Layton MD;  Location: [facility] OR;  Service: Pain Management;  Laterality: Bilateral;    INJECTION OF JOINT Left 1/21/2019    Procedure: Injection, FACET JOINT INJECTION LEFT L4-5 AND L5-S1;  Surgeon: Chyna Valdovinos MD;  Location: [facility] OR;  Service: Pain Management;  Laterality: Left;    JOINT REPLACEMENT      BILAT HIPS, RIGHT SHOULDER    KNEE ARTHROPLASTY Left     RADIOFREQUENCY THERMOCOAGULATION Bilateral 8/2/2021    Procedure: RADIOFREQUENCY THERMAL COAGULATION Bilateral L 3,4,5;  Surgeon: Jade Layton MD;  Location: [facility] OR;  Service: Pain Management;  Laterality: Bilateral;    TONSILLECTOMY  1950    TOTAL HIP ARTHROPLASTY Bilateral 2013 & 2014    TOTAL SHOULDER ARTHROPLASTY Right 2005     Family History   Problem Relation Age of Onset    Stroke Mother     Bladder Cancer Father      Social History     Tobacco Use    Smoking status: Former     Current packs/day: 0.00     Types: Cigarettes    Smokeless tobacco: Former     Quit date: 1/1/1990   Substance Use Topics    Alcohol use: Yes     Alcohol/week: 12.0 standard drinks of  alcohol     Types: 6 Glasses of wine, 6 Standard drinks or equivalent per week     Comment: 1 drink daily    Drug use: No     Review of Systems   Constitutional:  Positive for fatigue. Negative for chills and fever.   HENT: Negative.     Eyes: Negative.    Respiratory: Negative.     Cardiovascular: Negative.    Gastrointestinal: Negative.    Endocrine: Negative.    Genitourinary: Negative.    Musculoskeletal: Negative.    Skin: Negative.    Neurological:  Positive for weakness and light-headedness.   Psychiatric/Behavioral: Negative.         Physical Exam     Initial Vitals   BP Pulse Resp Temp SpO2   01/30/24 0742 01/30/24 0742 01/30/24 0742 01/30/24 0731 01/30/24 0742   117/67 76 20 98.9 °F (37.2 °C) (!) 94 %      MAP       --                Physical Exam    Nursing note and vitals reviewed.  Constitutional: He appears well-developed and well-nourished. He is not diaphoretic. No distress.   HENT:   Head: Normocephalic and atraumatic.   Nose: Nose normal.   Mouth/Throat: Oropharynx is clear and moist. No oropharyngeal exudate.   Eyes: Conjunctivae and EOM are normal. Pupils are equal, round, and reactive to light. No scleral icterus.   Neck: Neck supple. No JVD present.   Normal range of motion.  Cardiovascular:  Normal rate, regular rhythm, normal heart sounds and intact distal pulses.           No murmur heard.  Pulmonary/Chest: Breath sounds normal. No stridor. No respiratory distress. He has no wheezes. He has no rhonchi. He has no rales.   Abdominal: Abdomen is soft. Bowel sounds are normal. He exhibits no distension. There is no abdominal tenderness.   Musculoskeletal:         General: No tenderness or edema. Normal range of motion.      Cervical back: Normal range of motion and neck supple.     Neurological: He is alert and oriented to person, place, and time. He has normal strength. No cranial nerve deficit or sensory deficit. GCS score is 15. GCS eye subscore is 4. GCS verbal subscore is 5. GCS motor  subscore is 6.   Skin: Skin is warm and dry. Capillary refill takes less than 2 seconds. No rash noted. No erythema.   Psychiatric: He has a normal mood and affect. His behavior is normal.         ED Course   Procedures  Labs Reviewed   CBC W/ AUTO DIFFERENTIAL - Abnormal; Notable for the following components:       Result Value    RBC 3.69 (*)     Hemoglobin 11.6 (*)     Hematocrit 35.0 (*)     MCH 31.4 (*)     MPV 9.1 (*)     Gran % 73.3 (*)     Lymph % 17.9 (*)     All other components within normal limits   COMPREHENSIVE METABOLIC PANEL - Abnormal; Notable for the following components:    CO2 22 (*)     Glucose 129 (*)     BUN 29 (*)     Creatinine 1.8 (*)     Alkaline Phosphatase 49 (*)     eGFR 35.5 (*)     All other components within normal limits   TYPE & SCREEN     EKG Readings: (Independently Interpreted)   EKG personally reviewed by me shows sinus rhythm, first-degree AV block, left axis, low voltage, evidence for inferior infarct of undetermined age, also possible anterolateral infarct of undetermined age.  78 beats per minute, NC interval 296, .  No ST elevation or depression, no arrhythmia       Imaging Results    None          Medications - No data to display  Medical Decision Making  Differential includes upper GI bleed, lower GI bleed, symptomatic anemia, neoplasm, hemorrhoids, etc.    Physical exam reveals a healthy-appearing 89-year-old male, no acute distress.  Rectal exam reveals no evidence of fissure or obvious hemorrhoids.  Patient is leaking maroon colored blood.  No obvious stool in the vault.  No masses palpated.  His H&H appears to be at baseline but he does state that since he started bleeding he feels somewhat lightheaded when standing.  Vital signs are stable here.  Patient will need GI services, which are unavailable here.  Request for transfer has been entered and I spoke to Dr. Barreto, gastroenterologist at [place] he stated that he would be happy to see and treat the  patient.  Spoke to Dr. Alcantara, hospitalist who accepted transfer.  Now waiting bed assignment.  Patient is NPO, receiving IV fluids.  Last dose of Eliquis was last evening.    Amount and/or Complexity of Data Reviewed  Labs: ordered.    Risk  Prescription drug management.                                      Clinical Impression:  Final diagnoses:  [K92.2] GI bleeding  [K92.2] Lower GI bleed (Primary)  [Z79.01] Anticoagulant long-term use          ED Disposition Condition    Transfer to Another Facility Stable                Patel Spencer MD  01/30/24 [id number]       Patel Spencer MD  01/30/24 1041

## 2024-01-31 ENCOUNTER — ANESTHESIA EVENT (OUTPATIENT)
Dept: ENDOSCOPY | Facility: HOSPITAL | Age: 89
End: 2024-01-31
Payer: MEDICARE

## 2024-01-31 ENCOUNTER — ANESTHESIA (OUTPATIENT)
Dept: ENDOSCOPY | Facility: HOSPITAL | Age: 89
End: 2024-01-31
Payer: MEDICARE

## 2024-01-31 VITALS
OXYGEN SATURATION: 100 % | HEIGHT: 69 IN | WEIGHT: 224 LBS | HEART RATE: 65 BPM | SYSTOLIC BLOOD PRESSURE: 156 MMHG | DIASTOLIC BLOOD PRESSURE: 73 MMHG | RESPIRATION RATE: 20 BRPM | TEMPERATURE: 98 F | BODY MASS INDEX: 33.18 KG/M2

## 2024-01-31 LAB
BASOPHILS # BLD AUTO: 0.03 K/UL (ref 0–0.2)
BASOPHILS # BLD AUTO: 0.04 K/UL (ref 0–0.2)
BASOPHILS NFR BLD: 0.5 % (ref 0–1.9)
BASOPHILS NFR BLD: 0.8 % (ref 0–1.9)
DIFFERENTIAL METHOD BLD: ABNORMAL
DIFFERENTIAL METHOD BLD: ABNORMAL
EOSINOPHIL # BLD AUTO: 0 K/UL (ref 0–0.5)
EOSINOPHIL # BLD AUTO: 0.1 K/UL (ref 0–0.5)
EOSINOPHIL NFR BLD: 0.7 % (ref 0–8)
EOSINOPHIL NFR BLD: 1 % (ref 0–8)
ERYTHROCYTE [DISTWIDTH] IN BLOOD BY AUTOMATED COUNT: 12.7 % (ref 11.5–14.5)
ERYTHROCYTE [DISTWIDTH] IN BLOOD BY AUTOMATED COUNT: 12.7 % (ref 11.5–14.5)
HCT VFR BLD AUTO: 29.3 % (ref 40–54)
HCT VFR BLD AUTO: 31.3 % (ref 40–54)
HGB BLD-MCNC: 10.2 G/DL (ref 14–18)
HGB BLD-MCNC: 9.6 G/DL (ref 14–18)
IMM GRANULOCYTES # BLD AUTO: 0.01 K/UL (ref 0–0.04)
IMM GRANULOCYTES # BLD AUTO: 0.02 K/UL (ref 0–0.04)
IMM GRANULOCYTES NFR BLD AUTO: 0.2 % (ref 0–0.5)
IMM GRANULOCYTES NFR BLD AUTO: 0.4 % (ref 0–0.5)
LYMPHOCYTES # BLD AUTO: 1 K/UL (ref 1–4.8)
LYMPHOCYTES # BLD AUTO: 1.2 K/UL (ref 1–4.8)
LYMPHOCYTES NFR BLD: 19.7 % (ref 18–48)
LYMPHOCYTES NFR BLD: 20.3 % (ref 18–48)
MCH RBC QN AUTO: 31.8 PG (ref 27–31)
MCH RBC QN AUTO: 32.1 PG (ref 27–31)
MCHC RBC AUTO-ENTMCNC: 32.6 G/DL (ref 32–36)
MCHC RBC AUTO-ENTMCNC: 32.8 G/DL (ref 32–36)
MCV RBC AUTO: 98 FL (ref 82–98)
MCV RBC AUTO: 98 FL (ref 82–98)
MONOCYTES # BLD AUTO: 0.4 K/UL (ref 0.3–1)
MONOCYTES # BLD AUTO: 0.4 K/UL (ref 0.3–1)
MONOCYTES NFR BLD: 7.3 % (ref 4–15)
MONOCYTES NFR BLD: 7.6 % (ref 4–15)
NEUTROPHILS # BLD AUTO: 3.4 K/UL (ref 1.8–7.7)
NEUTROPHILS # BLD AUTO: 4.2 K/UL (ref 1.8–7.7)
NEUTROPHILS NFR BLD: 70.5 % (ref 38–73)
NEUTROPHILS NFR BLD: 71 % (ref 38–73)
NRBC BLD-RTO: 0 /100 WBC
NRBC BLD-RTO: 0 /100 WBC
PLATELET # BLD AUTO: 182 K/UL (ref 150–450)
PLATELET # BLD AUTO: 222 K/UL (ref 150–450)
PMV BLD AUTO: 9 FL (ref 9.2–12.9)
PMV BLD AUTO: 9.2 FL (ref 9.2–12.9)
POCT GLUCOSE: 108 MG/DL (ref 70–110)
RBC # BLD AUTO: 2.99 M/UL (ref 4.6–6.2)
RBC # BLD AUTO: 3.21 M/UL (ref 4.6–6.2)
WBC # BLD AUTO: 4.88 K/UL (ref 3.9–12.7)
WBC # BLD AUTO: 5.91 K/UL (ref 3.9–12.7)

## 2024-01-31 PROCEDURE — 99900035 HC TECH TIME PER 15 MIN (STAT)

## 2024-01-31 PROCEDURE — 63600175 PHARM REV CODE 636 W HCPCS: Performed by: NURSE ANESTHETIST, CERTIFIED REGISTERED

## 2024-01-31 PROCEDURE — 25000003 PHARM REV CODE 250: Performed by: NURSE ANESTHETIST, CERTIFIED REGISTERED

## 2024-01-31 PROCEDURE — 25000003 PHARM REV CODE 250: Performed by: NURSE PRACTITIONER

## 2024-01-31 PROCEDURE — 45378 DIAGNOSTIC COLONOSCOPY: CPT | Mod: ,,, | Performed by: INTERNAL MEDICINE

## 2024-01-31 PROCEDURE — 96361 HYDRATE IV INFUSION ADD-ON: CPT | Mod: 59

## 2024-01-31 PROCEDURE — 25000003 PHARM REV CODE 250: Performed by: INTERNAL MEDICINE

## 2024-01-31 PROCEDURE — 94761 N-INVAS EAR/PLS OXIMETRY MLT: CPT | Mod: XB

## 2024-01-31 PROCEDURE — 85025 COMPLETE CBC W/AUTO DIFF WBC: CPT | Performed by: NURSE PRACTITIONER

## 2024-01-31 PROCEDURE — 45378 DIAGNOSTIC COLONOSCOPY: CPT | Performed by: INTERNAL MEDICINE

## 2024-01-31 PROCEDURE — 63600175 PHARM REV CODE 636 W HCPCS: Performed by: NURSE PRACTITIONER

## 2024-01-31 PROCEDURE — 99900031 HC PATIENT EDUCATION (STAT)

## 2024-01-31 PROCEDURE — 37000008 HC ANESTHESIA 1ST 15 MINUTES: Performed by: INTERNAL MEDICINE

## 2024-01-31 PROCEDURE — C9113 INJ PANTOPRAZOLE SODIUM, VIA: HCPCS | Performed by: NURSE PRACTITIONER

## 2024-01-31 PROCEDURE — 94799 UNLISTED PULMONARY SVC/PX: CPT | Mod: XB

## 2024-01-31 PROCEDURE — 96376 TX/PRO/DX INJ SAME DRUG ADON: CPT | Mod: 59

## 2024-01-31 PROCEDURE — G0378 HOSPITAL OBSERVATION PER HR: HCPCS

## 2024-01-31 PROCEDURE — 36415 COLL VENOUS BLD VENIPUNCTURE: CPT | Performed by: NURSE PRACTITIONER

## 2024-01-31 PROCEDURE — 37000009 HC ANESTHESIA EA ADD 15 MINS: Performed by: INTERNAL MEDICINE

## 2024-01-31 RX ORDER — PROPOFOL 10 MG/ML
VIAL (ML) INTRAVENOUS
Status: DISCONTINUED | OUTPATIENT
Start: 2024-01-31 | End: 2024-01-31

## 2024-01-31 RX ORDER — PANTOPRAZOLE SODIUM 40 MG/1
40 TABLET, DELAYED RELEASE ORAL DAILY
Qty: 30 TABLET | Refills: 11 | Status: SHIPPED | OUTPATIENT
Start: 2024-01-31 | End: 2024-04-22 | Stop reason: SDUPTHER

## 2024-01-31 RX ORDER — DOCUSATE SODIUM 100 MG/1
100 CAPSULE, LIQUID FILLED ORAL DAILY
Refills: 0
Start: 2024-01-31

## 2024-01-31 RX ORDER — LIDOCAINE HYDROCHLORIDE 20 MG/ML
INJECTION INTRAVENOUS
Status: DISCONTINUED | OUTPATIENT
Start: 2024-01-31 | End: 2024-01-31

## 2024-01-31 RX ORDER — SODIUM CHLORIDE 9 MG/ML
INJECTION, SOLUTION INTRAVENOUS ONCE
Status: DISCONTINUED | OUTPATIENT
Start: 2024-01-31 | End: 2024-01-31 | Stop reason: SDUPTHER

## 2024-01-31 RX ORDER — SODIUM CHLORIDE 9 MG/ML
INJECTION, SOLUTION INTRAVENOUS CONTINUOUS
Status: DISCONTINUED | OUTPATIENT
Start: 2024-01-31 | End: 2024-01-31 | Stop reason: HOSPADM

## 2024-01-31 RX ORDER — BISACODYL 5 MG
5 TABLET, DELAYED RELEASE (ENTERIC COATED) ORAL DAILY PRN
Status: DISCONTINUED | OUTPATIENT
Start: 2024-01-31 | End: 2024-01-31 | Stop reason: HOSPADM

## 2024-01-31 RX ADMIN — PROPOFOL 100 MG: 10 INJECTION, EMULSION INTRAVENOUS at 03:01

## 2024-01-31 RX ADMIN — PANTOPRAZOLE SODIUM 40 MG: 40 INJECTION, POWDER, FOR SOLUTION INTRAVENOUS at 09:01

## 2024-01-31 RX ADMIN — PROPOFOL 40 MG: 10 INJECTION, EMULSION INTRAVENOUS at 03:01

## 2024-01-31 RX ADMIN — LIDOCAINE HYDROCHLORIDE 100 MG: 20 INJECTION, SOLUTION INTRAVENOUS at 03:01

## 2024-01-31 RX ADMIN — SODIUM CHLORIDE: 9 INJECTION, SOLUTION INTRAVENOUS at 03:01

## 2024-01-31 RX ADMIN — BICALUTAMIDE 50 MG: 50 TABLET, FILM COATED ORAL at 08:01

## 2024-01-31 RX ADMIN — BISACODYL 5 MG: 5 TABLET, COATED ORAL at 03:01

## 2024-01-31 RX ADMIN — LIDOCAINE HYDROCHLORIDE 50 MG: 20 INJECTION, SOLUTION INTRAVENOUS at 03:01

## 2024-01-31 NOTE — PLAN OF CARE
2/2/24 10:00 pcp follow up with Dr. Melgar added to pts AVS.    01/31/24 1051   Discharge Assessment   Assessment Type Discharge Planning Reassessment

## 2024-01-31 NOTE — PLAN OF CARE
HH packet and orders sent to [facility] FAJARDO of [facility] to review for acceptance. CM following.    01/31/24 1401   Post-Acute Status   Post-Acute Authorization Home Health   Home Health Status Referrals Sent   Patient choice form signed by patient/caregiver List with quality metrics by geographic area provided

## 2024-01-31 NOTE — PLAN OF CARE
Problem: Adult Inpatient Plan of Care  Goal: Plan of Care Review  Outcome: Ongoing, Progressing  Goal: Patient-Specific Goal (Individualized)  Outcome: Ongoing, Progressing  Goal: Optimal Comfort and Wellbeing  Outcome: Ongoing, Progressing     Problem: Diabetes Comorbidity  Goal: Blood Glucose Level Within Targeted Range  Outcome: Ongoing, Progressing     Problem: Adjustment to Illness (Gastrointestinal Bleeding)  Goal: Optimal Coping with Acute Illness  Outcome: Ongoing, Progressing     Problem: Bleeding (Gastrointestinal Bleeding)  Goal: Hemostasis  Outcome: Ongoing, Progressing     Problem: Fall Injury Risk  Goal: Absence of Fall and Fall-Related Injury  Outcome: Ongoing, Progressing     Problem: Pain Acute  Goal: Acceptable Pain Control and Functional Ability  Outcome: Ongoing, Progressing     Problem: Infection  Goal: Absence of Infection Signs and Symptoms  Outcome: Ongoing, Progressing

## 2024-01-31 NOTE — PLAN OF CARE
The pt is cleared for discharge home from case management with MS SCOTTY PARADA of [facility] and has follow up appts added to his AVS.    01/31/24 1415   Final Note   Assessment Type Final Discharge Note   Anticipated Discharge Disposition Home-Health   What phone number can be called within the next 1-3 days to see how you are doing after discharge? [phone]   Hospital Resources/Appts/Education Provided Post-Acute resouces added to AVS;Appointments scheduled and added to AVS   Post-Acute Status   Post-Acute Authorization Home Health   Home Health Status Set-up Complete/Auth obtained   Discharge Delays None known at this time

## 2024-01-31 NOTE — ANESTHESIA PREPROCEDURE EVALUATION
01/31/2024  Anthony Sheldon is a 89 y.o., male.      Pre-op Assessment    I have reviewed the Patient Summary Reports.     I have reviewed the Nursing Notes. I have reviewed the NPO Status.   I have reviewed the Medications.     Review of Systems  Anesthesia Hx:  No problems with previous Anesthesia             Denies Family Hx of Anesthesia complications.    Denies Personal Hx of Anesthesia complications.                    Hematology/Oncology:       -- Anemia:               Hematology Comments: PE/DVT, Eliquis 1/29                    Cardiovascular:                 DELEON  ECG has been reviewed. Recent cath without intervention      Shortness of Breath Dyspnea On Extertion                         Pulmonary:   COPD   Shortness of breath      Chronic Obstructive Pulmonary Disease (COPD):                      Renal/:  Chronic Renal Disease, CKD        Kidney Function/Disease             Hepatic/GI:    Hiatal Hernia,        Hernia, Hiatal Hernia      Musculoskeletal:  Arthritis        Arthritis     Spine Disorders: lumbar Degenerative disease           Neurological:    Neuromuscular Disease,           Arthritis                         Neuromuscular Disease   Endocrine:  Diabetes, type 2    Diabetes                    Obesity / BMI > 30      Physical Exam  General: Cooperative, Alert and Oriented    Airway:  Mallampati: III / II  Mouth Opening: Normal  TM Distance: Normal  Tongue: Normal    Dental:  Dentures        Anesthesia Plan  Type of Anesthesia, risks & benefits discussed:    Anesthesia Type: Gen Natural Airway  Intra-op Monitoring Plan: Standard ASA Monitors  Induction:  IV  Informed Consent: Informed consent signed with the Patient and all parties understand the risks and agree with anesthesia plan.  All questions answered.   ASA Score: 3    Ready For Surgery From Anesthesia Perspective.     .

## 2024-01-31 NOTE — ANESTHESIA POSTPROCEDURE EVALUATION
Anesthesia Post Evaluation    Patient: Anthony Sheldon    Procedure(s) Performed: Procedure(s) (LRB):  COLONOSCOPY (N/A)    Final Anesthesia Type: general      Patient location during evaluation: PACU  Patient participation: Yes- Able to Participate  Level of consciousness: awake and alert and oriented  Post-procedure vital signs: reviewed and stable  Pain management: adequate  Airway patency: patent    PONV status at discharge: No PONV  Anesthetic complications: no      Cardiovascular status: blood pressure returned to baseline and stable  Respiratory status: unassisted and spontaneous ventilation  Hydration status: euvolemic  Follow-up not needed.              Vitals Value Taken Time   /73 01/31/24 1555   Temp 36.4 °C (97.5 °F) 01/31/24 1545   Pulse 65 01/31/24 1555   Resp 20 01/31/24 1555   SpO2 100 % 01/31/24 1555         Event Time   Out of Recovery 01/31/2024 16:11:00         Pain/David Score: David Score: 10 (1/31/2024  4:11 PM)

## 2024-01-31 NOTE — OR NURSING
"Pt had colonoscopy with no bleeding at this time post Dx hemorrhoids, diverticulosis.  No Bx.  Recovered without incident.  Pt requests to go home today "has a poker game gil".  Pleasant, denies any co pain, nausea or other c/o.  Tx via w/c in NAD and report to Adi.  VS WDL and declined po fluids, will wait to go to room.  Tx easily bacjk to bed and call light within reach as well as his cell phone.  "

## 2024-01-31 NOTE — PROVATION PATIENT INSTRUCTIONS
Discharge Summary/Instructions after an Endoscopic Procedure  Patient Name: Anthony Sheldon  Patient MRN: [id number]  Patient YOB: 1934  Wednesday, January 31, 2024  Robert Suarez MD  Dear patient,  As a result of recent federal legislation (The Federal Cures Act), you may   receive lab or pathology results from your procedure in your [facility]   account before your physician is able to contact you. Your physician or   their representative will relay the results to you with their   recommendations at their soonest availability.  Thank you,  RESTRICTIONS:  During your procedure today, you received medications for sedation.  These   medications may affect your judgment, balance and coordination.  Therefore,   for 24 hours, you have the following restrictions:   - DO NOT drive a car, operate machinery, make legal/financial decisions,   sign important papers or drink alcohol.    ACTIVITY:  Today: no heavy lifting, straining or running due to procedural   sedation/anesthesia.  The following day: return to full activity including work.  DIET:  Eat and drink normally unless instructed otherwise.     TREATMENT FOR COMMON SIDE EFFECTS:  - Mild abdominal pain, nausea, belching, bloating or excessive gas:  rest,   eat lightly and use a heating pad.  - Sore Throat: treat with throat lozenges and/or gargle with warm salt   water.  - Because air was used during the procedure, expelling large amounts of air   from your rectum or belching is normal.  - If a bowel prep was taken, you may not have a bowel movement for 1-3 days.    This is normal.  SYMPTOMS TO WATCH FOR AND REPORT TO YOUR PHYSICIAN:  1. Abdominal pain or bloating, other than gas cramps.  2. Chest pain.  3. Back pain.  4. Signs of infection such as: chills or fever occurring within 24 hours   after the procedure.  5. Rectal bleeding, which would show as bright red, maroon, or black stools.   (A tablespoon of blood from the rectum is not serious, especially  if   hemorrhoids are present.)  6. Vomiting.  7. Weakness or dizziness.  GO DIRECTLY TO THE NEAREST EMERGENCY ROOM IF YOU HAVE ANY OF THE FOLLOWING:      Difficulty breathing              Chills and/or fever over 101 F   Persistent vomiting and/or vomiting blood   Severe abdominal pain   Severe chest pain   Black, tarry stools   Bleeding- more than one tablespoon   Any other symptom or condition that you feel may need urgent attention  Your doctor recommends these additional instructions:  If any biopsies were taken, your doctors clinic will contact you in 1 to 2   weeks with any results.  - [facility] to [facility].   - High fiber diet.   - Continue present medications.   - Resume Eliquis (apixaban) at prior dose today.   - Perform a CT scan (computed tomography) of abdomen with contrast and   pelvis with contrast if symptoms persist. GET CTA WITH GIB PROTOCOL STAT IF   BLEEDING RECURS.  - Return to GI office PRN.   - No repeat colonoscopy due to age.  For questions, problems or results please call your physician - Robert Suarez MD at Work:  [phone].  [facility], EMERGENCY ROOM PHONE NUMBER: [phone]  IF A COMPLICATION OR EMERGENCY SITUATION ARISES AND YOU ARE UNABLE TO REACH   YOUR PHYSICIAN - GO DIRECTLY TO THE EMERGENCY ROOM.  Robert Suarez MD  1/31/2024 3:37:13 PM  This report has been verified and signed electronically.  Dear patient,  As a result of recent federal legislation (The Federal Cures Act), you may   receive lab or pathology results from your procedure in your [facility]   account before your physician is able to contact you. Your physician or   their representative will relay the results to you with their   recommendations at their soonest availability.  Thank you,  PROVATION

## 2024-01-31 NOTE — PLAN OF CARE
01/30/24 1914   Patient Assessment/Suction   Level of Consciousness (AVPU) alert   Respiratory Effort Normal;Unlabored   Expansion/Accessory Muscles/Retractions expansion symmetric   All Lung Fields Breath Sounds clear;diminished   Rhythm/Pattern, Respiratory pattern regular   Cough Frequency no cough   PRE-TX-O2   Device (Oxygen Therapy) room air   SpO2 99 %   Pulse Oximetry Type Intermittent   Pulse 70   Resp 18   Aerosol Therapy   $ Aerosol Therapy Charges Aerosol Treatment   Respiratory Treatment Status (SVN) given   Treatment Route (SVN) mask   Patient Position (SVN) Baron's   Post Treatment Assessment (SVN) breath sounds unchanged   Signs of Intolerance (SVN) none   Breath Sounds Post-Respiratory Treatment   Post-treatment Heart Rate (beats/min) 72   Post-treatment Resp Rate (breaths/min) 18   Incentive Spirometer   $ Incentive Spirometer Charges done with encouragement   Incentive Spirometer Predicted Level (mL) 1800   Administration (IS) instruction provided, follow-up   Number of Repetitions (IS) 10   Level Incentive Spirometer (mL) 1250   Patient Tolerance (IS) good;no adverse signs/symptoms present

## 2024-01-31 NOTE — CARE UPDATE
01/31/24 0715   Patient Assessment/Suction   Level of Consciousness (AVPU) alert   Respiratory Effort Unlabored   Expansion/Accessory Muscles/Retractions no use of accessory muscles;no retractions;expansion symmetric   All Lung Fields Breath Sounds clear;diminished   Rhythm/Pattern, Respiratory unlabored   Cough Frequency no cough   PRE-TX-O2   Device (Oxygen Therapy) room air   SpO2 97 %   Pulse Oximetry Type Intermittent   $ Pulse Oximetry - Multiple Charge Pulse Oximetry - Multiple   Pulse 68   Resp 18   Positioning Flat   Aerosol Therapy   $ Aerosol Therapy Charges Refused   Incentive Spirometer   $ Incentive Spirometer Charges refused   Education   $ Education Bronchodilator;15 min

## 2024-01-31 NOTE — PLAN OF CARE
Pt is accepted by [facility] and [facility] updated. Discharge plan closed in [facility].    01/31/24 1417   Post-Acute Status   Post-Acute Authorization Home Health   Home Health Status Set-up Complete/Auth obtained

## 2024-01-31 NOTE — DISCHARGE INSTRUCTIONS
Resume previous home medications stay hydrated if you have any problems call 911 or go to nearest Emergency room monitor stool in bright red seek medical attention resume home blood thinners tonight follow up with scheduled appointments.

## 2024-01-31 NOTE — DISCHARGE SUMMARY
[facility]  Discharge Summary      Patient Name: Anthony Sheldon  MRN: [id number]  CADE: [id number]  Patient Class: OP- Observation  Admission Date: 1/30/2024  Hospital Length of Stay: 0 days  Discharge Date and Time:  01/31/2024 4:12 PM  Attending Physician: Elissa Castañeda MD   Discharging Provider: Nicolle Stephens NP  Primary Care Provider: Perfecto Melgar III, MD    Primary Care Team: Networked reference to record PCT     HPI:   Mr. Sheldon is an 89 year old male with a history of HTN, prostate ca (on hormone therapy), DVT/PE on eliquis, COPD, NIDDM2, CKDIIIB who presented today from [place] ED with complaints of rectal bleeding that began last night around 3am and again around 6am. It is moderate. It is associated with lightheadedness and constipation. He denies fever, chills, N/V/D, chest pain, SOB, or LOC. Last dose of eliquis 1/29/24 at 2000. VSS stable at [place]. H&H 11.6/35 with mild macrocytic indices which is his baseline. Pt was transferred for GI services. On arrival to floor, repeat H&H 10.4/34.5. Pt was transferred to endoscopy for EGD upon arrival to the floor.     Procedure(s) (LRB):  COLONOSCOPY (N/A)      Hospital Course:   Mr. Sheldon was admitted for lower GI bleed.  While in the hospital, his labs and vital signs were monitored.  His hemoglobin was trended and remained stable.  GI was consulted and he had an EGD on 01/30/2024 which showed gastritis.  He had a colonoscopy today and there was no evidence of active bleeding.  He has been cleared for discharge per GI.  He will continue all present medications, and okay to resume Eliquis at prior dose today per GI.  GI recommends a CT scan of abdomen and pelvis with contrast be done if symptoms persist or get CTA with GIB protocol stat if bleeding recurs.  He has been discharged to home today with home health in stable condition.  He will follow up with his PCP, and he will follow up with GI prn.  It was  recommended he remain on a high-fiber diet.  He is noted to be on opiate pain medication and is recommended that he take a stool softener daily to prevent constipation.  He did not have any adverse events while here.     Goals of Care Treatment Preferences:  Code Status: Full Code    Living Will: Yes              Consults:   Consults (From admission, onward)          Status Ordering Provider     Inpatient consult to Gastroenterology  Once        Provider:  Robert Barreto MD    Completed KORI CHAPIN            No new Assessment & Plan notes have been filed under this hospital service since the last note was generated.  Service: Hospital Medicine    Final Active Diagnoses:    Diagnosis Date Noted POA    PRINCIPAL PROBLEM:  GIB (gastrointestinal bleeding) [K92.2] 01/30/2024 Yes    COPD (chronic obstructive pulmonary disease) [J44.9] 01/30/2024 Yes    Type 2 diabetes mellitus with chronic kidney disease, without long-term current use of insulin [E11.22] 07/10/2023 Yes    History of pulmonary embolus (PE) [Z86.711] 02/22/2021 Yes    CKD (chronic kidney disease) stage 3, GFR 30-59 ml/min [N18.30] 10/07/2015 Yes      Problems Resolved During this Admission:       Discharged Condition: stable    Disposition: Home-Health Care [facility]    Follow Up:   Follow-up Information       Perfecto Melgar III, MD Follow up on 2/2/2024.    Specialties: Internal Medicine, Cardiology  Why: 10:00 am  Contact information:  [address]  [facility] [place] [address] [phone]               Robert Barreto MD. Schedule an appointment as soon as possible for a visit today.    Specialty: Gastroenterology  Why: As needed  Contact information:  [address]  [address]  [facility] [address]  [phone]               [facility] Follow up.    Why: Home Health  Contact information:  [address] [address] [phone]                         Patient Instructions:      Ambulatory  referral/consult to Home Health   Standing Status: Future   Referral Priority: Routine Referral Type: Home Health Care   Referral Reason: Specialty Services Required   Requested Specialty: Home Health Services   Number of Visits Requested: 1     Notify your health care provider if you experience any of the following:  temperature >100.4     Notify your health care provider if you experience any of the following:  severe uncontrolled pain     Notify your health care provider if you experience any of the following:  persistent nausea and vomiting or diarrhea     Notify your health care provider if you experience any of the following:  difficulty breathing or increased cough     Notify your health care provider if you experience any of the following:  persistent dizziness, light-headedness, or visual disturbances     Activity as tolerated       Significant Diagnostic Studies: Labs: CMP   Recent Labs   Lab 01/30/24  0812      K 4.4      CO2 22*   *   BUN 29*   CREATININE 1.8*   CALCIUM 9.3   PROT 7.5   ALBUMIN 3.8   BILITOT 0.5   ALKPHOS 49*   AST 14   ALT 11   ANIONGAP 12    and CBC   Recent Labs   Lab 01/30/24  1729 01/31/24  0440 01/31/24  1039   WBC 5.91 4.88 5.91   HGB 9.8* 9.6* 10.2*   HCT 30.3* 29.3* 31.3*    182 222       Pending Diagnostic Studies:       None           Medications:  Reconciled Home Medications:      Medication List        START taking these medications      docusate sodium 100 MG capsule  Commonly known as: COLACE  Take 1 capsule (100 mg total) by mouth once daily. Take while on opiate pain medication     pantoprazole 40 MG tablet  Commonly known as: PROTONIX  Take 1 tablet (40 mg total) by mouth once daily.            CONTINUE taking these medications      albuterol 90 mcg/actuation inhaler  Commonly known as: PROVENTIL/VENTOLIN HFA  Inhale 2 puffs into the lungs every 4 (four) hours as needed for Wheezing or Shortness of Breath. Rescue     apixaban 5 mg Tab  Commonly  known as: ELIQUIS  Take 1 tablet (5 mg total) by mouth 2 (two) times daily.     bicalutamide 50 MG Tab  Commonly known as: CASODEX  Take 1 tablet (50 mg total) by mouth once daily.     chlorhexidine 0.12 % solution  Commonly known as: PERIDEX  SWISH WITH 15 ML FOR 30 SECONDS TWICE DAILY     empagliflozin 10 mg tablet  Commonly known as: JARDIANCE  Take 1 tablet (10 mg total) by mouth once daily.     gabapentin 300 MG capsule  Commonly known as: NEURONTIN  Take 1 capsule (300 mg total) by mouth every evening.     HYDROcodone-acetaminophen 7.5-325 mg per tablet  Commonly known as: NORCO  Take 1 tablet by mouth every 6 (six) hours as needed for Pain.     * hydrocortisone 2.5 % ointment  APPLY TO EAR TWICE DAILY X2 WEEKS     * hydrocortisone 2.5 % cream  APPLY TO AFFECTED SITES TWICE DAILY X1-2 WEEKS     metFORMIN 500 MG tablet  Commonly known as: GLUCOPHAGE  Take 1 tablet (500 mg total) by mouth 2 (two) times daily with meals.     multivitamin per tablet  Commonly known as: THERAGRAN  Take 1 tablet by mouth once daily.     multivitamin with folic acid 400 mcg Tab  Take 1 tablet by mouth once daily.     TRELEGY ELLIPTA 200-62.5-25 mcg inhaler  Generic drug: fluticasone-umeclidin-vilanter  Inhale 1 puff into the lungs once daily.     zolpidem 5 MG Tab  Commonly known as: AMBIEN  Take 1 tablet (5 mg total) by mouth nightly as needed (sleep).           * This list has 2 medication(s) that are the same as other medications prescribed for you. Read the directions carefully, and ask your doctor or other care provider to review them with you.                STOP taking these medications      traMADoL 50 mg tablet  Commonly known as: ULTRAM              Indwelling Lines/Drains at time of discharge:   Lines/Drains/Airways       None                   Time spent on the discharge of patient: 38 minutes         Nicolle Stephens NP  Department of Hospital Medicine  [facility]/Surg

## 2024-01-31 NOTE — TRANSFER OF CARE
"Anesthesia Transfer of Care Note    Patient: Anthony Sheldon    Procedure(s) Performed: Procedure(s) (LRB):  COLONOSCOPY (N/A)    Patient location: PACU    Anesthesia Type: general    Transport from OR: Transported from OR on room air with adequate spontaneous ventilation    Post pain: adequate analgesia    Post assessment: no apparent anesthetic complications    Post vital signs: stable    Level of consciousness: awake    Nausea/Vomiting: no nausea/vomiting    Complications: none    Transfer of care protocol was followed      Last vitals: Visit Vitals  BP (!) 158/73   Pulse 72   Temp 36.6 °C (97.9 °F) (Skin)   Resp 18   Ht 5' 9" (1.753 m)   Wt 101.6 kg (224 lb)   SpO2 100%   BMI 33.08 kg/m²     "

## 2024-01-31 NOTE — PROGRESS NOTES
Pharmacist Renal Dose Adjustment Note    Anthony Sheldon is a 89 y.o. male being treated with the medication APIXABAN    Patient Data:    Vital Signs (Most Recent):  Temp: 97.5 °F (36.4 °C) (01/31/24 1545)  Pulse: 65 (01/31/24 1555)  Resp: 20 (01/31/24 1555)  BP: (!) 156/73 (01/31/24 1555)  SpO2: 100 % (01/31/24 1555) Vital Signs (72h Range):  Temp:  [97.5 °F (36.4 °C)-98.9 °F (37.2 °C)]   Pulse:  [65-77]   Resp:  [16-21]   BP: (117-171)/()   SpO2:  [94 %-100 %]      Recent Labs   Lab 01/30/24  0812   CREATININE 1.8*     Serum creatinine: 1.8 mg/dL (H) 01/30/24 0812  Estimated creatinine clearance: 32.7 mL/min (A)    apixaban 5 mg BID will be changed to apixaban 2.5 mg BID  Per Protocol    Pharmacist's Name: Yeimy Layton  Pharmacist's Extension: [phone]

## 2024-01-31 NOTE — HOSPITAL COURSE
Mr. Sheldon was admitted for lower GI bleed.  While in the hospital, his labs and vital signs were monitored.  His hemoglobin was trended and remained stable.  GI was consulted and he had an EGD on 01/30/2024 which showed gastritis.  He had a colonoscopy today and there was no evidence of active bleeding.  He has been cleared for discharge per GI.  He will continue all present medications, and okay to resume Eliquis at prior dose today per GI.  GI recommends a CT scan of abdomen and pelvis with contrast be done if symptoms persist or get CTA with GIB protocol stat if bleeding recurs.  He has been discharged to home today with home health in stable condition.  He will follow up with his PCP, and he will follow up with GI prn.  It was recommended he remain on a high-fiber diet.  He is noted to be on opiate pain medication and is recommended that he take a stool softener daily to prevent constipation.  He did not have any adverse events while here.

## 2024-01-31 NOTE — PROGRESS NOTES
Colonoscopy done.  Diverticulosis without bleeding noted.  If bleeding recurs, recommend STAT CTA with GIB protocol.  Resume diet.  Will sign off.

## 2024-01-31 NOTE — PLAN OF CARE
POC reviewed VSS afebrile denies pain Colonoscopy negative for active bleed, tolerated clear liquid diet , dc instructions given verbalizes understanding dc to home at this time

## 2024-01-31 NOTE — PLAN OF CARE
POC reviewed with Patient Verbalizes understanding VSS afebrile denies pain EGD negative Colonoscopy in AM  Stool Positive for blood tolerating clear liquid diet

## 2024-01-31 NOTE — MEDICAL/APP STUDENT
Medical Student Subjective & Objective     Principal Problem: GIB (gastrointestinal bleeding)    Chief Complaint:   Chief Complaint   Patient presents with    Rectal Bleeding       HPI: Anthony Sheldon 88 y/o male with a PMHx of  COPD, Diverticulosis, CKD stage IIIb (GFR 30-59), DVT (on Eliquis) , NIDD2, Hiatal hernia, Colon polyps, and Prostate Cancer which he sees an oncologist for. Patient presented moderate painless rectal bleeding to the ED last night after seeing blood in his stool at 3am and around 6am. Patient states this has happen 5 years ago and aby did a colonoscopy for him and is unsure what they found. Patients last bowel movement was at 12:00 pm and states there was dark red blood seen. He last took his eliquis around 8pm last night but hasn't taking another one since. Patient denies nausea, vomiting, fever, chest pain or SOB. At [facility] hemodynamically stable, vital signs unremarkable. labs show mild macrocytic anemia. At this facility, vital signs stable with mild hypertension. Hemodynamically stable with a hemoglobin of 10.4 and hematocrit of 32.5 and a baseline GFR of 35.5. Patient will be admitted to the hospital for a possible GI bleed and further monitoring.      Hospital Course: No notes on file    Interval History: Vitals signs and labs reviewed from  last 24 hours. Patient is Hemodynamically stable with mild hypertension and mild macrocytic indices. Glucose back down to 108 and at 3:00am patient had a bowel movent that was brown with dark red in it. Patient is fluctuant and having continuous bowel movements, last one was around 9pm and showed no signs of blood. Patients is not having any pain today in the LLQ.  Patients EGD came back unremarkable, a small hiatal hernia was present with some congestion. Patient was notified of the findings and put on NPO for colonoscopy around 12:30pm today.     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    Prostate cancer 05/2019    PSA elevation 10/7/2015    Wears glasses        Past Surgical History:   Procedure Laterality Date    BIOPSY WITH TRANSRECTAL ULTRASOUND (TRUS) GUIDANCE Bilateral 3/27/2019    Procedure: BIOPSY, WITH TRANSRECTAL US GUIDANCE;  Surgeon: Niall Blas MD;  Location: [facility] OR;  Service: Urology;  Laterality: Bilateral;    COLONOSCOPY N/A 1/13/2020    Procedure: COLONOSCOPY;  Surgeon: Charli Chowdhury MD;  Location: [facility];  Service: General;  Laterality: N/A;    CYSTOSCOPY N/A 3/27/2019    Procedure: CYSTOSCOPY;  Surgeon: Niall Blas MD;  Location: [facility] OR;  Service: Urology;  Laterality: N/A;    EPIDURAL STEROID INJECTION N/A 6/17/2019    Procedure: Injection, Steroid, Epidural - L5/S1 EPIDURAL STEROID INJECTION;  Surgeon: Chyna Valdovinos MD;  Location: [facility] OR;  Service: Pain Management;  Laterality: N/A;    EPIDURAL STEROID INJECTION N/A 12/11/2019    Procedure: Injection, Steroid, Epidural - L5/S1 LUMBAR EPIDURAL STEROID INJECTION;  Surgeon: Jade Layton MD;  Location: [facility] OR;  Service: Pain Management;  Laterality: N/A;  *FIRST CASE*    ESOPHAGOGASTRODUODENOSCOPY N/A 1/13/2020    Procedure: EGD (ESOPHAGOGASTRODUODENOSCOPY);  Surgeon: Charli Chowdhury MD;  Location: [facility] ENDO;  Service: General;  Laterality: N/A;    ESOPHAGOGASTRODUODENOSCOPY N/A 1/30/2024    Procedure: EGD (ESOPHAGOGASTRODUODENOSCOPY);  Surgeon: Robert Barreto MD;  Location: [facility];  Service: Endoscopy;  Laterality: N/A;    INJECTION OF ANESTHETIC AGENT AROUND MEDIAL BRANCH NERVES INNERVATING LUMBAR  FACET JOINT Bilateral 7/12/2021    Procedure: Block-nerve-medial branch-lumbar Bilateral L 3,4,5;  Surgeon: Jade Layton MD;  Location: [facility] OR;  Service: Pain Management;  Laterality: Bilateral;    INJECTION OF JOINT Left 1/21/2019    Procedure: Injection, FACET JOINT INJECTION LEFT L4-5 AND L5-S1;  Surgeon: Chyna Valdovinos MD;  Location: [facility] OR;  Service: Pain Management;  Laterality: Left;    JOINT REPLACEMENT      BILAT HIPS, RIGHT SHOULDER    KNEE ARTHROPLASTY Left     RADIOFREQUENCY THERMOCOAGULATION Bilateral 8/2/2021    Procedure: RADIOFREQUENCY THERMAL COAGULATION Bilateral L 3,4,5;  Surgeon: Jade Layton MD;  Location: [facility] OR;  Service: Pain Management;  Laterality: Bilateral;    TONSILLECTOMY  1950    TOTAL HIP ARTHROPLASTY Bilateral 2013 & 2014    TOTAL SHOULDER ARTHROPLASTY Right 2005       Review of patient's allergies indicates:  No Known Allergies    Current Facility-Administered Medications on File Prior to Encounter   Medication    [COMPLETED] sodium chloride 0.9% bolus 500 mL 500 mL     Current Outpatient Medications on File Prior to Encounter   Medication Sig    HYDROcodone-acetaminophen (NORCO) 7.5-325 mg per tablet Take 1 tablet by mouth every 6 (six) hours as needed for Pain.    albuterol (PROVENTIL/VENTOLIN HFA) 90 mcg/actuation inhaler Inhale 2 puffs into the lungs every 4 (four) hours as needed for Wheezing or Shortness of Breath. Rescue    apixaban (ELIQUIS) 5 mg Tab Take 1 tablet (5 mg total) by mouth 2 (two) times daily.    bicalutamide (CASODEX) 50 MG Tab Take 1 tablet (50 mg total) by mouth once daily.    chlorhexidine (PERIDEX) 0.12 % solution SWISH WITH 15 ML FOR 30 SECONDS TWICE DAILY    empagliflozin (JARDIANCE) 10 mg tablet Take 1 tablet (10 mg total) by mouth once daily.    fluticasone-umeclidin-vilanter (TRELEGY ELLIPTA) 200-62.5-25 mcg inhaler Inhale 1 puff into the lungs once daily.    gabapentin (NEURONTIN) 300 MG capsule Take 1 capsule (300 mg total) by mouth  every evening.    hydrocortisone 2.5 % cream APPLY TO AFFECTED SITES TWICE DAILY X1-2 WEEKS    hydrocortisone 2.5 % ointment APPLY TO EAR TWICE DAILY X2 WEEKS    metFORMIN (GLUCOPHAGE) 500 MG tablet Take 1 tablet (500 mg total) by mouth 2 (two) times daily with meals.    multivitamin (THERAGRAN) per tablet Take 1 tablet by mouth once daily.    multivitamin with folic acid 400 mcg Tab Take 1 tablet by mouth once daily.    traMADoL (ULTRAM) 50 mg tablet Take one tablet by mouth once or twice a day daily as needed for pain.    zolpidem (AMBIEN) 5 MG Tab Take 1 tablet (5 mg total) by mouth nightly as needed (sleep).     Family History       Problem Relation (Age of Onset)    Bladder Cancer Father    Stroke Mother          Tobacco Use    Smoking status: Former     Current packs/day: 0.00     Types: Cigarettes    Smokeless tobacco: Former     Quit date: 1/1/1990   Substance and Sexual Activity    Alcohol use: Yes     Alcohol/week: 12.0 standard drinks of alcohol     Types: 6 Glasses of wine, 6 Standard drinks or equivalent per week     Comment: 1 drink daily    Drug use: No    Sexual activity: Not Currently     Partners: Female     Review of Systems   Constitutional:  Negative for chills, diaphoresis, fatigue and fever.   HENT: Negative.     Respiratory:  Negative for cough, choking, chest tightness and shortness of breath.    Cardiovascular:  Negative for chest pain, palpitations and leg swelling.   Gastrointestinal:  Positive for blood in stool. Negative for abdominal distention, abdominal pain, constipation, diarrhea, nausea, rectal pain and vomiting.   Endocrine: Negative.    Genitourinary: Negative.    Musculoskeletal: Negative.    Skin: Negative.    Allergic/Immunologic: Negative.    Neurological:  Negative for light-headedness.   Hematological: Negative.    Psychiatric/Behavioral: Negative.     All other systems reviewed and are negative.    Objective:     Vital Signs (Most Recent):  Temp: 97.9 °F (36.6 °C)  (01/31/24 0709)  Pulse: 68 (01/31/24 0715)  Resp: 18 (01/31/24 0715)  BP: 139/63 (01/31/24 0709)  SpO2: 97 % (01/31/24 0715) Vital Signs (24h Range):  Temp:  [97.9 °F (36.6 °C)-98.5 °F (36.9 °C)] 97.9 °F (36.6 °C)  Pulse:  [67-76] 68  Resp:  [17-21] 18  SpO2:  [95 %-100 %] 97 %  BP: (123-171)/(59-80) 139/63     Weight: 101.6 kg (224 lb)  Body mass index is 33.08 kg/m².    Intake/Output Summary (Last 24 hours) at 1/31/2024 1007  Last data filed at 1/31/2024 0621  Gross per 24 hour   Intake 4890 ml   Output --   Net 4890 ml      Physical Exam  Constitutional:       General: He is not in acute distress.     Appearance: Normal appearance. He is not ill-appearing, toxic-appearing or diaphoretic.   HENT:      Head: Normocephalic and atraumatic.      Right Ear: External ear normal.      Left Ear: External ear normal.      Nose: Nose normal.   Eyes:      Extraocular Movements: Extraocular movements intact.      Conjunctiva/sclera: Conjunctivae normal.      Pupils: Pupils are equal, round, and reactive to light.   Cardiovascular:      Rate and Rhythm: Normal rate and regular rhythm.      Pulses: Normal pulses.      Heart sounds: Normal heart sounds.   Pulmonary:      Effort: Pulmonary effort is normal.      Breath sounds: Normal breath sounds.   Abdominal:      General: There is no distension.      Palpations: Abdomen is soft.      Tenderness: There is no abdominal tenderness. There is no guarding or rebound.      Comments: Hyperactive bowel sounds possible due to the Dulcolax   Musculoskeletal:         General: Normal range of motion.      Cervical back: Normal range of motion.   Skin:     General: Skin is warm.      Capillary Refill: Capillary refill takes less than 2 seconds.   Neurological:      Mental Status: He is oriented to person, place, and time.   Psychiatric:         Mood and Affect: Mood normal.         Behavior: Behavior normal.         Significant Labs: All pertinent labs within the past 24 hours have been  reviewed.  BMP:   Recent Labs   Lab 01/30/24  0812   *      K 4.4      CO2 22*   BUN 29*   CREATININE 1.8*   CALCIUM 9.3     CBC:   Recent Labs   Lab 01/30/24  1338 01/30/24  1729 01/31/24  0440   WBC 5.81 5.91 4.88   HGB 10.4* 9.8* 9.6*   HCT 32.5* 30.3* 29.3*    200 182       Significant Imaging: I have reviewed all pertinent imaging results/findings within the past 24 hours.    Medical Student Subjective & Objective   A/P      GI bleed  On Medical floor  GI consult - Colonoscopy Pending  NPO for coloscopy  Trend H & H q6  Pantoprazole IV  Blood transfusion is H&H <7/21 or patients becomes symptomatic from anemia with active bleeding      NIDD2   Acu check q6 hours with a moderate insulin dosing scale        CKD (chronic kidney disease) stage 3, GFR 30-59 ml/min  BMP reviewed    Based on current GFR, CKD stage is stage 3 - GFR 30-59.  Monitor UOP and serial BMP and adjust therapy as needed. Renally dose meds. Avoid nephrotoxic medications and procedure  At baseline      COPD  Chronic. Controlled  Continued regimen p.r.n     DVT  Discontinue eliquis until cleared by [facility]        - Swapna FLORES

## 2024-02-01 ENCOUNTER — PATIENT OUTREACH (OUTPATIENT)
Dept: ADMINISTRATIVE | Facility: CLINIC | Age: 89
End: 2024-02-01
Payer: MEDICARE

## 2024-02-01 NOTE — PROGRESS NOTES
C3 nurse spoke with Anthony Sheldon for a TCC post hospital discharge follow up call. The patient has a scheduled HOSFU appointment with Perfecto Melgar III, MD on 02/02/24 @ 1000.

## 2024-02-20 DIAGNOSIS — C61 PROSTATE CANCER: Primary | ICD-10-CM

## 2024-02-20 RX ORDER — APIXABAN 5 MG/1
5 TABLET, FILM COATED ORAL 2 TIMES DAILY
Qty: 60 TABLET | Refills: 5 | Status: SHIPPED | OUTPATIENT
Start: 2024-02-20

## 2024-02-28 ENCOUNTER — PATIENT MESSAGE (OUTPATIENT)
Dept: HEMATOLOGY/ONCOLOGY | Facility: CLINIC | Age: 89
End: 2024-02-28
Payer: MEDICARE

## 2024-03-18 DIAGNOSIS — C61 PROSTATE CANCER: Primary | ICD-10-CM

## 2024-03-27 ENCOUNTER — TELEPHONE (OUTPATIENT)
Dept: HEMATOLOGY/ONCOLOGY | Facility: CLINIC | Age: 89
End: 2024-03-27
Payer: MEDICARE

## 2024-04-12 ENCOUNTER — LAB VISIT (OUTPATIENT)
Dept: LAB | Facility: HOSPITAL | Age: 89
End: 2024-04-12
Attending: NURSE PRACTITIONER
Payer: MEDICARE

## 2024-04-12 DIAGNOSIS — C61 PROSTATE CANCER: ICD-10-CM

## 2024-04-12 LAB — COMPLEXED PSA SERPL-MCNC: 0.05 NG/ML (ref 0–4)

## 2024-04-12 PROCEDURE — 84153 ASSAY OF PSA TOTAL: CPT | Performed by: NURSE PRACTITIONER

## 2024-04-12 PROCEDURE — 36415 COLL VENOUS BLD VENIPUNCTURE: CPT | Performed by: NURSE PRACTITIONER

## 2024-04-15 ENCOUNTER — OFFICE VISIT (OUTPATIENT)
Dept: HEMATOLOGY/ONCOLOGY | Facility: CLINIC | Age: 89
End: 2024-04-15
Payer: MEDICARE

## 2024-04-15 VITALS
DIASTOLIC BLOOD PRESSURE: 81 MMHG | RESPIRATION RATE: 18 BRPM | WEIGHT: 237.31 LBS | HEART RATE: 82 BPM | BODY MASS INDEX: 35.04 KG/M2 | OXYGEN SATURATION: 97 % | TEMPERATURE: 97 F | SYSTOLIC BLOOD PRESSURE: 145 MMHG

## 2024-04-15 DIAGNOSIS — D68.69 OTHER THROMBOPHILIA: ICD-10-CM

## 2024-04-15 DIAGNOSIS — R97.20 ELEVATED PSA MEASUREMENT: ICD-10-CM

## 2024-04-15 DIAGNOSIS — E66.01 CLASS 2 SEVERE OBESITY DUE TO EXCESS CALORIES WITH SERIOUS COMORBIDITY AND BODY MASS INDEX (BMI) OF 35.0 TO 35.9 IN ADULT: ICD-10-CM

## 2024-04-15 DIAGNOSIS — I27.82 OTHER CHRONIC PULMONARY EMBOLISM WITHOUT ACUTE COR PULMONALE: ICD-10-CM

## 2024-04-15 DIAGNOSIS — C61 PROSTATE CANCER: Primary | ICD-10-CM

## 2024-04-15 PROBLEM — E66.812 CLASS 2 SEVERE OBESITY DUE TO EXCESS CALORIES WITH SERIOUS COMORBIDITY AND BODY MASS INDEX (BMI) OF 35.0 TO 35.9 IN ADULT: Status: ACTIVE | Noted: 2024-04-15

## 2024-04-15 PROCEDURE — 99214 OFFICE O/P EST MOD 30 MIN: CPT | Mod: S$GLB,,, | Performed by: NURSE PRACTITIONER

## 2024-04-15 PROCEDURE — 1159F MED LIST DOCD IN RCRD: CPT | Mod: CPTII,S$GLB,, | Performed by: NURSE PRACTITIONER

## 2024-04-15 PROCEDURE — 1160F RVW MEDS BY RX/DR IN RCRD: CPT | Mod: CPTII,S$GLB,, | Performed by: NURSE PRACTITIONER

## 2024-04-15 PROCEDURE — 99999 PR PBB SHADOW E&M-EST. PATIENT-LVL III: CPT | Mod: PBBFAC,,, | Performed by: NURSE PRACTITIONER

## 2024-04-15 RX ORDER — BICALUTAMIDE 50 MG/1
50 TABLET, FILM COATED ORAL DAILY
Qty: 90 TABLET | Refills: 3 | Status: SHIPPED | OUTPATIENT
Start: 2024-04-15 | End: 2025-04-15

## 2024-04-15 NOTE — PROGRESS NOTES
History of present illness:  The patient is an 88-year-old white gentleman with metastatic prostate carcinoma who is managed with Lupron and Casodex.  Patient returns to clinic for interval follow-up.  No worsening bone pain.  No urinary complaints.  Patient remains compliant with Casodex and is due for Lupron.    1/13/2023:  He returns today follow-up and is without complaint.    4/14/2023:  He returns today follow-up and is without complaint.  He is scheduled for Lupron today    4/15/2024:  He returns today follow-up and is without complaint.  His Lupron is scheduled for 182024    Review of Systems   Constitutional: Negative.    HENT: Negative.     Eyes: Negative.    Respiratory: Negative.     Cardiovascular: Negative.    Gastrointestinal: Negative.    Genitourinary: Negative.    Musculoskeletal: Negative.    Skin: Negative.    Neurological: Negative.    Endo/Heme/Allergies: Negative.    Psychiatric/Behavioral: Negative.        Physical examination:  Well-developed, well-nourished, elderly, white gentleman  VITAL SIGNS: Documented  and reviewed this visit.  HEENT: Normocephalic, atraumatic. Oral mucosa pink and moist. Lips without lesions. Tongue midline. Oropharynx clear. Nonicteric sclerae.   NECK: Supple, no adenopathy. No carotid bruits, thyromegaly or thyroid nodule.   HEART: Regular rate and rhythm without murmur, gallop or rub.   LUNGS: Clear to auscultation bilaterally. Normal respiratory effort.   ABDOMEN: Soft, nontender, nondistended with positive normoactive bowel sounds, no hepatosplenomegaly.   EXTREMITIES: No cyanosis, clubbing or edema. Distal pulses are intact.   AXILLAE AND GROIN: No palpable pathologic lymphadenopathy is appreciated.   SKIN: Intact/turgor normal   NEUROLOGIC: Cranial nerves II-XII grossly intact. Motor: Good muscle bulk and tone. Strength/sensory 5/5 throughout. Gait stable.         Impression/plan:  1. Metastatic prostate carcinoma without evidence of measurable metastatic  disease on most recent imaging  -continue Casodex 50 mg p.o. daily  -continue Lupron q.6 months-proceed with next dose 04/18/2024  -PSA is slowly trending up.  We will repeat PSA in 3 months  -see MD in 6  months with labs    2. Mild anemia in the setting of stage 3 chronic kidney disease.  -hemoglobin is stable    3. Stage 3 chronic kidney disease:   -follow-up with PCP      4. Iron-deficiency anemia:   -check iron stores on follow-up    Hyperglycemia:   -follow-up with PCP

## 2024-04-18 ENCOUNTER — INFUSION (OUTPATIENT)
Dept: INFUSION THERAPY | Facility: HOSPITAL | Age: 89
End: 2024-04-18
Attending: INTERNAL MEDICINE
Payer: MEDICARE

## 2024-04-18 VITALS
RESPIRATION RATE: 17 BRPM | DIASTOLIC BLOOD PRESSURE: 75 MMHG | SYSTOLIC BLOOD PRESSURE: 156 MMHG | BODY MASS INDEX: 34.58 KG/M2 | OXYGEN SATURATION: 100 % | HEIGHT: 69 IN | WEIGHT: 233.5 LBS | TEMPERATURE: 97 F | HEART RATE: 73 BPM

## 2024-04-18 DIAGNOSIS — C61 PROSTATE CANCER: Primary | ICD-10-CM

## 2024-04-18 PROCEDURE — 63600175 PHARM REV CODE 636 W HCPCS: Mod: JZ,JG | Performed by: NURSE PRACTITIONER

## 2024-04-18 PROCEDURE — 96402 CHEMO HORMON ANTINEOPL SQ/IM: CPT

## 2024-04-18 RX ADMIN — LEUPROLIDE ACETATE 45 MG: KIT at 08:04

## 2024-04-18 NOTE — PLAN OF CARE
Problem: Fall Injury Risk  Goal: Absence of Fall and Fall-Related Injury  Outcome: Ongoing, Progressing  Intervention: Identify and Manage Contributors  Flowsheets (Taken 4/18/2024 0800)  Self-Care Promotion:   independence encouraged   BADL personal objects within reach   safe use of adaptive equipment encouraged  Medication Review/Management: medications reviewed  Intervention: Promote Injury-Free Environment  Flowsheets (Taken 4/18/2024 0800)  Safety Promotion/Fall Prevention: medications reviewed

## 2024-05-06 PROBLEM — K92.2 GIB (GASTROINTESTINAL BLEEDING): Status: RESOLVED | Noted: 2024-01-30 | Resolved: 2024-05-06

## 2024-07-12 ENCOUNTER — LAB VISIT (OUTPATIENT)
Dept: LAB | Facility: HOSPITAL | Age: 89
End: 2024-07-12
Attending: NURSE PRACTITIONER
Payer: MEDICARE

## 2024-07-12 DIAGNOSIS — C61 PROSTATE CANCER: ICD-10-CM

## 2024-07-12 LAB — COMPLEXED PSA SERPL-MCNC: 0.07 NG/ML (ref 0–4)

## 2024-07-12 PROCEDURE — 84153 ASSAY OF PSA TOTAL: CPT | Performed by: NURSE PRACTITIONER

## 2024-07-12 PROCEDURE — 36415 COLL VENOUS BLD VENIPUNCTURE: CPT | Performed by: NURSE PRACTITIONER

## 2024-07-17 ENCOUNTER — HOSPITAL ENCOUNTER (OUTPATIENT)
Dept: RADIOLOGY | Facility: HOSPITAL | Age: 89
Discharge: HOME | End: 2024-07-17
Attending: ANESTHESIOLOGY
Payer: MEDICARE

## 2024-07-17 DIAGNOSIS — M54.17 LUMBOSACRAL RADICULOPATHY: ICD-10-CM

## 2024-07-17 PROCEDURE — 72148 MRI LUMBAR SPINE W/O DYE: CPT | Mod: TC

## 2024-07-17 PROCEDURE — 72148 MRI LUMBAR SPINE W/O DYE: CPT | Mod: 26,,, | Performed by: RADIOLOGY

## 2024-07-18 ENCOUNTER — LAB VISIT (OUTPATIENT)
Dept: LAB | Facility: HOSPITAL | Age: 89
End: 2024-07-18
Attending: NURSE PRACTITIONER
Payer: MEDICARE

## 2024-07-18 DIAGNOSIS — Z79.899 ENCOUNTER FOR LONG-TERM (CURRENT) USE OF OTHER MEDICATIONS: ICD-10-CM

## 2024-07-18 DIAGNOSIS — E11.9 DIABETES MELLITUS WITHOUT COMPLICATION: ICD-10-CM

## 2024-07-18 DIAGNOSIS — R93.1 ABNORMAL ECHOCARDIOGRAM: Primary | ICD-10-CM

## 2024-07-18 LAB
ALBUMIN SERPL BCP-MCNC: 3.6 G/DL (ref 3.5–5.2)
ALP SERPL-CCNC: 66 U/L (ref 55–135)
ALT SERPL W/O P-5'-P-CCNC: 12 U/L (ref 10–44)
ANION GAP SERPL CALC-SCNC: 12 MMOL/L (ref 8–16)
AST SERPL-CCNC: 14 U/L (ref 10–40)
BILIRUB SERPL-MCNC: 0.4 MG/DL (ref 0.1–1)
BUN SERPL-MCNC: 24 MG/DL (ref 8–23)
CALCIUM SERPL-MCNC: 9.3 MG/DL (ref 8.7–10.5)
CHLORIDE SERPL-SCNC: 105 MMOL/L (ref 95–110)
CHOLEST SERPL-MCNC: 182 MG/DL (ref 120–199)
CHOLEST/HDLC SERPL: 4.9 {RATIO} (ref 2–5)
CO2 SERPL-SCNC: 22 MMOL/L (ref 23–29)
CREAT SERPL-MCNC: 1.7 MG/DL (ref 0.5–1.4)
ERYTHROCYTE [DISTWIDTH] IN BLOOD BY AUTOMATED COUNT: 13.3 % (ref 11.5–14.5)
EST. GFR  (NO RACE VARIABLE): 37.8 ML/MIN/1.73 M^2
ESTIMATED AVG GLUCOSE: 126 MG/DL (ref 68–131)
GLUCOSE SERPL-MCNC: 163 MG/DL (ref 70–110)
HBA1C MFR BLD: 6 % (ref 4–5.6)
HCT VFR BLD AUTO: 36.5 % (ref 40–54)
HDLC SERPL-MCNC: 37 MG/DL (ref 40–75)
HDLC SERPL: 20.3 % (ref 20–50)
HGB BLD-MCNC: 11.7 G/DL (ref 14–18)
IRON SERPL-MCNC: 71 UG/DL (ref 45–160)
IRON SERPL-MCNC: 71 UG/DL (ref 45–160)
LDLC SERPL CALC-MCNC: 116 MG/DL (ref 63–159)
MCH RBC QN AUTO: 31.1 PG (ref 27–31)
MCHC RBC AUTO-ENTMCNC: 32.1 G/DL (ref 32–36)
MCV RBC AUTO: 97 FL (ref 82–98)
NONHDLC SERPL-MCNC: 145 MG/DL
PLATELET # BLD AUTO: 216 K/UL (ref 150–450)
PMV BLD AUTO: 9 FL (ref 9.2–12.9)
POTASSIUM SERPL-SCNC: 4.5 MMOL/L (ref 3.5–5.1)
PROT SERPL-MCNC: 7.1 G/DL (ref 6–8.4)
RBC # BLD AUTO: 3.76 M/UL (ref 4.6–6.2)
SATURATED IRON: 24 % (ref 20–50)
SODIUM SERPL-SCNC: 139 MMOL/L (ref 136–145)
TOTAL IRON BINDING CAPACITY: 296 UG/DL (ref 250–450)
TRANSFERRIN SERPL-MCNC: 200 MG/DL (ref 200–375)
TRIGL SERPL-MCNC: 145 MG/DL (ref 30–150)
WBC # BLD AUTO: 6.35 K/UL (ref 3.9–12.7)

## 2024-07-18 PROCEDURE — 83540 ASSAY OF IRON: CPT | Performed by: NURSE PRACTITIONER

## 2024-07-18 PROCEDURE — 84466 ASSAY OF TRANSFERRIN: CPT | Performed by: NURSE PRACTITIONER

## 2024-07-18 PROCEDURE — 36415 COLL VENOUS BLD VENIPUNCTURE: CPT | Performed by: NURSE PRACTITIONER

## 2024-07-18 PROCEDURE — 80053 COMPREHEN METABOLIC PANEL: CPT | Performed by: NURSE PRACTITIONER

## 2024-07-18 PROCEDURE — 80061 LIPID PANEL: CPT | Performed by: NURSE PRACTITIONER

## 2024-07-18 PROCEDURE — 83036 HEMOGLOBIN GLYCOSYLATED A1C: CPT | Performed by: NURSE PRACTITIONER

## 2024-07-18 PROCEDURE — 85027 COMPLETE CBC AUTOMATED: CPT | Performed by: NURSE PRACTITIONER

## 2024-08-20 ENCOUNTER — OFFICE VISIT (OUTPATIENT)
Dept: PODIATRY | Facility: CLINIC | Age: 89
End: 2024-08-20
Payer: MEDICARE

## 2024-08-20 VITALS
SYSTOLIC BLOOD PRESSURE: 138 MMHG | HEIGHT: 69 IN | BODY MASS INDEX: 34.7 KG/M2 | RESPIRATION RATE: 18 BRPM | DIASTOLIC BLOOD PRESSURE: 77 MMHG | HEART RATE: 71 BPM

## 2024-08-20 DIAGNOSIS — R60.0 EDEMA OF BOTH LOWER EXTREMITIES: ICD-10-CM

## 2024-08-20 DIAGNOSIS — B35.1 ONYCHOMYCOSIS OF TOENAIL: ICD-10-CM

## 2024-08-20 DIAGNOSIS — S90.424S: Primary | ICD-10-CM

## 2024-08-20 DIAGNOSIS — E11.65 TYPE 2 DIABETES MELLITUS WITH HYPERGLYCEMIA, UNSPECIFIED WHETHER LONG TERM INSULIN USE: ICD-10-CM

## 2024-08-20 PROCEDURE — 3288F FALL RISK ASSESSMENT DOCD: CPT | Mod: CPTII,S$GLB,, | Performed by: PODIATRIST

## 2024-08-20 PROCEDURE — 99999 PR PBB SHADOW E&M-EST. PATIENT-LVL V: CPT | Mod: PBBFAC,,, | Performed by: PODIATRIST

## 2024-08-20 PROCEDURE — 1101F PT FALLS ASSESS-DOCD LE1/YR: CPT | Mod: CPTII,S$GLB,, | Performed by: PODIATRIST

## 2024-08-20 PROCEDURE — 1160F RVW MEDS BY RX/DR IN RCRD: CPT | Mod: CPTII,S$GLB,, | Performed by: PODIATRIST

## 2024-08-20 PROCEDURE — 1159F MED LIST DOCD IN RCRD: CPT | Mod: CPTII,S$GLB,, | Performed by: PODIATRIST

## 2024-08-20 PROCEDURE — 99213 OFFICE O/P EST LOW 20 MIN: CPT | Mod: S$GLB,,, | Performed by: PODIATRIST

## 2024-08-20 PROCEDURE — 1126F AMNT PAIN NOTED NONE PRSNT: CPT | Mod: CPTII,S$GLB,, | Performed by: PODIATRIST

## 2024-08-20 RX ORDER — DOXYCYCLINE 100 MG/1
CAPSULE ORAL
COMMUNITY
End: 2024-08-20

## 2024-08-20 RX ORDER — LANCETS 33 GAUGE
EACH MISCELLANEOUS
COMMUNITY

## 2024-08-20 RX ORDER — HYDROCODONE BITARTRATE AND ACETAMINOPHEN 7.5; 325 MG/1; MG/1
TABLET ORAL
COMMUNITY

## 2024-08-20 RX ORDER — AZITHROMYCIN 250 MG/1
TABLET, FILM COATED ORAL
COMMUNITY
End: 2024-08-20

## 2024-08-20 RX ORDER — CLOTRIMAZOLE AND BETAMETHASONE DIPROPIONATE 10; .64 MG/G; MG/G
CREAM TOPICAL 2 TIMES DAILY
COMMUNITY
Start: 2024-08-12

## 2024-08-20 RX ORDER — BLOOD-GLUCOSE METER
EACH MISCELLANEOUS
COMMUNITY
Start: 2024-07-29 | End: 2024-08-20 | Stop reason: SDUPTHER

## 2024-08-20 NOTE — PROGRESS NOTES
Subjective:       Patient ID: Anthony Sheldon is a 89 y.o. male.    Chief Complaint: Follow-up and Diabetes Mellitus  Patient presents for follow-up due to type 2 diabetes.  Patient relates feet have been doing well.  He has had an on and off again bruise or blister 3rd digit right, relates some of the skin peeled off, no drainage, no pain  Relates diabetes has been doing well, A1c mid 6.0 range, has not been instructed to check glucose at home  Has a home pedicure once a month        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    Prostate cancer 05/2019    PSA elevation 10/7/2015    Wears glasses      Past Surgical History:   Procedure Laterality Date    BIOPSY WITH TRANSRECTAL ULTRASOUND (TRUS) GUIDANCE Bilateral 3/27/2019    Procedure: BIOPSY, WITH TRANSRECTAL US GUIDANCE;  Surgeon: Niall Blas MD;  Location: [facility] OR;  Service: Urology;  Laterality: Bilateral;    COLONOSCOPY N/A 1/13/2020    Procedure: COLONOSCOPY;  Surgeon: Charli Chowdhury MD;  Location: [facility];  Service: General;  Laterality: N/A;    COLONOSCOPY N/A 1/31/2024    Procedure: COLONOSCOPY;  Surgeon: Robert Barreto MD;  Location: [facility] ENDO;  Service: Endoscopy;  Laterality: N/A;    CYSTOSCOPY N/A 3/27/2019    Procedure: CYSTOSCOPY;  Surgeon: Niall Blas MD;  Location: [facility] OR;  Service: Urology;  Laterality: N/A;    EPIDURAL STEROID INJECTION N/A 6/17/2019    Procedure: Injection, Steroid, Epidural - L5/S1 EPIDURAL STEROID INJECTION;  Surgeon: Chyna Valdovinos MD;  Location: [facility] OR;  Service: Pain  Management;  Laterality: N/A;    EPIDURAL STEROID INJECTION N/A 12/11/2019    Procedure: Injection, Steroid, Epidural - L5/S1 LUMBAR EPIDURAL STEROID INJECTION;  Surgeon: Jade Layton MD;  Location: [facility] OR;  Service: Pain Management;  Laterality: N/A;  *FIRST CASE*    ESOPHAGOGASTRODUODENOSCOPY N/A 1/13/2020    Procedure: EGD (ESOPHAGOGASTRODUODENOSCOPY);  Surgeon: Charli Chowdhury MD;  Location: [facility];  Service: General;  Laterality: N/A;    ESOPHAGOGASTRODUODENOSCOPY N/A 1/30/2024    Procedure: EGD (ESOPHAGOGASTRODUODENOSCOPY);  Surgeon: Robert Barreto MD;  Location: [facility] ENDO;  Service: Endoscopy;  Laterality: N/A;    INJECTION OF ANESTHETIC AGENT AROUND MEDIAL BRANCH NERVES INNERVATING LUMBAR FACET JOINT Bilateral 7/12/2021    Procedure: Block-nerve-medial branch-lumbar Bilateral L 3,4,5;  Surgeon: Jade Layton MD;  Location: [facility] OR;  Service: Pain Management;  Laterality: Bilateral;    INJECTION OF JOINT Left 1/21/2019    Procedure: Injection, FACET JOINT INJECTION LEFT L4-5 AND L5-S1;  Surgeon: Chyna Valdovinos MD;  Location: [facility] OR;  Service: Pain Management;  Laterality: Left;    JOINT REPLACEMENT      BILAT HIPS, RIGHT SHOULDER    KNEE ARTHROPLASTY Left     RADIOFREQUENCY THERMOCOAGULATION Bilateral 8/2/2021    Procedure: RADIOFREQUENCY THERMAL COAGULATION Bilateral L 3,4,5;  Surgeon: Jade Layton MD;  Location: [facility] OR;  Service: Pain Management;  Laterality: Bilateral;    TONSILLECTOMY  1950    TOTAL HIP ARTHROPLASTY Bilateral 2013 & 2014    TOTAL SHOULDER ARTHROPLASTY Right 2005     Family History   Problem Relation Name Age of Onset    Stroke Mother Sheldon     Bladder Cancer Father       Social History     Socioeconomic History    Marital status:    Tobacco Use    Smoking status: Former     Current packs/day: 0.00     Types: Cigarettes    Smokeless tobacco: Former     Quit date: 1/1/1990   Substance and Sexual Activity    Alcohol use: Yes     Alcohol/week: 12.0  standard drinks of alcohol     Types: 6 Glasses of wine, 6 Standard drinks or equivalent per week     Comment: 1 drink daily    Drug use: No    Sexual activity: Not Currently     Partners: Female     Social Determinants of Health     Financial Resource Strain: Low Risk  (4/2/2024)    Received from [facility]    Overall Financial Resource Strain (CARDIA)     Difficulty of Paying Living Expenses: Not hard at all   Food Insecurity: No Food Insecurity (4/2/2024)    Received from [facility]    Hunger Vital Sign     Worried About Running Out of Food in the Last Year: Never true     Ran Out of Food in the Last Year: Never true   Transportation Needs: No Transportation Needs (4/2/2024)    Received from [facility]    PRAPARE - Transportation     Lack of Transportation (Medical): No     Lack of Transportation (Non-Medical): No   Physical Activity: Insufficiently Active (4/2/2024)    Received from [facility]    Exercise Vital Sign     Days of Exercise per Week: 3 days     Minutes of Exercise per Session: 10 min   Stress: No Stress Concern Present (4/2/2024)    Received from [facility]    [employer] - Occupational Stress Questionnaire     Feeling of Stress : Only a little   Housing Stability: Low Risk  (4/2/2024)    Received from [facility]    Housing Stability Vital Sign     Unable to Pay for Housing in the Last Year: No     Number of Places Lived in the Last Year: 1     In the last 12 months, was there a time when you did not have a steady place to sleep or slept in a shelter (including now)?: No       Current Outpatient Medications   Medication Sig Dispense Refill    albuterol (PROVENTIL/VENTOLIN HFA) 90 mcg/actuation inhaler Inhale 2 puffs into the lungs every 4 (four) hours as needed for Wheezing or Shortness of Breath. Rescue 8 g 0    apixaban (ELIQUIS) 5 mg Tab TAKE 1 TABLET BY MOUTH TWICE DAILY 60 tablet 5     bicalutamide (CASODEX) 50 MG Tab Take 1 tablet (50 mg total) by mouth once daily. 90 tablet 3    blood sugar diagnostic Strp 1 each by Misc.(Non-Drug; Combo Route) route 2 (two) times a day. 100 strip 5    blood-glucose meter (BLOOD GLUCOSE MONITORING) kit Use as instructed 1 each 0    blood-glucose meter (TRUE METRIX GLUCOSE METER MISC)       chlorhexidine (PERIDEX) 0.12 % solution SWISH WITH 15 ML FOR 30 SECONDS TWICE DAILY      clotrimazole-betamethasone 1-0.05% (LOTRISONE) cream 2 (two) times daily.      docusate sodium (COLACE) 100 MG capsule Take 1 capsule (100 mg total) by mouth once daily. Take while on opiate pain medication  0    empagliflozin (JARDIANCE) 10 mg tablet Take 1 tablet (10 mg total) by mouth once daily. 30 tablet 11    ferrous sulfate (IRON) 325 mg (65 mg iron) Tab tablet Take one table by mouth on Monday, Wednesday and Friday 30 tablet 1    fluticasone-umeclidin-vilanter (TRELEGY ELLIPTA) 200-62.5-25 mcg inhaler Inhale 1 puff into the lungs once daily. 60 each 11    HYDROcodone-acetaminophen (NORCO) 7.5-325 mg per tablet TAKE 1 TABLET BY MOUTH EVERY 6 HOURS AS NEEDED FOR PAIN DO NOT OPERATE MACHINERY, OR TAKE OTHER MEDS LIKE TRAMADOL WHILE ON THIS DRUG      hydrocortisone 2.5 % cream APPLY TO AFFECTED SITES TWICE DAILY X1-2 WEEKS      hydrocortisone 2.5 % ointment APPLY TO EAR TWICE DAILY X2 WEEKS      lancets (MICRO THIN LANCETS) 33 gauge [facility]       metFORMIN (GLUCOPHAGE) 500 MG tablet Take 1 tablet (500 mg total) by mouth 2 (two) times daily with meals. 180 tablet 3    multivitamin with folic acid 400 mcg Tab Take 1 tablet by mouth once daily.      pantoprazole (PROTONIX) 40 MG tablet Take 1 tablet (40 mg total) by mouth once daily. 90 tablet 3    traMADoL (ULTRAM) 50 mg tablet Take 1 tablet (50 mg total) by mouth daily as needed for Pain. 30 tablet 2    zolpidem (AMBIEN) 5 MG Tab Take 1 tablet (5 mg total) by mouth nightly as needed (sleep). 30 tablet 2    gabapentin (NEURONTIN) 300 MG  "capsule Take 1 capsule (300 mg total) by mouth every evening. 90 capsule 2     No current facility-administered medications for this visit.     Review of patient's allergies indicates:  No Known Allergies    Review of Systems   Cardiovascular:  Positive for leg swelling.   Musculoskeletal:  Positive for gait problem.   All other systems reviewed and are negative.      Objective:      Vitals:    08/20/24 0851   BP: 138/77   Pulse: 71   Resp: 18   Height: 5' 9" (1.753 m)       Physical Exam  Vitals and nursing note reviewed.   Constitutional:       Appearance: Normal appearance.   Cardiovascular:      Pulses:           Dorsalis pedis pulses are 1+ on the right side and 1+ on the left side.        Posterior tibial pulses are 1+ on the right side and 1+ on the left side.   Pulmonary:      Effort: Pulmonary effort is normal.   Musculoskeletal:      Right foot: Decreased range of motion.      Left foot: Decreased range of motion. Deformity (Contracted lesser digits with wdductovarus rotation 5th digit left.  Pes planus bilateral) present.      Comments: Limited mobility   Feet:      Right foot:      Protective Sensation: 6 sites tested.  6 sites sensed.      Skin integrity: Dry skin present. No blister (Healing blood blister distal dorsal 3rd digit right without pain).      Left foot:      Protective Sensation: 6 sites tested.  5 sites sensed.      Skin integrity: Callus (Mild callus distal medial left hallux) and dry skin present.      Toenail Condition: Left toenails are abnormally thick. Fungal disease present.  Skin:     General: Skin is dry.      Capillary Refill: Capillary refill takes 2 to 3 seconds.   Neurological:      Comments: diminished distal left hallux, no pain/paresthesias feet   Psychiatric:         Thought Content: Thought content normal.                          Assessment:       1. Blister of third toe of right foot, sequela    2. Edema of both lower extremities    3. Type 2 diabetes mellitus with " hyperglycemia, unspecified whether long term insulin use    4. Onychomycosis of toenail - Left Foot            Plan:         Advised patient since this area has been present for a while he needs to watch for any additional swelling, redness or warmth, or if blood blister fills up with fluid needs to contact the office for follow-up  We discussed dry skin toes, callus around the left big toe and better care and maintenance of dry skin on his feet  Reviewed dystrophic fungal nail left great toe, changing nail shape, more tented, this puts him at risk for ingrown nails  Thickness of the nail was debrided and reduced to help flattened the nail plate  Reviewed control of swelling feet and legs, hydrating skin front of the lower legs to prevent complications, and elevation of legs at all time when at home  Reviewed appropriate shoes especially indoors to protect his feet  Reviewed the importance of daily foot checks, contact office with any area of concern which has not improved in a few days  Patient was in understanding and agreement with treatment plan.  I counseled the patient on their conditions, implications and medical management.  Instructed patient to contact the office with any changes, questions, concerns, worsening of symptoms.   Total face to face time 20 minutes, exam, assessment, treatment, discussion, additional time for review of chart prior to and following appointment and visit documentation, consultation and coordination of care.   Follow up prn 3 months    This note was created using M*Modal voice recognition software that occasionally misinterpreted phrases or words.

## 2024-08-25 DIAGNOSIS — C61 PROSTATE CANCER: ICD-10-CM

## 2024-08-26 RX ORDER — APIXABAN 5 MG/1
5 TABLET, FILM COATED ORAL 2 TIMES DAILY
Qty: 60 TABLET | Refills: 5 | Status: SHIPPED | OUTPATIENT
Start: 2024-08-26

## 2024-09-05 DIAGNOSIS — C61 PROSTATE CANCER: ICD-10-CM

## 2024-09-05 DIAGNOSIS — R97.20 ELEVATED PSA MEASUREMENT: ICD-10-CM

## 2024-09-06 RX ORDER — BICALUTAMIDE 50 MG/1
50 TABLET, FILM COATED ORAL DAILY
Qty: 90 TABLET | Refills: 3 | Status: SHIPPED | OUTPATIENT
Start: 2024-09-06 | End: 2025-09-06

## 2024-10-11 ENCOUNTER — LAB VISIT (OUTPATIENT)
Dept: LAB | Facility: HOSPITAL | Age: 89
End: 2024-10-11
Attending: NURSE PRACTITIONER
Payer: MEDICARE

## 2024-10-11 DIAGNOSIS — C61 PROSTATE CANCER: ICD-10-CM

## 2024-10-11 LAB
ALBUMIN SERPL BCP-MCNC: 3.7 G/DL (ref 3.5–5.2)
ALP SERPL-CCNC: 69 U/L (ref 55–135)
ALT SERPL W/O P-5'-P-CCNC: 15 U/L (ref 10–44)
ANION GAP SERPL CALC-SCNC: 11 MMOL/L (ref 8–16)
AST SERPL-CCNC: 15 U/L (ref 10–40)
BASOPHILS # BLD AUTO: 0.05 K/UL (ref 0–0.2)
BASOPHILS NFR BLD: 1 % (ref 0–1.9)
BILIRUB SERPL-MCNC: 0.4 MG/DL (ref 0.1–1)
BUN SERPL-MCNC: 28 MG/DL (ref 8–23)
CALCIUM SERPL-MCNC: 9.8 MG/DL (ref 8.7–10.5)
CHLORIDE SERPL-SCNC: 109 MMOL/L (ref 95–110)
CO2 SERPL-SCNC: 22 MMOL/L (ref 23–29)
COMPLEXED PSA SERPL-MCNC: 0.08 NG/ML (ref 0–4)
CREAT SERPL-MCNC: 1.8 MG/DL (ref 0.5–1.4)
DIFFERENTIAL METHOD BLD: ABNORMAL
EOSINOPHIL # BLD AUTO: 0.1 K/UL (ref 0–0.5)
EOSINOPHIL NFR BLD: 1.3 % (ref 0–8)
ERYTHROCYTE [DISTWIDTH] IN BLOOD BY AUTOMATED COUNT: 13.5 % (ref 11.5–14.5)
EST. GFR  (NO RACE VARIABLE): 35.3 ML/MIN/1.73 M^2
GLUCOSE SERPL-MCNC: 144 MG/DL (ref 70–110)
HCT VFR BLD AUTO: 37.3 % (ref 40–54)
HGB BLD-MCNC: 11.8 G/DL (ref 14–18)
IMM GRANULOCYTES # BLD AUTO: 0.02 K/UL (ref 0–0.04)
IMM GRANULOCYTES NFR BLD AUTO: 0.4 % (ref 0–0.5)
LYMPHOCYTES # BLD AUTO: 1.7 K/UL (ref 1–4.8)
LYMPHOCYTES NFR BLD: 31.7 % (ref 18–48)
MCH RBC QN AUTO: 31 PG (ref 27–31)
MCHC RBC AUTO-ENTMCNC: 31.6 G/DL (ref 32–36)
MCV RBC AUTO: 98 FL (ref 82–98)
MONOCYTES # BLD AUTO: 0.5 K/UL (ref 0.3–1)
MONOCYTES NFR BLD: 10.3 % (ref 4–15)
NEUTROPHILS # BLD AUTO: 2.9 K/UL (ref 1.8–7.7)
NEUTROPHILS NFR BLD: 55.3 % (ref 38–73)
NRBC BLD-RTO: 0 /100 WBC
PLATELET # BLD AUTO: 229 K/UL (ref 150–450)
PMV BLD AUTO: 9.4 FL (ref 9.2–12.9)
POTASSIUM SERPL-SCNC: 4.6 MMOL/L (ref 3.5–5.1)
PROT SERPL-MCNC: 7.1 G/DL (ref 6–8.4)
RBC # BLD AUTO: 3.81 M/UL (ref 4.6–6.2)
SODIUM SERPL-SCNC: 142 MMOL/L (ref 136–145)
WBC # BLD AUTO: 5.23 K/UL (ref 3.9–12.7)

## 2024-10-11 PROCEDURE — 36415 COLL VENOUS BLD VENIPUNCTURE: CPT | Performed by: NURSE PRACTITIONER

## 2024-10-11 PROCEDURE — 84153 ASSAY OF PSA TOTAL: CPT | Performed by: NURSE PRACTITIONER

## 2024-10-11 PROCEDURE — 85025 COMPLETE CBC W/AUTO DIFF WBC: CPT | Performed by: NURSE PRACTITIONER

## 2024-10-11 PROCEDURE — 80053 COMPREHEN METABOLIC PANEL: CPT | Performed by: NURSE PRACTITIONER

## 2024-10-14 ENCOUNTER — OFFICE VISIT (OUTPATIENT)
Dept: HEMATOLOGY/ONCOLOGY | Facility: CLINIC | Age: 89
End: 2024-10-14
Payer: MEDICARE

## 2024-10-14 VITALS
HEIGHT: 69 IN | DIASTOLIC BLOOD PRESSURE: 64 MMHG | BODY MASS INDEX: 34.84 KG/M2 | WEIGHT: 235.25 LBS | SYSTOLIC BLOOD PRESSURE: 146 MMHG | HEART RATE: 75 BPM | RESPIRATION RATE: 17 BRPM | TEMPERATURE: 98 F | OXYGEN SATURATION: 98 %

## 2024-10-14 DIAGNOSIS — C61 PROSTATE CANCER: Primary | ICD-10-CM

## 2024-10-14 DIAGNOSIS — Z86.711 HISTORY OF PULMONARY EMBOLUS (PE): ICD-10-CM

## 2024-10-14 PROCEDURE — 1160F RVW MEDS BY RX/DR IN RCRD: CPT | Mod: CPTII,S$GLB,, | Performed by: INTERNAL MEDICINE

## 2024-10-14 PROCEDURE — 99999 PR PBB SHADOW E&M-EST. PATIENT-LVL V: CPT | Mod: PBBFAC,,, | Performed by: INTERNAL MEDICINE

## 2024-10-14 PROCEDURE — 3288F FALL RISK ASSESSMENT DOCD: CPT | Mod: CPTII,S$GLB,, | Performed by: INTERNAL MEDICINE

## 2024-10-14 PROCEDURE — 1101F PT FALLS ASSESS-DOCD LE1/YR: CPT | Mod: CPTII,S$GLB,, | Performed by: INTERNAL MEDICINE

## 2024-10-14 PROCEDURE — 99214 OFFICE O/P EST MOD 30 MIN: CPT | Mod: S$GLB,,, | Performed by: INTERNAL MEDICINE

## 2024-10-14 PROCEDURE — 1159F MED LIST DOCD IN RCRD: CPT | Mod: CPTII,S$GLB,, | Performed by: INTERNAL MEDICINE

## 2024-10-14 PROCEDURE — 1126F AMNT PAIN NOTED NONE PRSNT: CPT | Mod: CPTII,S$GLB,, | Performed by: INTERNAL MEDICINE

## 2024-10-14 NOTE — PROGRESS NOTES
Service Date:  10/14/24    Chief Complaint: Prostate Cancer (Labs  lupron clear 6mfu)    Anthony Sheldon is a 89 y.o. male here with metastatic prostate cancer.  Has been on Lupron and Casodex for a number of years.  Patient denies any worsening bone pains.  Denies any new symptoms since last being seen about 6 months ago.  He is getting his Lupron later this week.    Review of Systems   Constitutional: Negative.  Negative for appetite change and unexpected weight change.   HENT: Negative.  Negative for mouth sores.    Eyes: Negative.  Negative for visual disturbance.   Respiratory:  Positive for shortness of breath. Negative for cough.    Cardiovascular: Negative.  Negative for chest pain.   Gastrointestinal: Negative.  Negative for abdominal pain and diarrhea.   Endocrine: Negative.    Genitourinary: Negative.  Negative for frequency.   Musculoskeletal:  Positive for back pain.   Integumentary:  Negative for rash. Negative.   Neurological: Negative.  Negative for headaches.   Hematological: Negative.  Negative for adenopathy.   Psychiatric/Behavioral: Negative.  The patient is not nervous/anxious.         Current Outpatient Medications   Medication Instructions    albuterol (PROVENTIL/VENTOLIN HFA) 90 mcg/actuation inhaler 2 puffs, Inhalation, Every 4 hours PRN, Rescue    apixaban (ELIQUIS) 5 mg, Oral, 2 times daily    bicalutamide (CASODEX) 50 mg, Oral, Daily    blood sugar diagnostic Strp 1 each, Misc.(Non-Drug; Combo Route), 2 times daily    blood-glucose meter (BLOOD GLUCOSE MONITORING) kit Use as instructed    blood-glucose meter (TRUE METRIX GLUCOSE METER MISC)     chlorhexidine (PERIDEX) 0.12 % solution SWISH WITH 15 ML FOR 30 SECONDS TWICE DAILY    clotrimazole-betamethasone 1-0.05% (LOTRISONE) cream 2 times daily    docusate sodium (COLACE) 100 mg, Oral, Daily, Take while on opiate pain medication    empagliflozin (JARDIANCE) 10 mg, Oral, Daily    ferrous sulfate (IRON) 325 mg (65 mg iron) Tab tablet  Take one table by mouth on Monday, Wednesday and Friday    fluticasone-umeclidin-vilanter (TRELEGY ELLIPTA) 200-62.5-25 mcg inhaler 1 puff, Inhalation, Daily    gabapentin (NEURONTIN) 300 mg, Oral, Nightly    HYDROcodone-acetaminophen (NORCO) 7.5-325 mg per tablet TAKE 1 TABLET BY MOUTH EVERY 6 HOURS AS NEEDED FOR PAIN DO NOT OPERATE MACHINERY, OR TAKE OTHER MEDS LIKE TRAMADOL WHILE ON THIS DRUG    hydrocortisone 2.5 % cream APPLY TO AFFECTED SITES TWICE DAILY X1-2 WEEKS    hydrocortisone 2.5 % ointment APPLY TO EAR TWICE DAILY X2 WEEKS    lancets (MICRO THIN LANCETS) 33 gauge Misc     metFORMIN (GLUCOPHAGE) 500 mg, Oral, 2 times daily with meals    multivitamin with folic acid 400 mcg Tab 1 tablet, Daily    pantoprazole (PROTONIX) 40 mg, Oral, Daily    traMADoL (ULTRAM) 50 mg, Oral, Daily PRN    zolpidem (AMBIEN) 5 mg, Oral, Nightly PRN        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    Prostate cancer 05/2019    PSA elevation 10/7/2015    Wears glasses         Past Surgical History:   Procedure Laterality Date    BIOPSY WITH TRANSRECTAL ULTRASOUND (TRUS) GUIDANCE Bilateral 3/27/2019    Procedure: BIOPSY, WITH TRANSRECTAL US GUIDANCE;  Surgeon: Niall Blas MD;  Location: [facility] OR;  Service: Urology;  Laterality: Bilateral;    COLONOSCOPY N/A 1/13/2020    Procedure: COLONOSCOPY;  Surgeon: Charli Chowdhury MD;  Location: [facility] ENDO;  Service: General;  Laterality: N/A;    COLONOSCOPY N/A 1/31/2024    Procedure: COLONOSCOPY;  Surgeon: Robert Barreto MD;  Location: [facility] ENDO;   Service: Endoscopy;  Laterality: N/A;    CYSTOSCOPY N/A 3/27/2019    Procedure: CYSTOSCOPY;  Surgeon: Niall Blas MD;  Location: [facility] OR;  Service: Urology;  Laterality: N/A;    EPIDURAL STEROID INJECTION N/A 6/17/2019    Procedure: Injection, Steroid, Epidural - L5/S1 EPIDURAL STEROID INJECTION;  Surgeon: Chyna Valdovinos MD;  Location: [facility];  Service: Pain Management;  Laterality: N/A;    EPIDURAL STEROID INJECTION N/A 12/11/2019    Procedure: Injection, Steroid, Epidural - L5/S1 LUMBAR EPIDURAL STEROID INJECTION;  Surgeon: Jade Layton MD;  Location: [facility];  Service: Pain Management;  Laterality: N/A;  *FIRST CASE*    ESOPHAGOGASTRODUODENOSCOPY N/A 1/13/2020    Procedure: EGD (ESOPHAGOGASTRODUODENOSCOPY);  Surgeon: Charli Chowdhury MD;  Location: [facility];  Service: General;  Laterality: N/A;    ESOPHAGOGASTRODUODENOSCOPY N/A 1/30/2024    Procedure: EGD (ESOPHAGOGASTRODUODENOSCOPY);  Surgeon: Robert Barreto MD;  Location: [facility];  Service: Endoscopy;  Laterality: N/A;    INJECTION OF ANESTHETIC AGENT AROUND MEDIAL BRANCH NERVES INNERVATING LUMBAR FACET JOINT Bilateral 7/12/2021    Procedure: Block-nerve-medial branch-lumbar Bilateral L 3,4,5;  Surgeon: Jade Layton MD;  Location: [facility];  Service: Pain Management;  Laterality: Bilateral;    INJECTION OF JOINT Left 1/21/2019    Procedure: Injection, FACET JOINT INJECTION LEFT L4-5 AND L5-S1;  Surgeon: Chyna Valdovinos MD;  Location: [facility];  Service: Pain Management;  Laterality: Left;    JOINT REPLACEMENT      BILAT HIPS, RIGHT SHOULDER    KNEE ARTHROPLASTY Left     RADIOFREQUENCY THERMOCOAGULATION Bilateral 8/2/2021    Procedure: RADIOFREQUENCY THERMAL COAGULATION Bilateral L 3,4,5;  Surgeon: Jade Layton MD;  Location: [facility];  Service: Pain Management;  Laterality: Bilateral;    TONSILLECTOMY  1950    TOTAL HIP ARTHROPLASTY Bilateral 2013 & 2014    TOTAL SHOULDER ARTHROPLASTY Right 2005        Family History   Problem  "Relation Name Age of Onset    Stroke Mother Sheldon     Bladder Cancer Father         Social History     Tobacco Use    Smoking status: Former     Current packs/day: 0.00     Types: Cigarettes    Smokeless tobacco: Former     Quit date: 1/1/1990   Substance Use Topics    Alcohol use: Yes     Alcohol/week: 12.0 standard drinks of alcohol     Types: 6 Glasses of wine, 6 Standard drinks or equivalent per week     Comment: 1 drink daily    Drug use: No         Vitals:    10/14/24 1039   BP: (!) 146/64   Pulse: 75   Resp: 17   Temp: 97.7 °F (36.5 °C)        Physical Exam:  BP (!) 146/64   Pulse 75   Temp 97.7 °F (36.5 °C) (Temporal)   Resp 17   Ht 5' 9" (1.753 m)   Wt 106.7 kg (235 lb 3.7 oz)   SpO2 98%   BMI 34.74 kg/m²     Physical Exam  Vitals and nursing note reviewed.   Constitutional:       Appearance: Normal appearance.   HENT:      Head: Normocephalic and atraumatic.      Nose: Nose normal.      Mouth/Throat:      Mouth: Mucous membranes are moist.      Pharynx: Oropharynx is clear.   Eyes:      Extraocular Movements: Extraocular movements intact.      Conjunctiva/sclera: Conjunctivae normal.   Cardiovascular:      Rate and Rhythm: Normal rate and regular rhythm.      Heart sounds: Normal heart sounds.   Pulmonary:      Effort: Pulmonary effort is normal.      Breath sounds: Normal breath sounds.   Abdominal:      General: Abdomen is flat. Bowel sounds are normal.      Palpations: Abdomen is soft.   Musculoskeletal:         General: Normal range of motion.      Cervical back: Normal range of motion and neck supple.   Skin:     General: Skin is warm and dry.   Neurological:      General: No focal deficit present.      Mental Status: He is alert and oriented to person, place, and time. Mental status is at baseline.   Psychiatric:         Mood and Affect: Mood normal.          Labs:  Lab Results   Component Value Date    WBC 5.23 10/11/2024    RBC 3.81 (L) 10/11/2024    HGB 11.8 (L) 10/11/2024    HCT 37.3 (L) " 10/11/2024    MCV 98 10/11/2024    MCH 31.0 10/11/2024    MCHC 31.6 (L) 10/11/2024    RDW 13.5 10/11/2024     10/11/2024    MPV 9.4 10/11/2024    GRAN 2.9 10/11/2024    GRAN 55.3 10/11/2024    LYMPH 1.7 10/11/2024    LYMPH 31.7 10/11/2024    MONO 0.5 10/11/2024    MONO 10.3 10/11/2024    EOS 0.1 10/11/2024    BASO 0.05 10/11/2024    EOSINOPHIL 1.3 10/11/2024    BASOPHIL 1.0 10/11/2024     Sodium   Date Value Ref Range Status   10/11/2024 142 136 - 145 mmol/L Final     Potassium   Date Value Ref Range Status   10/11/2024 4.6 3.5 - 5.1 mmol/L Final     Chloride   Date Value Ref Range Status   10/11/2024 109 95 - 110 mmol/L Final     CO2   Date Value Ref Range Status   10/11/2024 22 (L) 23 - 29 mmol/L Final     Glucose   Date Value Ref Range Status   10/11/2024 144 (H) 70 - 110 mg/dL Final     BUN   Date Value Ref Range Status   10/11/2024 28 (H) 8 - 23 mg/dL Final     Creatinine   Date Value Ref Range Status   10/11/2024 1.8 (H) 0.5 - 1.4 mg/dL Final     Calcium   Date Value Ref Range Status   10/11/2024 9.8 8.7 - 10.5 mg/dL Final     Total Protein   Date Value Ref Range Status   10/11/2024 7.1 6.0 - 8.4 g/dL Final     Albumin   Date Value Ref Range Status   10/11/2024 3.7 3.5 - 5.2 g/dL Final     Total Bilirubin   Date Value Ref Range Status   10/11/2024 0.4 0.1 - 1.0 mg/dL Final     Comment:     For infants and newborns, interpretation of results should be based  on gestational age, weight and in agreement with clinical  observations.    Premature Infant recommended reference ranges:  Up to 24 hours.............<8.0 mg/dL  Up to 48 hours............<12.0 mg/dL  3-5 days..................<15.0 mg/dL  6-29 days.................<15.0 mg/dL       Alkaline Phosphatase   Date Value Ref Range Status   10/11/2024 69 55 - 135 U/L Final     AST   Date Value Ref Range Status   10/11/2024 15 10 - 40 U/L Final     ALT   Date Value Ref Range Status   10/11/2024 15 10 - 44 U/L Final     Anion Gap   Date Value Ref Range  Status   10/11/2024 11 8 - 16 mmol/L Final     eGFR if    Date Value Ref Range Status   03/28/2022 47.7 (A) >60 mL/min/1.73 m^2 Final     eGFR if non    Date Value Ref Range Status   03/28/2022 41.3 (A) >60 mL/min/1.73 m^2 Final     Comment:     Calculation used to obtain the estimated glomerular filtration  rate (eGFR) is the CKD-EPI equation.          A/P:    Metastatic prostate cancer   -currently on Lupron and Casodex  -PSA is gradually rising, although still very low.  Once it gets above 2.0, would recommend repeat scanning with an F 18 PET scan to look for any metastatic worsening.  If so, then his Casodex could be switched with a second-generation oral ADT medication  -return to clinic in 6 months with labs Aurash Khoobehi, MD  Hematology and Oncology

## 2024-10-18 ENCOUNTER — INFUSION (OUTPATIENT)
Dept: INFUSION THERAPY | Facility: HOSPITAL | Age: 89
End: 2024-10-18
Attending: INTERNAL MEDICINE
Payer: MEDICARE

## 2024-10-18 VITALS
WEIGHT: 236 LBS | TEMPERATURE: 98 F | HEART RATE: 71 BPM | HEIGHT: 69 IN | BODY MASS INDEX: 34.96 KG/M2 | DIASTOLIC BLOOD PRESSURE: 80 MMHG | OXYGEN SATURATION: 100 % | SYSTOLIC BLOOD PRESSURE: 149 MMHG | RESPIRATION RATE: 16 BRPM

## 2024-10-18 DIAGNOSIS — C61 PROSTATE CANCER: Primary | ICD-10-CM

## 2024-10-18 PROCEDURE — 96402 CHEMO HORMON ANTINEOPL SQ/IM: CPT

## 2024-11-21 ENCOUNTER — OFFICE VISIT (OUTPATIENT)
Dept: PODIATRY | Facility: CLINIC | Age: 89
End: 2024-11-21
Payer: MEDICARE

## 2024-11-21 VITALS
HEART RATE: 71 BPM | WEIGHT: 236 LBS | SYSTOLIC BLOOD PRESSURE: 147 MMHG | BODY MASS INDEX: 34.96 KG/M2 | HEIGHT: 69 IN | DIASTOLIC BLOOD PRESSURE: 82 MMHG | RESPIRATION RATE: 18 BRPM

## 2024-11-21 DIAGNOSIS — L84 PRE-ULCERATIVE CALLUSES: ICD-10-CM

## 2024-11-21 DIAGNOSIS — M20.5X2 ADDUCTOVARUS ROTATION OF TOE, ACQUIRED, LEFT: Primary | ICD-10-CM

## 2024-11-21 DIAGNOSIS — R60.0 EDEMA OF BOTH LOWER EXTREMITIES: ICD-10-CM

## 2024-11-21 DIAGNOSIS — L85.3 DRY SKIN: ICD-10-CM

## 2024-11-21 DIAGNOSIS — E11.65 TYPE 2 DIABETES MELLITUS WITH HYPERGLYCEMIA, UNSPECIFIED WHETHER LONG TERM INSULIN USE: ICD-10-CM

## 2024-11-21 PROCEDURE — 3288F FALL RISK ASSESSMENT DOCD: CPT | Mod: CPTII,S$GLB,, | Performed by: PODIATRIST

## 2024-11-21 PROCEDURE — 99999 PR PBB SHADOW E&M-EST. PATIENT-LVL IV: CPT | Mod: PBBFAC,,, | Performed by: PODIATRIST

## 2024-11-21 PROCEDURE — 99213 OFFICE O/P EST LOW 20 MIN: CPT | Mod: S$GLB,,, | Performed by: PODIATRIST

## 2024-11-21 PROCEDURE — 1159F MED LIST DOCD IN RCRD: CPT | Mod: CPTII,S$GLB,, | Performed by: PODIATRIST

## 2024-11-21 PROCEDURE — 1101F PT FALLS ASSESS-DOCD LE1/YR: CPT | Mod: CPTII,S$GLB,, | Performed by: PODIATRIST

## 2024-11-21 RX ORDER — METFORMIN HYDROCHLORIDE 500 MG/1
1 TABLET ORAL 2 TIMES DAILY
COMMUNITY

## 2024-11-21 RX ORDER — DOXYCYCLINE HYCLATE 100 MG
TABLET ORAL
COMMUNITY

## 2024-11-22 NOTE — PROGRESS NOTES
Subjective:       Patient ID: Anthony Sheldon is a 89 y.o. male.    Chief Complaint: Follow-up, Foot Swelling, Nail Problem, and Diabetes Mellitus  Patient presents for follow-up due to type 2 diabetes, follow-up pre ulcerative area on the inside of the 5th digit left.  Relates friend/neighbor has not had to come over as often to apply iodine to this area, it has not been painful recently  He has been having a lot of nerve pain in his feet  Relates no change in diabetes has been doing well, A1c mid 6.0 range  Has home pedicure once a month        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    Prostate cancer 05/2019    PSA elevation 10/7/2015    Wears glasses      Past Surgical History:   Procedure Laterality Date    BIOPSY WITH TRANSRECTAL ULTRASOUND (TRUS) GUIDANCE Bilateral 3/27/2019    Procedure: BIOPSY, WITH TRANSRECTAL US GUIDANCE;  Surgeon: Niall Blas MD;  Location: [facility] OR;  Service: Urology;  Laterality: Bilateral;    COLONOSCOPY N/A 1/13/2020    Procedure: COLONOSCOPY;  Surgeon: Charli Chowdhury MD;  Location: [facility];  Service: General;  Laterality: N/A;    COLONOSCOPY N/A 1/31/2024    Procedure: COLONOSCOPY;  Surgeon: Robert Barreto MD;  Location: [facility] ENDO;  Service: Endoscopy;  Laterality: N/A;    CYSTOSCOPY N/A 3/27/2019    Procedure: CYSTOSCOPY;  Surgeon: Niall Blas MD;  Location: [facility] OR;  Service: Urology;  Laterality: N/A;    EPIDURAL STEROID INJECTION N/A 6/17/2019    Procedure: Injection, Steroid, Epidural - L5/S1 EPIDURAL STEROID INJECTION;   Surgeon: Chyna Valdovinos MD;  Location: [facility] OR;  Service: Pain Management;  Laterality: N/A;    EPIDURAL STEROID INJECTION N/A 12/11/2019    Procedure: Injection, Steroid, Epidural - L5/S1 LUMBAR EPIDURAL STEROID INJECTION;  Surgeon: Jade Layton MD;  Location: [facility] OR;  Service: Pain Management;  Laterality: N/A;  *FIRST CASE*    ESOPHAGOGASTRODUODENOSCOPY N/A 1/13/2020    Procedure: EGD (ESOPHAGOGASTRODUODENOSCOPY);  Surgeon: Charli Chowdhury MD;  Location: [facility] ENDO;  Service: General;  Laterality: N/A;    ESOPHAGOGASTRODUODENOSCOPY N/A 1/30/2024    Procedure: EGD (ESOPHAGOGASTRODUODENOSCOPY);  Surgeon: Robert Barreto MD;  Location: [facility] ENDO;  Service: Endoscopy;  Laterality: N/A;    INJECTION OF ANESTHETIC AGENT AROUND MEDIAL BRANCH NERVES INNERVATING LUMBAR FACET JOINT Bilateral 7/12/2021    Procedure: Block-nerve-medial branch-lumbar Bilateral L 3,4,5;  Surgeon: Jade Layton MD;  Location: [facility] OR;  Service: Pain Management;  Laterality: Bilateral;    INJECTION OF JOINT Left 1/21/2019    Procedure: Injection, FACET JOINT INJECTION LEFT L4-5 AND L5-S1;  Surgeon: Chyna Valdovinos MD;  Location: [facility] OR;  Service: Pain Management;  Laterality: Left;    JOINT REPLACEMENT      BILAT HIPS, RIGHT SHOULDER    KNEE ARTHROPLASTY Left     RADIOFREQUENCY THERMOCOAGULATION Bilateral 8/2/2021    Procedure: RADIOFREQUENCY THERMAL COAGULATION Bilateral L 3,4,5;  Surgeon: Jade Layton MD;  Location: [facility] OR;  Service: Pain Management;  Laterality: Bilateral;    TONSILLECTOMY  1950    TOTAL HIP ARTHROPLASTY Bilateral 2013 & 2014    TOTAL SHOULDER ARTHROPLASTY Right 2005     Family History   Problem Relation Name Age of Onset    Stroke Mother Sheldon     Bladder Cancer Father       Social History     Socioeconomic History    Marital status:    Tobacco Use    Smoking status: Former     Current packs/day: 0.00     Types: Cigarettes    Smokeless tobacco: Former     Quit date: 1/1/1990   Substance and  Sexual Activity    Alcohol use: Yes     Alcohol/week: 12.0 standard drinks of alcohol     Types: 6 Glasses of wine, 6 Standard drinks or equivalent per week     Comment: 1 drink daily    Drug use: No    Sexual activity: Not Currently     Partners: Female     Social Drivers of Health     Financial Resource Strain: Low Risk  (10/15/2024)    Received from [facility]    Overall Financial Resource Strain (CARDIA)     Difficulty of Paying Living Expenses: Not hard at all   Food Insecurity: No Food Insecurity (10/15/2024)    Received from [facility]    Hunger Vital Sign     Worried About Running Out of Food in the Last Year: Never true     Ran Out of Food in the Last Year: Never true   Transportation Needs: No Transportation Needs (10/15/2024)    Received from [facility]    PRAPARE - Transportation     Lack of Transportation (Medical): No     Lack of Transportation (Non-Medical): No   Physical Activity: Insufficiently Active (10/15/2024)    Received from [facility]    Exercise Vital Sign     Days of Exercise per Week: 3 days     Minutes of Exercise per Session: 20 min   Stress: No Stress Concern Present (10/15/2024)    Received from [facility]    [employer] - Occupational Stress Questionnaire     Feeling of Stress : Only a little   Housing Stability: Low Risk  (4/2/2024)    Received from [facility], [facility]    Housing Stability Vital Sign     Unable to Pay for Housing in the Last Year: No     Number of Places Lived in the Last Year: 1     Unstable Housing in the Last Year: No       Current Outpatient Medications   Medication Sig Dispense Refill    albuterol (PROVENTIL/VENTOLIN HFA) 90 mcg/actuation inhaler Inhale 2 puffs into the lungs every 4 (four) hours as needed for Wheezing or Shortness of Breath. Rescue 8 g 0    apixaban (ELIQUIS) 5 mg Tab Take 1 tablet (5 mg total) by mouth 2 (two) times daily. 60 tablet 5    bicalutamide (CASODEX) 50 MG Tab Take 1 tablet (50 mg total) by  mouth once daily. 90 tablet 3    blood sugar diagnostic Strp 1 each by Misc.(Non-Drug; Combo Route) route 2 (two) times a day. 100 strip 5    blood-glucose meter (BLOOD GLUCOSE MONITORING) kit Use as instructed 1 each 0    clotrimazole-betamethasone 1-0.05% (LOTRISONE) cream 2 (two) times daily.      docusate sodium (COLACE) 100 MG capsule Take 1 capsule (100 mg total) by mouth once daily. Take while on opiate pain medication  0    empagliflozin (JARDIANCE) 10 mg tablet Take 1 tablet (10 mg total) by mouth once daily. 30 tablet 11    ferrous sulfate (IRON) 325 mg (65 mg iron) Tab tablet Take one table by mouth on Monday, Wednesday and Friday 30 tablet 1    fluticasone-umeclidin-vilanter (TRELEGY ELLIPTA) 100-62.5-25 mcg DsDv See Instructions, INHALE 1 PUFF BY MOUTH EVERY DAY AS DIRECTED, # 60 EA, 2 Refill(s), Pharmacy: [id number], 175, cm, 07/29/24 9:31:00 CDT, Height/Length Measured, 106.1, kg, 07/29/24 9:31:00 CDT, Weight Dosing      HYDROcodone-acetaminophen (NORCO) 7.5-325 mg per tablet TAKE 1 TABLET BY MOUTH EVERY 6 HOURS AS NEEDED FOR PAIN DO NOT OPERATE MACHINERY, OR TAKE OTHER MEDS LIKE TRAMADOL WHILE ON THIS DRUG      hydrocortisone 2.5 % ointment APPLY TO EAR TWICE DAILY X2 WEEKS      lancets (MICRO THIN LANCETS) 33 gauge [facility]       metFORMIN (GLUCOPHAGE) 500 MG tablet Take 1 tablet by mouth 2 (two) times daily.      multivitamin with folic acid 400 mcg Tab Take 1 tablet by mouth once daily.      pantoprazole (PROTONIX) 40 MG tablet Take 1 tablet (40 mg total) by mouth once daily. 90 tablet 3    traMADoL (ULTRAM) 50 mg tablet Take 1 tablet (50 mg total) by mouth daily as needed for Pain. 30 tablet 2    zolpidem (AMBIEN) 5 MG Tab Take 1 tablet (5 mg total) by mouth nightly as needed (sleep). 30 tablet 2    doxycycline (VIBRA-TABS) 100 MG tablet       fluticasone-umeclidin-vilanter (TRELEGY ELLIPTA) 200-62.5-25 mcg inhaler Inhale 1 puff into the lungs once daily. (Patient not taking:  "Reported on 11/21/2024) 60 each 11    gabapentin (NEURONTIN) 300 MG capsule Take 1 capsule (300 mg total) by mouth every evening. 90 capsule 2     No current facility-administered medications for this visit.     Review of patient's allergies indicates:  No Known Allergies    Review of Systems   Cardiovascular:  Positive for leg swelling.   Musculoskeletal:  Positive for gait problem.   All other systems reviewed and are negative.      Objective:      Vitals:    11/21/24 0818   BP: (!) 147/82   Pulse: 71   Resp: 18   Weight: 107 kg (236 lb)   Height: 5' 9" (1.753 m)       Physical Exam  Vitals and nursing note reviewed.   Constitutional:       Appearance: Normal appearance.   Cardiovascular:      Pulses:           Dorsalis pedis pulses are 1+ on the right side and 1+ on the left side.        Posterior tibial pulses are 1+ on the right side and 1+ on the left side.   Musculoskeletal:      Right foot: Decreased range of motion.      Left foot: Decreased range of motion. Deformity (Contracted lesser digits with wdductovarus rotation 5th digit left.  Pes planus bilateral) present.      Comments: Limited mobility   Feet:      Right foot:      Skin integrity: Skin breakdown (Healing abrasions dried dorsal lesser digits) and dry skin present.      Toenail Condition: Right toenails are abnormally thick.      Left foot:      Skin integrity: Ulcer (Preulcerative callus medial 5th digit left, thick, raised, area of discoloration without pain edema or erythema at this time), callus and dry skin present.      Toenail Condition: Left toenails are abnormally thick. Fungal disease present.  Skin:     General: Skin is dry.      Capillary Refill: Capillary refill takes 2 to 3 seconds.   Neurological:      Comments: Painful paresthesias bilateral feet   Psychiatric:         Thought Content: Thought content normal.                    Assessment:       1. Adductovarus rotation of toe, acquired, left    2. Pre-ulcerative calluses    3. " Edema of both lower extremities    4. Type 2 diabetes mellitus with hyperglycemia, unspecified whether long term insulin use    5. Dry skin              Plan:           Advised patient area of previous ulcer has developed more callus on the inside of the 5th digit  Explained this is considered preulcerative and we discussed potential complications  It is recommended to continue iodine/Betadine to this area every other day so it can be inspected properly as well  Make sure shoes are wide to avoid pressure  Can use baby powder to reduce friction  Reviewed swelling in feet which causes additional pressure between the toes  Elevate feet at all times when possible at home  Preulcerative callus debrided medial 5th digit left, positive discoloration without skin opening  Betadine/iodine applied  Instructed patient to contact office immediately if there are any changes or develops pain in this location  Reviewed better care and maintenance of dry skin  Reviewed care and maintenance of nails, nails debrided, no other areas of complication at this time but remains at risk  Reviewed the importance of daily foot checks, contact office with any area of concern which has not improved in a few days  Patient was in understanding and agreement with treatment plan.  I counseled the patient on their conditions, implications and medical management.  Instructed patient to contact the office with any changes, questions, concerns, worsening of symptoms.   Total face to face time 20 minutes, exam, assessment, treatment, discussion, additional time for review of chart prior to and following appointment and visit documentation, consultation and coordination of care.   Follow up prn 3 months    This note was created using [employer] voice recognition software that occasionally misinterpreted phrases or words.

## 2024-12-17 NOTE — TELEPHONE ENCOUNTER
Spoke with Yolanda at [facility] @ [phone] regarding arrival of physical therapy. She stated she will have the patient's nurse notified him of arrival. Returned call to patient to inform of status.  
DISPLAY PLAN FREE TEXT
DISPLAY PLAN FREE TEXT

## 2025-01-01 NOTE — PLAN OF CARE
Problem: Fall Injury Risk  Goal: Absence of Fall and Fall-Related Injury  Outcome: Progressing  Intervention: Promote Injury-Free Environment  Flowsheets (Taken 10/18/2024 2930)  Safety Promotion/Fall Prevention:   assistive device/personal item within reach   in recliner, wheels locked   instructed to call staff for mobility   supervised activity   patient expresses understanding of fall risk and prevention      SDICU/ICU

## 2025-01-16 ENCOUNTER — HOSPITAL ENCOUNTER (EMERGENCY)
Facility: HOSPITAL | Age: OVER 89
Discharge: HOME | End: 2025-01-16
Attending: EMERGENCY MEDICINE
Payer: MEDICARE

## 2025-01-16 VITALS
DIASTOLIC BLOOD PRESSURE: 58 MMHG | SYSTOLIC BLOOD PRESSURE: 119 MMHG | RESPIRATION RATE: 20 BRPM | OXYGEN SATURATION: 96 % | HEART RATE: 84 BPM | WEIGHT: 230 LBS | BODY MASS INDEX: 33.97 KG/M2 | TEMPERATURE: 100 F

## 2025-01-16 DIAGNOSIS — A08.4 VIRAL GASTROENTERITIS: Primary | ICD-10-CM

## 2025-01-16 DIAGNOSIS — R07.9 CHEST PAIN: ICD-10-CM

## 2025-01-16 DIAGNOSIS — B34.9 NONSPECIFIC SYNDROME SUGGESTIVE OF VIRAL ILLNESS: ICD-10-CM

## 2025-01-16 DIAGNOSIS — E86.0 DEHYDRATION: ICD-10-CM

## 2025-01-16 LAB
ALBUMIN SERPL BCP-MCNC: 3.7 G/DL (ref 3.5–5.2)
ALP SERPL-CCNC: 56 U/L (ref 40–150)
ALT SERPL W/O P-5'-P-CCNC: 10 U/L (ref 10–44)
ANION GAP SERPL CALC-SCNC: 16 MMOL/L (ref 8–16)
AST SERPL-CCNC: 11 U/L (ref 10–40)
BASOPHILS # BLD AUTO: 0.04 K/UL (ref 0–0.2)
BASOPHILS NFR BLD: 0.6 % (ref 0–1.9)
BILIRUB SERPL-MCNC: 0.6 MG/DL (ref 0.1–1)
BNP SERPL-MCNC: 60 PG/ML (ref 0–99)
BUN SERPL-MCNC: 33 MG/DL (ref 8–23)
CALCIUM SERPL-MCNC: 9 MG/DL (ref 8.7–10.5)
CHLORIDE SERPL-SCNC: 107 MMOL/L (ref 95–110)
CO2 SERPL-SCNC: 20 MMOL/L (ref 23–29)
CREAT SERPL-MCNC: 1.9 MG/DL (ref 0.5–1.4)
DIFFERENTIAL METHOD BLD: ABNORMAL
DIGOXIN SERPL-MCNC: <0.2 NG/ML (ref 0.8–2)
EOSINOPHIL # BLD AUTO: 0 K/UL (ref 0–0.5)
EOSINOPHIL NFR BLD: 0.3 % (ref 0–8)
ERYTHROCYTE [DISTWIDTH] IN BLOOD BY AUTOMATED COUNT: 13.1 % (ref 11.5–14.5)
EST. GFR  (NO RACE VARIABLE): 33.1 ML/MIN/1.73 M^2
GLUCOSE SERPL-MCNC: 213 MG/DL (ref 70–110)
HCT VFR BLD AUTO: 41.1 % (ref 40–54)
HCV AB SERPL QL IA: NORMAL
HGB BLD-MCNC: 13.5 G/DL (ref 14–18)
HIV 1+2 AB+HIV1 P24 AG SERPL QL IA: NORMAL
IMM GRANULOCYTES # BLD AUTO: 0.01 K/UL (ref 0–0.04)
IMM GRANULOCYTES NFR BLD AUTO: 0.2 % (ref 0–0.5)
INFLUENZA A, MOLECULAR: NEGATIVE
INFLUENZA B, MOLECULAR: NEGATIVE
LYMPHOCYTES # BLD AUTO: 0.2 K/UL (ref 1–4.8)
LYMPHOCYTES NFR BLD: 3.8 % (ref 18–48)
MAGNESIUM SERPL-MCNC: 1.8 MG/DL (ref 1.6–2.6)
MCH RBC QN AUTO: 31.3 PG (ref 27–31)
MCHC RBC AUTO-ENTMCNC: 32.8 G/DL (ref 32–36)
MCV RBC AUTO: 95 FL (ref 82–98)
MONOCYTES # BLD AUTO: 0.4 K/UL (ref 0.3–1)
MONOCYTES NFR BLD: 5.5 % (ref 4–15)
NEUTROPHILS # BLD AUTO: 5.7 K/UL (ref 1.8–7.7)
NEUTROPHILS NFR BLD: 89.6 % (ref 38–73)
NRBC BLD-RTO: 0 /100 WBC
PHOSPHATE SERPL-MCNC: 3.5 MG/DL (ref 2.7–4.5)
PLATELET # BLD AUTO: 248 K/UL (ref 150–450)
PMV BLD AUTO: 9 FL (ref 9.2–12.9)
POTASSIUM SERPL-SCNC: 4.5 MMOL/L (ref 3.5–5.1)
PROT SERPL-MCNC: 7.1 G/DL (ref 6–8.4)
RBC # BLD AUTO: 4.31 M/UL (ref 4.6–6.2)
SARS-COV-2 RDRP RESP QL NAA+PROBE: NEGATIVE
SODIUM SERPL-SCNC: 143 MMOL/L (ref 136–145)
SPECIMEN SOURCE: NORMAL
TROPONIN I SERPL DL<=0.01 NG/ML-MCNC: 0.01 NG/ML (ref 0–0.03)
WBC # BLD AUTO: 6.35 K/UL (ref 3.9–12.7)

## 2025-01-16 PROCEDURE — 85025 COMPLETE CBC W/AUTO DIFF WBC: CPT | Performed by: EMERGENCY MEDICINE

## 2025-01-16 PROCEDURE — 71045 X-RAY EXAM CHEST 1 VIEW: CPT | Mod: 26,,, | Performed by: RADIOLOGY

## 2025-01-16 PROCEDURE — 86803 HEPATITIS C AB TEST: CPT | Performed by: EMERGENCY MEDICINE

## 2025-01-16 PROCEDURE — 87389 HIV-1 AG W/HIV-1&-2 AB AG IA: CPT | Performed by: EMERGENCY MEDICINE

## 2025-01-16 PROCEDURE — 84484 ASSAY OF TROPONIN QUANT: CPT | Performed by: EMERGENCY MEDICINE

## 2025-01-16 PROCEDURE — 96374 THER/PROPH/DIAG INJ IV PUSH: CPT

## 2025-01-16 PROCEDURE — 25000003 PHARM REV CODE 250: Performed by: EMERGENCY MEDICINE

## 2025-01-16 PROCEDURE — 83880 ASSAY OF NATRIURETIC PEPTIDE: CPT | Performed by: EMERGENCY MEDICINE

## 2025-01-16 PROCEDURE — 87635 SARS-COV-2 COVID-19 AMP PRB: CPT | Performed by: EMERGENCY MEDICINE

## 2025-01-16 PROCEDURE — 93010 ELECTROCARDIOGRAM REPORT: CPT | Mod: ,,, | Performed by: INTERNAL MEDICINE

## 2025-01-16 PROCEDURE — 80053 COMPREHEN METABOLIC PANEL: CPT | Performed by: EMERGENCY MEDICINE

## 2025-01-16 PROCEDURE — 87502 INFLUENZA DNA AMP PROBE: CPT

## 2025-01-16 PROCEDURE — 99285 EMERGENCY DEPT VISIT HI MDM: CPT | Mod: 25

## 2025-01-16 PROCEDURE — 83735 ASSAY OF MAGNESIUM: CPT | Performed by: EMERGENCY MEDICINE

## 2025-01-16 PROCEDURE — 94760 N-INVAS EAR/PLS OXIMETRY 1: CPT

## 2025-01-16 PROCEDURE — 93005 ELECTROCARDIOGRAM TRACING: CPT

## 2025-01-16 PROCEDURE — 71045 X-RAY EXAM CHEST 1 VIEW: CPT | Mod: TC

## 2025-01-16 PROCEDURE — 63600175 PHARM REV CODE 636 W HCPCS: Performed by: EMERGENCY MEDICINE

## 2025-01-16 PROCEDURE — 87502 INFLUENZA DNA AMP PROBE: CPT | Performed by: EMERGENCY MEDICINE

## 2025-01-16 PROCEDURE — 96361 HYDRATE IV INFUSION ADD-ON: CPT

## 2025-01-16 PROCEDURE — 84100 ASSAY OF PHOSPHORUS: CPT | Performed by: EMERGENCY MEDICINE

## 2025-01-16 PROCEDURE — 80162 ASSAY OF DIGOXIN TOTAL: CPT | Performed by: EMERGENCY MEDICINE

## 2025-01-16 RX ORDER — TRAMADOL HYDROCHLORIDE 50 MG/1
50 TABLET ORAL
Status: COMPLETED | OUTPATIENT
Start: 2025-01-16 | End: 2025-01-16

## 2025-01-16 RX ORDER — ONDANSETRON HYDROCHLORIDE 2 MG/ML
8 INJECTION, SOLUTION INTRAVENOUS
Status: COMPLETED | OUTPATIENT
Start: 2025-01-16 | End: 2025-01-16

## 2025-01-16 RX ORDER — ASPIRIN 325 MG
325 TABLET ORAL
Status: COMPLETED | OUTPATIENT
Start: 2025-01-16 | End: 2025-01-16

## 2025-01-16 RX ORDER — ONDANSETRON 4 MG/1
4 TABLET, ORALLY DISINTEGRATING ORAL EVERY 8 HOURS PRN
Qty: 18 TABLET | Refills: 0 | Status: SHIPPED | OUTPATIENT
Start: 2025-01-16 | End: 2025-01-23

## 2025-01-16 RX ADMIN — ONDANSETRON 8 MG: 2 INJECTION INTRAMUSCULAR; INTRAVENOUS at 11:01

## 2025-01-16 RX ADMIN — SODIUM CHLORIDE 500 ML: 9 INJECTION, SOLUTION INTRAVENOUS at 11:01

## 2025-01-16 RX ADMIN — ASPIRIN 325 MG ORAL TABLET 325 MG: 325 PILL ORAL at 11:01

## 2025-01-16 RX ADMIN — TRAMADOL HYDROCHLORIDE 50 MG: 50 TABLET, COATED ORAL at 01:01

## 2025-01-16 RX ADMIN — SODIUM CHLORIDE 500 ML: 9 INJECTION, SOLUTION INTRAVENOUS at 12:01

## 2025-01-16 NOTE — ED PROVIDER NOTES
History     Chief Complaint   Patient presents with    Shortness of Breath     Nausea, diarrhea, sob and chest pain since last night     HPI:  Anthony Sheldon is a 89 y.o. male with PMH as below who presents to the [facility] emergency department for evaluation of SOB, chest tightness, nausea, vomiting, and diarrhea that all developed last night. He has been having a cough for the preceding 1-2 weeks. He notes some decreased fluid intake. He has no other complaints.     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    Prostate cancer 05/2019    PSA elevation 10/7/2015    Wears glasses      Past Surgical History:   Procedure Laterality Date    BIOPSY WITH TRANSRECTAL ULTRASOUND (TRUS) GUIDANCE Bilateral 3/27/2019    Procedure: BIOPSY, WITH TRANSRECTAL US GUIDANCE;  Surgeon: Niall Blas MD;  Location: [facility] OR;  Service: Urology;  Laterality: Bilateral;    COLONOSCOPY N/A 1/13/2020    Procedure: COLONOSCOPY;  Surgeon: Charli Chowdhury MD;  Location: [facility];  Service: General;  Laterality: N/A;    COLONOSCOPY N/A 1/31/2024    Procedure: COLONOSCOPY;  Surgeon: Robert Barreto MD;  Location: [facility] ENDO;  Service: Endoscopy;  Laterality: N/A;    CYSTOSCOPY N/A 3/27/2019    Procedure: CYSTOSCOPY;  Surgeon: Niall Blas MD;  Location: [facility] OR;  Service: Urology;  Laterality: N/A;    EPIDURAL STEROID INJECTION N/A 6/17/2019    Procedure: Injection, Steroid, Epidural - L5/S1  EPIDURAL STEROID INJECTION;  Surgeon: Chyna Valdovinos MD;  Location: [facility] OR;  Service: Pain Management;  Laterality: N/A;    EPIDURAL STEROID INJECTION N/A 12/11/2019    Procedure: Injection, Steroid, Epidural - L5/S1 LUMBAR EPIDURAL STEROID INJECTION;  Surgeon: Jade Layton MD;  Location: [facility] OR;  Service: Pain Management;  Laterality: N/A;  *FIRST CASE*    ESOPHAGOGASTRODUODENOSCOPY N/A 1/13/2020    Procedure: EGD (ESOPHAGOGASTRODUODENOSCOPY);  Surgeon: Charli Chowdhury MD;  Location: [facility];  Service: General;  Laterality: N/A;    ESOPHAGOGASTRODUODENOSCOPY N/A 1/30/2024    Procedure: EGD (ESOPHAGOGASTRODUODENOSCOPY);  Surgeon: Robert Barreto MD;  Location: [facility] ENDO;  Service: Endoscopy;  Laterality: N/A;    INJECTION OF ANESTHETIC AGENT AROUND MEDIAL BRANCH NERVES INNERVATING LUMBAR FACET JOINT Bilateral 7/12/2021    Procedure: Block-nerve-medial branch-lumbar Bilateral L 3,4,5;  Surgeon: Jade Layton MD;  Location: [facility] OR;  Service: Pain Management;  Laterality: Bilateral;    INJECTION OF JOINT Left 1/21/2019    Procedure: Injection, FACET JOINT INJECTION LEFT L4-5 AND L5-S1;  Surgeon: Chyna Valdovinos MD;  Location: [facility] OR;  Service: Pain Management;  Laterality: Left;    JOINT REPLACEMENT      BILAT HIPS, RIGHT SHOULDER    KNEE ARTHROPLASTY Left     RADIOFREQUENCY THERMOCOAGULATION Bilateral 8/2/2021    Procedure: RADIOFREQUENCY THERMAL COAGULATION Bilateral L 3,4,5;  Surgeon: Jade Layton MD;  Location: [facility] OR;  Service: Pain Management;  Laterality: Bilateral;    TONSILLECTOMY  1950    TOTAL HIP ARTHROPLASTY Bilateral 2013 & 2014    TOTAL SHOULDER ARTHROPLASTY Right 2005       Family History   Problem Relation Name Age of Onset    Stroke Mother Sheldon     Bladder Cancer Father       Social History     Tobacco Use    Smoking status: Former     Current packs/day: 0.00     Types: Cigarettes    Smokeless tobacco: Former     Quit date: 1/1/1990   Substance and Sexual Activity     Alcohol use: Yes     Alcohol/week: 12.0 standard drinks of alcohol     Types: 6 Glasses of wine, 6 Standard drinks or equivalent per week     Comment: 1 drink daily    Drug use: No    Sexual activity: Not Currently     Partners: Female      Review of Systems     Review of Systems   Constitutional: Negative.    HENT: Negative.     Eyes: Negative.    Respiratory: Negative.     Cardiovascular: Negative.    Gastrointestinal: Negative.    Endocrine: Negative.    Genitourinary: Negative.    Musculoskeletal: Negative.    Skin: Negative.    Allergic/Immunologic: Negative.    Neurological: Negative.    Hematological: Negative.    Psychiatric/Behavioral: Negative.     All other systems reviewed and are negative.       Physical Exam     Initial Vitals [01/16/25 1009]   BP Pulse Resp Temp SpO2   (!) 147/109 92 (!) 22 98.5 °F (36.9 °C) (!) 94 %      MAP       --          Nursing notes and vital signs reviewed.  Constitutional: Patient is in mild to moderate distress.   Head: Normocephalic. Atraumatic.   Eyes:  Conjunctivae are not pale. No scleral icterus.   ENT: Mucous membranes dry.   Neck: Supple.   Cardiovascular: Regular rate. Regular rhythm. Faint systolic murmur.   Pulmonary: No respiratory distress. Clear to auscultation bilaterally   Abdominal: Soft, mildly distended, nontender.    Musculoskeletal: Moves all extremities. No obvious deformities.   Skin: Warm and dry. Poor skin turgor   Neurological:  Alert, awake, and appropriate. Normal speech. No acute lateralizing neurologic deficits appreciated.   Psychiatric: Normal affect.       ED Course   Procedures  Vitals:    01/16/25 1009 01/16/25 1045 01/16/25 1100   BP: (!) 147/109 (!) 123/52 (!) 114/55   Pulse: 92 88 85   Resp: (!) 22 17    Temp: 98.5 °F (36.9 °C)     TempSrc: Oral     SpO2: (!) 94% 95% 95%   Weight: 104.3 kg (230 lb)       Lab Results Interpreted as Abnormal:  Labs Reviewed   CBC W/ AUTO DIFFERENTIAL - Abnormal       Result Value    WBC 6.35      RBC 4.31  (*)     Hemoglobin 13.5 (*)     Hematocrit 41.1      MCV 95      MCH 31.3 (*)     MCHC 32.8      RDW 13.1      Platelets 248      MPV 9.0 (*)     Immature Granulocytes 0.2      Gran # (ANC) 5.7      Immature Grans (Abs) 0.01      Lymph # 0.2 (*)     Mono # 0.4      Eos # 0.0      Baso # 0.04      nRBC 0      Gran % 89.6 (*)     Lymph % 3.8 (*)     Mono % 5.5      Eosinophil % 0.3      Basophil % 0.6      Differential Method Automated      Narrative:     Release to patient->Immediate   COMPREHENSIVE METABOLIC PANEL - Abnormal    Sodium 143      Potassium 4.5      Chloride 107      CO2 20 (*)     Glucose 213 (*)     BUN 33 (*)     Creatinine 1.9 (*)     Calcium 9.0      Total Protein 7.1      Albumin 3.7      Total Bilirubin 0.6      Alkaline Phosphatase 56      AST 11      ALT 10      eGFR 33.1 (*)     Anion Gap 16      Narrative:     Release to patient->Immediate   INFLUENZA A & B BY MOLECULAR    Influenza A, Molecular Negative      Influenza B, Molecular Negative      Flu A & B Source Nasal Swab     B-TYPE NATRIURETIC PEPTIDE    BNP 60      Narrative:     Release to patient->Immediate   TROPONIN I    Troponin I 0.008      Narrative:     Release to patient->Immediate   MAGNESIUM    Magnesium 1.8      Narrative:     Release to patient->Immediate   PHOSPHORUS    Phosphorus 3.5      Narrative:     Release to patient->Immediate   SARS-COV-2 RNA AMPLIFICATION, QUAL    SARS-CoV-2 RNA, Amplification, Qual Negative     HEPATITIS C ANTIBODY   HIV 1 / 2 ANTIBODY   DIGOXIN LEVEL      All Lab Results:  Results for orders placed or performed during the hospital encounter of 01/16/25   CBC auto differential    Collection Time: 01/16/25 10:18 AM   Result Value Ref Range    WBC 6.35 3.90 - 12.70 K/uL    RBC 4.31 (L) 4.60 - 6.20 M/uL    Hemoglobin 13.5 (L) 14.0 - 18.0 g/dL    Hematocrit 41.1 40.0 - 54.0 %    MCV 95 82 - 98 fL    MCH 31.3 (H) 27.0 - 31.0 pg    MCHC 32.8 32.0 - 36.0 g/dL    RDW 13.1 11.5 - 14.5 %    Platelets 248 150  - 450 K/uL    MPV 9.0 (L) 9.2 - 12.9 fL    Immature Granulocytes 0.2 0.0 - 0.5 %    Gran # (ANC) 5.7 1.8 - 7.7 K/uL    Immature Grans (Abs) 0.01 0.00 - 0.04 K/uL    Lymph # 0.2 (L) 1.0 - 4.8 K/uL    Mono # 0.4 0.3 - 1.0 K/uL    Eos # 0.0 0.0 - 0.5 K/uL    Baso # 0.04 0.00 - 0.20 K/uL    nRBC 0 0 /100 WBC    Gran % 89.6 (H) 38.0 - 73.0 %    Lymph % 3.8 (L) 18.0 - 48.0 %    Mono % 5.5 4.0 - 15.0 %    Eosinophil % 0.3 0.0 - 8.0 %    Basophil % 0.6 0.0 - 1.9 %    Differential Method Automated    Comprehensive metabolic panel    Collection Time: 01/16/25 10:18 AM   Result Value Ref Range    Sodium 143 136 - 145 mmol/L    Potassium 4.5 3.5 - 5.1 mmol/L    Chloride 107 95 - 110 mmol/L    CO2 20 (L) 23 - 29 mmol/L    Glucose 213 (H) 70 - 110 mg/dL    BUN 33 (H) 8 - 23 mg/dL    Creatinine 1.9 (H) 0.5 - 1.4 mg/dL    Calcium 9.0 8.7 - 10.5 mg/dL    Total Protein 7.1 6.0 - 8.4 g/dL    Albumin 3.7 3.5 - 5.2 g/dL    Total Bilirubin 0.6 0.1 - 1.0 mg/dL    Alkaline Phosphatase 56 40 - 150 U/L    AST 11 10 - 40 U/L    ALT 10 10 - 44 U/L    eGFR 33.1 (A) >60 mL/min/1.73 m^2    Anion Gap 16 8 - 16 mmol/L   Brain Natriuretic Peptide    Collection Time: 01/16/25 10:18 AM   Result Value Ref Range    BNP 60 0 - 99 pg/mL   Troponin I    Collection Time: 01/16/25 10:18 AM   Result Value Ref Range    Troponin I 0.008 0.000 - 0.026 ng/mL   Magnesium    Collection Time: 01/16/25 10:18 AM   Result Value Ref Range    Magnesium 1.8 1.6 - 2.6 mg/dL   Phosphorus    Collection Time: 01/16/25 10:18 AM   Result Value Ref Range    Phosphorus 3.5 2.7 - 4.5 mg/dL   Influenza A & B by Molecular    Collection Time: 01/16/25 11:37 AM    Specimen: Nasopharyngeal Swab   Result Value Ref Range    Influenza A, Molecular Negative Negative    Influenza B, Molecular Negative Negative    Flu A & B Source Nasal Swab    COVID-19 Rapid Screening    Collection Time: 01/16/25 11:37 AM   Result Value Ref Range    SARS-CoV-2 RNA, Amplification, Qual Negative Negative      Imaging Results              X-Ray Chest AP Portable (Final result)  Result time 01/16/25 10:40:49      Final result by Ortiz Riojas MD (01/16/25 10:40:49)                   Impression:      Pulmonary hypoinflation with dependent left lower lobe atelectasis.    As deemed clinically necessary, further evaluation may be obtained with dedicated PA and lateral views of the chest.      Electronically signed by: Ortiz Riojas  Date:    01/16/2025  Time:    10:40               Narrative:    EXAMINATION:  XR CHEST AP PORTABLE    CLINICAL HISTORY:  Chest Pain;    TECHNIQUE:  Portable view of the chest was performed.    COMPARISON:  07/12/2023.    FINDINGS:  Pulmonary hypoinflation crowding of the bronchopulmonary vessels.  Dependent atelectasis at the left lung base.  Right lung clear.  Heart size is enlarged.  Trachea midline.    Bony thorax intact with demineralization.  Right shoulder arthroplasty.                                       The emergency physician reviewed the vital signs / test results outlined above.     ED Discussion     ED Course as of 01/16/25 1236   Thu Jan 16, 2025   1014 The EKG was ordered, reviewed, and independently interpreted by the ED Physician:  Physician interpreting: Alli Carrizales MD  Rhythm: 1st degree AV block with PVCs  Rate: 92 bpm  Nonspecific ST-T changes. No STEMI.   Leftward axis deviation.    Normal intervals   Inferior Q waves  [ND]      ED Course User Index  [ND] Alli Carrizales MD     Patient's evaluation in the ED does not suggest any emergent or life-threatening medical conditions requiring immediate intervention beyond what was provided in the ED, and I believe patient is safe for discharge. Regardless, an unremarkable evaluation in the ED does not preclude the development or presence of a serious or life-threatening condition. As such, patient was given return instructions for any change or worsening of symptoms.       ED Medication(s)  Administered:  Medications   sodium chloride 0.9% bolus 500 mL 500 mL (has no administration in time range)   sodium chloride 0.9% bolus 500 mL 500 mL ( Intravenous Stopped 1/16/25 1202)   ondansetron injection 8 mg (8 mg Intravenous Given 1/16/25 1131)   aspirin tablet 325 mg (325 mg Oral Given 1/16/25 1134)       Prescription Management: I performed a review of the patient's current Rx medication list as input by nursing staff.    Patient's Medications   New Prescriptions    No medications on file   Previous Medications    ALBUTEROL (PROVENTIL/VENTOLIN HFA) 90 MCG/ACTUATION INHALER    Inhale 2 puffs into the lungs every 4 (four) hours as needed for Wheezing or Shortness of Breath. Rescue    APIXABAN (ELIQUIS) 5 MG TAB    Take 1 tablet (5 mg total) by mouth 2 (two) times daily.    BICALUTAMIDE (CASODEX) 50 MG TAB    Take 1 tablet (50 mg total) by mouth once daily.    BLOOD SUGAR DIAGNOSTIC STRP    1 each by Misc.(Non-Drug; Combo Route) route 2 (two) times a day.    BLOOD-GLUCOSE METER (BLOOD GLUCOSE MONITORING) KIT    Use as instructed    CLOTRIMAZOLE-BETAMETHASONE 1-0.05% (LOTRISONE) CREAM    2 (two) times daily.    DOCUSATE SODIUM (COLACE) 100 MG CAPSULE    Take 1 capsule (100 mg total) by mouth once daily. Take while on opiate pain medication    DOXYCYCLINE (VIBRA-TABS) 100 MG TABLET        EMPAGLIFLOZIN (JARDIANCE) 10 MG TABLET    Take 1 tablet (10 mg total) by mouth once daily.    FERROUS SULFATE (IRON) 325 MG (65 MG IRON) TAB TABLET    Take one table by mouth on Monday, Wednesday and Friday    FLUTICASONE-UMECLIDIN-VILANTER (TRELEGY ELLIPTA) 100-62.5-25 MCG DSDV    See Instructions, INHALE 1 PUFF BY MOUTH EVERY DAY AS DIRECTED, # 60 EA, 2 Refill(s), Pharmacy: [id number], 175, cm, 07/29/24 9:31:00 CDT, Height/Length Measured, 106.1, kg, 07/29/24 9:31:00 CDT, Weight Dosing    GABAPENTIN (NEURONTIN) 300 MG CAPSULE    Take 1 capsule (300 mg total) by mouth every evening.     HYDROCODONE-ACETAMINOPHEN (NORCO) 7.5-325 MG PER TABLET    TAKE 1 TABLET BY MOUTH EVERY 6 HOURS AS NEEDED FOR PAIN DO NOT OPERATE MACHINERY, OR TAKE OTHER MEDS LIKE TRAMADOL WHILE ON THIS DRUG    HYDROCORTISONE 2.5 % OINTMENT    APPLY TO EAR TWICE DAILY X2 WEEKS    LANCETS (MICRO THIN LANCETS) 33 GAUGE MISC        METFORMIN (GLUCOPHAGE) 500 MG TABLET    Take 1 tablet by mouth 2 (two) times daily.    MULTIVITAMIN WITH FOLIC ACID 400 MCG TAB    Take 1 tablet by mouth once daily.    PANTOPRAZOLE (PROTONIX) 40 MG TABLET    Take 1 tablet (40 mg total) by mouth once daily.    TRAMADOL (ULTRAM) 50 MG TABLET    Take 1 tablet (50 mg total) by mouth daily as needed for Pain.    ZOLPIDEM (AMBIEN) 5 MG TAB    Take 1 tablet (5 mg total) by mouth nightly as needed (sleep).   Modified Medications    No medications on file   Discontinued Medications    No medications on file         Follow-up Information       Brady Rowan MD. Schedule an appointment as soon as possible for a visit on 1/20/2025.    Specialties: Cardiology, General Surgery  Why: for chest pain follow up  Contact information:  [address] [facility] [address] [phone]               Perfecto Melgar III, MD.    Specialties: Internal Medicine, Cardiology  Contact information:  [address]  [facility] [address] [phone]               [facility] [place] [facility].    Specialty: Emergency Medicine  Why: As needed, If symptoms worsen  Contact information:  [address] [facility] [address] [phone]                          Clinical Impression       ICD-10-CM ICD-9-CM   1. Viral gastroenteritis  A08.4 008.8   2. Chest pain  R07.9 786.50   3. Nonspecific syndrome suggestive of viral illness  B34.9 079.99   4. Dehydration  E86.0 276.51      ED Disposition Condition    Discharge Stable             Alli Carrizales MD  01/16/25 1214

## 2025-01-24 LAB
OHS QRS DURATION: 86 MS
OHS QRS DURATION: 92 MS
OHS QTC CALCULATION: 427 MS
OHS QTC CALCULATION: 431 MS

## 2025-01-28 DIAGNOSIS — C61 PROSTATE CANCER: ICD-10-CM

## 2025-01-28 RX ORDER — APIXABAN 5 MG/1
5 TABLET, FILM COATED ORAL 2 TIMES DAILY
Qty: 180 TABLET | Refills: 3 | Status: SHIPPED | OUTPATIENT
Start: 2025-01-28

## 2025-02-27 ENCOUNTER — OFFICE VISIT (OUTPATIENT)
Dept: PODIATRY | Facility: CLINIC | Age: OVER 89
End: 2025-02-27
Payer: MEDICARE

## 2025-02-27 VITALS
WEIGHT: 232.31 LBS | HEART RATE: 66 BPM | DIASTOLIC BLOOD PRESSURE: 64 MMHG | HEIGHT: 69 IN | SYSTOLIC BLOOD PRESSURE: 144 MMHG | BODY MASS INDEX: 34.41 KG/M2

## 2025-02-27 DIAGNOSIS — R60.0 EDEMA OF BOTH LOWER EXTREMITIES: ICD-10-CM

## 2025-02-27 DIAGNOSIS — L84 PRE-ULCERATIVE CALLUSES: ICD-10-CM

## 2025-02-27 DIAGNOSIS — E11.65 TYPE 2 DIABETES MELLITUS WITH HYPERGLYCEMIA, UNSPECIFIED WHETHER LONG TERM INSULIN USE: ICD-10-CM

## 2025-02-27 DIAGNOSIS — E11.9 COMPREHENSIVE DIABETIC FOOT EXAMINATION, TYPE 2 DM, ENCOUNTER FOR: Primary | ICD-10-CM

## 2025-02-27 DIAGNOSIS — M20.5X2 ADDUCTOVARUS ROTATION OF TOE, ACQUIRED, LEFT: ICD-10-CM

## 2025-02-27 DIAGNOSIS — M20.60 ACQUIRED DEFORMITY OF LESSER TOE: ICD-10-CM

## 2025-02-27 DIAGNOSIS — B35.1 ONYCHOMYCOSIS OF TOENAIL: ICD-10-CM

## 2025-02-27 PROCEDURE — 1159F MED LIST DOCD IN RCRD: CPT | Mod: CPTII,S$GLB,, | Performed by: PODIATRIST

## 2025-02-27 PROCEDURE — 99999 PR PBB SHADOW E&M-EST. PATIENT-LVL IV: CPT | Mod: PBBFAC,,, | Performed by: PODIATRIST

## 2025-02-27 PROCEDURE — 1126F AMNT PAIN NOTED NONE PRSNT: CPT | Mod: CPTII,S$GLB,, | Performed by: PODIATRIST

## 2025-02-27 PROCEDURE — 99214 OFFICE O/P EST MOD 30 MIN: CPT | Mod: S$GLB,,, | Performed by: PODIATRIST

## 2025-02-27 PROCEDURE — 3288F FALL RISK ASSESSMENT DOCD: CPT | Mod: CPTII,S$GLB,, | Performed by: PODIATRIST

## 2025-02-27 PROCEDURE — 1101F PT FALLS ASSESS-DOCD LE1/YR: CPT | Mod: CPTII,S$GLB,, | Performed by: PODIATRIST

## 2025-03-01 NOTE — PROGRESS NOTES
Subjective:       Patient ID: Anthony Sheldon is a 89 y.o. male.    Chief Complaint: Diabetic Foot Exam, Callouses (Left foot, 1st toe ), and Numbness (Pt stated he has tingling sensation in feet )  Patient presents for annual diabetic foot exam and follow-up pre ulcerative area inside of the 5th digit left.    Patient relates this area continues to do well as long as it is treated regularly, friend/neighbor helps him to apply iodine a few times a week, keeping this area dry has really reduced his pain.  He is very careful about what shoes he wears to make sure they are wide and soft.  It does cause him pain on occasion and is tender there is pressure on the outside of the 5th toe pushing in  Relates more tingling sensations in his feet, feels numb, but thinks he has good feeling, there is no pain associated with these symptoms at this time  Reports no change in diabetes well-controlled, A1c mid 6.0 range       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 OR;  Service: Urology;  Laterality: Bilateral;    COLONOSCOPY N/A 1/13/2020    Procedure: COLONOSCOPY;  Surgeon: Charli Chowdhury MD;  Location: [facility] END[facility];  Service: General;  Laterality: N/A;    COLONOSCOPY N/A 1/31/2024    Procedure:  COLONOSCOPY;  Surgeon: Robert Barreto MD;  Location: [facility];  Service: Endoscopy;  Laterality: N/A;    CYSTOSCOPY N/A 3/27/2019    Procedure: CYSTOSCOPY;  Surgeon: Niall Blas MD;  Location: [facility];  Service: Urology;  Laterality: N/A;    EPIDURAL STEROID INJECTION N/A 6/17/2019    Procedure: Injection, Steroid, Epidural - L5/S1 EPIDURAL STEROID INJECTION;  Surgeon: Chyna Valdovinos MD;  Location: [facility] OR;  Service: Pain Management;  Laterality: N/A;    EPIDURAL STEROID INJECTION N/A 12/11/2019    Procedure: Injection, Steroid, Epidural - L5/S1 LUMBAR EPIDURAL STEROID INJECTION;  Surgeon: Jade Layton MD;  Location: [facility];  Service: Pain Management;  Laterality: N/A;  *FIRST CASE*    ESOPHAGOGASTRODUODENOSCOPY N/A 1/13/2020    Procedure: EGD (ESOPHAGOGASTRODUODENOSCOPY);  Surgeon: Charli Chowdhury MD;  Location: [facility];  Service: General;  Laterality: N/A;    ESOPHAGOGASTRODUODENOSCOPY N/A 1/30/2024    Procedure: EGD (ESOPHAGOGASTRODUODENOSCOPY);  Surgeon: Robert Barreto MD;  Location: [facility];  Service: Endoscopy;  Laterality: N/A;    INJECTION OF ANESTHETIC AGENT AROUND MEDIAL BRANCH NERVES INNERVATING LUMBAR FACET JOINT Bilateral 7/12/2021    Procedure: Block-nerve-medial branch-lumbar Bilateral L 3,4,5;  Surgeon: Jade Layton MD;  Location: [facility];  Service: Pain Management;  Laterality: Bilateral;    INJECTION OF JOINT Left 1/21/2019    Procedure: Injection, FACET JOINT INJECTION LEFT L4-5 AND L5-S1;  Surgeon: Chyna Valdovinos MD;  Location: [facility];  Service: Pain Management;  Laterality: Left;    JOINT REPLACEMENT      BILAT HIPS, RIGHT SHOULDER    KNEE ARTHROPLASTY Left     RADIOFREQUENCY THERMOCOAGULATION Bilateral 8/2/2021    Procedure: RADIOFREQUENCY THERMAL COAGULATION Bilateral L 3,4,5;  Surgeon: Jade Layton MD;  Location: [facility];  Service: Pain Management;  Laterality: Bilateral;    TONSILLECTOMY  1950    TOTAL HIP ARTHROPLASTY Bilateral 2013 & 2014     TOTAL SHOULDER ARTHROPLASTY Right 2005     Family History   Problem Relation Name Age of Onset    Stroke Mother Sheldon     Bladder Cancer Father       Social History     Socioeconomic History    Marital status:    Tobacco Use    Smoking status: Former     Current packs/day: 0.00     Types: Cigarettes    Smokeless tobacco: Former     Quit date: 1/1/1990   Substance and Sexual Activity    Alcohol use: Yes     Alcohol/week: 12.0 standard drinks of alcohol     Types: 6 Glasses of wine, 6 Standard drinks or equivalent per week     Comment: 1 drink daily    Drug use: No    Sexual activity: Not Currently     Partners: Female     Social Drivers of Health     Financial Resource Strain: Low Risk  (10/15/2024)    Received from [facility]    Overall Financial Resource Strain (CARDIA)     Difficulty of Paying Living Expenses: Not hard at all   Food Insecurity: No Food Insecurity (10/15/2024)    Received from [facility]    Hunger Vital Sign     Worried About Running Out of Food in the Last Year: Never true     Ran Out of Food in the Last Year: Never true   Transportation Needs: No Transportation Needs (10/15/2024)    Received from [facility]    PRAPARE - Transportation     Lack of Transportation (Medical): No     Lack of Transportation (Non-Medical): No   Physical Activity: Insufficiently Active (10/15/2024)    Received from [facility]    Exercise Vital Sign     Days of Exercise per Week: 3 days     Minutes of Exercise per Session: 20 min   Stress: No Stress Concern Present (10/15/2024)    Received from [facility]    [employer] - Occupational Stress Questionnaire     Feeling of Stress : Only a little   Housing Stability: Low Risk  (4/2/2024)    Received from [facility]    Housing Stability Vital Sign     Unable to Pay for Housing in the Last Year: No     Number of Places Lived in the Last Year: 1     Unstable Housing in the Last Year: No       Current Outpatient Medications   Medication  Sig Dispense Refill    bicalutamide (CASODEX) 50 MG Tab Take 1 tablet (50 mg total) by mouth once daily. 90 tablet 3    blood sugar diagnostic Strp 1 each by Misc.(Non-Drug; Combo Route) route 2 (two) times a day. 100 strip 5    blood-glucose meter (BLOOD GLUCOSE MONITORING) kit Use as instructed 1 each 0    clotrimazole-betamethasone 1-0.05% (LOTRISONE) cream 2 (two) times daily.      docusate sodium (COLACE) 100 MG capsule Take 1 capsule (100 mg total) by mouth once daily. Take while on opiate pain medication  0    doxycycline (VIBRA-TABS) 100 MG tablet       ELIQUIS 5 mg Tab TAKE 1 TABLET TWICE DAILY 180 tablet 3    empagliflozin (JARDIANCE) 10 mg tablet Take 1 tablet (10 mg total) by mouth once daily. 30 tablet 11    ferrous sulfate (IRON) 325 mg (65 mg iron) Tab tablet Take one table by mouth on Monday, Wednesday and Friday 30 tablet 1    fluticasone-umeclidin-vilanter (TRELEGY ELLIPTA) 100-62.5-25 mcg DsDv See Instructions, INHALE 1 PUFF BY MOUTH EVERY DAY AS DIRECTED, # 60 EA, 2 Refill(s), Pharmacy: [id number], 175, cm, 07/29/24 9:31:00 CDT, Height/Length Measured, 106.1, kg, 07/29/24 9:31:00 CDT, Weight Dosing      HYDROcodone-acetaminophen (NORCO) 7.5-325 mg per tablet TAKE 1 TABLET BY MOUTH EVERY 6 HOURS AS NEEDED FOR PAIN DO NOT OPERATE MACHINERY, OR TAKE OTHER MEDS LIKE TRAMADOL WHILE ON THIS DRUG      hydrocortisone 2.5 % ointment APPLY TO EAR TWICE DAILY X2 WEEKS      lancets (MICRO THIN LANCETS) 33 gauge [facility]       metFORMIN (GLUCOPHAGE) 500 MG tablet Take 1 tablet by mouth 2 (two) times daily.      multivitamin with folic acid 400 mcg Tab Take 1 tablet by mouth once daily.      pantoprazole (PROTONIX) 40 MG tablet Take 1 tablet (40 mg total) by mouth once daily. 90 tablet 3    traMADoL (ULTRAM) 50 mg tablet Take 1 tablet (50 mg total) by mouth daily as needed for Pain. 30 tablet 2    zolpidem (AMBIEN) 5 MG Tab Take 1 tablet (5 mg total) by mouth nightly as needed (sleep). 30 tablet 2  "   albuterol (PROVENTIL/VENTOLIN HFA) 90 mcg/actuation inhaler Inhale 2 puffs into the lungs every 4 (four) hours as needed for Wheezing or Shortness of Breath. Rescue (Patient not taking: Reported on 2/27/2025) 8 g 0    gabapentin (NEURONTIN) 300 MG capsule Take 1 capsule (300 mg total) by mouth every evening. 90 capsule 2     No current facility-administered medications for this visit.     Review of patient's allergies indicates:  No Known Allergies    Review of Systems   Cardiovascular:  Positive for leg swelling.   Musculoskeletal:  Positive for gait problem.   All other systems reviewed and are negative.      Objective:      Vitals:    02/27/25 0823   BP: (!) 144/64   BP Location: Right arm   Patient Position: Sitting   Pulse: 66   Weight: 105.4 kg (232 lb 4.8 oz)   Height: 5' 9" (1.753 m)       Physical Exam  Vitals and nursing note reviewed.   Constitutional:       General: He is not in acute distress.     Appearance: Normal appearance.   Cardiovascular:      Pulses:           Dorsalis pedis pulses are 1+ on the right side and 1+ on the left side.        Posterior tibial pulses are 1+ on the right side and 1+ on the left side.   Musculoskeletal:      Right foot: Decreased range of motion.      Left foot: Decreased range of motion. Deformity (Contracted lesser digits with wdductovarus rotation 5th digit left.  Pes planus bilateral) present.      Comments: Limited mobility   Feet:      Right foot:      Protective Sensation: 5 sites tested.  5 sites sensed.      Skin integrity: Dry skin present. No skin breakdown.      Toenail Condition: Right toenails are abnormally thick.      Left foot:      Protective Sensation: 5 sites tested.  5 sites sensed.      Skin integrity: Ulcer (tender preulcerative callus medial 5th digit left, thick, raised, area of discoloration without skin opening), callus and dry skin present.      Toenail Condition: Left toenails are abnormally thick. Fungal disease present.  Skin:     " General: Skin is dry.      Capillary Refill: Capillary refill takes 2 to 3 seconds.   Neurological:      Mental Status: He is alert.      Comments: Sensation intact all areas bilateral feet with monofilament testing.  Numbness tingling paresthesias bilateral feet   Psychiatric:         Thought Content: Thought content normal.                          Assessment:       1. Comprehensive diabetic foot examination, type 2 DM, encounter for    2. Type 2 diabetes mellitus with hyperglycemia, unspecified whether long term insulin use    3. Edema of both lower extremities    4. Adductovarus rotation of toe, acquired, left    5. Pre-ulcerative calluses    6. Acquired deformity of lesser toe - Left Foot    7. Onychomycosis of toenail - Left Foot              Plan:             Comprehensive diabetic pedal exam performed.    Reviewed good sensation in feet, advised patient  This can fluctuate we discussed subtle symptoms of neuropathy, numbness and tingling which is very common due to length of time he has been diabetic, additional swelling in his feet which can put a lot of pressure on the skin and nerves just putting shoes on  Reviewed good sensation in feet with the sensation of numbness  Reviewed diabetic education  Reviewed A1c Reviewed benefit of controled glucose/diabetes regarding potential foot problems especially   Advised patient area of previous ulcer continues to develop callus on the inside of the 5th digit, but it is stable and dry only really due to the application of iodine periods highly recommended he continue to have family/friend apply iodine 3 times a week  Dry well between the toes  We discussed light padding between the toes at this could even be applied 3 times a week would be helpful  Explained area remains preulcerative and reviewed potential complications  Preulcerative callus medial 5th digit left debrided  Reviewed control of swelling in feet and legs  Elevate feet at all times when possible at  home  Betadine/iodine applied  Nails are well-maintained and he can continue pedicures at home.  We discussed thickness and damaged due to fungus of the left great toenail and thickness was reduced significantly.  Advised should be treated with Vicks vapor rub even a few days a week would be helpful  Reviewed care and maintenance of dry skin  Check feet daily and contact office with any area of concern which has not improved in a few days  Patient was in understanding and agreement with treatment plan.  I counseled the patient on their conditions, implications and medical management.  Instructed patient to contact the office with any changes, questions, concerns, worsening of symptoms.   Total face to face time 30 minutes, exam, assessment, treatment, discussion, additional time for review of chart prior to and following appointment and visit documentation, consultation and coordination of care.   Follow up prn 3 months    This note was created using M*Modal voice recognition software that occasionally misinterpreted phrases or words.

## 2025-04-11 ENCOUNTER — LAB VISIT (OUTPATIENT)
Dept: LAB | Facility: HOSPITAL | Age: OVER 89
End: 2025-04-11
Attending: NURSE PRACTITIONER
Payer: MEDICARE

## 2025-04-11 DIAGNOSIS — C61 PROSTATE CANCER: ICD-10-CM

## 2025-04-11 LAB
ABSOLUTE EOSINOPHIL (OHS): 0.09 K/UL
ABSOLUTE MONOCYTE (OHS): 0.48 K/UL (ref 0.3–1)
ABSOLUTE NEUTROPHIL COUNT (OHS): 2.87 K/UL (ref 1.8–7.7)
ALBUMIN SERPL BCP-MCNC: 3.6 G/DL (ref 3.5–5.2)
ALP SERPL-CCNC: 57 UNIT/L (ref 40–150)
ALT SERPL W/O P-5'-P-CCNC: 12 UNIT/L (ref 10–44)
ANION GAP (OHS): 10 MMOL/L (ref 8–16)
AST SERPL-CCNC: 13 UNIT/L (ref 11–45)
BASOPHILS # BLD AUTO: 0.05 K/UL
BASOPHILS NFR BLD AUTO: 1 %
BILIRUB SERPL-MCNC: 0.5 MG/DL (ref 0.1–1)
BUN SERPL-MCNC: 27 MG/DL (ref 8–23)
CALCIUM SERPL-MCNC: 9.2 MG/DL (ref 8.7–10.5)
CHLORIDE SERPL-SCNC: 105 MMOL/L (ref 95–110)
CO2 SERPL-SCNC: 23 MMOL/L (ref 23–29)
CREAT SERPL-MCNC: 1.8 MG/DL (ref 0.5–1.4)
ERYTHROCYTE [DISTWIDTH] IN BLOOD BY AUTOMATED COUNT: 13.2 % (ref 11.5–14.5)
GFR SERPLBLD CREATININE-BSD FMLA CKD-EPI: 35 ML/MIN/1.73/M2
GLUCOSE SERPL-MCNC: 123 MG/DL (ref 70–110)
HCT VFR BLD AUTO: 36.4 % (ref 40–54)
HGB BLD-MCNC: 11.9 GM/DL (ref 14–18)
IMM GRANULOCYTES # BLD AUTO: 0.01 K/UL (ref 0–0.04)
IMM GRANULOCYTES NFR BLD AUTO: 0.2 % (ref 0–0.5)
LYMPHOCYTES # BLD AUTO: 1.56 K/UL (ref 1–4.8)
MCH RBC QN AUTO: 31.4 PG (ref 27–31)
MCHC RBC AUTO-ENTMCNC: 32.7 G/DL (ref 32–36)
MCV RBC AUTO: 96 FL (ref 82–98)
NUCLEATED RBC (/100WBC) (OHS): 0 /100 WBC
PLATELET # BLD AUTO: 211 K/UL (ref 150–450)
PMV BLD AUTO: 9.2 FL (ref 9.2–12.9)
POTASSIUM SERPL-SCNC: 4.4 MMOL/L (ref 3.5–5.1)
PROT SERPL-MCNC: 6.8 GM/DL (ref 6–8.4)
PSA SERPL-MCNC: 0.15 NG/ML
RBC # BLD AUTO: 3.79 M/UL (ref 4.6–6.2)
RELATIVE EOSINOPHIL (OHS): 1.8 %
RELATIVE LYMPHOCYTE (OHS): 30.8 % (ref 18–48)
RELATIVE MONOCYTE (OHS): 9.5 % (ref 4–15)
RELATIVE NEUTROPHIL (OHS): 56.7 % (ref 38–73)
SODIUM SERPL-SCNC: 138 MMOL/L (ref 136–145)
WBC # BLD AUTO: 5.06 K/UL (ref 3.9–12.7)

## 2025-04-11 PROCEDURE — 80053 COMPREHEN METABOLIC PANEL: CPT

## 2025-04-11 PROCEDURE — 84153 ASSAY OF PSA TOTAL: CPT

## 2025-04-11 PROCEDURE — 85025 COMPLETE CBC W/AUTO DIFF WBC: CPT

## 2025-04-11 PROCEDURE — 36415 COLL VENOUS BLD VENIPUNCTURE: CPT

## 2025-04-14 ENCOUNTER — OFFICE VISIT (OUTPATIENT)
Dept: HEMATOLOGY/ONCOLOGY | Facility: CLINIC | Age: OVER 89
End: 2025-04-14
Payer: MEDICARE

## 2025-04-14 VITALS
SYSTOLIC BLOOD PRESSURE: 139 MMHG | HEIGHT: 69 IN | OXYGEN SATURATION: 99 % | DIASTOLIC BLOOD PRESSURE: 63 MMHG | TEMPERATURE: 97 F | HEART RATE: 64 BPM | WEIGHT: 232.38 LBS | RESPIRATION RATE: 17 BRPM | BODY MASS INDEX: 34.42 KG/M2

## 2025-04-14 DIAGNOSIS — C61 PROSTATE CANCER: Primary | ICD-10-CM

## 2025-04-14 DIAGNOSIS — Z86.711 HISTORY OF PULMONARY EMBOLUS (PE): ICD-10-CM

## 2025-04-14 DIAGNOSIS — R97.20 ELEVATED PSA MEASUREMENT: ICD-10-CM

## 2025-04-14 DIAGNOSIS — C61 PROSTATE CANCER: ICD-10-CM

## 2025-04-14 PROCEDURE — 1101F PT FALLS ASSESS-DOCD LE1/YR: CPT | Mod: CPTII,S$GLB,, | Performed by: INTERNAL MEDICINE

## 2025-04-14 PROCEDURE — 99214 OFFICE O/P EST MOD 30 MIN: CPT | Mod: S$GLB,,, | Performed by: INTERNAL MEDICINE

## 2025-04-14 PROCEDURE — 1126F AMNT PAIN NOTED NONE PRSNT: CPT | Mod: CPTII,S$GLB,, | Performed by: INTERNAL MEDICINE

## 2025-04-14 PROCEDURE — 3288F FALL RISK ASSESSMENT DOCD: CPT | Mod: CPTII,S$GLB,, | Performed by: INTERNAL MEDICINE

## 2025-04-14 PROCEDURE — 99999 PR PBB SHADOW E&M-EST. PATIENT-LVL III: CPT | Mod: PBBFAC,,, | Performed by: INTERNAL MEDICINE

## 2025-04-14 RX ORDER — BICALUTAMIDE 50 MG/1
50 TABLET, FILM COATED ORAL DAILY
Qty: 90 TABLET | Refills: 3 | Status: SHIPPED | OUTPATIENT
Start: 2025-04-14 | End: 2026-04-14

## 2025-04-14 NOTE — PROGRESS NOTES
Service Date:  4/14/25    Chief Complaint: Prostate Cancer (Labs  6mfu  lupron lcear)    Anthony Sheldon is a 89 y.o. male here with metastatic prostate cancer.  Has been on Lupron and Casodex for 4-5 of years.  Patient denies any worsening bone pains.  Denies any new symptoms since last being seen about 6 months ago.  He is getting his Lupron next week.    Review of Systems   Constitutional: Negative.  Negative for appetite change and unexpected weight change.   HENT: Negative.  Negative for mouth sores.    Eyes: Negative.  Negative for visual disturbance.   Respiratory:  Positive for shortness of breath. Negative for cough.    Cardiovascular: Negative.  Negative for chest pain.   Gastrointestinal: Negative.  Negative for abdominal pain and diarrhea.   Endocrine: Negative.    Genitourinary: Negative.  Negative for frequency.   Musculoskeletal:  Positive for back pain.   Integumentary:  Negative for rash. Negative.   Neurological: Negative.  Negative for headaches.   Hematological: Negative.  Negative for adenopathy.   Psychiatric/Behavioral: Negative.  The patient is not nervous/anxious.         Current Outpatient Medications   Medication Instructions    albuterol (PROVENTIL/VENTOLIN HFA) 90 mcg/actuation inhaler 2 puffs, Inhalation, Every 4 hours PRN, Rescue    bicalutamide (CASODEX) 50 mg, Oral, Daily    blood sugar diagnostic Strp 1 each, Misc.(Non-Drug; Combo Route), 2 times daily    blood-glucose meter (BLOOD GLUCOSE MONITORING) kit Use as instructed    clotrimazole-betamethasone 1-0.05% (LOTRISONE) cream 2 times daily    docusate sodium (COLACE) 100 mg, Oral, Daily, Take while on opiate pain medication    doxycycline (VIBRA-TABS) 100 MG tablet     ELIQUIS 5 mg, Oral, 2 times daily    empagliflozin (JARDIANCE) 10 mg, Oral, Daily    ferrous sulfate (IRON) 325 mg (65 mg iron) Tab tablet Take one table by mouth on Monday, Wednesday and Friday    fluticasone-umeclidin-vilanter (TRELEGY ELLIPTA) 100-62.5-25 mcg  DsDv See Instructions, INHALE 1 PUFF BY MOUTH EVERY DAY AS DIRECTED, # 60 EA, 2 Refill(s), Pharmacy: [id number], 175, cm, 07/29/24 9:31:00 CDT, Height/Length Measured, 106.1, kg, 07/29/24 9:31:00 CDT, Weight Dosing    gabapentin (NEURONTIN) 300 mg, Oral, Nightly    HYDROcodone-acetaminophen (NORCO) 7.5-325 mg per tablet TAKE 1 TABLET BY MOUTH EVERY 6 HOURS AS NEEDED FOR PAIN DO NOT OPERATE MACHINERY, OR TAKE OTHER MEDS LIKE TRAMADOL WHILE ON THIS DRUG    hydrocortisone 2.5 % ointment APPLY TO EAR TWICE DAILY X2 WEEKS    lancets (MICRO THIN LANCETS) 33 gauge Misc     metFORMIN (GLUCOPHAGE) 500 MG tablet 1 tablet, 2 times daily    multivitamin with folic acid 400 mcg Tab 1 tablet, Daily    pantoprazole (PROTONIX) 40 mg, Oral, Daily    traMADoL (ULTRAM) 50 mg, Oral, Daily PRN    zolpidem (AMBIEN) 5 mg, Oral, Nightly PRN        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    Prostate cancer 05/2019    PSA elevation 10/7/2015    Wears glasses         Past Surgical History:   Procedure Laterality Date    BIOPSY WITH TRANSRECTAL ULTRASOUND (TRUS) GUIDANCE Bilateral 3/27/2019    Procedure: BIOPSY, WITH TRANSRECTAL US GUIDANCE;  Surgeon: Niall Blas MD;  Location: [facility] OR;  Service: Urology;  Laterality: Bilateral;    COLONOSCOPY N/A 1/13/2020    Procedure: COLONOSCOPY;  Surgeon: Charli Chowdhury MD;  Location: [facility] ENDO;  Service: General;  Laterality: N/A;    COLONOSCOPY N/A 1/31/2024    Procedure: COLONOSCOPY;  Surgeon: Robert Barreto MD;  Location: [facility] ENDO;  Service:  Endoscopy;  Laterality: N/A;    CYSTOSCOPY N/A 3/27/2019    Procedure: CYSTOSCOPY;  Surgeon: Niall Blas MD;  Location: [facility] OR;  Service: Urology;  Laterality: N/A;    EPIDURAL STEROID INJECTION N/A 6/17/2019    Procedure: Injection, Steroid, Epidural - L5/S1 EPIDURAL STEROID INJECTION;  Surgeon: Chyna Valdovinos MD;  Location: [facility];  Service: Pain Management;  Laterality: N/A;    EPIDURAL STEROID INJECTION N/A 12/11/2019    Procedure: Injection, Steroid, Epidural - L5/S1 LUMBAR EPIDURAL STEROID INJECTION;  Surgeon: Jade Layton MD;  Location: [facility] OR;  Service: Pain Management;  Laterality: N/A;  *FIRST CASE*    ESOPHAGOGASTRODUODENOSCOPY N/A 1/13/2020    Procedure: EGD (ESOPHAGOGASTRODUODENOSCOPY);  Surgeon: Charli Chowdhury MD;  Location: [facility];  Service: General;  Laterality: N/A;    ESOPHAGOGASTRODUODENOSCOPY N/A 1/30/2024    Procedure: EGD (ESOPHAGOGASTRODUODENOSCOPY);  Surgeon: Robert Barreto MD;  Location: [facility];  Service: Endoscopy;  Laterality: N/A;    INJECTION OF ANESTHETIC AGENT AROUND MEDIAL BRANCH NERVES INNERVATING LUMBAR FACET JOINT Bilateral 7/12/2021    Procedure: Block-nerve-medial branch-lumbar Bilateral L 3,4,5;  Surgeon: Jade Layton MD;  Location: [facility];  Service: Pain Management;  Laterality: Bilateral;    INJECTION OF JOINT Left 1/21/2019    Procedure: Injection, FACET JOINT INJECTION LEFT L4-5 AND L5-S1;  Surgeon: Chyna Valdovinos MD;  Location: [facility];  Service: Pain Management;  Laterality: Left;    JOINT REPLACEMENT      BILAT HIPS, RIGHT SHOULDER    KNEE ARTHROPLASTY Left     RADIOFREQUENCY THERMOCOAGULATION Bilateral 8/2/2021    Procedure: RADIOFREQUENCY THERMAL COAGULATION Bilateral L 3,4,5;  Surgeon: Jade Layton MD;  Location: [facility];  Service: Pain Management;  Laterality: Bilateral;    TONSILLECTOMY  1950    TOTAL HIP ARTHROPLASTY Bilateral 2013 & 2014    TOTAL SHOULDER ARTHROPLASTY Right 2005        Family History   Problem Relation  "Name Age of Onset    Stroke Mother Sheldon     Bladder Cancer Father         Social History     Tobacco Use    Smoking status: Former     Current packs/day: 0.00     Types: Cigarettes    Smokeless tobacco: Former     Quit date: 1/1/1990   Substance Use Topics    Alcohol use: Yes     Alcohol/week: 12.0 standard drinks of alcohol     Types: 6 Glasses of wine, 6 Standard drinks or equivalent per week     Comment: 1 drink daily    Drug use: No         Vitals:    04/14/25 1049   BP: 139/63   Pulse: 64   Resp: 17   Temp: 97.4 °F (36.3 °C)        Physical Exam:  /63 (BP Location: Right arm, Patient Position: Sitting)   Pulse 64   Temp 97.4 °F (36.3 °C) (Temporal)   Resp 17   Ht 5' 9" (1.753 m)   Wt 105.4 kg (232 lb 5.8 oz)   SpO2 99%   BMI 34.31 kg/m²     Physical Exam  Vitals and nursing note reviewed.   Constitutional:       Appearance: Normal appearance.   HENT:      Head: Normocephalic and atraumatic.      Nose: Nose normal.      Mouth/Throat:      Mouth: Mucous membranes are moist.      Pharynx: Oropharynx is clear.   Eyes:      Extraocular Movements: Extraocular movements intact.      Conjunctiva/sclera: Conjunctivae normal.   Cardiovascular:      Rate and Rhythm: Normal rate and regular rhythm.      Heart sounds: Normal heart sounds.   Pulmonary:      Effort: Pulmonary effort is normal.      Breath sounds: Normal breath sounds.   Abdominal:      General: Abdomen is flat. Bowel sounds are normal.      Palpations: Abdomen is soft.   Musculoskeletal:         General: Normal range of motion.      Cervical back: Normal range of motion and neck supple.   Skin:     General: Skin is warm and dry.   Neurological:      General: No focal deficit present.      Mental Status: He is alert and oriented to person, place, and time. Mental status is at baseline.   Psychiatric:         Mood and Affect: Mood normal.          Labs:  Lab Results   Component Value Date    WBC 5.06 04/11/2025    RBC 3.79 (L) 04/11/2025    HGB " 11.9 (L) 04/11/2025    HCT 36.4 (L) 04/11/2025    MCV 96 04/11/2025    MCH 31.4 (H) 04/11/2025    MCHC 32.7 04/11/2025    RDW 13.2 04/11/2025     04/11/2025    MPV 9.2 04/11/2025    GRAN 5.7 01/16/2025    GRAN 89.6 (H) 01/16/2025    LYMPH 30.8 04/11/2025    LYMPH 1.56 04/11/2025    MONO 9.5 04/11/2025    MONO 0.48 04/11/2025    EOS 1.8 04/11/2025    EOS 0.09 04/11/2025    BASO 0.04 01/16/2025    EOSINOPHIL 0.3 01/16/2025    BASOPHIL 1.0 04/11/2025    BASOPHIL 0.05 04/11/2025     Sodium   Date Value Ref Range Status   04/11/2025 138 136 - 145 mmol/L Final   01/16/2025 143 136 - 145 mmol/L Final     Potassium   Date Value Ref Range Status   04/11/2025 4.4 3.5 - 5.1 mmol/L Final   01/16/2025 4.5 3.5 - 5.1 mmol/L Final     Chloride   Date Value Ref Range Status   04/11/2025 105 95 - 110 mmol/L Final   01/16/2025 107 95 - 110 mmol/L Final     CO2   Date Value Ref Range Status   04/11/2025 23 23 - 29 mmol/L Final   01/16/2025 20 (L) 23 - 29 mmol/L Final     Glucose   Date Value Ref Range Status   01/16/2025 213 (H) 70 - 110 mg/dL Final     BUN   Date Value Ref Range Status   04/11/2025 27 (H) 8 - 23 mg/dL Final     Creatinine   Date Value Ref Range Status   04/11/2025 1.8 (H) 0.5 - 1.4 mg/dL Final     Calcium   Date Value Ref Range Status   04/11/2025 9.2 8.7 - 10.5 mg/dL Final   01/16/2025 9.0 8.7 - 10.5 mg/dL Final     Total Protein   Date Value Ref Range Status   01/16/2025 7.1 6.0 - 8.4 g/dL Final     Albumin   Date Value Ref Range Status   04/11/2025 3.6 3.5 - 5.2 g/dL Final   01/16/2025 3.7 3.5 - 5.2 g/dL Final     Total Bilirubin   Date Value Ref Range Status   01/16/2025 0.6 0.1 - 1.0 mg/dL Final     Comment:     For infants and newborns, interpretation of results should be based  on gestational age, weight and in agreement with clinical  observations.    Premature Infant recommended reference ranges:  Up to 24 hours.............<8.0 mg/dL  Up to 48 hours............<12.0 mg/dL  3-5  days..................<15.0 mg/dL  6-29 days.................<15.0 mg/dL       Bilirubin Total   Date Value Ref Range Status   04/11/2025 0.5 0.1 - 1.0 mg/dL Final     Comment:     For infants and newborns, interpretation of results should be based   on gestational age, weight and in agreement with clinical   observations.    Premature Infant recommended reference ranges:   0-24 hours:  <8.0 mg/dL   24-48 hours: <12.0 mg/dL   3-5 days:    <15.0 mg/dL   6-29 days:   <15.0 mg/dL     Alkaline Phosphatase   Date Value Ref Range Status   01/16/2025 56 40 - 150 U/L Final     ALP   Date Value Ref Range Status   04/11/2025 57 40 - 150 unit/L Final     AST   Date Value Ref Range Status   04/11/2025 13 11 - 45 unit/L Final   01/16/2025 11 10 - 40 U/L Final     ALT   Date Value Ref Range Status   04/11/2025 12 10 - 44 unit/L Final   01/16/2025 10 10 - 44 U/L Final     Anion Gap   Date Value Ref Range Status   04/11/2025 10 8 - 16 mmol/L Final     eGFR if    Date Value Ref Range Status   03/28/2022 47.7 (A) >60 mL/min/1.73 m^2 Final     eGFR if non    Date Value Ref Range Status   03/28/2022 41.3 (A) >60 mL/min/1.73 m^2 Final     Comment:     Calculation used to obtain the estimated glomerular filtration  rate (eGFR) is the CKD-EPI equation.          A/P:    Metastatic prostate cancer   -currently on Lupron and Casodex  -PSA is gradually rising, although still very low, now 0.15.  Once it gets above 2.0, would recommend repeat scanning with an F 18 PET scan to look for any metastatic worsening.  If so, then his Casodex could be switched with a second-generation oral ADT medication  -return to clinic in 6 months with labs      Aurash Khoobehi, MD  Hematology and Oncology

## 2025-04-21 ENCOUNTER — TELEPHONE (OUTPATIENT)
Dept: HEMATOLOGY/ONCOLOGY | Facility: CLINIC | Age: OVER 89
End: 2025-04-21
Payer: MEDICARE

## 2025-04-21 NOTE — TELEPHONE ENCOUNTER
----- Message from Nurse Fortune sent at 2025  7:13 AM CDT -----  Regarding: FW: Lupron order     ----- Message -----  From: Malka Stewart RN  Sent: 2025   2:05 PM CDT  To: Devika Webster RN; Aurash Khoobehi, MD  Subject: Lupron order                              Dr Khoobehi, the lupron order will be  and will need to be resigned by the pt's scheduled appt time on 25. Please re-sign. Thank you

## 2025-04-22 ENCOUNTER — INFUSION (OUTPATIENT)
Dept: INFUSION THERAPY | Facility: HOSPITAL | Age: OVER 89
End: 2025-04-22
Attending: INTERNAL MEDICINE
Payer: MEDICARE

## 2025-04-22 VITALS
SYSTOLIC BLOOD PRESSURE: 143 MMHG | BODY MASS INDEX: 34.2 KG/M2 | WEIGHT: 230.88 LBS | HEART RATE: 63 BPM | RESPIRATION RATE: 16 BRPM | DIASTOLIC BLOOD PRESSURE: 72 MMHG | HEIGHT: 69 IN | OXYGEN SATURATION: 100 % | TEMPERATURE: 97 F

## 2025-04-22 DIAGNOSIS — C61 PROSTATE CANCER: Primary | ICD-10-CM

## 2025-04-22 PROCEDURE — 63600175 PHARM REV CODE 636 W HCPCS: Mod: JZ,TB | Performed by: INTERNAL MEDICINE

## 2025-04-22 PROCEDURE — 96401 CHEMO ANTI-NEOPL SQ/IM: CPT

## 2025-04-22 RX ADMIN — LEUPROLIDE ACETATE 45 MG: KIT at 07:04

## 2025-05-27 ENCOUNTER — OFFICE VISIT (OUTPATIENT)
Dept: PODIATRY | Facility: CLINIC | Age: OVER 89
End: 2025-05-27
Payer: MEDICARE

## 2025-05-27 VITALS
SYSTOLIC BLOOD PRESSURE: 132 MMHG | DIASTOLIC BLOOD PRESSURE: 73 MMHG | BODY MASS INDEX: 33.74 KG/M2 | HEART RATE: 72 BPM | RESPIRATION RATE: 18 BRPM | HEIGHT: 69 IN | WEIGHT: 227.81 LBS

## 2025-05-27 DIAGNOSIS — R60.0 EDEMA OF BOTH LOWER EXTREMITIES: ICD-10-CM

## 2025-05-27 DIAGNOSIS — B35.1 ONYCHOMYCOSIS OF TOENAIL: ICD-10-CM

## 2025-05-27 DIAGNOSIS — M20.5X2 ADDUCTOVARUS ROTATION OF TOE, ACQUIRED, LEFT: Primary | ICD-10-CM

## 2025-05-27 DIAGNOSIS — L84 PRE-ULCERATIVE CALLUSES: ICD-10-CM

## 2025-05-27 DIAGNOSIS — E11.65 TYPE 2 DIABETES MELLITUS WITH HYPERGLYCEMIA, UNSPECIFIED WHETHER LONG TERM INSULIN USE: ICD-10-CM

## 2025-05-27 PROCEDURE — 99999 PR PBB SHADOW E&M-EST. PATIENT-LVL V: CPT | Mod: PBBFAC,,, | Performed by: PODIATRIST

## 2025-05-27 PROCEDURE — 1126F AMNT PAIN NOTED NONE PRSNT: CPT | Mod: CPTII,S$GLB,, | Performed by: PODIATRIST

## 2025-05-27 PROCEDURE — 1101F PT FALLS ASSESS-DOCD LE1/YR: CPT | Mod: CPTII,S$GLB,, | Performed by: PODIATRIST

## 2025-05-27 PROCEDURE — 3288F FALL RISK ASSESSMENT DOCD: CPT | Mod: CPTII,S$GLB,, | Performed by: PODIATRIST

## 2025-05-27 PROCEDURE — 99213 OFFICE O/P EST LOW 20 MIN: CPT | Mod: S$GLB,,, | Performed by: PODIATRIST

## 2025-05-27 PROCEDURE — 1159F MED LIST DOCD IN RCRD: CPT | Mod: CPTII,S$GLB,, | Performed by: PODIATRIST

## 2025-05-27 RX ORDER — DULOXETINE HYDROCHLORIDE 20 MG/1
20 CAPSULE, DELAYED RELEASE ORAL
COMMUNITY
Start: 2025-05-14

## 2025-05-27 RX ORDER — PREDNISONE 10 MG/1
10 TABLET ORAL
COMMUNITY
Start: 2025-01-13 | End: 2025-05-27

## 2025-05-27 RX ORDER — ACETAMINOPHEN 325 MG/1
650 TABLET ORAL EVERY 6 HOURS PRN
COMMUNITY

## 2025-05-27 RX ORDER — BUDESONIDE, GLYCOPYRROLATE, AND FORMOTEROL FUMARATE 160; 9; 4.8 UG/1; UG/1; UG/1
AEROSOL, METERED RESPIRATORY (INHALATION)
COMMUNITY
Start: 2025-05-26

## 2025-05-27 RX ORDER — AMLODIPINE BESYLATE 2.5 MG/1
2.5 TABLET ORAL
COMMUNITY

## 2025-05-27 NOTE — PROGRESS NOTES
Subjective:       Patient ID: Anthony Sheldon is a 89 y.o. male.    Chief Complaint: Follow-up, Callouses, Foot Swelling, and Diabetes Mellitus  Patient presents for follow-up diabetes, pre ulcerative area inside of the 5th digit left.    Patient relates this area has continued to do well, has not needed to have friend apply any medication between the toes  Has been moisturizing skin better and  comes to the house to take care of his nails every month.  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    Prostate cancer 05/2019    PSA elevation 10/7/2015    Wears glasses      Past Surgical History:   Procedure Laterality Date    BIOPSY WITH TRANSRECTAL ULTRASOUND (TRUS) GUIDANCE Bilateral 3/27/2019    Procedure: BIOPSY, WITH TRANSRECTAL US GUIDANCE;  Surgeon: Niall Blas MD;  Location: [facility] OR;  Service: Urology;  Laterality: Bilateral;    COLONOSCOPY N/A 1/13/2020    Procedure: COLONOSCOPY;  Surgeon: Charli Chowdhury MD;  Location: [facility];  Service: General;  Laterality: N/A;    COLONOSCOPY N/A 1/31/2024    Procedure: COLONOSCOPY;  Surgeon: Robert Barreto MD;  Location: [facility];  Service: Endoscopy;  Laterality: N/A;    CYSTOSCOPY N/A 3/27/2019    Procedure: CYSTOSCOPY;  Surgeon: Niall Blas MD;  Location: [facility] OR;  Service: Urology;  Laterality: N/A;    EPIDURAL STEROID INJECTION N/A 6/17/2019    Procedure: Injection, Steroid,  Epidural - L5/S1 EPIDURAL STEROID INJECTION;  Surgeon: Chyna Valdovinos MD;  Location: [facility] OR;  Service: Pain Management;  Laterality: N/A;    EPIDURAL STEROID INJECTION N/A 12/11/2019    Procedure: Injection, Steroid, Epidural - L5/S1 LUMBAR EPIDURAL STEROID INJECTION;  Surgeon: Jade Layton MD;  Location: [facility] OR;  Service: Pain Management;  Laterality: N/A;  *FIRST CASE*    ESOPHAGOGASTRODUODENOSCOPY N/A 1/13/2020    Procedure: EGD (ESOPHAGOGASTRODUODENOSCOPY);  Surgeon: Charli Chowdhury MD;  Location: [facility] ENDO;  Service: General;  Laterality: N/A;    ESOPHAGOGASTRODUODENOSCOPY N/A 1/30/2024    Procedure: EGD (ESOPHAGOGASTRODUODENOSCOPY);  Surgeon: Robert Barreto MD;  Location: [facility] ENDO;  Service: Endoscopy;  Laterality: N/A;    INJECTION OF ANESTHETIC AGENT AROUND MEDIAL BRANCH NERVES INNERVATING LUMBAR FACET JOINT Bilateral 7/12/2021    Procedure: Block-nerve-medial branch-lumbar Bilateral L 3,4,5;  Surgeon: Jade Layton MD;  Location: [facility] OR;  Service: Pain Management;  Laterality: Bilateral;    INJECTION OF JOINT Left 1/21/2019    Procedure: Injection, FACET JOINT INJECTION LEFT L4-5 AND L5-S1;  Surgeon: Chyna Valdovinos MD;  Location: [facility] OR;  Service: Pain Management;  Laterality: Left;    JOINT REPLACEMENT      BILAT HIPS, RIGHT SHOULDER    KNEE ARTHROPLASTY Left     RADIOFREQUENCY THERMOCOAGULATION Bilateral 8/2/2021    Procedure: RADIOFREQUENCY THERMAL COAGULATION Bilateral L 3,4,5;  Surgeon: Jade Layton MD;  Location: [facility] OR;  Service: Pain Management;  Laterality: Bilateral;    TONSILLECTOMY  1950    TOTAL HIP ARTHROPLASTY Bilateral 2013 & 2014    TOTAL SHOULDER ARTHROPLASTY Right 2005     Family History   Problem Relation Name Age of Onset    Stroke Mother Sheldon     Bladder Cancer Father       Social History     Socioeconomic History    Marital status:    Tobacco Use    Smoking status: Former     Current packs/day: 0.00     Types: Cigarettes    Smokeless tobacco:  Former     Quit date: 1/1/1990   Substance and Sexual Activity    Alcohol use: Yes     Alcohol/week: 12.0 standard drinks of alcohol     Types: 6 Glasses of wine, 6 Standard drinks or equivalent per week     Comment: 1 drink daily    Drug use: No    Sexual activity: Not Currently     Partners: Female     Social Drivers of Health     Financial Resource Strain: Low Risk  (4/7/2025)    Overall Financial Resource Strain (CARDIA)     Difficulty of Paying Living Expenses: Not hard at all   Food Insecurity: No Food Insecurity (4/7/2025)    Hunger Vital Sign     Worried About Running Out of Food in the Last Year: Never true     Ran Out of Food in the Last Year: Never true   Transportation Needs: No Transportation Needs (4/7/2025)    PRAPARE - Transportation     Lack of Transportation (Medical): No     Lack of Transportation (Non-Medical): No   Physical Activity: Insufficiently Active (4/7/2025)    Exercise Vital Sign     Days of Exercise per Week: 3 days     Minutes of Exercise per Session: 10 min   Stress: No Stress Concern Present (4/7/2025)    [employer] [place] [employer] - Occupational Stress Questionnaire     Feeling of Stress : Only a little   Housing Stability: Low Risk  (4/7/2025)    Housing Stability Vital Sign     Unable to Pay for Housing in the Last Year: No     Number of Times Moved in the Last Year: 0     Homeless in the Last Year: No       Current Outpatient Medications   Medication Sig Dispense Refill    acetaminophen (TYLENOL) 325 MG tablet Take 650 mg by mouth every 6 (six) hours as needed.      albuterol (PROVENTIL/VENTOLIN HFA) 90 mcg/actuation inhaler Inhale 2 puffs into the lungs every 4 (four) hours as needed for Wheezing or Shortness of Breath. Rescue 8 g 0    amLODIPine (NORVASC) 2.5 MG tablet Take 2.5 mg by mouth.      bicalutamide (CASODEX) 50 MG Tab Take 1 tablet (50 mg total) by mouth once daily. 90 tablet 3    blood sugar diagnostic Strp 1 each by Misc.(Non-Drug; Combo Route) route  2 (two) times a day. 100 strip 5    BREZTRI AEROSPHERE 160-9-4.8 mcg/actuation HFAA Inhale into the lungs.      clotrimazole-betamethasone 1-0.05% (LOTRISONE) cream 2 (two) times daily.      docusate sodium (COLACE) 100 MG capsule Take 1 capsule (100 mg total) by mouth once daily. Take while on opiate pain medication  0    ELIQUIS 5 mg Tab TAKE 1 TABLET TWICE DAILY 180 tablet 3    empagliflozin (JARDIANCE) 10 mg tablet Take 1 tablet (10 mg total) by mouth once daily. 30 tablet 11    ferrous sulfate (IRON) 325 mg (65 mg iron) Tab tablet Take one table by mouth on Monday, Wednesday and Friday 30 tablet 1    fluticasone-umeclidin-vilanter (TRELEGY ELLIPTA) 100-62.5-25 mcg DsDv See Instructions, INHALE 1 PUFF BY MOUTH EVERY DAY AS DIRECTED, # 60 EA, 2 Refill(s), Pharmacy: [id number], 175, cm, 07/29/24 9:31:00 CDT, Height/Length Measured, 106.1, kg, 07/29/24 9:31:00 CDT, Weight Dosing      HYDROcodone-acetaminophen (NORCO) 7.5-325 mg per tablet TAKE 1 TABLET BY MOUTH EVERY 6 HOURS AS NEEDED FOR PAIN DO NOT OPERATE MACHINERY, OR TAKE OTHER MEDS LIKE TRAMADOL WHILE ON THIS DRUG      hydrocortisone 2.5 % ointment APPLY TO EAR TWICE DAILY X2 WEEKS      lancets (MICRO THIN LANCETS) 33 gauge Misc       metFORMIN (GLUCOPHAGE) 500 MG tablet Take 1 tablet by mouth 2 (two) times daily.      multivitamin with folic acid 400 mcg Tab Take 1 tablet by mouth once daily.      traMADoL (ULTRAM) 50 mg tablet Take 1 tablet (50 mg total) by mouth daily as needed for Pain. 30 tablet 2    zolpidem (AMBIEN) 5 MG Tab Take 1 tablet (5 mg total) by mouth nightly as needed (sleep). 30 tablet 2    blood-glucose meter (BLOOD GLUCOSE MONITORING) kit Use as instructed 1 each 0    CYMBALTA 20 mg capsule 20 mg.      doxycycline (VIBRA-TABS) 100 MG tablet  (Patient not taking: Reported on 5/27/2025)      gabapentin (NEURONTIN) 300 MG capsule Take 1 capsule (300 mg total) by mouth every evening. 90 capsule 2    pantoprazole (PROTONIX) 40  "MG tablet Take 1 tablet (40 mg total) by mouth once daily. 90 tablet 3    predniSONE (DELTASONE) 10 MG tablet Take 10 mg by mouth. (Patient not taking: Reported on 5/27/2025)       No current facility-administered medications for this visit.     Review of patient's allergies indicates:  No Known Allergies    Review of Systems   Cardiovascular:  Positive for leg swelling.   Musculoskeletal:  Positive for gait problem.        Cane   All other systems reviewed and are negative.      Objective:      Vitals:    05/27/25 0837   BP: 132/73   Pulse: 72   Resp: 18   Weight: 103.3 kg (227 lb 12.8 oz)   Height: 5' 9" (1.753 m)       Physical Exam  Vitals and nursing note reviewed.   Constitutional:       General: He is not in acute distress.     Appearance: Normal appearance.   Cardiovascular:      Pulses:           Dorsalis pedis pulses are 1+ on the right side and 1+ on the left side.        Posterior tibial pulses are 1+ on the right side and 1+ on the left side.   Musculoskeletal:      Right foot: Decreased range of motion.      Left foot: Decreased range of motion. Deformity (Contracted lesser digits with wdductovarus rotation 5th digit left.  Pes planus bilateral) present.      Comments: Limited mobility   Feet:      Right foot:      Toenail Condition: Right toenails are abnormally thick.      Left foot:      Skin integrity: Ulcer (small much improved preulcerative callus medial 5th digit left, raised, no discoloration) and callus present.      Toenail Condition: Left toenails are abnormally thick. Fungal disease present.  Skin:     General: Skin is dry.      Capillary Refill: Capillary refill takes 2 to 3 seconds.   Neurological:      General: No focal deficit present.      Mental Status: He is alert.      Comments: Numbness tingling paresthesias bilateral feet   Psychiatric:         Thought Content: Thought content normal.                        Assessment:       1. Adductovarus rotation of toe, acquired, left    2. " Pre-ulcerative calluses    3. Type 2 diabetes mellitus with hyperglycemia, unspecified whether long term insulin use    4. Edema of both lower extremities    5. Onychomycosis of toenail - Left Foot              Plan:             Reviewed rotation deformity of 5th toe causing pressure on the 4th contributing a chronic preulcerative callus in this location needs to be checked on a regular basis  Advised decrease swelling in his feet helps tremendously, there has not been as much friction between these toes over the last few months  Continue wide comfortable shoes  And advised patient it is very important when able he have somebody check between the toes as it typically starts to get more callus or become white/moist before he starts having pain  Any moisture needs to be treated with the iodine  Dry well between the toes  Advised patient he still is developing a raised callus in this area and needs to be checked and treated on a regular basis to prevent complication  Preulcerative callus debrided medial 5th digit left  Reduced thickness of nail left hallux, , no other complications at this time  Skin much improved  Reviewed diabetic education  Reviewed diabetic foot care and potential complications  Check feet daily and contact office with any area of concern which has not improved in a few days  Patient was in understanding and agreement with treatment plan.  I counseled the patient on their conditions, implications and medical management.  Instructed patient to contact the office with any changes, questions, concerns, worsening of symptoms.   Total face to face time 20 minutes, exam, assessment, treatment, discussion, additional time for review of chart prior to and following appointment and visit documentation, consultation and coordination of care.   Follow up prn 3 months    This note was created using [employer] voice recognition software that occasionally misinterpreted phrases or words.

## 2025-07-22 ENCOUNTER — OFFICE VISIT (OUTPATIENT)
Dept: HEMATOLOGY/ONCOLOGY | Facility: CLINIC | Age: OVER 89
End: 2025-07-22
Payer: MEDICARE

## 2025-07-22 VITALS
SYSTOLIC BLOOD PRESSURE: 148 MMHG | BODY MASS INDEX: 34.12 KG/M2 | HEART RATE: 80 BPM | WEIGHT: 230.38 LBS | RESPIRATION RATE: 16 BRPM | TEMPERATURE: 97 F | DIASTOLIC BLOOD PRESSURE: 69 MMHG | HEIGHT: 69 IN | OXYGEN SATURATION: 99 %

## 2025-07-22 DIAGNOSIS — E66.01 CLASS 2 SEVERE OBESITY DUE TO EXCESS CALORIES WITH SERIOUS COMORBIDITY AND BODY MASS INDEX (BMI) OF 35.0 TO 35.9 IN ADULT: ICD-10-CM

## 2025-07-22 DIAGNOSIS — R97.20 ELEVATED PSA MEASUREMENT: ICD-10-CM

## 2025-07-22 DIAGNOSIS — C61 PROSTATE CANCER: ICD-10-CM

## 2025-07-22 DIAGNOSIS — E66.812 CLASS 2 SEVERE OBESITY DUE TO EXCESS CALORIES WITH SERIOUS COMORBIDITY AND BODY MASS INDEX (BMI) OF 35.0 TO 35.9 IN ADULT: ICD-10-CM

## 2025-07-22 PROBLEM — I27.82 OTHER CHRONIC PULMONARY EMBOLISM WITHOUT ACUTE COR PULMONALE: Status: RESOLVED | Noted: 2024-04-15 | Resolved: 2025-07-22

## 2025-07-22 PROCEDURE — 1159F MED LIST DOCD IN RCRD: CPT | Mod: CPTII,S$GLB,, | Performed by: INTERNAL MEDICINE

## 2025-07-22 PROCEDURE — 99213 OFFICE O/P EST LOW 20 MIN: CPT | Mod: S$GLB,,, | Performed by: INTERNAL MEDICINE

## 2025-07-22 PROCEDURE — 1101F PT FALLS ASSESS-DOCD LE1/YR: CPT | Mod: CPTII,S$GLB,, | Performed by: INTERNAL MEDICINE

## 2025-07-22 PROCEDURE — 99999 PR PBB SHADOW E&M-EST. PATIENT-LVL V: CPT | Mod: PBBFAC,,, | Performed by: INTERNAL MEDICINE

## 2025-07-22 PROCEDURE — 3288F FALL RISK ASSESSMENT DOCD: CPT | Mod: CPTII,S$GLB,, | Performed by: INTERNAL MEDICINE

## 2025-07-22 PROCEDURE — 1126F AMNT PAIN NOTED NONE PRSNT: CPT | Mod: CPTII,S$GLB,, | Performed by: INTERNAL MEDICINE

## 2025-07-22 RX ORDER — BICALUTAMIDE 50 MG/1
50 TABLET, FILM COATED ORAL DAILY
Qty: 90 TABLET | Refills: 3 | Status: SHIPPED | OUTPATIENT
Start: 2025-07-22 | End: 2026-07-22

## 2025-07-22 NOTE — PROGRESS NOTES
Service Date:  7/22/25    Chief Complaint:  change in meds (MYCHART FOLLOWUP/OFFICE VISIT)    nAthony Sheldon is a 89 y.o. male here with metastatic prostate cancer.  Has been on Lupron and Casodex for 4-5 of years.  Ran out of his Casodex.  Felt that he needed to make an appointment to get the refill.  No complaints to me.    Review of Systems   Constitutional: Negative.  Negative for appetite change and unexpected weight change.   HENT: Negative.  Negative for mouth sores.    Eyes: Negative.  Negative for visual disturbance.   Respiratory:  Negative for cough and shortness of breath.    Cardiovascular: Negative.  Negative for chest pain.   Gastrointestinal: Negative.  Negative for abdominal pain and diarrhea.   Endocrine: Negative.    Genitourinary: Negative.  Negative for frequency.   Musculoskeletal:  Negative for back pain.   Integumentary:  Negative for rash. Negative.   Neurological: Negative.  Negative for headaches.   Hematological: Negative.  Negative for adenopathy.   Psychiatric/Behavioral: Negative.  The patient is not nervous/anxious.         Current Outpatient Medications   Medication Instructions    acetaminophen (TYLENOL) 650 mg, Every 6 hours PRN    albuterol (PROVENTIL/VENTOLIN HFA) 90 mcg/actuation inhaler 2 puffs, Inhalation, Every 4 hours PRN, Rescue    amLODIPine (NORVASC) 2.5 mg    bicalutamide (CASODEX) 50 mg, Oral, Daily    blood sugar diagnostic Strp 1 each, Misc.(Non-Drug; Combo Route), 2 times daily    blood-glucose meter (BLOOD GLUCOSE MONITORING) kit Use as instructed    BREZTRI AEROSPHERE 160-9-4.8 mcg/actuation HFAA Inhale into the lungs.    clotrimazole-betamethasone 1-0.05% (LOTRISONE) cream 2 times daily    CYMBALTA 20 mg    docusate sodium (COLACE) 100 mg, Oral, Daily, Take while on opiate pain medication    ELIQUIS 5 mg, Oral, 2 times daily    empagliflozin (JARDIANCE) 10 mg, Oral, Daily    ferrous sulfate (IRON) 325 mg (65 mg iron) Tab tablet Take one table by mouth on  Monday, Wednesday and Friday    fluticasone-umeclidin-vilanter (TRELEGY ELLIPTA) 100-62.5-25 mcg DsDv See Instructions, INHALE 1 PUFF BY MOUTH EVERY DAY AS DIRECTED, # 60 EA, 2 Refill(s), Pharmacy: [facility] #[id number], 175, cm, 07/29/24 9:31:00 CDT, Height/Length Measured, 106.1, kg, 07/29/24 9:31:00 CDT, Weight Dosing    gabapentin (NEURONTIN) 300 mg, Oral, Nightly    HYDROcodone-acetaminophen (NORCO) 7.5-325 mg per tablet TAKE 1 TABLET BY MOUTH EVERY 6 HOURS AS NEEDED FOR PAIN DO NOT OPERATE MACHINERY, OR TAKE OTHER MEDS LIKE TRAMADOL WHILE ON THIS DRUG    hydrocortisone 2.5 % ointment APPLY TO EAR TWICE DAILY X2 WEEKS    lancets (MICRO THIN LANCETS) 33 gauge Misc     metFORMIN (GLUCOPHAGE) 500 MG tablet 1 tablet, 2 times daily    multivitamin with folic acid 400 mcg Tab 1 tablet, Daily    pantoprazole (PROTONIX) 40 mg, Oral, Daily    traMADoL (ULTRAM) 50 mg, Oral, Daily PRN    zolpidem (AMBIEN) 5 mg, Oral, Nightly PRN        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    Prostate cancer 05/2019    PSA elevation 10/7/2015    Wears glasses         Past Surgical History:   Procedure Laterality Date    BIOPSY WITH TRANSRECTAL ULTRASOUND (TRUS) GUIDANCE Bilateral 3/27/2019    Procedure: BIOPSY, WITH TRANSRECTAL US GUIDANCE;  Surgeon: Niall Blas MD;  Location: [facility] OR;  Service: Urology;  Laterality: Bilateral;    COLONOSCOPY N/A 1/13/2020    Procedure: COLONOSCOPY;  Surgeon: Charli Chowdhury MD;  Location: [facility] ENDO;  Service: General;  Laterality: N/A;    COLONOSCOPY N/A  1/31/2024    Procedure: COLONOSCOPY;  Surgeon: Robert Barreto MD;  Location: [facility];  Service: Endoscopy;  Laterality: N/A;    CYSTOSCOPY N/A 3/27/2019    Procedure: CYSTOSCOPY;  Surgeon: Niall Blas MD;  Location: [facility];  Service: Urology;  Laterality: N/A;    EPIDURAL STEROID INJECTION N/A 6/17/2019    Procedure: Injection, Steroid, Epidural - L5/S1 EPIDURAL STEROID INJECTION;  Surgeon: Chyna Valdovinos MD;  Location: [facility];  Service: Pain Management;  Laterality: N/A;    EPIDURAL STEROID INJECTION N/A 12/11/2019    Procedure: Injection, Steroid, Epidural - L5/S1 LUMBAR EPIDURAL STEROID INJECTION;  Surgeon: Jade Layton MD;  Location: [facility];  Service: Pain Management;  Laterality: N/A;  *FIRST CASE*    ESOPHAGOGASTRODUODENOSCOPY N/A 1/13/2020    Procedure: EGD (ESOPHAGOGASTRODUODENOSCOPY);  Surgeon: Charli Chowdhury MD;  Location: [facility];  Service: General;  Laterality: N/A;    ESOPHAGOGASTRODUODENOSCOPY N/A 1/30/2024    Procedure: EGD (ESOPHAGOGASTRODUODENOSCOPY);  Surgeon: Robert Barreto MD;  Location: [facility];  Service: Endoscopy;  Laterality: N/A;    INJECTION OF ANESTHETIC AGENT AROUND MEDIAL BRANCH NERVES INNERVATING LUMBAR FACET JOINT Bilateral 7/12/2021    Procedure: Block-nerve-medial branch-lumbar Bilateral L 3,4,5;  Surgeon: Jade Layton MD;  Location: [facility];  Service: Pain Management;  Laterality: Bilateral;    INJECTION OF JOINT Left 1/21/2019    Procedure: Injection, FACET JOINT INJECTION LEFT L4-5 AND L5-S1;  Surgeon: Chyna Valdovinos MD;  Location: [facility];  Service: Pain Management;  Laterality: Left;    JOINT REPLACEMENT      BILAT HIPS, RIGHT SHOULDER    KNEE ARTHROPLASTY Left     RADIOFREQUENCY THERMOCOAGULATION Bilateral 8/2/2021    Procedure: RADIOFREQUENCY THERMAL COAGULATION Bilateral L 3,4,5;  Surgeon: Jade Layton MD;  Location: [facility] OR;  Service: Pain Management;  Laterality: Bilateral;    TONSILLECTOMY  1950    TOTAL HIP ARTHROPLASTY  "Bilateral 2013 & 2014    TOTAL SHOULDER ARTHROPLASTY Right 2005        Family History   Problem Relation Name Age of Onset    Stroke Mother Sheldon     Bladder Cancer Father         Social History     Tobacco Use    Smoking status: Former     Current packs/day: 0.00     Types: Cigarettes    Smokeless tobacco: Former     Quit date: 1/1/1990   Substance Use Topics    Alcohol use: Yes     Alcohol/week: 12.0 standard drinks of alcohol     Types: 6 Glasses of wine, 6 Standard drinks or equivalent per week     Comment: 1 drink daily    Drug use: No         Vitals:    07/22/25 1436   BP: (!) 148/69   Pulse: 80   Resp: 16   Temp: 96.8 °F (36 °C)        Physical Exam:  BP (!) 148/69 (BP Location: Left arm, Patient Position: Sitting)   Pulse 80   Temp 96.8 °F (36 °C) (Temporal)   Resp 16   Ht 5' 9" (1.753 m)   Wt 104.5 kg (230 lb 6.1 oz)   SpO2 99%   BMI 34.02 kg/m²     Physical Exam  Vitals and nursing note reviewed.   Constitutional:       Appearance: Normal appearance.   HENT:      Head: Normocephalic and atraumatic.      Nose: Nose normal.      Mouth/Throat:      Mouth: Mucous membranes are moist.      Pharynx: Oropharynx is clear.   Eyes:      Extraocular Movements: Extraocular movements intact.      Conjunctiva/sclera: Conjunctivae normal.   Cardiovascular:      Rate and Rhythm: Normal rate and regular rhythm.      Heart sounds: Normal heart sounds.   Pulmonary:      Effort: Pulmonary effort is normal.      Breath sounds: Normal breath sounds.   Abdominal:      General: Abdomen is flat. Bowel sounds are normal.      Palpations: Abdomen is soft.   Musculoskeletal:         General: Normal range of motion.      Cervical back: Normal range of motion and neck supple.   Skin:     General: Skin is warm and dry.   Neurological:      General: No focal deficit present.      Mental Status: He is alert and oriented to person, place, and time. Mental status is at baseline.   Psychiatric:         Mood and Affect: Mood normal. "          Labs:  Lab Results   Component Value Date    WBC 5.06 04/11/2025    RBC 3.79 (L) 04/11/2025    HGB 11.9 (L) 04/11/2025    HCT 36.4 (L) 04/11/2025    MCV 96 04/11/2025    MCH 31.4 (H) 04/11/2025    MCHC 32.7 04/11/2025    RDW 13.2 04/11/2025     04/11/2025    MPV 9.2 04/11/2025    GRAN 5.7 01/16/2025    GRAN 89.6 (H) 01/16/2025    LYMPH 30.8 04/11/2025    LYMPH 1.56 04/11/2025    MONO 9.5 04/11/2025    MONO 0.48 04/11/2025    EOS 1.8 04/11/2025    EOS 0.09 04/11/2025    BASO 0.04 01/16/2025    EOSINOPHIL 0.3 01/16/2025    BASOPHIL 1.0 04/11/2025    BASOPHIL 0.05 04/11/2025     Sodium   Date Value Ref Range Status   04/11/2025 138 136 - 145 mmol/L Final   01/16/2025 143 136 - 145 mmol/L Final     Potassium   Date Value Ref Range Status   04/11/2025 4.4 3.5 - 5.1 mmol/L Final   01/16/2025 4.5 3.5 - 5.1 mmol/L Final     Chloride   Date Value Ref Range Status   04/11/2025 105 95 - 110 mmol/L Final   01/16/2025 107 95 - 110 mmol/L Final     CO2   Date Value Ref Range Status   04/11/2025 23 23 - 29 mmol/L Final   01/16/2025 20 (L) 23 - 29 mmol/L Final     Glucose   Date Value Ref Range Status   04/11/2025 123 (H) 70 - 110 mg/dL Final   01/16/2025 213 (H) 70 - 110 mg/dL Final     BUN   Date Value Ref Range Status   04/11/2025 27 (H) 8 - 23 mg/dL Final     Creatinine   Date Value Ref Range Status   04/11/2025 1.8 (H) 0.5 - 1.4 mg/dL Final     Calcium   Date Value Ref Range Status   04/11/2025 9.2 8.7 - 10.5 mg/dL Final   01/16/2025 9.0 8.7 - 10.5 mg/dL Final     Protein Total   Date Value Ref Range Status   04/11/2025 6.8 6.0 - 8.4 gm/dL Final     Total Protein   Date Value Ref Range Status   01/16/2025 7.1 6.0 - 8.4 g/dL Final     Albumin   Date Value Ref Range Status   04/11/2025 3.6 3.5 - 5.2 g/dL Final   01/16/2025 3.7 3.5 - 5.2 g/dL Final     Total Bilirubin   Date Value Ref Range Status   01/16/2025 0.6 0.1 - 1.0 mg/dL Final     Comment:     For infants and newborns, interpretation of results should be  based  on gestational age, weight and in agreement with clinical  observations.    Premature Infant recommended reference ranges:  Up to 24 hours.............<8.0 mg/dL  Up to 48 hours............<12.0 mg/dL  3-5 days..................<15.0 mg/dL  6-29 days.................<15.0 mg/dL       Bilirubin Total   Date Value Ref Range Status   04/11/2025 0.5 0.1 - 1.0 mg/dL Final     Comment:     For infants and newborns, interpretation of results should be based   on gestational age, weight and in agreement with clinical   observations.    Premature Infant recommended reference ranges:   0-24 hours:  <8.0 mg/dL   24-48 hours: <12.0 mg/dL   3-5 days:    <15.0 mg/dL   6-29 days:   <15.0 mg/dL     Alkaline Phosphatase   Date Value Ref Range Status   01/16/2025 56 40 - 150 U/L Final     ALP   Date Value Ref Range Status   04/11/2025 57 40 - 150 unit/L Final     AST   Date Value Ref Range Status   04/11/2025 13 11 - 45 unit/L Final   01/16/2025 11 10 - 40 U/L Final     ALT   Date Value Ref Range Status   04/11/2025 12 10 - 44 unit/L Final   01/16/2025 10 10 - 44 U/L Final     Anion Gap   Date Value Ref Range Status   04/11/2025 10 8 - 16 mmol/L Final     eGFR if    Date Value Ref Range Status   03/28/2022 47.7 (A) >60 mL/min/1.73 m^2 Final     eGFR if non    Date Value Ref Range Status   03/28/2022 41.3 (A) >60 mL/min/1.73 m^2 Final     Comment:     Calculation used to obtain the estimated glomerular filtration  rate (eGFR) is the CKD-EPI equation.          A/P:    Metastatic prostate cancer   -currently on Lupron and Casodex  -PSA is gradually rising, although still very low, now 0.15.  Once it gets above 2.0, would recommend repeat scanning with an F 18 PET scan to look for any metastatic worsening.  If so, then his Casodex could be switched with a second-generation oral ADT medication  -keep October appointment     Refilled Casodex today    Aurash Khoobehi, MD  Hematology and  Oncology

## 2025-08-11 ENCOUNTER — HOSPITAL ENCOUNTER (OUTPATIENT)
Dept: RADIOLOGY | Facility: HOSPITAL | Age: OVER 89
Discharge: HOME | End: 2025-08-11
Attending: NURSE PRACTITIONER
Payer: MEDICARE

## 2025-08-11 DIAGNOSIS — R06.02 SHORTNESS OF BREATH: Primary | ICD-10-CM

## 2025-08-11 DIAGNOSIS — R06.02 SHORTNESS OF BREATH: ICD-10-CM

## 2025-08-11 PROCEDURE — 71046 X-RAY EXAM CHEST 2 VIEWS: CPT | Mod: TC

## 2025-08-11 PROCEDURE — 71046 X-RAY EXAM CHEST 2 VIEWS: CPT | Mod: 26,,, | Performed by: RADIOLOGY

## 2025-08-26 ENCOUNTER — PATIENT MESSAGE (OUTPATIENT)
Dept: PODIATRY | Facility: CLINIC | Age: OVER 89
End: 2025-08-26
Payer: MEDICARE

## (undated) DEVICE — SEE MEDLINE ITEM 153151

## (undated) DEVICE — APPLICATOR CHLORAPREP CLR 10.5

## (undated) DEVICE — ELECTRODE REM PLYHSV RETURN 9

## (undated) DEVICE — DRAPE INCISE IOBAN 2 23X17IN

## (undated) DEVICE — CLOSURE SKIN STERI STRIP 1/2X4

## (undated) DEVICE — INTERPULSE SET

## (undated) DEVICE — KIT ENDO CARRY ON PROCEDURE

## (undated) DEVICE — SEE MEDLINE ITEM 107746

## (undated) DEVICE — KIT AUTO TRANSF 800ML RESVR

## (undated) DEVICE — SEE MEDLINE ITEM 146292

## (undated) DEVICE — SOL NACL IRR 1000ML BTL

## (undated) DEVICE — NDL TUOHY 20 X 3.5

## (undated) DEVICE — SET EXT W/2 VLVE PORTS 40

## (undated) DEVICE — BANDAGE ADHESIVE

## (undated) DEVICE — SEE MEDLINE ITEM 157131

## (undated) DEVICE — SYS LABEL CORRECT MED

## (undated) DEVICE — NDL SAFETY 25G X 1.5 ECLIPSE

## (undated) DEVICE — SUT STRATAFIX PDS 1 CTX 18IN

## (undated) DEVICE — BANDAGE ESMARK 6X12

## (undated) DEVICE — SEE MEDLINE ITEM 152622

## (undated) DEVICE — STRAP OR TABLE 5IN X 72IN

## (undated) DEVICE — DRAPE STERI-DRAPE 1000 17X11IN

## (undated) DEVICE — SOL IRR NACL .9% 3000ML

## (undated) DEVICE — SYR LUER SLIP GLASS 5ML

## (undated) DEVICE — UNDERGLOVE BIOGEL PI SZ 6.5 LF

## (undated) DEVICE — ALCOHOL 70% ISOP RUBBING 4OZ

## (undated) DEVICE — SEE MEDLINE ITEM 152530

## (undated) DEVICE — UNDERGLOVES BIOGEL PI SIZE 7.5

## (undated) DEVICE — GLOVE BIOGEL SZ 8 1/2

## (undated) DEVICE — SOL IRRI STRL WATER 1000ML

## (undated) DEVICE — SYR 10CC LUER LOCK

## (undated) DEVICE — DRAPE STERI INSTRUMENT 1018

## (undated) DEVICE — SEE MEDLINE ITEM 157117

## (undated) DEVICE — Device

## (undated) DEVICE — CHLORAPREP 3ML APPLICATOR TINT

## (undated) DEVICE — ADHESIVE MASTISOL VIAL 48/BX

## (undated) DEVICE — CANNULA RADIOPAQUE 20G CURVED

## (undated) DEVICE — BLADE SURG CARBON STEEL #10

## (undated) DEVICE — GAUZE SPONGE 4X4 12PLY

## (undated) DEVICE — SET CYSTO IRR DRP CHMBR 84IN

## (undated) DEVICE — GAUZE SPONGE BULKEE 6X6.75IN

## (undated) DEVICE — SOL 9P NACL IRR PIC IL

## (undated) DEVICE — NDL HYPODERMIC BLUNT 18G 1.5IN

## (undated) DEVICE — GLOVE SURG ULTRA TOUCH 7

## (undated) DEVICE — NDL SPINAL 25GX3.5 SPINOCAN

## (undated) DEVICE — BAG LINGEMAN DRAIN UROLOGY

## (undated) DEVICE — PAD GROUNDING DISPER ELECTRODE

## (undated) DEVICE — DRAIN SINGLE ROUND 3/16 15F

## (undated) DEVICE — KIT NERVE BLOCK PREP BAPTIST

## (undated) DEVICE — GLOVE SURG ULTRA TOUCH 7.5

## (undated) DEVICE — SYR SLIP TIP 5CC

## (undated) DEVICE — LINER SUCTION 3000CC

## (undated) DEVICE — BANDAGE ACE 6 NSTRL

## (undated) DEVICE — TOURNIQUET SB QC DP 34X4IN

## (undated) DEVICE — SEE MEDLINE ITEM 157185

## (undated) DEVICE — SEE MEDLINE ITEM 146271

## (undated) DEVICE — DRAPE XRAY CASSTTE 20X25

## (undated) DEVICE — CHLORAPREP 10.5 ML APPLICATOR

## (undated) DEVICE — PAD ABD 8X10 STERILE

## (undated) DEVICE — COLD THER SYS W/PAD UNV RH

## (undated) DEVICE — BLADE SAG 13.0 X1.27X90

## (undated) DEVICE — NDL SPINAL SPINOCAN 22GX3.5

## (undated) DEVICE — SUT 2-0 VICRYL / CT-1

## (undated) DEVICE — SYR DISP LL 5CC

## (undated) DEVICE — NDL TOUHY 18G X 5

## (undated) DEVICE — PADDING CAST SPECIALIST 6X4YD

## (undated) DEVICE — ADAPTER STOPCOCK FEMALE LL

## (undated) DEVICE — SLEEVE SCD EXPRESS CALF MEDIUM

## (undated) DEVICE — PACK LOWER EXTREMITY

## (undated) DEVICE — PACK CUSTOM UNIV BASIN SLI

## (undated) DEVICE — SEE MEDLINE ITEM 157166

## (undated) DEVICE — BLADE SAW SGTL 21.2X85.5X1.2

## (undated) DEVICE — TAPE SILK 3IN

## (undated) DEVICE — UNDERGLOVES BIOGEL PI SIZE 8.5

## (undated) DEVICE — SUT FIBERWIRE 2 38 IN TAPER

## (undated) DEVICE — SUT MONOCRYL 3-0 PS-1

## (undated) DEVICE — APPLICATOR CHLORAPREP ORN 26ML

## (undated) DEVICE — SHEET DRAPE MEDIUM

## (undated) DEVICE — SCRUB HIBICLENS 4% CHG 4OZ

## (undated) DEVICE — CANISTER SUCTION 3000CC

## (undated) DEVICE — SET SECONDARY UNIVERSAL

## (undated) DEVICE — PAD PREPS ALCOHOL 2-PLY LARGE

## (undated) DEVICE — GLOVE GAMMEX 7 PF STERILE